# Patient Record
Sex: FEMALE | Race: WHITE | NOT HISPANIC OR LATINO | Employment: OTHER | URBAN - METROPOLITAN AREA
[De-identification: names, ages, dates, MRNs, and addresses within clinical notes are randomized per-mention and may not be internally consistent; named-entity substitution may affect disease eponyms.]

---

## 2020-06-08 ENCOUNTER — TELEPHONE (OUTPATIENT)
Dept: FAMILY MEDICINE CLINIC | Facility: CLINIC | Age: 50
End: 2020-06-08

## 2020-06-09 NOTE — TELEPHONE ENCOUNTER
Mother called on behalf of this patient, mom states that she has a appointment with melly cordero 06/09/2020 did not mention a a time. No information was given to mom at all. Mom states that her daughter vanessa was diagnosed 4 + years ago with bipolar issues, mom states that she draws a disability check but don't think that's the problem. She has also been diagnosed with spinal stenosis. 3 years ago she was suppose to have surgery for it to see if it would help then her mom had a stroke. Vanessa mom said that she didn't want to have the surgery because she needed to care for her mom and not her self so she canceled the surgery. She has seen a spine doctor in the past but not recently. She walks with a walker. Mom said 4 months ago vanessa has been sitting in the middle of her bed ross crossed and wont get up to do anything. She said she got up to go to the bathroom and she fell on the floor she said her leg came out from under neath her she couldn't get up they had to dial 911. She said that has happened twice since. Mom told vanessa that she cant be calling 911 every time she falls. Mom says every time she goes to the bathroom and calls for her daughter sonal to come in there (shes 17 yr old), she thinks that the daughter helps her wipe her butt because they go in there and shut the door. Mom thinks that she has something else going like depression. She knows that this is putting a toll on everyone in the family and she needs help. Mom says that she has diabetic, thyroid, and bipolar issues. Patient mom was informed to have patient call out office to verify her appointment. ( pt is not scheduled), would not tell any information to mom per hippa

## 2021-11-22 ENCOUNTER — TELEPHONE (OUTPATIENT)
Dept: FAMILY MEDICINE CLINIC | Facility: CLINIC | Age: 51
End: 2021-11-22

## 2021-11-29 ENCOUNTER — TELEMEDICINE (OUTPATIENT)
Dept: FAMILY MEDICINE CLINIC | Facility: CLINIC | Age: 51
End: 2021-11-29

## 2021-11-29 VITALS — BODY MASS INDEX: 41.48 KG/M2 | HEIGHT: 65 IN | WEIGHT: 249 LBS

## 2021-11-29 DIAGNOSIS — D50.8 IRON DEFICIENCY ANEMIA SECONDARY TO INADEQUATE DIETARY IRON INTAKE: ICD-10-CM

## 2021-11-29 DIAGNOSIS — E53.8 VITAMIN B12 DEFICIENCY: ICD-10-CM

## 2021-11-29 DIAGNOSIS — E54 VITAMIN C DEFICIENCY: ICD-10-CM

## 2021-11-29 DIAGNOSIS — E11.42 DIABETIC PERIPHERAL NEUROPATHY (HCC): ICD-10-CM

## 2021-11-29 DIAGNOSIS — E03.9 ACQUIRED HYPOTHYROIDISM: ICD-10-CM

## 2021-11-29 DIAGNOSIS — Z11.59 ENCOUNTER FOR HEPATITIS C SCREENING TEST FOR LOW RISK PATIENT: ICD-10-CM

## 2021-11-29 DIAGNOSIS — B37.31 VAGINAL CANDIDIASIS: ICD-10-CM

## 2021-11-29 DIAGNOSIS — Z79.4 TYPE 2 DIABETES MELLITUS WITH DIABETIC POLYNEUROPATHY, WITH LONG-TERM CURRENT USE OF INSULIN (HCC): Primary | ICD-10-CM

## 2021-11-29 DIAGNOSIS — E61.1 IRON DEFICIENCY: ICD-10-CM

## 2021-11-29 DIAGNOSIS — E78.5 HYPERLIPIDEMIA, UNSPECIFIED HYPERLIPIDEMIA TYPE: ICD-10-CM

## 2021-11-29 DIAGNOSIS — M62.81 MUSCLE WEAKNESS: ICD-10-CM

## 2021-11-29 DIAGNOSIS — E55.9 VITAMIN D DEFICIENCY: ICD-10-CM

## 2021-11-29 DIAGNOSIS — E11.42 TYPE 2 DIABETES MELLITUS WITH DIABETIC POLYNEUROPATHY, WITH LONG-TERM CURRENT USE OF INSULIN (HCC): Primary | ICD-10-CM

## 2021-11-29 PROBLEM — M47.817 LUMBOSACRAL SPONDYLOSIS WITHOUT MYELOPATHY: Status: ACTIVE | Noted: 2017-02-24

## 2021-11-29 PROBLEM — M21.372 LEFT FOOT DROP: Status: ACTIVE | Noted: 2017-01-10

## 2021-11-29 PROBLEM — M54.32 SCIATICA OF LEFT SIDE: Status: ACTIVE | Noted: 2019-02-27

## 2021-11-29 PROBLEM — M48.061 SPINAL STENOSIS OF LUMBAR REGION: Status: ACTIVE | Noted: 2017-01-10

## 2021-11-29 PROCEDURE — 99214 OFFICE O/P EST MOD 30 MIN: CPT | Performed by: INTERNAL MEDICINE

## 2021-11-29 RX ORDER — INSULIN ASPART 100 [IU]/ML
INJECTION, SOLUTION INTRAVENOUS; SUBCUTANEOUS
Qty: 6 PEN | Refills: 4 | Status: SHIPPED | OUTPATIENT
Start: 2021-11-29

## 2021-11-29 RX ORDER — FLUCONAZOLE 100 MG/1
100 TABLET ORAL DAILY
Qty: 2 TABLET | Refills: 0 | Status: SHIPPED | OUTPATIENT
Start: 2021-11-29 | End: 2022-02-21

## 2021-11-29 RX ORDER — ATORVASTATIN CALCIUM 80 MG/1
80 TABLET, FILM COATED ORAL DAILY
Qty: 30 TABLET | Refills: 4 | Status: SHIPPED | OUTPATIENT
Start: 2021-11-29 | End: 2022-03-29 | Stop reason: SDUPTHER

## 2021-11-29 RX ORDER — EZETIMIBE 10 MG/1
TABLET ORAL
COMMUNITY
End: 2021-11-29 | Stop reason: SDUPTHER

## 2021-11-29 RX ORDER — EZETIMIBE 10 MG/1
10 TABLET ORAL DAILY
Qty: 30 TABLET | Refills: 4 | Status: SHIPPED | OUTPATIENT
Start: 2021-11-29 | End: 2022-05-02

## 2021-11-29 RX ORDER — LEVOTHYROXINE SODIUM 0.05 MG/1
50 TABLET ORAL DAILY
COMMUNITY
End: 2021-11-29 | Stop reason: SDUPTHER

## 2021-11-29 RX ORDER — INSULIN DEGLUDEC 200 U/ML
60 INJECTION, SOLUTION SUBCUTANEOUS DAILY
Qty: 3 PEN | Refills: 4 | Status: SHIPPED | OUTPATIENT
Start: 2021-11-29

## 2021-11-29 RX ORDER — INSULIN DEGLUDEC 200 U/ML
INJECTION, SOLUTION SUBCUTANEOUS
COMMUNITY
Start: 2021-10-04 | End: 2021-11-29 | Stop reason: SDUPTHER

## 2021-11-29 RX ORDER — LEVOTHYROXINE SODIUM 0.2 MG/1
200 TABLET ORAL DAILY
Qty: 30 TABLET | Refills: 4 | Status: SHIPPED | OUTPATIENT
Start: 2021-11-29 | End: 2022-03-29 | Stop reason: SDUPTHER

## 2021-11-29 RX ORDER — LEVOTHYROXINE SODIUM 0.2 MG/1
200 TABLET ORAL DAILY
COMMUNITY
End: 2021-11-29 | Stop reason: SDUPTHER

## 2021-11-29 RX ORDER — LEVOTHYROXINE SODIUM 0.05 MG/1
50 TABLET ORAL DAILY
Qty: 30 TABLET | Refills: 4 | Status: SHIPPED | OUTPATIENT
Start: 2021-11-29 | End: 2021-12-20 | Stop reason: SDUPTHER

## 2021-11-29 RX ORDER — ATORVASTATIN CALCIUM 80 MG/1
TABLET, FILM COATED ORAL
COMMUNITY
End: 2021-11-29 | Stop reason: SDUPTHER

## 2021-11-29 NOTE — PROGRESS NOTES
Subjective   Vanessa Root is a 51 y.o. female.     Chief Complaint   Patient presents with   • Diabetes     switch to pens, medication refill    • Vaginitis       History of Present Illness   You have chosen to receive care through a telehealth visit.  Do you consent to use a video/audio connection for your medical care today? Yes  Video visit completed utilizing [employer] video conferencing.  Patient is located within her home in [facility] and physician is located within the office in [facility]    Patient reestablishing physician.  Has been having weakness in the legs for over 2 years that was progressive.  Was hospitalized in [facility] 2020 with low vit B12 and was in rehab with little improvement.  Since home has had 2 rounds of in home PT.  Has been out of her meds for the last 2 months other than the insulin.  Last labs done about 5 months ago with [facility].  Has been seen by physical med/rehab and awaiting [facility] rehab in patient.  Basically is bedridden with weakness in the legs and numbness in the legs with poor balance.    Here for follow-up of diabetes.  Patient states to have been compliant with medications.  Blood sugar monitoring - patient states has been running on average over 200's.  With a range of 83 - 300.  No episodes of hypoglycemia, nausea, vomiting, new rashes, syncope or other issues.  Denies any difficulties with the current medication regimen of tresiba 60 mg daily and novolog sliding scale of 10-15 Units with each meal.  Januvia 100 mg and metformin 1000 mg BID.    Follow-up for thyroid.  Denies fatigue, weakness, constipation/diarrhea, hair/skin changes, weight gain/loss, depression/anxiety, rashes, palpitations, swelling, chest pain, shortness of breath or other issues.  Has been compliant with taking the medication of levothyroxine 250 mcg with no recent changes.  Denies any difficulty with the current medication.  Is due for  labs.    Follow-up for cholesterol.  Currently, has been feeling well without any myalgias, muscle aches, weakness, numbness, chest pain, short of breath or other issues.  Currently, is adherent with medication regimen of zetia 10 mg and atorvastatin 80 mg and denies medication side effects. Is due for lab follow-up.    History of WILLIS and using CPAP at night.    Patient is     The following portions of the patient's history were reviewed and updated as appropriate: allergies, current medications, past family history, past medical history, past social history, past surgical history and problem list.    Depression Screen:  No flowsheet data found.    Past Medical History:   Diagnosis Date   • Arthritis    • Bipolar 1 disorder (HCC)    • Cancer (HCC)     uterine   • Depression    • Diabetes mellitus (HCC)    • Frequent UTI    • GERD (gastroesophageal reflux disease)    • Hyperlipidemia    • Migraine    • Murmur, heart    • Ovarian cyst    • Stroke (HCC)        Past Surgical History:   Procedure Laterality Date   •  SECTION  2003   • HYSTERECTOMY     • TONSILLECTOMY         Family History   Problem Relation Age of Onset   • Arthritis Mother    • Diabetes Mother    • Migraines Mother    • Stroke Mother    • Arthritis Father    • Cancer Maternal Grandmother    • Thyroid disease Maternal Grandmother    • Cancer Maternal Grandfather    • Cancer Paternal Grandmother    • Thyroid disease Paternal Grandmother    • Cancer Paternal Grandfather        Social History     Socioeconomic History   • Marital status: Single   Tobacco Use   • Smoking status: Former Smoker   • Smokeless tobacco: Never Used   Substance and Sexual Activity   • Alcohol use: Never   • Drug use: Never   • Sexual activity: Defer       Current Outpatient Medications   Medication Sig Dispense Refill   • atorvastatin (LIPITOR) 80 MG tablet Take 1 tablet by mouth Daily. 30 tablet 4   • ezetimibe (ZETIA) 10 MG tablet Take 1 tablet by mouth Daily. 30  tablet 4   • levothyroxine (SYNTHROID, LEVOTHROID) 200 MCG tablet Take 1 tablet by mouth Daily. 30 tablet 4   • levothyroxine (SYNTHROID, LEVOTHROID) 50 MCG tablet Take 1 tablet by mouth Daily. 30 tablet 4   • metFORMIN (GLUCOPHAGE) 1000 MG tablet Take 1 tablet by mouth 2 (Two) Times a Day With Meals. 60 tablet 4   • SITagliptin (Januvia) 100 MG tablet Take 1 tablet by mouth Daily. 30 tablet 5   • Tresiba FlexTouch 200 UNIT/ML solution pen-injector pen injection Inject 60 Units under the skin into the appropriate area as directed Daily. 3 pen 4   • fluconazole (Diflucan) 100 MG tablet Take 1 tablet by mouth Daily. 2 tablet 0   • insulin aspart (NovoLOG FlexPen) 100 UNIT/ML solution pen-injector sc pen Use as directed up to 16 U with each meal 6 pen 4   • Insulin Pen Needle 32G X 4 MM misc Use as directed with insulin with meals and at night 200 each 4     No current facility-administered medications for this visit.       Review of Systems   Constitutional: Negative for activity change, appetite change, fatigue, fever, unexpected weight gain and unexpected weight loss.   HENT: Negative for nosebleeds, rhinorrhea, trouble swallowing and voice change.    Eyes: Positive for visual disturbance.   Respiratory: Negative for cough, chest tightness, shortness of breath and wheezing.    Cardiovascular: Negative for chest pain, palpitations and leg swelling.   Gastrointestinal: Negative for abdominal pain, blood in stool, constipation, diarrhea, nausea, vomiting, GERD and indigestion.   Endocrine: Positive for polyuria.   Genitourinary: Negative for dysuria, frequency and hematuria.        Vaginal itching   Musculoskeletal: Positive for back pain and gait problem. Negative for arthralgias and myalgias.   Skin: Negative for rash and bruise.   Neurological: Positive for weakness and numbness. Negative for dizziness, tremors, seizures, syncope, light-headedness, headache and memory problem.   Hematological: Negative for  "adenopathy. Does not bruise/bleed easily.   Psychiatric/Behavioral: Negative for sleep disturbance and depressed mood. The patient is not nervous/anxious.        Objective   Ht 165.1 cm (65\")   Wt 113 kg (249 lb)   BMI 41.44 kg/m²     Physical Exam   Constitutional: She appears well-developed. No distress.   Obese habitus   HENT:   Head: Normocephalic and atraumatic.   Eyes: Pupils are equal, round, and reactive to light. Conjunctivae and EOM are normal.   Pulmonary/Chest: Effort normal.  No respiratory distress.  Neurological: She is alert.   Skin: She is not diaphoretic.   Psychiatric: She has a normal mood and affect. Her behavior is normal. Thought content is normal. She does not express abnormal judgement.       No results found for this or any previous visit (from the past 2016 hour(s)).  Assessment/Plan   Diagnoses and all orders for this visit:    1. Type 2 diabetes mellitus with diabetic polyneuropathy, with long-term current use of insulin (HCC) (Primary)  -     Hemoglobin A1c; Future  -     Comprehensive Metabolic Panel; Future  -     Lipid Panel; Future  -     Microalbumin / Creatinine Urine Ratio - Urine, Clean Catch; Future  -     SITagliptin (Januvia) 100 MG tablet; Take 1 tablet by mouth Daily.  Dispense: 30 tablet; Refill: 5  -     metFORMIN (GLUCOPHAGE) 1000 MG tablet; Take 1 tablet by mouth 2 (Two) Times a Day With Meals.  Dispense: 60 tablet; Refill: 4  -     Tresiba FlexTouch 200 UNIT/ML solution pen-injector pen injection; Inject 60 Units under the skin into the appropriate area as directed Daily.  Dispense: 3 pen; Refill: 4  -     insulin aspart (NovoLOG FlexPen) 100 UNIT/ML solution pen-injector sc pen; Use as directed up to 16 U with each meal  Dispense: 6 pen; Refill: 4  -     Insulin Pen Needle 32G X 4 MM misc; Use as directed with insulin with meals and at night  Dispense: 200 each; Refill: 4    2. Hyperlipidemia, unspecified hyperlipidemia type  -     Comprehensive Metabolic Panel; " Future  -     Lipid Panel; Future  -     ezetimibe (ZETIA) 10 MG tablet; Take 1 tablet by mouth Daily.  Dispense: 30 tablet; Refill: 4  -     atorvastatin (LIPITOR) 80 MG tablet; Take 1 tablet by mouth Daily.  Dispense: 30 tablet; Refill: 4    3. Acquired hypothyroidism  -     TSH; Future  -     T4, Free; Future  -     levothyroxine (SYNTHROID, LEVOTHROID) 50 MCG tablet; Take 1 tablet by mouth Daily.  Dispense: 30 tablet; Refill: 4  -     levothyroxine (SYNTHROID, LEVOTHROID) 200 MCG tablet; Take 1 tablet by mouth Daily.  Dispense: 30 tablet; Refill: 4    4. Vitamin B12 deficiency  -     Vitamin B12 & Folate; Future    5. Vitamin D deficiency  -     Vitamin D 25 Hydroxy; Future    6. Vitamin C deficiency  -     Vitamin C; Future    7. Iron deficiency anemia secondary to inadequate dietary iron intake  -     CBC & Differential  -     Iron and TIBC; Future  -     Ferritin; Future    8. Iron deficiency  -     CBC & Differential  -     Iron and TIBC; Future  -     Ferritin; Future    9. Diabetic peripheral neuropathy (HCC)    10. Encounter for hepatitis C screening test for low risk patient  -     Hepatitis C Antibody; Future    11. Vaginal candidiasis  -     fluconazole (Diflucan) 100 MG tablet; Take 1 tablet by mouth Daily.  Dispense: 2 tablet; Refill: 0    12. Muscle weakness    We will try to obtain past medical history and labs from  internal medicine office.  Also try and obtain the MRIs that apparently were done at [facility].  Patient has care tenders that is scheduled to be coming to see her.  We will see if we can have them perform labs as she is unable to leave the home have labs done.  She will need labs including A1c, B12, vitamin C, vitamin D, CMP, lipid panel, microalbumin, hepatitis C screen, iron studies and CBC.  I discussed with her the importance of controlling her blood sugars and taking her medications.  We will restart all medications and likely repeat the labs in 3 months.  Hopefully,  she will be able to get in with the [facility] rehab as is the plan with her physical medicine rehab physician.  I did recommend that she follow-up with the eye doctor and proper foot care to be performed at home.  Colon cancer screening will not be done until she is 52 as she has Medicare and the only covered 50 and older.      Video visit completed.     I spent 64 minutes caring for Vanessa on this date of service. This time includes time spent by me in the following activities:preparing for the visit, obtaining and/or reviewing a separately obtained history, performing a medically appropriate examination and/or evaluation , counseling and educating the patient/family/caregiver, ordering medications, tests, or procedures, referring and communicating with other health care professionals  and documenting information in the medical record

## 2021-12-10 ENCOUNTER — TELEPHONE (OUTPATIENT)
Dept: FAMILY MEDICINE CLINIC | Facility: CLINIC | Age: 51
End: 2021-12-10

## 2021-12-10 NOTE — TELEPHONE ENCOUNTER
[facility]ren low [facility] went for pt evaluation on 12/09/2021 and is needing verbal orders for skilled nursing visits 1 time per week for 5 weeks    [facility] can be contacted at   [phone]

## 2021-12-20 ENCOUNTER — TELEPHONE (OUTPATIENT)
Dept: FAMILY MEDICINE CLINIC | Facility: CLINIC | Age: 51
End: 2021-12-20

## 2021-12-20 DIAGNOSIS — E03.9 ACQUIRED HYPOTHYROIDISM: ICD-10-CM

## 2021-12-20 DIAGNOSIS — E55.9 VITAMIN D DEFICIENCY: Primary | ICD-10-CM

## 2021-12-20 RX ORDER — CHOLECALCIFEROL (VITAMIN D3) 1250 MCG
50000 CAPSULE ORAL
Qty: 12 CAPSULE | Refills: 3 | Status: SHIPPED | OUTPATIENT
Start: 2021-12-20

## 2021-12-20 RX ORDER — LEVOTHYROXINE SODIUM 0.07 MG/1
TABLET ORAL
Qty: 30 TABLET | Refills: 11 | Status: SHIPPED | OUTPATIENT
Start: 2021-12-20

## 2021-12-20 NOTE — TELEPHONE ENCOUNTER
Patient states she has been off of medication for 2 months.  She said she used to get it from her home health dr but she hadn't seen him in a while so she hasn't been able to take it.  She said when she was on it, she was on 250 so she is confused about the dosage.  Also, she says she hasn't taken her diabetic medication for 2 months and was wondering about those results, but I didn't see any results in her labs.  Her number is [phone]

## 2021-12-20 NOTE — TELEPHONE ENCOUNTER
[facility]    low vit D, take VIT D ONE PILL Q WEEK, rx was called in   Also thyroid is uncontrolled and I increased her thyroid medication from 50 mcg to 75 mg, new rx was sent.   Discuss with  when he is back.

## 2021-12-28 ENCOUNTER — TELEMEDICINE (OUTPATIENT)
Dept: FAMILY MEDICINE CLINIC | Facility: CLINIC | Age: 51
End: 2021-12-28

## 2021-12-28 VITALS — BODY MASS INDEX: 41.44 KG/M2 | HEIGHT: 65 IN | TEMPERATURE: 97 F

## 2021-12-28 DIAGNOSIS — E83.51 HYPOCALCEMIA: ICD-10-CM

## 2021-12-28 DIAGNOSIS — E78.5 HYPERLIPIDEMIA, UNSPECIFIED HYPERLIPIDEMIA TYPE: ICD-10-CM

## 2021-12-28 DIAGNOSIS — E03.9 ACQUIRED HYPOTHYROIDISM: ICD-10-CM

## 2021-12-28 DIAGNOSIS — E54 VITAMIN C DEFICIENCY: ICD-10-CM

## 2021-12-28 DIAGNOSIS — E11.42 TYPE 2 DIABETES MELLITUS WITH DIABETIC POLYNEUROPATHY, WITH LONG-TERM CURRENT USE OF INSULIN (HCC): Primary | ICD-10-CM

## 2021-12-28 DIAGNOSIS — Z79.4 TYPE 2 DIABETES MELLITUS WITH DIABETIC POLYNEUROPATHY, WITH LONG-TERM CURRENT USE OF INSULIN (HCC): Primary | ICD-10-CM

## 2021-12-28 DIAGNOSIS — E55.9 VITAMIN D DEFICIENCY: ICD-10-CM

## 2021-12-28 PROCEDURE — 99214 OFFICE O/P EST MOD 30 MIN: CPT | Performed by: INTERNAL MEDICINE

## 2021-12-28 RX ORDER — MULTIVIT WITH MINERALS/LUTEIN
250 TABLET ORAL DAILY
Qty: 30 TABLET | Refills: 6 | Status: SHIPPED | OUTPATIENT
Start: 2021-12-28

## 2021-12-28 RX ORDER — FLUTICASONE PROPIONATE 50 MCG
2 SPRAY, SUSPENSION (ML) NASAL DAILY
Qty: 16 G | Refills: 6 | Status: SHIPPED | OUTPATIENT
Start: 2021-12-28

## 2021-12-28 RX ORDER — IBUPROFEN 800 MG/1
800 TABLET ORAL EVERY 6 HOURS PRN
Qty: 90 TABLET | Refills: 3 | Status: SHIPPED | OUTPATIENT
Start: 2021-12-28

## 2021-12-28 NOTE — PROGRESS NOTES
Subjective   Vanessa Root is a 51 y.o. female.     Chief Complaint   Patient presents with   • Hypothyroidism   • Pain       History of Present Illness   You have chosen to receive care through a telehealth visit.  Do you consent to use a video/audio connection for your medical care today? Yes  Video visit completed utilizing [employer] video conferencing. Most of the visit patient could see me but I can only see her intermittently. Audio connection was clear throughout  .  Patient is located within the home in [facility] and physician is located within the office in [facility].    Patient had been off the medications for at least 2 months prior to labs. Is now back on the medications as noted.    Here for follow-up of diabetes.  Patient states to have been compliant with medications.  Blood sugar monitoring - patient states has been running on average around 150.  With a range of 137-251.  No episodes of hypoglycemia, nausea, vomiting, new rashes, syncope or other issues.  Denies any difficulties with the current medication regimen of tresiba 60 mg daily and novolog sliding scale of 10-15 Units with each meal.  Januvia 100 mg and metformin 1000 mg BID.  Labs done on 12/9/2021 demonstrated a blood sugar of 284 but unfortunately A1c was not done.     Follow-up for thyroid.  Denies fatigue, weakness, constipation/diarrhea, hair/skin changes, weight gain/loss, depression/anxiety, rashes, palpitations, swelling, chest pain, shortness of breath or other issues.  Has been compliant with taking the medication of levothyroxine 250 mcg with no recent changes.  Denies any difficulty with the current medication.  Labs done on 12/9/2021 demonstrated TSH of 35.71 and a free T4 of 4.4.     Follow-up for cholesterol.  Currently, has been feeling well without any myalgias, muscle aches, weakness, numbness, chest pain, short of breath or other issues.  Currently, is adherent with medication regimen of zetia 10  mg and atorvastatin 80 mg and denies medication side effects.  Labs done on 2021 demonstrated an LDL of 47.8 with a total cholesterol 116, triglycerides 201, HDL 28.     History of WILLIS and using CPAP at night.    Patient with low calcium and vitamin D with calcium level of 8.2 and vitamin D level of 11 based on results from 2021 and a low vitamin C of 0.3.    Patient has started the physical therapy with only 2 visits to date and more to come.    The following portions of the patient's history were reviewed and updated as appropriate: allergies, current medications, past family history, past medical history, past social history, past surgical history and problem list.    Depression Screen:  No flowsheet data found.    Past Medical History:   Diagnosis Date   • Arthritis    • Bipolar 1 disorder (HCC)    • Cancer (HCC)     uterine   • Depression    • Diabetes mellitus (HCC)    • Frequent UTI    • GERD (gastroesophageal reflux disease)    • Hyperlipidemia    • Migraine    • Murmur, heart    • Ovarian cyst    • Stroke (HCC)        Past Surgical History:   Procedure Laterality Date   •  SECTION  2003   • HYSTERECTOMY     • TONSILLECTOMY         Family History   Problem Relation Age of Onset   • Arthritis Mother    • Diabetes Mother    • Migraines Mother    • Stroke Mother    • Arthritis Father    • Cancer Maternal Grandmother    • Thyroid disease Maternal Grandmother    • Cancer Maternal Grandfather    • Cancer Paternal Grandmother    • Thyroid disease Paternal Grandmother    • Cancer Paternal Grandfather        Social History     Socioeconomic History   • Marital status: Single   Tobacco Use   • Smoking status: Former Smoker   • Smokeless tobacco: Never Used   Substance and Sexual Activity   • Alcohol use: Never   • Drug use: Never   • Sexual activity: Defer       Current Outpatient Medications   Medication Sig Dispense Refill   • atorvastatin (LIPITOR) 80 MG tablet Take 1 tablet by mouth Daily.  30 tablet 4   • Cholecalciferol (Vitamin D3) 1.25 MG (66367 UT) capsule Take 1 capsule by mouth Every 7 (Seven) Days. 12 capsule 3   • ezetimibe (ZETIA) 10 MG tablet Take 1 tablet by mouth Daily. 30 tablet 4   • insulin aspart (NovoLOG FlexPen) 100 UNIT/ML solution pen-injector sc pen Use as directed up to 16 U with each meal 6 pen 4   • Insulin Pen Needle 32G X 4 MM misc Use as directed with insulin with meals and at night 200 each 4   • levothyroxine (SYNTHROID, LEVOTHROID) 200 MCG tablet Take 1 tablet by mouth Daily. 30 tablet 4   • levothyroxine (SYNTHROID, LEVOTHROID) 75 MCG tablet Take one a day. 30 tablet 11   • metFORMIN (GLUCOPHAGE) 1000 MG tablet Take 1 tablet by mouth 2 (Two) Times a Day With Meals. 60 tablet 4   • SITagliptin (Januvia) 100 MG tablet Take 1 tablet by mouth Daily. 30 tablet 5   • Tresiba FlexTouch 200 UNIT/ML solution pen-injector pen injection Inject 60 Units under the skin into the appropriate area as directed Daily. 3 pen 4   • fluconazole (Diflucan) 100 MG tablet Take 1 tablet by mouth Daily. 2 tablet 0   • fluticasone (Flonase) 50 MCG/ACT nasal spray 2 sprays into the nostril(s) as directed by provider Daily. 16 g 6   • ibuprofen (ADVIL,MOTRIN) 800 MG tablet Take 1 tablet by mouth Every 6 (Six) Hours As Needed for Mild Pain . Take with small amount of food. 90 tablet 3   • vitamin C (ASCORBIC ACID) 250 MG tablet Take 1 tablet by mouth Daily. 30 tablet 6     No current facility-administered medications for this visit.       Review of Systems   Constitutional: Negative for activity change, appetite change, fatigue, fever, unexpected weight gain and unexpected weight loss.   HENT: Negative for nosebleeds, rhinorrhea, trouble swallowing and voice change.    Eyes: Negative for visual disturbance.   Respiratory: Negative for cough, chest tightness, shortness of breath and wheezing.    Cardiovascular: Negative for chest pain, palpitations and leg swelling.   Gastrointestinal: Positive for  "constipation and diarrhea. Negative for abdominal pain, blood in stool, nausea, vomiting, GERD and indigestion.   Genitourinary: Negative for dysuria, frequency and hematuria.   Musculoskeletal: Negative for arthralgias, back pain and myalgias.   Skin: Negative for rash and bruise.   Neurological: Positive for weakness. Negative for dizziness, tremors, light-headedness, numbness, headache and memory problem.   Hematological: Negative for adenopathy. Does not bruise/bleed easily.   Psychiatric/Behavioral: Negative for sleep disturbance and depressed mood. The patient is not nervous/anxious.        Objective   Temp 97 °F (36.1 °C)   Ht 165.1 cm (65\")   BMI 41.44 kg/m²     Physical Exam   Constitutional: She appears well-developed and well-nourished.   Pulmonary/Chest: Effort normal.  No respiratory distress.  Neurological: She is alert.   Psychiatric: She has a normal mood and affect. Her behavior is normal. Thought content is normal. She does not express abnormal judgement.       No results found for this or any previous visit (from the past 2016 hour(s)).  Assessment/Plan   Diagnoses and all orders for this visit:    1. Type 2 diabetes mellitus with diabetic polyneuropathy, with long-term current use of insulin (HCC) (Primary)    2. Hyperlipidemia, unspecified hyperlipidemia type    3. Acquired hypothyroidism    4. Vitamin D deficiency    5. Hypocalcemia    6. Vitamin C deficiency  -     vitamin C (ASCORBIC ACID) 250 MG tablet; Take 1 tablet by mouth Daily.  Dispense: 30 tablet; Refill: 6    Other orders  -     ibuprofen (ADVIL,MOTRIN) 800 MG tablet; Take 1 tablet by mouth Every 6 (Six) Hours As Needed for Mild Pain . Take with small amount of food.  Dispense: 90 tablet; Refill: 3  -     fluticasone (Flonase) 50 MCG/ACT nasal spray; 2 sprays into the nostril(s) as directed by provider Daily.  Dispense: 16 g; Refill: 6    We discussed at length her history including her labs. Type 2 diabetes uncontrolled but has " not been on her medications just like her thyroid vitamin D etc. We will see how she is when she is taking these on a more consistent basis. We discussed her movement and inactivity is likely cause underlying causes for many of her issues. I encouraged her and strongly to get up and move and hopefully the next time we can see her standing.    Noted low vitamin C and calcium levels. I recommend that she take vitamin C for which prescription sent to the pharmacy and a calcium supplement over-the-counter. Continue the vitamin D and thyroid hormone that she is now receiving.    Continue the current medications and encouraged diet and exercise as much as possible.  Continue the PT and home health.    Recheck the labs in 3 months for the A1c, CMP, lipid, vit D, Vit C, TSH, and Free T4.    Video visit completed.     I spent 37 minutes caring for Vanessa on this date of service. This time includes time spent by me in the following activities:preparing for the visit, reviewing tests, obtaining and/or reviewing a separately obtained history, counseling and educating the patient/family/caregiver, ordering medications, tests, or procedures and documenting information in the medical record

## 2021-12-29 ENCOUNTER — TELEPHONE (OUTPATIENT)
Dept: FAMILY MEDICINE CLINIC | Facility: CLINIC | Age: 51
End: 2021-12-29

## 2021-12-29 RX ORDER — CALCIUM CARB/VITAMIN D3/VIT K1 500-100-40
TABLET,CHEWABLE ORAL
COMMUNITY
Start: 2021-12-28

## 2022-02-17 DIAGNOSIS — B37.31 VAGINAL CANDIDIASIS: ICD-10-CM

## 2022-02-18 DIAGNOSIS — B37.31 VAGINAL CANDIDIASIS: ICD-10-CM

## 2022-02-18 RX ORDER — FLUCONAZOLE 100 MG/1
100 TABLET ORAL DAILY
Qty: 2 TABLET | Refills: 0 | OUTPATIENT
Start: 2022-02-18

## 2022-02-18 NOTE — TELEPHONE ENCOUNTER
Rx Refill Note  Requested Prescriptions     Refused Prescriptions Disp Refills   • fluconazole (DIFLUCAN) 100 MG tablet [Pharmacy Med Name: FLUCONAZOLE 100MG TABLETS] 2 tablet 0     Sig: TAKE 1 TABLET BY MOUTH DAILY     Refused By: ALPA BAH     Reason for Refusal: Other      Last office visit with prescribing clinician: Visit date not found      Next office visit with prescribing clinician: 2/18/2022            Alpa Bah MA  02/18/22, 13:39 EST

## 2022-02-19 DIAGNOSIS — B37.31 VAGINAL CANDIDIASIS: ICD-10-CM

## 2022-02-20 DIAGNOSIS — B37.31 VAGINAL CANDIDIASIS: ICD-10-CM

## 2022-02-21 DIAGNOSIS — B37.31 VAGINAL CANDIDIASIS: ICD-10-CM

## 2022-02-21 RX ORDER — FLUCONAZOLE 100 MG/1
100 TABLET ORAL DAILY
Qty: 2 TABLET | Refills: 0 | OUTPATIENT
Start: 2022-02-21

## 2022-02-21 RX ORDER — FLUCONAZOLE 100 MG/1
100 TABLET ORAL DAILY
Qty: 2 TABLET | Refills: 0 | Status: SHIPPED | OUTPATIENT
Start: 2022-02-21

## 2022-03-29 ENCOUNTER — TELEMEDICINE (OUTPATIENT)
Dept: FAMILY MEDICINE CLINIC | Facility: CLINIC | Age: 52
End: 2022-03-29

## 2022-03-29 DIAGNOSIS — K21.9 GASTROESOPHAGEAL REFLUX DISEASE, UNSPECIFIED WHETHER ESOPHAGITIS PRESENT: ICD-10-CM

## 2022-03-29 DIAGNOSIS — M62.81 MUSCLE WEAKNESS: ICD-10-CM

## 2022-03-29 DIAGNOSIS — E03.9 ACQUIRED HYPOTHYROIDISM: ICD-10-CM

## 2022-03-29 DIAGNOSIS — E11.42 TYPE 2 DIABETES MELLITUS WITH DIABETIC POLYNEUROPATHY, WITH LONG-TERM CURRENT USE OF INSULIN: ICD-10-CM

## 2022-03-29 DIAGNOSIS — I63.9 CEREBROVASCULAR ACCIDENT (CVA), UNSPECIFIED MECHANISM: ICD-10-CM

## 2022-03-29 DIAGNOSIS — Z79.4 TYPE 2 DIABETES MELLITUS WITH DIABETIC POLYNEUROPATHY, WITH LONG-TERM CURRENT USE OF INSULIN: ICD-10-CM

## 2022-03-29 DIAGNOSIS — E55.9 VITAMIN D DEFICIENCY: Primary | ICD-10-CM

## 2022-03-29 DIAGNOSIS — E54 VITAMIN C DEFICIENCY: ICD-10-CM

## 2022-03-29 DIAGNOSIS — M21.372 LEFT FOOT DROP: ICD-10-CM

## 2022-03-29 DIAGNOSIS — Z74.09 IMMOBILITY: ICD-10-CM

## 2022-03-29 DIAGNOSIS — E66.01 MORBID OBESITY: ICD-10-CM

## 2022-03-29 DIAGNOSIS — E78.5 HYPERLIPIDEMIA, UNSPECIFIED HYPERLIPIDEMIA TYPE: ICD-10-CM

## 2022-03-29 PROBLEM — C55 MALIGNANT NEOPLASM OF UTERUS: Status: ACTIVE | Noted: 2022-03-29

## 2022-03-29 PROCEDURE — 99214 OFFICE O/P EST MOD 30 MIN: CPT | Performed by: INTERNAL MEDICINE

## 2022-03-29 RX ORDER — LEVOTHYROXINE SODIUM 0.2 MG/1
200 TABLET ORAL DAILY
Qty: 30 TABLET | Refills: 4 | Status: SHIPPED | OUTPATIENT
Start: 2022-03-29

## 2022-03-29 RX ORDER — ATORVASTATIN CALCIUM 80 MG/1
80 TABLET, FILM COATED ORAL DAILY
Qty: 30 TABLET | Refills: 4 | Status: SHIPPED | OUTPATIENT
Start: 2022-03-29

## 2022-03-29 RX ORDER — PANTOPRAZOLE SODIUM 40 MG/1
40 TABLET, DELAYED RELEASE ORAL DAILY
Qty: 30 TABLET | Refills: 3 | Status: SHIPPED | OUTPATIENT
Start: 2022-03-29 | End: 2022-08-10

## 2022-03-29 NOTE — PROGRESS NOTES
Subjective   Vanessa Root is a 52 y.o. female.     Chief Complaint   Patient presents with   • Home Health Care       History of Present Illness   You have chosen to receive care through a telehealth visit.  Do you consent to use a video/audio connection for your medical care today? Yes  Video visit completed utilizing  doximity but had to be changed to audio only.  Patient is located within the home in [facility] and physician is located within the office in [facility].    Unable to complete visit using a video connection to the patient. A phone visit was used to complete this visits. Total time of discussion was 40 minutes.    Patient still continues with difficulties with strength and not able to get out of bed.  She is homebound secondary to immobility.  She is unable to stand and walk on her own.  She requires medical transport for any kind of leaving of the home.  She has weakness throughout ever since she had a CVA per patient history.  She is try to get in the [facility] rehab but she has not been able to get in.  She is asking for help and so is the family.    Here for follow-up of diabetes.  Patient states to have been compliant with medications.  Blood sugar monitoring - patient states has been running on average around 150.  With a range of .  No episodes of hypoglycemia, nausea, vomiting, new rashes, syncope or other issues.  Denies any difficulties with the current medication regimen of tresiba 45 U daily and novolog sliding scale of 10 Units with each meal.  Januvia 100 mg and metformin 1000 mg BID.  Labs done on 12/9/2021 demonstrated a blood sugar of 284 but unfortunately A1c was not done.     Follow-up for thyroid.  Denies fatigue, weakness, constipation/diarrhea, hair/skin changes, weight gain/loss, depression/anxiety, rashes, palpitations, swelling, chest pain, shortness of breath or other issues.  Has been compliant with taking the medication of levothyroxine 275  mcg with no recent changes since 2021.  Denies any difficulty with the current medication.  Labs done on 2021 demonstrated TSH of 35.71 and a free T4 of 4.4.     Follow-up for cholesterol.  Currently, has been feeling well without any myalgias, muscle aches, weakness, numbness, chest pain, short of breath or other issues.  Currently, is adherent with medication regimen of zetia 10 mg and atorvastatin 80 mg and denies medication side effects.  Labs done on 2021 demonstrated an LDL of 47.8 with a total cholesterol 116, triglycerides 201, HDL 28.     History of WILLIS and using CPAP at night.     Patient with low calcium and vitamin D with calcium level of 8.2 and vitamin D level of 11 based on results from 2021 and a low vitamin C of 0.3.  Is currently on the vitamin D 50,000 units weekly and the vitamin C 250 mg daily     History of persisting GERD and is not taking any medications at this time.  Is asking for medication.    The following portions of the patient's history were reviewed and updated as appropriate: allergies, current medications, past family history, past medical history, past social history, past surgical history and problem list.    Depression Screen:  No flowsheet data found.    Past Medical History:   Diagnosis Date   • Arthritis    • Bipolar 1 disorder (HCC)    • Cancer (HCC)     uterine   • Depression    • Diabetes mellitus (HCC)    • Frequent UTI    • GERD (gastroesophageal reflux disease)    • Hyperlipidemia    • Migraine    • Murmur, heart    • Ovarian cyst    • Stroke (HCC)        Past Surgical History:   Procedure Laterality Date   •  SECTION  2003   • HYSTERECTOMY     • TONSILLECTOMY         Family History   Problem Relation Age of Onset   • Arthritis Mother    • Diabetes Mother    • Migraines Mother    • Stroke Mother    • Arthritis Father    • Cancer Maternal Grandmother    • Thyroid disease Maternal Grandmother    • Cancer Maternal Grandfather    • Cancer  "Paternal Grandmother    • Thyroid disease Paternal Grandmother    • Cancer Paternal Grandfather        Social History     Socioeconomic History   • Marital status: Single   Tobacco Use   • Smoking status: Former Smoker   • Smokeless tobacco: Never Used   Substance and Sexual Activity   • Alcohol use: Never   • Drug use: Never   • Sexual activity: Defer       Current Outpatient Medications   Medication Sig Dispense Refill   • atorvastatin (LIPITOR) 80 MG tablet Take 1 tablet by mouth Daily. 30 tablet 4   • levothyroxine (SYNTHROID, LEVOTHROID) 200 MCG tablet Take 1 tablet by mouth Daily. 30 tablet 4   • metFORMIN (GLUCOPHAGE) 1000 MG tablet Take 1 tablet by mouth 2 (Two) Times a Day With Meals. 60 tablet 4   • SITagliptin (Januvia) 100 MG tablet Take 1 tablet by mouth Daily. 30 tablet 5   • Cholecalciferol (Vitamin D3) 1.25 MG (22731 UT) capsule Take 1 capsule by mouth Every 7 (Seven) Days. 12 capsule 3   • ezetimibe (ZETIA) 10 MG tablet Take 1 tablet by mouth Daily. 30 tablet 4   • fluconazole (DIFLUCAN) 100 MG tablet TAKE 1 TABLET BY MOUTH DAILY 2 tablet 0   • fluticasone (Flonase) 50 MCG/ACT nasal spray 2 sprays into the nostril(s) as directed by provider Daily. 16 g 6   • ibuprofen (ADVIL,MOTRIN) 800 MG tablet Take 1 tablet by mouth Every 6 (Six) Hours As Needed for Mild Pain . Take with small amount of food. 90 tablet 3   • insulin aspart (NovoLOG FlexPen) 100 UNIT/ML solution pen-injector sc pen Use as directed up to 16 U with each meal 6 pen 4   • Insulin Pen Needle 32G X 4 MM misc Use as directed with insulin with meals and at night 200 each 4   • Insulin Syringe 31G X 5/16\" 1 ML misc      • levothyroxine (SYNTHROID, LEVOTHROID) 75 MCG tablet Take one a day. 30 tablet 11   • pantoprazole (PROTONIX) 40 MG EC tablet Take 1 tablet by mouth Daily. Take prior to dinner/evening mealtime 30 tablet 3   • Tresiba FlexTouch 200 UNIT/ML solution pen-injector pen injection Inject 60 Units under the skin into the " appropriate area as directed Daily. 3 pen 4   • vitamin C (ASCORBIC ACID) 250 MG tablet Take 1 tablet by mouth Daily. 30 tablet 6     No current facility-administered medications for this visit.       Review of Systems   Constitutional: Positive for fatigue. Negative for activity change, appetite change, fever, unexpected weight gain and unexpected weight loss.   HENT: Negative for nosebleeds, rhinorrhea, trouble swallowing and voice change.    Eyes: Negative for visual disturbance.   Respiratory: Negative for cough, chest tightness, shortness of breath and wheezing.    Cardiovascular: Negative for chest pain, palpitations and leg swelling.   Gastrointestinal: Positive for GERD. Negative for abdominal pain, blood in stool, constipation, diarrhea, nausea, vomiting and indigestion.   Genitourinary: Negative for dysuria, frequency and hematuria.   Musculoskeletal: Negative for arthralgias, back pain and myalgias.   Skin: Negative for rash and wound.   Neurological: Positive for weakness. Negative for dizziness, tremors, light-headedness, numbness, headache and memory problem.   Hematological: Negative for adenopathy. Does not bruise/bleed easily.   Psychiatric/Behavioral: Negative for sleep disturbance and depressed mood. The patient is not nervous/anxious.        Objective   There were no vitals taken for this visit.    Physical Exam   Constitutional:   Severely limited secondary audio only visit   Pulmonary/Chest: Effort normal.  No respiratory distress.  Neurological: She is alert.   Psychiatric: She has a normal mood and affect. Her behavior is normal. Thought content is normal. She does not express abnormal judgement.       No results found for this or any previous visit (from the past 2016 hour(s)).  Assessment/Plan   Diagnoses and all orders for this visit:    1. Vitamin D deficiency (Primary)  -     Vitamin D 25 Hydroxy; Future    2. Hyperlipidemia, unspecified hyperlipidemia type  -     atorvastatin (LIPITOR)  80 MG tablet; Take 1 tablet by mouth Daily.  Dispense: 30 tablet; Refill: 4  -     Comprehensive Metabolic Panel; Future  -     Lipid Panel; Future    3. Acquired hypothyroidism  -     levothyroxine (SYNTHROID, LEVOTHROID) 200 MCG tablet; Take 1 tablet by mouth Daily.  Dispense: 30 tablet; Refill: 4  -     TSH; Future  -     T4, Free; Future    4. Type 2 diabetes mellitus with diabetic polyneuropathy, with long-term current use of insulin (HCC)  -     metFORMIN (GLUCOPHAGE) 1000 MG tablet; Take 1 tablet by mouth 2 (Two) Times a Day With Meals.  Dispense: 60 tablet; Refill: 4  -     SITagliptin (Januvia) 100 MG tablet; Take 1 tablet by mouth Daily.  Dispense: 30 tablet; Refill: 5  -     Comprehensive Metabolic Panel; Future  -     Lipid Panel; Future  -     Hemoglobin A1c; Future    5. Vitamin C deficiency  -     Vitamin C; Future    6. Gastroesophageal reflux disease, unspecified whether esophagitis present  -     pantoprazole (PROTONIX) 40 MG EC tablet; Take 1 tablet by mouth Daily. Take prior to dinner/evening mealtime  Dispense: 30 tablet; Refill: 3    7. Muscle weakness  -     Ambulatory Referral to Home Health  -     Ambulatory Referral to Neurology    8. Cerebrovascular accident (CVA), unspecified mechanism (HCC)  -     Ambulatory Referral to Home Health  -     Ambulatory Referral to Neurology    9. Left foot drop  -     Ambulatory Referral to Home Health  -     Ambulatory Referral to Neurology    10. Immobility  -     Ambulatory Referral to Home Health  -     Ambulatory Referral to Neurology    11. Morbid obesity (HCC)  -     Ambulatory Referral to Home Health    Long discussion with patient concerning her care.  She requires multiple labs which have been ordered.  We are still trying to control her blood sugars which are uncontrolled with her diabetes.  We will see if we need home health to draw the blood and for the nursing to evaluate and for diabetic education.   as well to be involved for  trying to place patient in a rehab facility secondary to patient's immobility that is severe.  We will start pantoprazole for GERD symptoms.  And await lab results as above.      Video visit completed.  Time of visit in discussion and care of patient and one-on-one care not including charting time of 40 minutes.     I spent 40 minutes caring for Vanessa on this date of service. This time includes time spent by me in the following activities:preparing for the visit, reviewing tests, obtaining and/or reviewing a separately obtained history, counseling and educating the patient/family/caregiver, ordering medications, tests, or procedures, referring and communicating with other health care professionals  and documenting information in the medical record

## 2022-05-02 DIAGNOSIS — E78.5 HYPERLIPIDEMIA, UNSPECIFIED HYPERLIPIDEMIA TYPE: ICD-10-CM

## 2022-05-02 RX ORDER — EZETIMIBE 10 MG/1
10 TABLET ORAL DAILY
Qty: 30 TABLET | Refills: 4 | Status: SHIPPED | OUTPATIENT
Start: 2022-05-02

## 2022-05-02 NOTE — TELEPHONE ENCOUNTER
Rx Refill Note  Requested Prescriptions     Pending Prescriptions Disp Refills   • ezetimibe (ZETIA) 10 MG tablet [Pharmacy Med Name: EZETIMIBE 10MG TABLETS] 30 tablet 4     Sig: TAKE 1 TABLET BY MOUTH DAILY      Last office visit with prescribing clinician: 3/29/22      Next office visit with prescribing clinician: Visit date not found          {TIP  Is Refill Pharmacy correct?:23}  Lauren Lindo MA  05/02/22, 16:13 EDT

## 2022-05-05 DIAGNOSIS — E03.9 ACQUIRED HYPOTHYROIDISM: ICD-10-CM

## 2022-05-05 RX ORDER — LEVOTHYROXINE SODIUM 0.05 MG/1
50 TABLET ORAL DAILY
Qty: 30 TABLET | Refills: 4 | OUTPATIENT
Start: 2022-05-05

## 2022-05-05 NOTE — TELEPHONE ENCOUNTER
Rx Refill Note  Requested Prescriptions     Pending Prescriptions Disp Refills   • levothyroxine (SYNTHROID, LEVOTHROID) 50 MCG tablet [Pharmacy Med Name: LEVOTHYROXINE 0.05MG (50MCG) TAB] 30 tablet 4     Sig: TAKE 1 TABLET BY MOUTH DAILY      Last office visit with prescribing clinician: 3/29/22     Next office visit with prescribing clinician: Visit date not found      Patient has been increased to 200 MCG

## 2022-05-17 ENCOUNTER — TELEPHONE (OUTPATIENT)
Dept: FAMILY MEDICINE CLINIC | Facility: CLINIC | Age: 52
End: 2022-05-17

## 2022-05-17 NOTE — TELEPHONE ENCOUNTER
I do not believe that we have ordered this.  And we cannot do a full body MRI unless we have a significant indication otherwise.

## 2022-05-17 NOTE — TELEPHONE ENCOUNTER
Caller: Vanessa Root    Relationship: Self    Best call back number: [phone]    What orders are you requesting (i.e. lab or imaging): A FULL MRI    In what timeframe would the patient need to come in: SHE ALREADY HAS AN APPOINTMENT ON June 7TH FOR A LUMBAR MRI, CAMI FROM [facility] TOLD PATIENT TO ASK IF PCP CAN ADD NEW MRI ORDER IN WITH THE LUMBAR MRI SO IT WILL BE ONE COST.     Where will you receive your lab/imaging services: [facility].     Additional notes: PLEASE ADVISE.

## 2022-05-18 NOTE — TELEPHONE ENCOUNTER
OK FOR HUB      [facility]    Patient needs to contact the provider who ordered the first scan of the lumbar spine. Spoke with [facility] imaging and they state the provider is Fina Sue she can be reached at [phone]

## 2022-05-18 NOTE — TELEPHONE ENCOUNTER
Patient would like Dr Batista to order the cervical & thoracic MRI to go along with the Lumbar MRI that Dr Sue has ordered, Dr Sue stated patient would benefit form the cervical & thoracic but she wanted PCP to do the add on order

## 2022-05-24 NOTE — TELEPHONE ENCOUNTER
History of Present Illness





- History of Present Illness


History of Present Illness: 


INFECTIOUS DISEASE CONSULT;


HPI.





63-year-old male with history of HTN, diabetes mellitus, arthritis,   right leg 

NONHEALING WOUND who presented to [facility] with abdominal pains, nausea 

and vomiting , diarrhea and sinus bradycardia with heart rate in 40s and 38.


Patient also has history off substance abuse and states he used heroin one week 

ago.


Infectious disease consult requested by PMD for a right leg wound which is drai

mansoor purulent material.


Patient complains of subjective fevers and chills.  Denies any headaches.


Patient was treated with appropriate cultures with Zosyn one dose and one dose 

of Flagyl 500 mg.


PATIENT DENIES COUGH OR SHORTNESS OF BREATH OR CHEST PAIN.


PATIENT STATES HE HAD AN APPOINTMENT WITH DR ISLAS, WHO GIVES HER oXYcONTIN FOR 

PAIN BUT BEFORE THE APPOINTMENT HE FELT SICK AND CAME TO THE ER WHERE HE WAS 

ADVISED ADMISSION.


PATIENT WAS ALSO FOUND ON CT OF THE ABDOMEN AND PELVIS WITH MILD THICKENING AND 

EDEMA RIGHT COLON SUGGESTIVE OF COLITIS.





PMH: Arthritis, Fractures (Right leg), HTN


Family History: States: No Known Family Hx





- Social History


Hx Alcohol Use: Yes


Hx Substance Use: Yes





- Immunization History


Hx Tetanus Toxoid Vaccination: No


Hx Influenza Vaccination: No


Hx Pneumococcal Vaccination: No








Review of Systems





- Constitutional


Constitutional: absent: Chills, Fever





- EENT


Nose/Mouth/Throat: absent: Mouth Lesions





- Cardiovascular


Cardiovascular: Slow Heart Rate.  absent: Chest Pain, Dyspnea





- Gastrointestinal


Gastrointestinal: Abdominal Pain, Diarrhea, Nausea, Vomiting





- Genitourinary


Genitourinary: absent: Dysuria





- Musculoskeletal


Musculoskeletal: As Per HPI, Abnormal Gait (WALKS WITH A CANE.)





- Neurological


Neurological: absent: Headaches





- Hematologic/Lymphatic


Hematologic: As Per HPI.  absent: Lymphadenopathy





Past Patient History





- Past Social History


Smoking Status: Heavy Smoker > 10 Cigarettes Daily





- CARDIAC


Hx Hypertension: Yes





- ENDOCRINE/METABOLIC


Hx Diabetes Mellitus Type 2: Yes





- MUSCULOSKELETAL/RHEUMATOLOGICAL


Hx Arthritis: Yes


Hx Fractures: Yes (Right leg)





- PSYCHIATRIC


Hx Substance Use: Yes





- SURGICAL HISTORY


Hx Orthopedic Surgery: Yes (Right leg)





- ANESTHESIA


Hx Anesthesia: Yes


Hx Anesthesia Reactions: No





Meds


Allergies/Adverse Reactions: 


                                    Allergies











Allergy/AdvReac Type Severity Reaction Status Date / Time


 


No Known Allergies Allergy   Verified 12/21/18 12:14














- Medications


Medications: 


                               Current Medications





Insulin Human Regular (Novolin R)  0 unit SC ACHS JARVIS; Protocol


Tramadol HCl (Ultram)  50 mg PO Q8 PRN


   PRN Reason: Pain, severe (8-10)











Physical Exam





- Constitutional


Appears: No Acute Distress


Additional comments: 





MORBIDLY OBESE





- Head Exam


Head Exam: NORMAL INSPECTION





- Eye Exam


Eye Exam: EOMI, PERRL





- ENT Exam


ENT Exam: Normal Oropharynx





- Neck Exam


Neck exam: Positive for: Normal Inspection





- Respiratory Exam


Respiratory Exam: Clear to Auscultation Bilateral, NORMAL BREATHING PATTERN





- Cardiovascular Exam


Cardiovascular Exam: Bradycardia, +S1, +S2 (HR 42-38/MIN)





- GI/Abdominal Exam


GI & Abdominal Exam: Normal Bowel Sounds, Soft, Tenderness (EPIGASTRIUM,MID 

ABDOMEN)





- Extremities Exam


Extremities exam: Positive for: pedal pulses present (WEAK. LINEAR ULCERATION 

7CM LONG ANTERIOR SHIN.SERO-SANGUINOUS DRAINAGE +VE RT LE).  Negative for: calf 

tenderness, pedal edema





- Neurological Exam


Neurological exam: Abnormal Gait, Alert, CN II-XII Intact, Oriented x3, Reflexes

Normal (AMBULATES WITH CANE.)





- Skin


Skin Exam: Normal Color, Warm





Results





- Vital Signs


Recent Vital Signs: 


                                Last Vital Signs











Temp  98.9 F   12/21/18 18:27


 


Pulse  41 L  12/21/18 18:27


 


Resp  12   12/21/18 18:27


 


BP  139/61   12/21/18 18:27


 


Pulse Ox  99   12/21/18 18:27














- Labs


Result Diagrams: 


                                 12/23/18 08:14





                                 12/23/18 08:14


Labs: 


                         Laboratory Results - last 24 hr











  12/21/18 12/21/18 12/21/18





  12:13 13:03 13:03


 


WBC   6.3 


 


RBC   4.31 L 


 


Hgb   13.1 


 


Hct   38.1 


 


MCV   88.5 


 


MCH   30.5 


 


MCHC   34.5 


 


RDW   15.9 H 


 


Plt Count   311 


 


MPV   8.4 


 


Neut % (Auto)   82.0 H 


 


Lymph % (Auto)   13.9 L 


 


Mono % (Auto)   3.3 


 


Eos % (Auto)   0.0 


 


Baso % (Auto)   0.8 


 


Neut # (Auto)   5.2 


 


Lymph # (Auto)   0.9 L 


 


Mono # (Auto)   0.2 


 


Eos # (Auto)   0.0 


 


Baso # (Auto)   0.1 


 


Sodium    137


 


Potassium    3.5 L


 


Chloride    105


 


Carbon Dioxide    18 L


 


Anion Gap    17


 


BUN    12


 


Creatinine    0.9


 


Est GFR ( Amer)    > 60


 


Est GFR (Non-Af Amer)    > 60


 


POC Glucose (mg/dL)  149 H  


 


Random Glucose    160 H D


 


Calcium    9.6


 


Total Bilirubin    0.9


 


AST    20


 


ALT    10 L D


 


Alkaline Phosphatase    109


 


Total Creatine Kinase    139


 


CK-MB (Mass)    1.30


 


Troponin I    < 0.0120


 


Total Protein    8.5 H


 


Albumin    4.6


 


Globulin    3.9


 


Albumin/Globulin Ratio    1.2


 


Lipase    65


 


Urine Color   


 


Urine Clarity   


 


Urine pH   


 


Ur Specific Gravity   


 


Urine Protein   


 


Urine Glucose (UA)   


 


Urine Ketones   


 


Urine Blood   


 


Urine Nitrate   


 


Urine Bilirubin   


 


Urine Urobilinogen   


 


Ur Leukocyte Esterase   


 


Urine WBC (Auto)   


 


Urine RBC (Auto)   


 


Ur Squamous Epith Cells   


 


Urine Bacteria   


 


Urine Opiates Screen   


 


Urine Methadone Screen   


 


Ur Barbiturates Screen   


 


Ur Phencyclidine Scrn   


 


Ur Amphetamines Screen   


 


U Benzodiazepines Scrn   


 


U Oth Cocaine Metabols   


 


U Cannabinoids Screen   














  12/21/18 12/21/18





  14:39 14:39


 


WBC  


 


RBC  


 


Hgb  


 


Hct  


 


MCV  


 


MCH  


 


MCHC  


 


RDW  


 


Plt Count  


 


MPV  


 


Neut % (Auto)  


 


Lymph % (Auto)  


 


Mono % (Auto)  


 


Eos % (Auto)  


 


Baso % (Auto)  


 


Neut # (Auto)  


 


Lymph # (Auto)  


 


Mono # (Auto)  


 


Eos # (Auto)  


 


Baso # (Auto)  


 


Sodium  


 


Potassium  


 


Chloride  


 


Carbon Dioxide  


 


Anion Gap  


 


BUN  


 


Creatinine  


 


Est GFR ( Amer)  


 


Est GFR (Non-Af Amer)  


 


POC Glucose (mg/dL)  


 


Random Glucose  


 


Calcium  


 


Total Bilirubin  


 


AST  


 


ALT  


 


Alkaline Phosphatase  


 


Total Creatine Kinase  


 


CK-MB (Mass)  


 


Troponin I  


 


Total Protein  


 


Albumin  


 


Globulin  


 


Albumin/Globulin Ratio  


 


Lipase  


 


Urine Color  Clau 


 


Urine Clarity  Clear 


 


Urine pH  5.0 


 


Ur Specific Gravity  1.033 H 


 


Urine Protein  2+ H 


 


Urine Glucose (UA)  Normal 


 


Urine Ketones  1+ H 


 


Urine Blood  Negative 


 


Urine Nitrate  Negative 


 


Urine Bilirubin  1+ H 


 


Urine Urobilinogen  2.0 


 


Ur Leukocyte Esterase  Neg 


 


Urine WBC (Auto)  3 


 


Urine RBC (Auto)  3 


 


Ur Squamous Epith Cells  3 


 


Urine Bacteria  Rare 


 


Urine Opiates Screen   Positive H


 


Urine Methadone Screen   Negative


 


Ur Barbiturates Screen   Negative


 


Ur Phencyclidine Scrn   Negative


 


Ur Amphetamines Screen   Negative


 


U Benzodiazepines Scrn   Positive


 


U Oth Cocaine Metabols   Negative


 


U Cannabinoids Screen   Negative














- Imaging and Cardiology


  ** CT scan - abdomenAND PELVIS WITH DOUBT BY MOUT OR iv CONTRAST


Status: Report reviewed by me (MILD THICKENING EDEMA RIGHT COLON ? COLITIS.)





Assessment & Plan


(1) Ulcer of right lower leg


Assessment and Plan: 


PANCULTURES


ESR


CRP.


wOUND CULTURES.


START iv ROCEPHIN 1 G EVERY 12 HOURLY.12/21/18.


F/U CULTURES TO ADJUST ANTIBIOTICS.


Status: Acute   





(2) Lower extremity pain, right


Status: Acute   





(3) Sinus bradycardia


Assessment and Plan: 


PATIENT PRESENTLY HAS SINUS BRADYCARDIA, BUT BLOOD PRESSURE IS MAINTAINED 

130/60.


PATIENT REQUESTING FOR OXYCONTIN.


PATIENT TO GET PSYCHIATRY EVALUATION FOR POSSIBLE OPIOID WITHDRAWAL.


Status: Acute   





(4) Colitis


Assessment and Plan: 


STOOLS FOR C. DIFFICILE TOXIN.


ct OF THE ABDOMEN AND PELVIS REVIEWED SHOWS RIGHT SIDED COLITIS.


wILL FOLLOW c. DIFFICILE TOXIN.





Status: Acute   





(5) Diabetes mellitus


Assessment and Plan: 


PATIENT VERY NONCOMPLIANT.  pRESENTLY STATES HAS NOT BEEN TAKING HIS  DIABETIC 

PILLS.


Status: Acute Patient called back regarding MRI. She said Fina Watson is ordering the lower lumbar MRI and states her pcp would need to order the full body MRI.  She said they are trying to figure out why she can't walk.  Her phone number [phone].

## 2022-05-24 NOTE — TELEPHONE ENCOUNTER
There is no mention of this in Ms. Fina ruiz office notes.  Please call their office and see what they are wanting us to do as normally were not the ones that order this when they are coming to the spine.  They usually are.

## 2022-05-25 ENCOUNTER — TELEPHONE (OUTPATIENT)
Dept: FAMILY MEDICINE CLINIC | Facility: CLINIC | Age: 52
End: 2022-05-25

## 2022-05-25 NOTE — TELEPHONE ENCOUNTER
[facility] returned Lauren's call and states they will only start with a lumbar and will only go up from there after she's been evaluated.

## 2022-05-26 ENCOUNTER — TELEPHONE (OUTPATIENT)
Dept: FAMILY MEDICINE CLINIC | Facility: CLINIC | Age: 52
End: 2022-05-26

## 2022-05-26 NOTE — TELEPHONE ENCOUNTER
[facility] can't accept the referral without a face to face appt documented, the appointment on 3/29/22 states video wasn't able to be done so the visit was telephone. Once a face to face has been done then they can take a verbal to get this referral going

## 2022-05-26 NOTE — TELEPHONE ENCOUNTER
OK for HUB to read and schedule:    LMTCB-  Your provider recommended a face to face appointment in order to have the home health referral completed. The appointment must be visual appointment.      Please call our office to schedule.   
[facility] called and needs a face to face before they can accept the patient  
normal (ped)...

## 2022-08-10 ENCOUNTER — TELEMEDICINE (OUTPATIENT)
Dept: FAMILY MEDICINE CLINIC | Facility: CLINIC | Age: 52
End: 2022-08-10

## 2022-08-10 DIAGNOSIS — Z12.11 SCREENING FOR COLON CANCER: ICD-10-CM

## 2022-08-10 DIAGNOSIS — E11.42 TYPE 2 DIABETES MELLITUS WITH DIABETIC POLYNEUROPATHY, WITH LONG-TERM CURRENT USE OF INSULIN: Primary | ICD-10-CM

## 2022-08-10 DIAGNOSIS — E53.8 VITAMIN B12 DEFICIENCY: ICD-10-CM

## 2022-08-10 DIAGNOSIS — E66.01 MORBID OBESITY: ICD-10-CM

## 2022-08-10 DIAGNOSIS — K21.9 GASTROESOPHAGEAL REFLUX DISEASE WITHOUT ESOPHAGITIS: ICD-10-CM

## 2022-08-10 DIAGNOSIS — D50.8 IRON DEFICIENCY ANEMIA SECONDARY TO INADEQUATE DIETARY IRON INTAKE: ICD-10-CM

## 2022-08-10 DIAGNOSIS — M21.372 LEFT FOOT DROP: ICD-10-CM

## 2022-08-10 DIAGNOSIS — M62.81 MUSCLE WEAKNESS: ICD-10-CM

## 2022-08-10 DIAGNOSIS — E03.9 ACQUIRED HYPOTHYROIDISM: ICD-10-CM

## 2022-08-10 DIAGNOSIS — M62.3 IMMOBILITY SYNDROME: ICD-10-CM

## 2022-08-10 DIAGNOSIS — Z79.4 TYPE 2 DIABETES MELLITUS WITH DIABETIC POLYNEUROPATHY, WITH LONG-TERM CURRENT USE OF INSULIN: Primary | ICD-10-CM

## 2022-08-10 DIAGNOSIS — E55.9 VITAMIN D DEFICIENCY: ICD-10-CM

## 2022-08-10 DIAGNOSIS — E54 VITAMIN C DEFICIENCY: ICD-10-CM

## 2022-08-10 DIAGNOSIS — E78.5 HYPERLIPIDEMIA, UNSPECIFIED HYPERLIPIDEMIA TYPE: ICD-10-CM

## 2022-08-10 DIAGNOSIS — I63.9 CEREBROVASCULAR ACCIDENT (CVA), UNSPECIFIED MECHANISM: ICD-10-CM

## 2022-08-10 PROCEDURE — 99214 OFFICE O/P EST MOD 30 MIN: CPT | Performed by: INTERNAL MEDICINE

## 2022-08-10 RX ORDER — OMEPRAZOLE 40 MG/1
40 CAPSULE, DELAYED RELEASE ORAL DAILY
Qty: 30 CAPSULE | Refills: 3 | Status: SHIPPED | OUTPATIENT
Start: 2022-08-10

## 2022-08-10 NOTE — PROGRESS NOTES
Subjective   Vanessa Root is a 52 y.o. female.     No chief complaint on file.      History of Present Illness   You have chosen to receive care through a telehealth visit.  Do you consent to use a video/audio connection for your medical care today? Yes  Video visit completed utilizing [employer].  Patient is located within the home in [facility].  Physician located within the office in [facility].    Face-to-face visit for patient and need for home health care.  She is needing labs to be done as she is unable to leave the home.  She needs assistance with increasing her ability to even stand or do things within the home.    Patient continues with difficulties not being able to get out of bed.  She is homebound secondary to her immobility.  She is unable to stand and walk on her own she requires medical transport of any kind to leave the home.  Her mother has been trying to take care of her but is having difficulties doing this.  She has weakness throughout.  She has had a CVA in the past history and is try to get in with [facility] rehab but continues not to be able to get in.  Is having some blurred vision.  Has appointment with neurology on 11/23/22.    History of of diabetes.  Patient states to have been compliant with medications.  Blood sugar monitoring - patient states has been running on average around 160.  With a range of .  No episodes of hypoglycemia, nausea, vomiting, new rashes, syncope or other issues.  Denies any difficulties with the current medication regimen of tresiba 45 U daily and novolog sliding scale of 10 Units with each meal.  Januvia 100 mg and metformin 1000 mg BID.  Labs done on 12/9/2021 demonstrated a blood sugar of 284 but unfortunately A1c was not done.  Labs were reordered on 3/31/2022 but have never been done secondary to patient unable to leave the home to have any labs done.     Follow-up for thyroid.  Denies fatigue, weakness, constipation/diarrhea,  hair/skin changes, weight gain/loss, depression/anxiety, rashes, palpitations, swelling, chest pain, shortness of breath or other issues.  Has been compliant with taking the medication of levothyroxine 275 mcg with no recent changes since 2021.  Denies any difficulty with the current medication.  Labs done on 2021 demonstrated TSH of 35.71 and a free T4 of 4.4.     Follow-up for cholesterol.  Currently, has been feeling well without any myalgias, muscle aches, weakness, numbness, chest pain, short of breath or other issues.  Currently, is adherent with medication regimen of zetia 10 mg and atorvastatin 80 mg and denies medication side effects.  Labs done on 2021 demonstrated an LDL of 47.8 with a total cholesterol 116, triglycerides 201, HDL 28.     History of WILLIS and using CPAP at night.     Patient with low calcium and vitamin D with calcium level of 8.2 and vitamin D level of 11 based on results from 2021 and a low vitamin C of 0.3.  Is currently on the vitamin D 50,000 units weekly and the vitamin C 250 mg daily     History of persisting GERD and is currently on pantoprazole 40 mg daily.  Once started the blood sugar increased.    The following portions of the patient's history were reviewed and updated as appropriate: allergies, current medications, past family history, past medical history, past social history, past surgical history and problem list.    Depression Screen:  No flowsheet data found.    Past Medical History:   Diagnosis Date   • Arthritis    • Bipolar 1 disorder (HCC)    • Cancer (HCC)     uterine   • Depression    • Diabetes mellitus (HCC)    • Frequent UTI    • GERD (gastroesophageal reflux disease)    • Hyperlipidemia    • Migraine    • Murmur, heart    • Ovarian cyst    • Stroke (HCC)        Past Surgical History:   Procedure Laterality Date   •  SECTION  2003   • HYSTERECTOMY     • TONSILLECTOMY         Family History   Problem Relation Age of Onset   •  "Arthritis Mother    • Diabetes Mother    • Migraines Mother    • Stroke Mother    • Arthritis Father    • Cancer Maternal Grandmother    • Thyroid disease Maternal Grandmother    • Cancer Maternal Grandfather    • Cancer Paternal Grandmother    • Thyroid disease Paternal Grandmother    • Cancer Paternal Grandfather        Social History     Socioeconomic History   • Marital status: Single   Tobacco Use   • Smoking status: Former Smoker   • Smokeless tobacco: Never Used   Substance and Sexual Activity   • Alcohol use: Never   • Drug use: Never   • Sexual activity: Defer       Current Outpatient Medications   Medication Sig Dispense Refill   • atorvastatin (LIPITOR) 80 MG tablet Take 1 tablet by mouth Daily. 30 tablet 4   • Cholecalciferol (Vitamin D3) 1.25 MG (55144 UT) capsule Take 1 capsule by mouth Every 7 (Seven) Days. 12 capsule 3   • ezetimibe (ZETIA) 10 MG tablet TAKE 1 TABLET BY MOUTH DAILY 30 tablet 4   • fluconazole (DIFLUCAN) 100 MG tablet TAKE 1 TABLET BY MOUTH DAILY 2 tablet 0   • fluticasone (Flonase) 50 MCG/ACT nasal spray 2 sprays into the nostril(s) as directed by provider Daily. 16 g 6   • ibuprofen (ADVIL,MOTRIN) 800 MG tablet Take 1 tablet by mouth Every 6 (Six) Hours As Needed for Mild Pain . Take with small amount of food. 90 tablet 3   • insulin aspart (NovoLOG FlexPen) 100 UNIT/ML solution pen-injector sc pen Use as directed up to 16 U with each meal 6 pen 4   • Insulin Pen Needle 32G X 4 MM misc Use as directed with insulin with meals and at night 200 each 4   • Insulin Syringe 31G X 5/16\" 1 ML misc      • levothyroxine (SYNTHROID, LEVOTHROID) 200 MCG tablet Take 1 tablet by mouth Daily. 30 tablet 4   • levothyroxine (SYNTHROID, LEVOTHROID) 75 MCG tablet Take one a day. 30 tablet 11   • metFORMIN (GLUCOPHAGE) 1000 MG tablet Take 1 tablet by mouth 2 (Two) Times a Day With Meals. 60 tablet 4   • omeprazole (priLOSEC) 40 MG capsule Take 1 capsule by mouth Daily. 30 capsule 3   • SITagliptin " (Januvia) 100 MG tablet Take 1 tablet by mouth Daily. 30 tablet 5   • Tresiba FlexTouch 200 UNIT/ML solution pen-injector pen injection Inject 60 Units under the skin into the appropriate area as directed Daily. 3 pen 4   • vitamin C (ASCORBIC ACID) 250 MG tablet Take 1 tablet by mouth Daily. 30 tablet 6     No current facility-administered medications for this visit.       Review of Systems   Constitutional: Positive for fatigue. Negative for activity change, appetite change, fever, unexpected weight gain and unexpected weight loss.   HENT: Negative for nosebleeds, rhinorrhea, trouble swallowing and voice change.    Eyes: Positive for visual disturbance.   Respiratory: Negative for cough, chest tightness, shortness of breath and wheezing.    Cardiovascular: Negative for chest pain, palpitations and leg swelling.   Gastrointestinal: Negative for abdominal pain, blood in stool, constipation, diarrhea, nausea, vomiting, GERD and indigestion.   Genitourinary: Negative for dysuria, frequency and hematuria.   Musculoskeletal: Positive for back pain and gait problem. Negative for arthralgias and myalgias.        Diffuse weakness   Skin: Negative for rash and wound.   Neurological: Positive for weakness. Negative for dizziness, tremors, light-headedness, numbness, headache and memory problem.   Hematological: Negative for adenopathy. Does not bruise/bleed easily.   Psychiatric/Behavioral: Negative for sleep disturbance and depressed mood. The patient is not nervous/anxious.        Objective   There were no vitals taken for this visit.    Physical Exam   Constitutional: No distress.   Video visit completed while patient is laying in her bed in the home.  She cannot stand up on her own.  Cannot transfer on her own.   HENT:   Head: Normocephalic and atraumatic.   Eyes: Pupils are equal, round, and reactive to light. EOM are normal.   Pulmonary/Chest: Effort normal.  No respiratory distress.  Neurological: She is alert.    Skin: She is not diaphoretic.   Psychiatric: She has a normal mood and affect.   Currently talking clearly without any evidence of any issues and denies any depression or other issues.       No results found for this or any previous visit (from the past 2016 hour(s)).  Assessment & Plan   Diagnoses and all orders for this visit:    1. Type 2 diabetes mellitus with diabetic polyneuropathy, with long-term current use of insulin ([facility]) (Primary)  -     Ambulatory Referral to Home Health    2. Muscle weakness  -     Ambulatory Referral to Home Health    3. Cerebrovascular accident (CVA), unspecified mechanism ([facility])  -     Ambulatory Referral to Home Health    4. Left foot drop  -     Ambulatory Referral to Home Health    5. Immobility syndrome  -     Ambulatory Referral to Home Health    6. Morbid obesity ([facility])  -     Ambulatory Referral to Home Health    7. Gastroesophageal reflux disease without esophagitis  -     omeprazole (priLOSEC) 40 MG capsule; Take 1 capsule by mouth Daily.  Dispense: 30 capsule; Refill: 3    8. Hyperlipidemia, unspecified hyperlipidemia type    9. Acquired hypothyroidism    10. Vitamin B12 deficiency    11. Vitamin D deficiency    12. Vitamin C deficiency    13. Iron deficiency anemia secondary to inadequate dietary iron intake    14. Screening for colon cancer  -     Cologuard - Stool, Per Rectum; Future      Face-to-face visit completed via video conferencing through [employer].  We will have home health evaluate with skilled nursing, , occupational therapy.  Will need to have labs drawn likely in the home as patient is unable leave the house.  Will need assistance in scheduling and setting up transportation and in payment for .  We will have the dietitian also see hopefully to help with her diabetes and her weight.  She does need to see the neurologist but that is not until November.  If she has any acute changes in her vision weakness numbness talking out of her  head is to go to the emergency room by ambulance.  She will need all labs done that have been ordered as per in chart Which would include free T4 TSH, vitamin C, hemoglobin A1c, lipid panel, CMP.    Patient GERD symptoms not controlled with the pantoprazole.  We will have to try omeprazole 40 mg daily as per insurance coverage.  Prescription has been sent to the pharmacy.  She needs to be following her diet she needs to be elevating and she needs to be exercising the best that she can get that weight down which will help.    Video visit completed.     I spent 36 minutes caring for Vanessa on this date of service. This time includes time spent by me in the following activities:preparing for the visit, obtaining and/or reviewing a separately obtained history, performing a medically appropriate examination and/or evaluation , counseling and educating the patient/family/caregiver, ordering medications, tests, or procedures, referring and communicating with other health care professionals  and documenting information in the medical record

## 2022-08-12 ENCOUNTER — TELEPHONE (OUTPATIENT)
Dept: FAMILY MEDICINE CLINIC | Facility: CLINIC | Age: 52
End: 2022-08-12

## 2022-08-12 NOTE — TELEPHONE ENCOUNTER
fyi    -incoming call from [facility] asking for new verbal orders for skilled nursing for start of care for next week.      Verbal order previously given for start of care (skilled nursing) for today Friday 08/12)    [facility] was going to start care today 08/12/2022 pt declined pt danyel prefer start of care next week and needs new verbal order    Verbal order given with ma guidance emerson

## 2022-08-16 ENCOUNTER — TELEPHONE (OUTPATIENT)
Dept: FAMILY MEDICINE CLINIC | Facility: CLINIC | Age: 52
End: 2022-08-16

## 2022-08-29 ENCOUNTER — TELEPHONE (OUTPATIENT)
Dept: FAMILY MEDICINE CLINIC | Facility: CLINIC | Age: 52
End: 2022-08-29

## 2022-08-29 DIAGNOSIS — R11.0 NAUSEA: Primary | ICD-10-CM

## 2022-08-29 RX ORDER — ONDANSETRON 4 MG/1
4 TABLET, FILM COATED ORAL EVERY 8 HOURS PRN
Qty: 20 TABLET | Refills: 0 | Status: SHIPPED | OUTPATIENT
Start: 2022-08-29

## 2022-08-29 NOTE — TELEPHONE ENCOUNTER
Caller: Vanessa Root    Relationship: Self    Best call back number: [phone]      What medication are you requesting: SOMETHING FOR NAUSEA    What are your current symptoms: STOMACH VERY UPSET FROM COVID. STILL HAS A LOT OF CONGESTION BUILT UP AND IS MAKING HER STOMACH VERY UPSET.    How long have you been experiencing symptoms: ABOUT A WEEK AGO.    Have you had these symptoms before:    [x] Yes  [] No    Have you been treated for these symptoms before:   [x] Yes  [] No    If a prescription is needed, what is your preferred pharmacy and phone number: [id number] [place], [place] - [address]  AT [facility] [address]  - [phone]  - [phone] FX     Additional notes:  PATIENT TESTED POSITIVE FOR COVID ABOUT 2 WEEK AGO AND SHE IS STILL HAVING SYMPTOMS, SHE IS VERY NAUSEATED AND IS ASKING TO GET SOMETHING TO HELP WITH THAT. SHE FEELS LIKE ALL THE MUCUS AND CONGESTION IS MAKING HE STOMACH UPSET.

## 2023-01-08 DIAGNOSIS — Z79.4 TYPE 2 DIABETES MELLITUS WITH DIABETIC POLYNEUROPATHY, WITH LONG-TERM CURRENT USE OF INSULIN: ICD-10-CM

## 2023-01-08 DIAGNOSIS — E11.42 TYPE 2 DIABETES MELLITUS WITH DIABETIC POLYNEUROPATHY, WITH LONG-TERM CURRENT USE OF INSULIN: ICD-10-CM

## 2023-01-09 RX ORDER — INSULIN ASPART 100 [IU]/ML
INJECTION, SOLUTION INTRAVENOUS; SUBCUTANEOUS
Qty: 18 ML | OUTPATIENT
Start: 2023-01-09

## 2023-01-09 RX ORDER — FLUTICASONE PROPIONATE 50 MCG
SPRAY, SUSPENSION (ML) NASAL
Qty: 16 G | Refills: 6 | OUTPATIENT
Start: 2023-01-09

## 2023-01-09 RX ORDER — INSULIN DEGLUDEC 200 U/ML
INJECTION, SOLUTION SUBCUTANEOUS
Qty: 9 ML | OUTPATIENT
Start: 2023-01-09

## 2023-01-09 RX ORDER — IBUPROFEN 800 MG/1
TABLET ORAL
Qty: 90 TABLET | Refills: 3 | OUTPATIENT
Start: 2023-01-09

## 2023-01-09 NOTE — TELEPHONE ENCOUNTER
Rx Refill Note  Requested Prescriptions     Pending Prescriptions Disp Refills   • ibuprofen (ADVIL,MOTRIN) 800 MG tablet [Pharmacy Med Name: IBUPROFEN 800MG TABLETS] 90 tablet 3     Sig: TAKE 1 TABLET BY MOUTH EVERY 6 HOURS AS NEEDED FOR PAIN. TAKE WITH A SMALL AMOUNT OF FOOD      Last office visit with prescribing clinician: 03/29/2022   Last telemedicine visit with prescribing clinician: Visit date not found   Next office visit with prescribing clinician: Visit date not found  Last refill 12/28/2021

## 2023-01-10 NOTE — TELEPHONE ENCOUNTER
This patient needs to be seen and has to have labs before anybody can prescribe any insulin or other medications.  If she cannot leave the home then she will need to have the MD to you are home physicians coming to the home to see her and doing her labs.

## 2023-03-31 ENCOUNTER — OFFICE VISIT (OUTPATIENT)
Dept: NEUROLOGY | Facility: CLINIC | Age: 53
End: 2023-03-31
Payer: MEDICARE

## 2023-03-31 VITALS — BODY MASS INDEX: 41.44 KG/M2 | HEIGHT: 65 IN

## 2023-03-31 DIAGNOSIS — R29.898 WEAKNESS OF BOTH LOWER EXTREMITIES: Primary | ICD-10-CM

## 2023-03-31 NOTE — LETTER
"March 31, 2023       No Recipients    Patient: Vanessa Root   YOB: 1970   Date of Visit: 3/31/2023       Dear Dr. Henriquez Recipients:    Thank you for referring Vanessa Root to me for evaluation. Below are the relevant portions of my assessment and plan of care.    If you have questions, please do not hesitate to call me. I look forward to following Vanessa along with you.         Sincerely,        Ayo Carrasquillo II, MD        CC:   No Recipients    Ayo Carrasquillo II, MD  04/18/23 0942  Sign when Signing Visit  Chief Complaint   Patient presents with   • Numbness       Patient ID: Vanessa Root is a 53 y.o. female.    HPI: I had the pleasure of seeing your patient today.  As you may know she is a 53-year-old female here for evaluation of the inability to walk.  The patient says that her symptoms began in February 2020.  She says that in February she had some sort of respiratory infection and it was question whether she had COVID.  These symptoms continued with increasing difficulty walking and ambulating.  Her legs began to feel very tired and \"heavy\".  The symptoms were bilateral.  She is morbidly obese.  However she previously was able to ambulate.  Of note she has been diagnosed with congenital spinal stenosis.  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  She says in April she fell twice needing assistance from EMS to get back up and back into bed.  She contacted her PCP who prescribed physical therapy for her.  She apparently was hospitalized back in July of last year.  She says that some blood work was performed and no particular diagnosis was found however she was discharged to rehabilitation.  She had home health physical therapy upon returning back to home.  Although while she continued to have difficulties with walking.  She was discharged from home health PT due to the failure of improvement.  She apparently was supposed to have some type of " lumbosacral spine surgical intervention however the history is somewhat unclear.  She has no significant movement that she can describe that her legs.  No significant pain in her legs however she does have chronic back pain.  She does describe some urinary frequency and urgency however no incontinence.    The following portions of the patient's history were reviewed and updated as appropriate: allergies, current medications, past family history, past medical history, past social history, past surgical history and problem list.    Review of Systems   Constitutional: Positive for fatigue and fever.   HENT: Positive for congestion, dental problem, hearing loss, postnasal drip and sneezing.    Eyes: Positive for photophobia and visual disturbance.   Respiratory: Positive for apnea and cough.    Gastrointestinal: Positive for constipation, diarrhea and nausea.   Endocrine: Positive for cold intolerance, heat intolerance, polydipsia and polyuria.   Genitourinary: Positive for frequency and urgency.   Musculoskeletal: Positive for arthralgias, back pain, gait problem, myalgias and neck stiffness.   Allergic/Immunologic: Positive for environmental allergies and food allergies.   Neurological: Positive for dizziness, tremors, weakness, light-headedness, numbness and headaches.   Psychiatric/Behavioral: Positive for agitation, confusion, decreased concentration and sleep disturbance. The patient is nervous/anxious.       I have reviewed the review of systems above performed by my medical assistant.      There were no vitals filed for this visit.    Neurologic Exam     Mental Status   Oriented to person, place, and time.   Registration: recalls 3 of 3 objects. Follows 3 step commands.   Attention: normal. Concentration: normal.   Speech: speech is normal   Level of consciousness: alert  Knowledge: consistent with education (No deficits found.).   Normal comprehension.     Cranial Nerves     CN II   Visual fields full to  confrontation.     CN III, IV, VI   Pupils are equal, round, and reactive to light.  Extraocular motions are normal.   CN III: no CN III palsy  CN VI: no CN VI palsy  Nystagmus: none   Diplopia: none    CN V   Facial sensation intact.     CN VII   Facial expression full, symmetric.     CN VIII   CN VIII normal.     CN IX, X   CN IX normal.   CN X normal.     CN XI   CN XI normal.     CN XII   CN XII normal.     Motor Exam   Muscle bulk: normal  Right arm tone: normal  Left arm tone: normal  Right leg tone: normal  Left leg tone: normal    Strength   Right neck flexion: 5/5  Left neck flexion: 5/5  Right neck extension: 5/5  Left neck extension: 5/5  Right deltoid: 5/5  Left deltoid: 5/5  Right biceps: 5/5  Left biceps: 5/5  Right triceps: 5/5  Left triceps: 5/5  Right wrist flexion: 5/5  Left wrist flexion: 5/5  Right wrist extension: 5/5  Left wrist extension: 5/5  Right interossei: 5/5  Left interossei: 5/5  Right abdominals: 5/5  Left abdominals: 5/5  Right iliopsoas: 4/5  Left iliopsoas: 4/5  Right quadriceps: 3/5  Left quadriceps: 3/5  Right hamstring: 3/5  Left hamstring: 3/5  Right glutei: 3/5  Left glutei: 3/5  Right anterior tibial: 3/5  Left anterior tibial: 4/5  Right posterior tibial: 3/5  Left posterior tibial: 3/5  Right peroneal: 3/5  Left peroneal: 3/5  Right gastroc: 3/5  Left gastroc: 3/5    Sensory Exam   Right leg light touch: decreased from toes  Left leg light touch: decreased from toes  Right leg vibration: decreased from ankle  Left leg vibration: decreased from ankle  Right leg proprioception: decreased from toes  Left leg proprioception: decreased from toes  Right leg pinprick: decreased from ankle  Left leg pinprick: decreased from ankle    Gait, Coordination, and Reflexes     Gait  Gait: (Patient unable to ambulate.  Due to leg weakness.)    Coordination   Positive Romberg's test: unable to assess.    Tremor   Resting tremor: absent  Intention tremor: absent    Reflexes   Right  brachioradialis: 0  Left brachioradialis: 0  Right biceps: 0  Left biceps: 0  Right triceps: 0  Left triceps: 0  Right patellar: 0  Left patellar: 0  Right achilles: 0  Left achilles: 0  Right : 0  Left : 0Station is normal.       Physical Exam  Vitals reviewed.   Constitutional:       General: She is not in acute distress.     Appearance: She is well-developed.   HENT:      Head: Normocephalic and atraumatic.   Eyes:      Extraocular Movements: EOM normal.      Pupils: Pupils are equal, round, and reactive to light.   Cardiovascular:      Rate and Rhythm: Normal rate and regular rhythm.      Heart sounds: Normal heart sounds.   Pulmonary:      Effort: Pulmonary effort is normal. No respiratory distress.      Breath sounds: Normal breath sounds.   Abdominal:      General: Bowel sounds are normal. There is no distension.      Palpations: Abdomen is soft.      Tenderness: There is no abdominal tenderness.   Musculoskeletal:         General: No deformity. Normal range of motion.      Cervical back: Normal range of motion.   Skin:     General: Skin is warm.      Findings: No rash.   Neurological:      Mental Status: She is oriented to person, place, and time.      Coordination: Positive Romberg's test: unable to assess.      Deep Tendon Reflexes:      Reflex Scores:       Tricep reflexes are 0 on the right side and 0 on the left side.       Bicep reflexes are 0 on the right side and 0 on the left side.       Brachioradialis reflexes are 0 on the right side and 0 on the left side.       Patellar reflexes are 0 on the right side and 0 on the left side.       Achilles reflexes are 0 on the right side and 0 on the left side.  Psychiatric:         Speech: Speech normal.         Judgment: Judgment normal.         Procedures    Assessment/Plan: Our plan is to schedule an MRI of both lower extremities.  We will also schedule physical therapy/home health PT.  We are going to also schedule an EMG/nerve conduction study of  both lower extremities.  We will see her back after testing been completed.    Diagnoses and all orders for this visit:    1. Weakness of both lower extremities (Primary)  -     MRI lumbar spine w wo contrast; Future  -     EMG & Nerve Conduction Test; Future          Ayo Carrasquillo II, MD

## 2023-03-31 NOTE — PROGRESS NOTES
"Chief Complaint   Patient presents with   • Numbness       Patient ID: Vanessa Root is a 53 y.o. female.    HPI: I had the pleasure of seeing your patient today.  As you may know she is a 53-year-old female here for evaluation of the inability to walk.  The patient says that her symptoms began in February 2020.  She says that in February she had some sort of respiratory infection and it was question whether she had COVID.  These symptoms continued with increasing difficulty walking and ambulating.  Her legs began to feel very tired and \"heavy\".  The symptoms were bilateral.  She is morbidly obese.  However she previously was able to ambulate.  Of note she has been diagnosed with congenital spinal stenosis.  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  She says in April she fell twice needing assistance from EMS to get back up and back into bed.  She contacted her PCP who prescribed physical therapy for her.  She apparently was hospitalized back in July of last year.  She says that some blood work was performed and no particular diagnosis was found however she was discharged to rehabilitation.  She had home health physical therapy upon returning back to home.  Although while she continued to have difficulties with walking.  She was discharged from home health PT due to the failure of improvement.  She apparently was supposed to have some type of lumbosacral spine surgical intervention however the history is somewhat unclear.  She has no significant movement that she can describe that her legs.  No significant pain in her legs however she does have chronic back pain.  She does describe some urinary frequency and urgency however no incontinence.    The following portions of the patient's history were reviewed and updated as appropriate: allergies, current medications, past family history, past medical history, past social history, past surgical history and problem list.    Review " of Systems   Constitutional: Positive for fatigue and fever.   HENT: Positive for congestion, dental problem, hearing loss, postnasal drip and sneezing.    Eyes: Positive for photophobia and visual disturbance.   Respiratory: Positive for apnea and cough.    Gastrointestinal: Positive for constipation, diarrhea and nausea.   Endocrine: Positive for cold intolerance, heat intolerance, polydipsia and polyuria.   Genitourinary: Positive for frequency and urgency.   Musculoskeletal: Positive for arthralgias, back pain, gait problem, myalgias and neck stiffness.   Allergic/Immunologic: Positive for environmental allergies and food allergies.   Neurological: Positive for dizziness, tremors, weakness, light-headedness, numbness and headaches.   Psychiatric/Behavioral: Positive for agitation, confusion, decreased concentration and sleep disturbance. The patient is nervous/anxious.       I have reviewed the review of systems above performed by my medical assistant.      There were no vitals filed for this visit.    Neurologic Exam     Mental Status   Oriented to person, place, and time.   Registration: recalls 3 of 3 objects. Follows 3 step commands.   Attention: normal. Concentration: normal.   Speech: speech is normal   Level of consciousness: alert  Knowledge: consistent with education (No deficits found.).   Normal comprehension.     Cranial Nerves     CN II   Visual fields full to confrontation.     CN III, IV, VI   Pupils are equal, round, and reactive to light.  Extraocular motions are normal.   CN III: no CN III palsy  CN VI: no CN VI palsy  Nystagmus: none   Diplopia: none    CN V   Facial sensation intact.     CN VII   Facial expression full, symmetric.     CN VIII   CN VIII normal.     CN IX, X   CN IX normal.   CN X normal.     CN XI   CN XI normal.     CN XII   CN XII normal.     Motor Exam   Muscle bulk: normal  Right arm tone: normal  Left arm tone: normal  Right leg tone: normal  Left leg tone:  normal    Strength   Right neck flexion: 5/5  Left neck flexion: 5/5  Right neck extension: 5/5  Left neck extension: 5/5  Right deltoid: 5/5  Left deltoid: 5/5  Right biceps: 5/5  Left biceps: 5/5  Right triceps: 5/5  Left triceps: 5/5  Right wrist flexion: 5/5  Left wrist flexion: 5/5  Right wrist extension: 5/5  Left wrist extension: 5/5  Right interossei: 5/5  Left interossei: 5/5  Right abdominals: 5/5  Left abdominals: 5/5  Right iliopsoas: 4/5  Left iliopsoas: 4/5  Right quadriceps: 3/5  Left quadriceps: 3/5  Right hamstring: 3/5  Left hamstring: 3/5  Right glutei: 3/5  Left glutei: 3/5  Right anterior tibial: 3/5  Left anterior tibial: 4/5  Right posterior tibial: 3/5  Left posterior tibial: 3/5  Right peroneal: 3/5  Left peroneal: 3/5  Right gastroc: 3/5  Left gastroc: 3/5    Sensory Exam   Right leg light touch: decreased from toes  Left leg light touch: decreased from toes  Right leg vibration: decreased from ankle  Left leg vibration: decreased from ankle  Right leg proprioception: decreased from toes  Left leg proprioception: decreased from toes  Right leg pinprick: decreased from ankle  Left leg pinprick: decreased from ankle    Gait, Coordination, and Reflexes     Gait  Gait: (Patient unable to ambulate.  Due to leg weakness.)    Coordination   Positive Romberg's test: unable to assess.    Tremor   Resting tremor: absent  Intention tremor: absent    Reflexes   Right brachioradialis: 0  Left brachioradialis: 0  Right biceps: 0  Left biceps: 0  Right triceps: 0  Left triceps: 0  Right patellar: 0  Left patellar: 0  Right achilles: 0  Left achilles: 0  Right : 0  Left : 0Station is normal.       Physical Exam  Vitals reviewed.   Constitutional:       General: She is not in acute distress.     Appearance: She is well-developed.   HENT:      Head: Normocephalic and atraumatic.   Eyes:      Extraocular Movements: EOM normal.      Pupils: Pupils are equal, round, and reactive to light.    Cardiovascular:      Rate and Rhythm: Normal rate and regular rhythm.      Heart sounds: Normal heart sounds.   Pulmonary:      Effort: Pulmonary effort is normal. No respiratory distress.      Breath sounds: Normal breath sounds.   Abdominal:      General: Bowel sounds are normal. There is no distension.      Palpations: Abdomen is soft.      Tenderness: There is no abdominal tenderness.   Musculoskeletal:         General: No deformity. Normal range of motion.      Cervical back: Normal range of motion.   Skin:     General: Skin is warm.      Findings: No rash.   Neurological:      Mental Status: She is oriented to person, place, and time.      Coordination: Positive Romberg's test: unable to assess.      Deep Tendon Reflexes:      Reflex Scores:       Tricep reflexes are 0 on the right side and 0 on the left side.       Bicep reflexes are 0 on the right side and 0 on the left side.       Brachioradialis reflexes are 0 on the right side and 0 on the left side.       Patellar reflexes are 0 on the right side and 0 on the left side.       Achilles reflexes are 0 on the right side and 0 on the left side.  Psychiatric:         Speech: Speech normal.         Judgment: Judgment normal.         Procedures    Assessment/Plan: Our plan is to schedule an MRI of both lower extremities.  We will also schedule physical therapy/home health PT.  We are going to also schedule an EMG/nerve conduction study of both lower extremities.  We will see her back after testing been completed.     Diagnoses and all orders for this visit:    1. Weakness of both lower extremities (Primary)  -     MRI lumbar spine w wo contrast; Future  -     EMG & Nerve Conduction Test; Future           Ayo Carrasquillo II, MD

## 2023-04-04 ENCOUNTER — PATIENT ROUNDING (BHMG ONLY) (OUTPATIENT)
Dept: NEUROLOGY | Facility: CLINIC | Age: 53
End: 2023-04-04
Payer: MEDICARE

## 2023-04-10 ENCOUNTER — TELEPHONE (OUTPATIENT)
Dept: NEUROLOGY | Facility: CLINIC | Age: 53
End: 2023-04-10
Payer: MEDICARE

## 2023-04-10 NOTE — TELEPHONE ENCOUNTER
PT CALLING TO CHECK ON MRI REFERRAL TO [facility] - PT NEEDS OPEN MRI.    NO REFERRAL IN CHART.    PLEASE ADVISE    THANK YOU

## 2023-04-17 ENCOUNTER — TELEPHONE (OUTPATIENT)
Dept: NEUROLOGY | Facility: CLINIC | Age: 53
End: 2023-04-17

## 2023-04-17 NOTE — TELEPHONE ENCOUNTER
Provider: GIGI  Caller: BONNY  Phone Number: [phone]  Reason for Call: PT CALLED BACK TO SEE IF IF PROVIDER HAS SENT A REFERRAL OVER TO [facility] FOR A MRI TO BE COMPLETED. PT STATES THAT SHE NEEDS A KNOW A HEAD OF TIME SO SHE CAN SCHEDULE STRETCHER  TRANSPORTATION.  PT WAS SEEN ON  03/31/23    PLEASE REVIEW AND ADVISE.  THANK YOU

## 2023-05-12 ENCOUNTER — TELEPHONE (OUTPATIENT)
Dept: NEUROLOGY | Facility: CLINIC | Age: 53
End: 2023-05-12

## 2023-05-12 NOTE — TELEPHONE ENCOUNTER
Caller: ANGELES VILLANUEVA    Relationship: Mother    Best call back number: [phone]    What is the best time to reach you: ANY    Who are you requesting to speak with (clinical staff, provider,  specific staff member): GIGI    What was the call regarding: MOM IS NEEDING HELP W/ RECOMMENDATIONS RE: ASSISTED LIVING/ NURSING HOME FOR PATIENT. MOM IS HAVING TEST DONE. GETTING THINGS ALIGNED SO PATIENT RECIEVES THE APPROPRIATE CARE AS MOM IS AGING & HAVING LOTS OF BLOOD WORK DONE. SHE IS WANTING PATIENT TO BE SELF SUFFICIENT IN CASE & READY TO TRANSFER TO A FACILITY IF ANYTHING HAPPENS TO HER.    PLEASE CALL & ADVISE    THANK YOU

## 2023-05-15 NOTE — TELEPHONE ENCOUNTER
Spoke with patient's mother about testing and care.  We will discuss further after tests have been completed.

## 2023-05-24 ENCOUNTER — TELEPHONE (OUTPATIENT)
Dept: FAMILY MEDICINE CLINIC | Facility: CLINIC | Age: 53
End: 2023-05-24

## 2023-05-24 ENCOUNTER — HOSPITAL ENCOUNTER (INPATIENT)
Facility: HOSPITAL | Age: 53
LOS: 13 days | Discharge: HOME | End: 2023-06-06
Attending: EMERGENCY MEDICINE | Admitting: INTERNAL MEDICINE
Payer: MEDICARE

## 2023-05-24 ENCOUNTER — APPOINTMENT (OUTPATIENT)
Dept: CT IMAGING | Facility: HOSPITAL | Age: 53
End: 2023-05-24
Payer: MEDICARE

## 2023-05-24 DIAGNOSIS — E11.42 TYPE 2 DIABETES MELLITUS WITH DIABETIC POLYNEUROPATHY, WITH LONG-TERM CURRENT USE OF INSULIN: ICD-10-CM

## 2023-05-24 DIAGNOSIS — L89.310 PRESSURE INJURY OF RIGHT BUTTOCK, UNSTAGEABLE: ICD-10-CM

## 2023-05-24 DIAGNOSIS — A41.9 SEPSIS, DUE TO UNSPECIFIED ORGANISM, UNSPECIFIED WHETHER ACUTE ORGAN DYSFUNCTION PRESENT: Primary | ICD-10-CM

## 2023-05-24 DIAGNOSIS — L89.40: ICD-10-CM

## 2023-05-24 DIAGNOSIS — M79.89 NECROTIZING SOFT TISSUE INFECTION: ICD-10-CM

## 2023-05-24 DIAGNOSIS — Z79.4 TYPE 2 DIABETES MELLITUS WITH DIABETIC POLYNEUROPATHY, WITH LONG-TERM CURRENT USE OF INSULIN: ICD-10-CM

## 2023-05-24 DIAGNOSIS — Z86.73 HISTORY OF CVA (CEREBROVASCULAR ACCIDENT): ICD-10-CM

## 2023-05-24 DIAGNOSIS — E83.42 HYPOMAGNESEMIA: ICD-10-CM

## 2023-05-24 DIAGNOSIS — Z86.39 HISTORY OF DIABETES MELLITUS: ICD-10-CM

## 2023-05-24 LAB
ALBUMIN SERPL-MCNC: 3 G/DL (ref 3.5–5.2)
ALBUMIN/GLOB SERPL: 0.8 G/DL
ALP SERPL-CCNC: 190 U/L (ref 39–117)
ALT SERPL W P-5'-P-CCNC: 10 U/L (ref 1–33)
ANION GAP SERPL CALCULATED.3IONS-SCNC: 14.4 MMOL/L (ref 5–15)
AST SERPL-CCNC: 13 U/L (ref 1–32)
BACTERIA UR QL AUTO: ABNORMAL /HPF
BILIRUB SERPL-MCNC: 0.8 MG/DL (ref 0–1.2)
BILIRUB UR QL STRIP: NEGATIVE
BUN SERPL-MCNC: 18 MG/DL (ref 6–20)
BUN/CREAT SERPL: 15.7 (ref 7–25)
CALCIUM SPEC-SCNC: 9.8 MG/DL (ref 8.6–10.5)
CHLORIDE SERPL-SCNC: 93 MMOL/L (ref 98–107)
CLARITY UR: ABNORMAL
CO2 SERPL-SCNC: 27.6 MMOL/L (ref 22–29)
COLOR UR: ABNORMAL
CREAT SERPL-MCNC: 1.15 MG/DL (ref 0.57–1)
D-LACTATE SERPL-SCNC: 1.8 MMOL/L (ref 0.5–2)
D-LACTATE SERPL-SCNC: 2.3 MMOL/L (ref 0.5–2)
DEPRECATED RDW RBC AUTO: 42.3 FL (ref 37–54)
EGFRCR SERPLBLD CKD-EPI 2021: 57.8 ML/MIN/1.73
ERYTHROCYTE [DISTWIDTH] IN BLOOD BY AUTOMATED COUNT: 12.7 % (ref 12.3–15.4)
GLOBULIN UR ELPH-MCNC: 3.7 GM/DL
GLUCOSE BLDC GLUCOMTR-MCNC: 302 MG/DL (ref 70–130)
GLUCOSE SERPL-MCNC: 325 MG/DL (ref 65–99)
GLUCOSE UR STRIP-MCNC: ABNORMAL MG/DL
HCT VFR BLD AUTO: 31.7 % (ref 34–46.6)
HGB BLD-MCNC: 10.6 G/DL (ref 12–15.9)
HGB UR QL STRIP.AUTO: ABNORMAL
HYALINE CASTS UR QL AUTO: ABNORMAL /LPF
KETONES UR QL STRIP: ABNORMAL
LEUKOCYTE ESTERASE UR QL STRIP.AUTO: ABNORMAL
LYMPHOCYTES # BLD MANUAL: 0.61 10*3/MM3 (ref 0.7–3.1)
LYMPHOCYTES NFR BLD MANUAL: 1 % (ref 5–12)
MAGNESIUM SERPL-MCNC: 1.5 MG/DL (ref 1.6–2.6)
MCH RBC QN AUTO: 29.9 PG (ref 26.6–33)
MCHC RBC AUTO-ENTMCNC: 33.4 G/DL (ref 31.5–35.7)
MCV RBC AUTO: 89.5 FL (ref 79–97)
MONOCYTES # BLD: 0.2 10*3/MM3 (ref 0.1–0.9)
NEUTROPHILS # BLD AUTO: 18.76 10*3/MM3 (ref 1.7–7)
NEUTROPHILS NFR BLD MANUAL: 95.9 % (ref 42.7–76)
NITRITE UR QL STRIP: NEGATIVE
NRBC BLD AUTO-RTO: 0.1 /100 WBC (ref 0–0.2)
NRBC SPEC MANUAL: 1 /100 WBC (ref 0–0.2)
PH UR STRIP.AUTO: <=5 [PH] (ref 5–8)
PLAT MORPH BLD: NORMAL
PLATELET # BLD AUTO: 396 10*3/MM3 (ref 140–450)
PMV BLD AUTO: 10.7 FL (ref 6–12)
POTASSIUM SERPL-SCNC: 3.9 MMOL/L (ref 3.5–5.2)
PROCALCITONIN SERPL-MCNC: 1.21 NG/ML (ref 0–0.25)
PROT SERPL-MCNC: 6.7 G/DL (ref 6–8.5)
PROT UR QL STRIP: ABNORMAL
RBC # BLD AUTO: 3.54 10*6/MM3 (ref 3.77–5.28)
RBC # UR STRIP: ABNORMAL /HPF
RBC MORPH BLD: NORMAL
REF LAB TEST METHOD: ABNORMAL
SODIUM SERPL-SCNC: 135 MMOL/L (ref 136–145)
SP GR UR STRIP: >=1.03 (ref 1–1.03)
SQUAMOUS #/AREA URNS HPF: ABNORMAL /HPF
UROBILINOGEN UR QL STRIP: ABNORMAL
VARIANT LYMPHS NFR BLD MANUAL: 3.1 % (ref 19.6–45.3)
WBC # UR STRIP: ABNORMAL /HPF
WBC MORPH BLD: NORMAL
WBC NRBC COR # BLD: 19.56 10*3/MM3 (ref 3.4–10.8)
YEAST URNS QL MICRO: ABNORMAL /HPF

## 2023-05-24 PROCEDURE — 82948 REAGENT STRIP/BLOOD GLUCOSE: CPT

## 2023-05-24 PROCEDURE — 87147 CULTURE TYPE IMMUNOLOGIC: CPT | Performed by: EMERGENCY MEDICINE

## 2023-05-24 PROCEDURE — 84145 PROCALCITONIN (PCT): CPT | Performed by: EMERGENCY MEDICINE

## 2023-05-24 PROCEDURE — G0378 HOSPITAL OBSERVATION PER HR: HCPCS

## 2023-05-24 PROCEDURE — 25010000002 VANCOMYCIN 10 G RECONSTITUTED SOLUTION: Performed by: EMERGENCY MEDICINE

## 2023-05-24 PROCEDURE — 83605 ASSAY OF LACTIC ACID: CPT | Performed by: EMERGENCY MEDICINE

## 2023-05-24 PROCEDURE — 25010000002 ONDANSETRON PER 1 MG: Performed by: INTERNAL MEDICINE

## 2023-05-24 PROCEDURE — 72193 CT PELVIS W/DYE: CPT

## 2023-05-24 PROCEDURE — 80053 COMPREHEN METABOLIC PANEL: CPT | Performed by: EMERGENCY MEDICINE

## 2023-05-24 PROCEDURE — 85007 BL SMEAR W/DIFF WBC COUNT: CPT | Performed by: EMERGENCY MEDICINE

## 2023-05-24 PROCEDURE — 25010000002 MORPHINE PER 10 MG: Performed by: EMERGENCY MEDICINE

## 2023-05-24 PROCEDURE — P9612 CATHETERIZE FOR URINE SPEC: HCPCS

## 2023-05-24 PROCEDURE — 25010000002 PIPERACILLIN SOD-TAZOBACTAM PER 1 G: Performed by: EMERGENCY MEDICINE

## 2023-05-24 PROCEDURE — 99285 EMERGENCY DEPT VISIT HI MDM: CPT

## 2023-05-24 PROCEDURE — 83735 ASSAY OF MAGNESIUM: CPT | Performed by: EMERGENCY MEDICINE

## 2023-05-24 PROCEDURE — 85025 COMPLETE CBC W/AUTO DIFF WBC: CPT | Performed by: EMERGENCY MEDICINE

## 2023-05-24 PROCEDURE — 25510000001 IOPAMIDOL 61 % SOLUTION: Performed by: INTERNAL MEDICINE

## 2023-05-24 PROCEDURE — 81001 URINALYSIS AUTO W/SCOPE: CPT | Performed by: EMERGENCY MEDICINE

## 2023-05-24 PROCEDURE — 87040 BLOOD CULTURE FOR BACTERIA: CPT | Performed by: EMERGENCY MEDICINE

## 2023-05-24 PROCEDURE — 87150 DNA/RNA AMPLIFIED PROBE: CPT | Performed by: EMERGENCY MEDICINE

## 2023-05-24 PROCEDURE — 83036 HEMOGLOBIN GLYCOSYLATED A1C: CPT | Performed by: HOSPITALIST

## 2023-05-24 RX ORDER — INSULIN LISPRO 100 [IU]/ML
1-200 INJECTION, SOLUTION INTRAVENOUS; SUBCUTANEOUS AS NEEDED
Status: DISCONTINUED | OUTPATIENT
Start: 2023-05-24 | End: 2023-06-06 | Stop reason: HOSPADM

## 2023-05-24 RX ORDER — NICOTINE POLACRILEX 4 MG
15 LOZENGE BUCCAL
Status: DISCONTINUED | OUTPATIENT
Start: 2023-05-24 | End: 2023-06-06 | Stop reason: HOSPADM

## 2023-05-24 RX ORDER — HYDROCODONE BITARTRATE AND ACETAMINOPHEN 5; 325 MG/1; MG/1
1 TABLET ORAL EVERY 6 HOURS PRN
Status: DISCONTINUED | OUTPATIENT
Start: 2023-05-24 | End: 2023-05-25

## 2023-05-24 RX ORDER — ONDANSETRON 2 MG/ML
4 INJECTION INTRAMUSCULAR; INTRAVENOUS EVERY 6 HOURS PRN
Status: DISCONTINUED | OUTPATIENT
Start: 2023-05-24 | End: 2023-06-06 | Stop reason: HOSPADM

## 2023-05-24 RX ORDER — VANCOMYCIN 2 GRAM/500 ML IN 0.9 % SODIUM CHLORIDE INTRAVENOUS
2000 ONCE
Status: COMPLETED | OUTPATIENT
Start: 2023-05-24 | End: 2023-05-25

## 2023-05-24 RX ORDER — IBUPROFEN 600 MG/1
1 TABLET ORAL
Status: DISCONTINUED | OUTPATIENT
Start: 2023-05-24 | End: 2023-06-06 | Stop reason: HOSPADM

## 2023-05-24 RX ORDER — BISACODYL 5 MG/1
5 TABLET, DELAYED RELEASE ORAL DAILY PRN
Status: DISCONTINUED | OUTPATIENT
Start: 2023-05-24 | End: 2023-05-26

## 2023-05-24 RX ORDER — MORPHINE SULFATE 2 MG/ML
4 INJECTION, SOLUTION INTRAMUSCULAR; INTRAVENOUS ONCE
Status: COMPLETED | OUTPATIENT
Start: 2023-05-24 | End: 2023-05-24

## 2023-05-24 RX ORDER — DEXTROSE MONOHYDRATE 25 G/50ML
10-50 INJECTION, SOLUTION INTRAVENOUS
Status: DISCONTINUED | OUTPATIENT
Start: 2023-05-24 | End: 2023-06-06 | Stop reason: HOSPADM

## 2023-05-24 RX ORDER — ONDANSETRON 4 MG/1
4 TABLET, FILM COATED ORAL EVERY 6 HOURS PRN
Status: DISCONTINUED | OUTPATIENT
Start: 2023-05-24 | End: 2023-06-06 | Stop reason: HOSPADM

## 2023-05-24 RX ORDER — ACETAMINOPHEN 325 MG/1
650 TABLET ORAL EVERY 4 HOURS PRN
Status: DISCONTINUED | OUTPATIENT
Start: 2023-05-24 | End: 2023-06-06 | Stop reason: HOSPADM

## 2023-05-24 RX ORDER — AMOXICILLIN 250 MG
2 CAPSULE ORAL 2 TIMES DAILY
Status: DISCONTINUED | OUTPATIENT
Start: 2023-05-24 | End: 2023-05-26

## 2023-05-24 RX ORDER — INSULIN LISPRO 100 [IU]/ML
1-200 INJECTION, SOLUTION INTRAVENOUS; SUBCUTANEOUS
Status: DISCONTINUED | OUTPATIENT
Start: 2023-05-24 | End: 2023-06-06 | Stop reason: HOSPADM

## 2023-05-24 RX ORDER — POLYETHYLENE GLYCOL 3350 17 G/17G
17 POWDER, FOR SOLUTION ORAL DAILY PRN
Status: DISCONTINUED | OUTPATIENT
Start: 2023-05-24 | End: 2023-05-26

## 2023-05-24 RX ORDER — UREA 10 %
3 LOTION (ML) TOPICAL NIGHTLY PRN
Status: DISCONTINUED | OUTPATIENT
Start: 2023-05-24 | End: 2023-06-06 | Stop reason: HOSPADM

## 2023-05-24 RX ORDER — BISACODYL 10 MG
10 SUPPOSITORY, RECTAL RECTAL DAILY PRN
Status: DISCONTINUED | OUTPATIENT
Start: 2023-05-24 | End: 2023-05-26

## 2023-05-24 RX ADMIN — TAZOBACTAM SODIUM AND PIPERACILLIN SODIUM 3.38 G: 375; 3 INJECTION, SOLUTION INTRAVENOUS at 19:25

## 2023-05-24 RX ADMIN — HYDROCODONE BITARTRATE AND ACETAMINOPHEN 1 TABLET: 5; 325 TABLET ORAL at 23:36

## 2023-05-24 RX ADMIN — SODIUM CHLORIDE 1000 ML: 9 INJECTION, SOLUTION INTRAVENOUS at 17:52

## 2023-05-24 RX ADMIN — IOPAMIDOL 85 ML: 612 INJECTION, SOLUTION INTRAVENOUS at 20:06

## 2023-05-24 RX ADMIN — SODIUM CHLORIDE 1000 ML: 9 INJECTION, SOLUTION INTRAVENOUS at 19:29

## 2023-05-24 RX ADMIN — VANCOMYCIN HYDROCHLORIDE 2000 MG: 10 INJECTION, POWDER, LYOPHILIZED, FOR SOLUTION INTRAVENOUS at 23:30

## 2023-05-24 RX ADMIN — MORPHINE SULFATE 4 MG: 2 INJECTION, SOLUTION INTRAMUSCULAR; INTRAVENOUS at 20:21

## 2023-05-24 RX ADMIN — ONDANSETRON 4 MG: 2 INJECTION INTRAMUSCULAR; INTRAVENOUS at 19:29

## 2023-05-24 NOTE — ED NOTES
Nursing report ED to floor  Vanessa Root  53 y.o.  female    HPI :   Chief Complaint   Patient presents with    Wound Check    Headache    Nausea       Admitting doctor:   Rosa Alvarado MD    Admitting diagnosis:   The primary encounter diagnosis was Sepsis, due to unspecified organism, unspecified whether acute organ dysfunction present. Diagnoses of Pressure injury of skin of contiguous region involving right buttock and hip, unspecified injury stage, Hypomagnesemia, History of diabetes mellitus, and History of CVA (cerebrovascular accident) were also pertinent to this visit.    Code status:   Current Code Status       Date Active Code Status Order ID Comments User Context       5/24/2023 1841 CPR (Attempt to Resuscitate) [id number]  Rosa Alvarado MD ED        Question Answer    Code Status (Patient has no pulse and is not breathing) CPR (Attempt to Resuscitate)    Medical Interventions (Patient has pulse or is breathing) Full                    Allergies:   Clemastine fumarate, Ziprasidone, Aripiprazole, Sulfa antibiotics, Latex, and Lisinopril    Isolation:   No active isolations    Intake and Output  No intake or output data in the 24 hours ending 05/24/23 1937    Weight:   There were no vitals filed for this visit.    Most recent vitals:   Vitals:    05/24/23 1657 05/24/23 1658 05/24/23 1741 05/24/23 1931   BP: 91/60  109/69 121/82   BP Location:    Left arm   Patient Position:    Lying   Pulse:  93 86 82   Resp:    20   Temp:       TempSrc:       SpO2:  96% 93% 94%       Active LDAs/IV Access:   Lines, Drains & Airways       Active LDAs       Name Placement date Placement time Site Days    Peripheral IV 05/24/23 1709 Right Antecubital 05/24/23  1709  Antecubital  less than 1                    Labs (abnormal labs have a star):   Labs Reviewed   COMPREHENSIVE METABOLIC PANEL - Abnormal; Notable for the following components:       Result Value    Glucose 325 (*)     Creatinine 1.15 (*)      "Sodium 135 (*)     Chloride 93 (*)     Albumin 3.0 (*)     Alkaline Phosphatase 190 (*)     eGFR 57.8 (*)     All other components within normal limits    Narrative:     GFR Normal >60  Chronic Kidney Disease <60  Kidney Failure <15     LACTIC ACID, PLASMA - Abnormal; Notable for the following components:    Lactate 2.3 (*)     All other components within normal limits   PROCALCITONIN - Abnormal; Notable for the following components:    Procalcitonin 1.21 (*)     All other components within normal limits    Narrative:     As a Marker for Sepsis (Non-Neonates):    1. <0.5 ng/mL represents a low risk of severe sepsis and/or septic shock.  2. >2 ng/mL represents a high risk of severe sepsis and/or septic shock.    As a Marker for Lower Respiratory Tract Infections that require antibiotic therapy:    PCT on Admission    Antibiotic Therapy       6-12 Hrs later    >0.5                Strongly Recommended  >0.25 - <0.5        Recommended   0.1 - 0.25          Discouraged              Remeasure/reassess PCT  <0.1                Strongly Discouraged     Remeasure/reassess PCT    As 28 day mortality risk marker: \"Change in Procalcitonin Result\" (>80% or <=80%) if Day 0 (or Day 1) and Day 4 values are available. Refer to http://www.[employer]-pct-calculator.com    Change in PCT <=80%  A decrease of PCT levels below or equal to 80% defines a positive change in PCT test result representing a higher risk for 28-day all-cause mortality of patients diagnosed with severe sepsis for septic shock.    Change in PCT >80%  A decrease of PCT levels of more than 80% defines a negative change in PCT result representing a lower risk for 28-day all-cause mortality of patients diagnosed with severe sepsis or septic shock.      MAGNESIUM - Abnormal; Notable for the following components:    Magnesium 1.5 (*)     All other components within normal limits   URINALYSIS W/ MICROSCOPIC IF INDICATED (NO CULTURE) - Abnormal; Notable for the following " components:    Color, UA Dark Yellow (*)     Appearance, UA Turbid (*)     Glucose, UA >=1000 mg/dL (3+) (*)     Ketones, UA 15 mg/dL (1+) (*)     Blood, UA Trace (*)     Protein,  mg/dL (2+) (*)     Leuk Esterase, UA Moderate (2+) (*)     All other components within normal limits   CBC WITH AUTO DIFFERENTIAL - Abnormal; Notable for the following components:    WBC 19.56 (*)     RBC 3.54 (*)     Hemoglobin 10.6 (*)     Hematocrit 31.7 (*)     All other components within normal limits   MANUAL DIFFERENTIAL - Abnormal; Notable for the following components:    Neutrophil % 95.9 (*)     Lymphocyte % 3.1 (*)     Monocyte % 1.0 (*)     Neutrophils Absolute 18.76 (*)     Lymphocytes Absolute 0.61 (*)     nRBC 1.0 (*)     All other components within normal limits   URINALYSIS, MICROSCOPIC ONLY - Abnormal; Notable for the following components:    WBC, UA Too Numerous to Count (*)     Bacteria, UA 4+ (*)     All other components within normal limits   BLOOD CULTURE   BLOOD CULTURE   LACTIC ACID, REFLEX   CBC AND DIFFERENTIAL    Narrative:     The following orders were created for panel order CBC & Differential.  Procedure                               Abnormality         Status                     ---------                               -----------         ------                     CBC Auto Differential[[id number]]        Abnormal            Final result                 Please view results for these tests on the individual orders.       EKG:   No orders to display       Meds given in ED:   Medications   vancomycin (VANCOCIN) 20 mg/kg in sodium chloride 0.9 % 250 mL IVPB (has no administration in time range)   piperacillin-tazobactam (ZOSYN) 3.375 g in iso-osmotic dextrose 50 ml (premix) (3.375 g Intravenous New Bag 5/24/23 1925)   sodium chloride 0.9 % bolus 1,000 mL (1,000 mL Intravenous New Bag 5/24/23 1929)   acetaminophen (TYLENOL) tablet 650 mg (has no administration in time range)   sennosides-docusate  (PERICOLACE) 8.6-50 MG per tablet 2 tablet (has no administration in time range)     And   polyethylene glycol (MIRALAX) packet 17 g (has no administration in time range)     And   bisacodyl (DULCOLAX) EC tablet 5 mg (has no administration in time range)     And   bisacodyl (DULCOLAX) suppository 10 mg (has no administration in time range)   ondansetron (ZOFRAN) tablet 4 mg ( Oral Not Given:  See Alt 5/24/23 1929)     Or   ondansetron (ZOFRAN) injection 4 mg (4 mg Intravenous Given 5/24/23 1929)   melatonin tablet 3 mg (has no administration in time range)   morphine injection 4 mg (has no administration in time range)   sodium chloride 0.9 % bolus 1,000 mL (0 mL Intravenous Stopped 5/24/23 1924)       Imaging results:  No radiology results for the last day    Ambulatory status:   - bed rest    Social issues:   Social History     Socioeconomic History    Marital status: Single   Tobacco Use    Smoking status: Former    Smokeless tobacco: Never   Substance and Sexual Activity    Alcohol use: Never    Drug use: Never    Sexual activity: Defer       NIH Stroke Scale:         Lily Mccoy RN  05/24/23 19:37 EDT

## 2023-05-24 NOTE — TELEPHONE ENCOUNTER
Called and spoke to mother and they are requesting a cream to keep the spot on her hip from getting infected. Patient has scratched it and it's deep. She has dry skin and wanting to know if we can prescribe anything. Patient is bedridden.    Please advise.

## 2023-05-24 NOTE — TELEPHONE ENCOUNTER
Caller: ANGELES VILLANUEVA    Relationship: Mother    Best call back number: [phone]    What medication are you requesting: CREAM TO HELP PREVENT BED SORES     What are your current symptoms: SPOT ON HIP THAT HAS SCABBED OVER    If a prescription is needed, what is your preferred pharmacy and phone number: [id number] - [place], [place] - [address]  AT [facility] [address]  - [phone] [facility] [phone] FX     Additional notes: PATIENTS MOM STATES SHE IS WANTING TO KNOW IF DR. SCOTT COULD CALL THE PATIENT IN A CREAM THAT COULD HELP PREVENT BED SORES IF THERE IS ANYTHING THAT DR. SCOTT RECOMMENDS THE PATIENT TRYING.

## 2023-05-24 NOTE — ED PROVIDER NOTES
EMERGENCY DEPARTMENT ENCOUNTER    Room Number:  22/22  Date of encounter:  5/24/2023  PCP: Reese Batista MD  Historian: Patient      HPI:  Chief Complaint: Leg pain, nausea  A complete HPI/ROS/PMH/PSH/SH/FH are unobtainable due to: None    Context: Vanessa Root is a 53 y.o. female who presents to the ED via EMS from home with progressively worsening right gluteal/hip pain from a chronic wound that has worsened over the last week or 2.  Has had nausea without vomiting.  No diarrhea.  Denies any abdominal pain.  Patient is bedbound chronically.  Does have a history of diabetes.  Has not been on any recent antibiotics.      MEDICAL RECORD REVIEW    External (non-ED) record review: No anticoagulants noted on chart review in epic    PAST MEDICAL HISTORY  Active Ambulatory Problems     Diagnosis Date Noted   • Hyperlipidemia    • Diabetes mellitus    • GERD (gastroesophageal reflux disease)    • Bipolar 1 disorder    • Depression    • Arthritis    • Spinal stenosis of lumbar region 01/10/2017   • Sciatica of left side 02/27/2019   • Restless legs 09/02/2015   • Obstructive sleep apnea syndrome 09/09/2014   • Lumbosacral spondylosis without myelopathy 02/24/2017   • Hypothyroidism 04/23/2014   • Facet arthritis of lumbar region 02/28/2014   • Degeneration of intervertebral disc of lumbar region 02/28/2014   • Diabetic peripheral neuropathy 12/09/2014   • Cancer of endometrium 07/02/2013   • Cerebrovascular accident 06/09/2015   • Left foot drop 01/10/2017   • Vitamin B12 deficiency    • Vitamin D deficiency    • Vitamin C deficiency    • H/O hypokalemia    • Iron deficiency anemia secondary to inadequate dietary iron intake    • Iron deficiency    • Muscle weakness 07/06/2020   • Hypocalcemia 12/28/2021   • Malignant neoplasm of uterus 03/29/2022     Resolved Ambulatory Problems     Diagnosis Date Noted   • No Resolved Ambulatory Problems     Past Medical History:   Diagnosis Date   • Cancer    • COVID-19    •  Frequent UTI    • Migraine    • Murmur, heart    • Ovarian cyst    • Stroke          PAST SURGICAL HISTORY  Past Surgical History:   Procedure Laterality Date   •  SECTION  2003   • HYSTERECTOMY     • TONSILLECTOMY           FAMILY HISTORY  Family History   Problem Relation Age of Onset   • Arthritis Mother    • Diabetes Mother    • Migraines Mother    • Stroke Mother    • Arthritis Father    • Cancer Maternal Grandmother    • Thyroid disease Maternal Grandmother    • Cancer Maternal Grandfather    • Cancer Paternal Grandmother    • Thyroid disease Paternal Grandmother    • Cancer Paternal Grandfather          SOCIAL HISTORY  Social History     Socioeconomic History   • Marital status: Single   Tobacco Use   • Smoking status: Former   • Smokeless tobacco: Never   Substance and Sexual Activity   • Alcohol use: Never   • Drug use: Never   • Sexual activity: Defer         ALLERGIES  Clemastine fumarate, Ziprasidone, Aripiprazole, Sulfa antibiotics, Latex, and Lisinopril        REVIEW OF SYSTEMS  Review of Systems     All systems reviewed and negative except for those discussed in HPI.       PHYSICAL EXAM    I have reviewed the triage vital signs and nursing notes.    ED Triage Vitals [23 1643]   Temp Heart Rate Resp BP SpO2   100 °F (37.8 °C) 90 23 109/76 93 %      Temp src Heart Rate Source Patient Position BP Location FiO2 (%)   Oral -- Lying Left arm --       Physical Exam  General: Chronically ill-appearing, nondiaphoretic  HEENT: Mucous membranes dry, atraumatic, EOMI  Neck: Full ROM  Pulm: Symmetric chest rise, nonlabored, lungs CTAB  Cardiovascular: Regular rate and rhythm, intact distal pulses  GI: Soft, nontender, nondistended, no rebound, no guarding, bowel sounds present  MSK: Full ROM, no deformity  Skin: Warm, dry, large right gluteal/hip area of necrosis and ulceration with surrounding induration  Neuro: Awake, alert, oriented x 4, GCS 15, moving all extremities, no focal  deficits  Psych: Calm, cooperative        LAB RESULTS  Recent Results (from the past 24 hour(s))   Comprehensive Metabolic Panel    Collection Time: 05/24/23  5:31 PM    Specimen: Blood   Result Value Ref Range    Glucose 325 (H) 65 - 99 mg/dL    BUN 18 6 - 20 mg/dL    Creatinine 1.15 (H) 0.57 - 1.00 mg/dL    Sodium 135 (L) 136 - 145 mmol/L    Potassium 3.9 3.5 - 5.2 mmol/L    Chloride 93 (L) 98 - 107 mmol/L    CO2 27.6 22.0 - 29.0 mmol/L    Calcium 9.8 8.6 - 10.5 mg/dL    Total Protein 6.7 6.0 - 8.5 g/dL    Albumin 3.0 (L) 3.5 - 5.2 g/dL    ALT (SGPT) 10 1 - 33 U/L    AST (SGOT) 13 1 - 32 U/L    Alkaline Phosphatase 190 (H) 39 - 117 U/L    Total Bilirubin 0.8 0.0 - 1.2 mg/dL    Globulin 3.7 gm/dL    A/G Ratio 0.8 g/dL    BUN/Creatinine Ratio 15.7 7.0 - 25.0    Anion Gap 14.4 5.0 - 15.0 mmol/L    eGFR 57.8 (L) >60.0 mL/min/1.73   Lactic Acid, Plasma    Collection Time: 05/24/23  5:31 PM    Specimen: Blood   Result Value Ref Range    Lactate 2.3 (C) 0.5 - 2.0 mmol/L   Procalcitonin    Collection Time: 05/24/23  5:31 PM    Specimen: Blood   Result Value Ref Range    Procalcitonin 1.21 (H) 0.00 - 0.25 ng/mL   Magnesium    Collection Time: 05/24/23  5:31 PM    Specimen: Blood   Result Value Ref Range    Magnesium 1.5 (L) 1.6 - 2.6 mg/dL   CBC Auto Differential    Collection Time: 05/24/23  5:31 PM    Specimen: Blood   Result Value Ref Range    WBC 19.56 (H) 3.40 - 10.80 10*3/mm3    RBC 3.54 (L) 3.77 - 5.28 10*6/mm3    Hemoglobin 10.6 (L) 12.0 - 15.9 g/dL    Hematocrit 31.7 (L) 34.0 - 46.6 %    MCV 89.5 79.0 - 97.0 fL    MCH 29.9 26.6 - 33.0 pg    MCHC 33.4 31.5 - 35.7 g/dL    RDW 12.7 12.3 - 15.4 %    RDW-SD 42.3 37.0 - 54.0 fl    MPV 10.7 6.0 - 12.0 fL    Platelets 396 140 - 450 10*3/mm3    nRBC 0.1 0.0 - 0.2 /100 WBC   Manual Differential    Collection Time: 05/24/23  5:31 PM    Specimen: Blood   Result Value Ref Range    Neutrophil % 95.9 (H) 42.7 - 76.0 %    Lymphocyte % 3.1 (L) 19.6 - 45.3 %    Monocyte % 1.0 (L)  5.0 - 12.0 %    Neutrophils Absolute 18.76 (H) 1.70 - 7.00 10*3/mm3    Lymphocytes Absolute 0.61 (L) 0.70 - 3.10 10*3/mm3    Monocytes Absolute 0.20 0.10 - 0.90 10*3/mm3    nRBC 1.0 (H) 0.0 - 0.2 /100 WBC    RBC Morphology Normal Normal    WBC Morphology Normal Normal    Platelet Morphology Normal Normal   Urinalysis With Microscopic If Indicated (No Culture) - Urine, Catheter    Collection Time: 05/24/23  5:54 PM    Specimen: Urine, Catheter   Result Value Ref Range    Color, UA Dark Yellow (A) Yellow, Straw    Appearance, UA Turbid (A) Clear    pH, UA <=5.0 5.0 - 8.0    Specific Gravity, UA >=1.030 1.005 - 1.030    Glucose, UA >=1000 mg/dL (3+) (A) Negative    Ketones, UA 15 mg/dL (1+) (A) Negative    Bilirubin, UA Negative Negative    Blood, UA Trace (A) Negative    Protein,  mg/dL (2+) (A) Negative    Leuk Esterase, UA Moderate (2+) (A) Negative    Nitrite, UA Negative Negative    Urobilinogen, UA 1.0 E.U./dL 0.2 - 1.0 E.U./dL   Urinalysis, Microscopic Only - Urine, Catheter    Collection Time: 05/24/23  5:54 PM    Specimen: Urine, Catheter   Result Value Ref Range    RBC, UA 0-2 None Seen, 0-2 /HPF    WBC, UA Too Numerous to Count (A) None Seen, 0-2 /HPF    Bacteria, UA 4+ (A) None Seen /HPF    Squamous Epithelial Cells, UA 0-2 None Seen, 0-2 /HPF    Yeast, UA Large/3+ Budding Yeast None Seen /HPF    Hyaline Casts, UA None Seen None Seen /LPF    Methodology Manual Light Microscopy        Ordered the above labs and independently interpreted results. My findings will be discussed in the medical decision making section below        RADIOLOGY  No Radiology Exams Resulted Within Past 24 Hours    Ordered the above noted radiological studies.  Independently interpreted by me and my independent review of findings can be found in the ED Course.  See dictation for official radiology interpretation.      PROCEDURES    Critical Care  Performed by: Leobardo Ceron MD  Authorized by: Leobardo Ceron MD     Critical  care provider statement:     Critical care time (minutes):  38    Critical care time was exclusive of:  Separately billable procedures and treating other patients    Critical care was necessary to treat or prevent imminent or life-threatening deterioration of the following conditions:  Sepsis    Critical care was time spent personally by me on the following activities:  Development of treatment plan with patient or surrogate, discussions with consultants, evaluation of patient's response to treatment, examination of patient, obtaining history from patient or surrogate, ordering and performing treatments and interventions, ordering and review of laboratory studies, ordering and review of radiographic studies, pulse oximetry, review of old charts and re-evaluation of patient's condition    Care discussed with: admitting provider            MEDICATIONS GIVEN IN ER    Medications   vancomycin (VANCOCIN) 20 mg/kg in sodium chloride 0.9 % 250 mL IVPB (has no administration in time range)   piperacillin-tazobactam (ZOSYN) 3.375 g in iso-osmotic dextrose 50 ml (premix) (has no administration in time range)   sodium chloride 0.9 % bolus 1,000 mL (has no administration in time range)   acetaminophen (TYLENOL) tablet 650 mg (has no administration in time range)   sennosides-docusate (PERICOLACE) 8.6-50 MG per tablet 2 tablet (has no administration in time range)     And   polyethylene glycol (MIRALAX) packet 17 g (has no administration in time range)     And   bisacodyl (DULCOLAX) EC tablet 5 mg (has no administration in time range)     And   bisacodyl (DULCOLAX) suppository 10 mg (has no administration in time range)   ondansetron (ZOFRAN) tablet 4 mg (has no administration in time range)     Or   ondansetron (ZOFRAN) injection 4 mg (has no administration in time range)   melatonin tablet 3 mg (has no administration in time range)   sodium chloride 0.9 % bolus 1,000 mL (1,000 mL Intravenous New Bag 5/24/23 8110)          PROGRESS, DATA ANALYSIS, CONSULTS, AND MEDICAL DECISION MAKING    Please note that this section constitutes my independent interpretation of clinical data including lab results, radiology, EKG's.  This constitutes my independent professional opinion regarding differential diagnosis and management of this patient.  It may include any factors such as history from outside sources, review of external records, social determinants of health, management of medications, response to those treatments, and discussions with other providers.    Differential Diagnosis and Plan: Initial concern for infected necrotic decubitus wound in the right hip/gluteal region, sepsis, renal failure, electrolyte abnormalities, UTI, among others.  Plan for labs, symptomatic control, CT scan, and plans for hospitalization with antibiotics.    Additional sources:  - Discussed/ obtained information from independent historians:       - Chronic or social conditions impacting care:      - Shared decision making:  Patient fully updated on and in agreement with the course and plan moving forward    ED Course as of 05/24/23 1917   Wed May 24, 2023   1819 WBC(!): 19.56 [DC]   1819 Hemoglobin(!): 10.6 [DC]   1819 Lactate(!!): 2.3 [DC]   1819 Procalcitonin(!): 1.21 [DC]   1819 Glucose(!): 325 [DC]   1819 BUN: 18 [DC]   1819 Creatinine(!): 1.15 [DC]   1819 Sodium(!): 135 [DC]   1819 Potassium: 3.9 [DC]   1819 ALT (SGPT): 10 [DC]   1819 AST (SGOT): 13 [DC]   1819 Alkaline Phosphatase(!): 190 [DC]   1819 Total Bilirubin: 0.8 [DC]   1819 Nitrite, UA: Negative [DC]   1819 Leukocytes, UA(!): Moderate (2+) [DC]   1819 Blood, UA(!): Trace [DC]   1819 Ketones, UA(!): 15 mg/dL (1+) [DC]   1838 Discussed with Dr. Alvarado, [facility], discussed patient's clinical course and findings today, treatment modalities, and need for hospitalization. [DC]      ED Course User Index  [DC] Leobardo Ceron MD     Concerning appearing wound as a source of infection on her right  hip/gluteal region, broad-spectrum antibiotics have been ordered, IV fluids in progress.  CT scan pending prior to going upstairs patient fully updated the course and plan Avinza day need for hospital stay.    Hospitalization Considered?: yes    Orders Placed During This Visit:  Orders Placed This Encounter   Procedures   • Blood Culture - Blood,   • Blood Culture - Blood,   • CT Pelvis With Contrast   • Comprehensive Metabolic Panel   • Lactic Acid, Plasma   • Procalcitonin   • Magnesium   • Urinalysis With Microscopic If Indicated (No Culture) - Urine, Catheter   • CBC Auto Differential   • Manual Differential   • Urinalysis, Microscopic Only - Urine, Clean Catch   • STAT Lactic Acid, Reflex   • Basic Metabolic Panel   • CBC (No Diff)   • Diet: Cardiac Diets; Healthy Heart (2-3 Na+); Texture: Regular Texture (IDDSI 7); Fluid Consistency: Thin (IDDSI 0)   • Vital Signs   • Up with assistance   • Daily Weights   • Strict Intake & Output   • Oral Care   • Place Sequential Compression Device   • Maintain Sequential Compression Device   • Code Status and Medical Interventions:   • LHA (on-call MD unless specified) Details   • Inpatient Admission   • Initiate Observation Status   • CBC & Differential       Additional orders considered but not placed:      Independent interpretation of labs, radiology studies, and discussions with consultants: See ED Course        AS OF 19:17 EDT VITALS:    BP - 109/69  HR - 86  TEMP - 100 °F (37.8 °C) (Oral)  02 SATS - 93%        DIAGNOSIS  Final diagnoses:   Sepsis, due to unspecified organism, unspecified whether acute organ dysfunction present   Pressure injury of skin of contiguous region involving right buttock and hip, unspecified injury stage   Hypomagnesemia   History of diabetes mellitus   History of CVA (cerebrovascular accident)         DISPOSITION  HOSPITALIZATION    Discussed treatment plan and reason for hospitalization with pt/family and hospitalizing physician.   Pt/family voiced understanding of the plan for hospitalization for further testing/treatment as needed.                   --    Please note that portions of this were completed with a voice recognition program.       Note Disclaimer: At [facility], we believe that sharing information builds trust and better relationships. You are receiving this note because you are receiving care at [facility] or recently visited. It is possible you will see health information before a provider has talked with you about it. This kind of information can be easy to misunderstand. To help you fully understand what it means for your health, we urge you to discuss this note with your provider.         Leobardo Ceron MD  05/24/23 [id number]

## 2023-05-24 NOTE — H&P
HISTORY AND PHYSICAL   [facility]        Date of Admission: 2023  Patient Identification:  Name: Vanessa Root  Age: 53 y.o.  Sex: female  :  1970  MRN: [id number]                     Primary Care Physician: Reese Batista MD    Chief Complaint:  53 year old female who presented to the emergency room with pain of her right hip and buttock as well as nausea; she has a history of spinal stenosis and has been bed bound for about 3 years; she tries to remember to turn frequently but has developed a wound on her right hip/gluteus; she denies fever or chills    History of Present Illness:   As above    Past Medical History:  Past Medical History:   Diagnosis Date   • Arthritis    • Bipolar 1 disorder    • Cancer     uterine   • COVID-19    • Depression    • Diabetes mellitus    • Frequent UTI    • GERD (gastroesophageal reflux disease)    • Hyperlipidemia    • Migraine    • Murmur, heart    • Ovarian cyst    • Stroke      Past Surgical History:  Past Surgical History:   Procedure Laterality Date   •  SECTION  2003   • HYSTERECTOMY     • TONSILLECTOMY        Home Meds:  (Not in a hospital admission)      Allergies:  Allergies   Allergen Reactions   • Clemastine Fumarate Other (See Comments)     SEVERE ORBITAL SWELLING   • Ziprasidone Other (See Comments)     EXTREME SLEEPINESS  EXTREME SLEEPINESS     • Aripiprazole Other (See Comments)     MIGRAINES  MIGRAINES     • Sulfa Antibiotics Nausea Only   • Latex Rash   • Lisinopril Cough     Immunizations:  Immunization History   Administered Date(s) Administered   • COVID-19 (MODERNA) 1st,2nd,3rd Dose Monovalent 2021, 10/20/2021   • Flu Vaccine Intradermal Quad 18-64YR 10/20/2021   • Flu Vaccine Quad PF >36MO 10/18/2014, 10/07/2015   • Fluzone High Dose =>65 Years ([employer] ONLY) 2016   • Hepatitis A 2018, 2018   • Influenza Quad Vaccine (Inpatient) 2013   • Pneumococcal Polysaccharide (PPSV23)  2015, 2015   • Td, Unspecified 2009   • Tdap 2013     Social History:   Social History     Social History Narrative   • Not on file     Social History     Socioeconomic History   • Marital status: Single   Tobacco Use   • Smoking status: Former   • Smokeless tobacco: Never   Substance and Sexual Activity   • Alcohol use: Never   • Drug use: Never   • Sexual activity: Defer       Family History:  Family History   Problem Relation Age of Onset   • Arthritis Mother    • Diabetes Mother    • Migraines Mother    • Stroke Mother    • Arthritis Father    • Cancer Maternal Grandmother    • Thyroid disease Maternal Grandmother    • Cancer Maternal Grandfather    • Cancer Paternal Grandmother    • Thyroid disease Paternal Grandmother    • Cancer Paternal Grandfather         Review of Systems  See history of present illness and past medical history.  Patient denies headache, dizziness, syncope, falls, trauma, change in vision, change in hearing, change in taste, changes in weight, changes in appetite, focal weakness, numbness, or paresthesia.  Patient denies chest pain, palpitations, dyspnea, orthopnea, PND, cough, sinus pressure, rhinorrhea, epistaxis, hemoptysis, nausea, vomiting,hematemesis, diarrhea, constipation or hematochezia.  Denies cold or heat intolerance, polydipsia, polyuria, polyphagia. Denies hematuria, pyuria, dysuria, hesitancy, frequency or urgency. Denies consumption of raw and under cooked meats foods or change in water source.  Denies fever, chills, sweats, night sweats.  Denies missing any routine medications. Remainder of ROS is negative.    Objective:  T Max 24 hrs: Temp (24hrs), Av °F (37.8 °C), Min:100 °F (37.8 °C), Max:100 °F (37.8 °C)    Vitals Ranges:   Temp:  [100 °F (37.8 °C)] 100 °F (37.8 °C)  Heart Rate:  [82-93] 82  Resp:  [20-23] 20  BP: ()/(60-82) 121/82      Exam:  /82 (BP Location: Left arm, Patient Position: Lying)   Pulse 82   Temp 100 °F (37.8  °C) (Oral)   Resp 20   SpO2 94%     General Appearance:    Alert, cooperative, no distress, appears stated age   Head:    Normocephalic, without obvious abnormality, atraumatic   Eyes:    PERRL, conjunctivae/corneas clear, EOM's intact, both eyes   Ears:    Normal external ear canals, both ears   Nose:   Nares normal, septum midline, mucosa normal, no drainage    or sinus tenderness   Throat:   Lips, mucosa, and tongue normal   Neck:   Supple, symmetrical, trachea midline, no adenopathy;     thyroid:  no enlargement/tenderness/nodules; no carotid    bruit or JVD   Back:     Symmetric, no curvature, ROM normal, no CVA tenderness   Lungs:     Decreased breath sounds bilaterally, respirations unlabored   Chest Wall:    No tenderness or deformity    Heart:    Regular rate and rhythm, S1 and S2 normal, no murmur, rub   or gallop   Abdomen:     Soft, nontender, bowel sounds active all four quadrants,     no masses, no hepatomegaly, no splenomegaly   Extremities:   Extremities normal, atraumatic, no cyanosis or edema   Pulses:   2+ and symmetric all extremities   Skin:   Skin color, texture, turgor normal, wound right buttock/hip with necrosis               .    Data Review:  Labs in chart were reviewed.  WBC   Date Value Ref Range Status   05/24/2023 19.56 (H) 3.40 - 10.80 10*3/mm3 Final     Hemoglobin   Date Value Ref Range Status   05/24/2023 10.6 (L) 12.0 - 15.9 g/dL Final     Hematocrit   Date Value Ref Range Status   05/24/2023 31.7 (L) 34.0 - 46.6 % Final     Platelets   Date Value Ref Range Status   05/24/2023 396 140 - 450 10*3/mm3 Final     Sodium   Date Value Ref Range Status   05/24/2023 135 (L) 136 - 145 mmol/L Final     Potassium   Date Value Ref Range Status   05/24/2023 3.9 3.5 - 5.2 mmol/L Final     Chloride   Date Value Ref Range Status   05/24/2023 93 (L) 98 - 107 mmol/L Final     CO2   Date Value Ref Range Status   05/24/2023 27.6 22.0 - 29.0 mmol/L Final     BUN   Date Value Ref Range Status    05/24/2023 18 6 - 20 mg/dL Final     Creatinine   Date Value Ref Range Status   05/24/2023 1.15 (H) 0.57 - 1.00 mg/dL Final     Glucose   Date Value Ref Range Status   05/24/2023 325 (H) 65 - 99 mg/dL Final     Calcium   Date Value Ref Range Status   05/24/2023 9.8 8.6 - 10.5 mg/dL Final     Magnesium   Date Value Ref Range Status   05/24/2023 1.5 (L) 1.6 - 2.6 mg/dL Final                Imaging Results (All)     None            Assessment:  Active Hospital Problems    Diagnosis  POA   • **Sepsis, due to unspecified organism, unspecified whether acute organ dysfunction present [A41.9]  Yes      Resolved Hospital Problems   No resolved problems to display.   decubitus ulcer right hip unstageable poa  Obesity  Spinal stenosis  Immobility  Sleep apnea  Diabetes  anemia    Plan:  Ask id to see her  Ask the wound nurse to see her  Replace magnesium  accu checks, insulin  Monitor on telemetry  Cpap for sleep apnea  Trend labs  D.w patient and ed provider as well as family    Rosa Alvarado MD  5/24/2023  19:52 EDT

## 2023-05-24 NOTE — ED NOTES
Chief Complaint   Patient presents with    Wound Check    Headache    Nausea     Blood pressure 121/82, pulse 82, temperature 100 °F (37.8 °C), temperature source Oral, resp. rate 20, SpO2 94 %.      Bedside report with off going RN.. Family at the bedside.. the patient is alert and oriented x4.

## 2023-05-24 NOTE — Clinical Note
Level of Care: Telemetry [5]   Diagnosis: Sepsis, due to unspecified organism, unspecified whether acute organ dysfunction present [[id number]]   Admitting Physician: JOHNATHAN BEAR [[id number]]   Attending Physician: JOHNATHAN BEAR [[id number]]   Certification: I Certify That Inpatient Hospital Services Are Medically Necessary For Greater Than 2 Midnights

## 2023-05-25 ENCOUNTER — ANESTHESIA (OUTPATIENT)
Dept: PERIOP | Facility: HOSPITAL | Age: 53
End: 2023-05-25
Payer: MEDICARE

## 2023-05-25 ENCOUNTER — ANESTHESIA EVENT (OUTPATIENT)
Dept: PERIOP | Facility: HOSPITAL | Age: 53
End: 2023-05-25
Payer: MEDICARE

## 2023-05-25 PROBLEM — M79.89 NECROTIZING SOFT TISSUE INFECTION: Status: ACTIVE | Noted: 2023-05-25

## 2023-05-25 PROBLEM — L89.310 PRESSURE INJURY OF RIGHT BUTTOCK, UNSTAGEABLE: Status: ACTIVE | Noted: 2023-05-25

## 2023-05-25 PROBLEM — M62.3 IMMOBILITY SYNDROME: Status: ACTIVE | Noted: 2023-05-25

## 2023-05-25 LAB
ANION GAP SERPL CALCULATED.3IONS-SCNC: 10 MMOL/L (ref 5–15)
BACTERIA BLD CULT: ABNORMAL
BUN SERPL-MCNC: 18 MG/DL (ref 6–20)
BUN/CREAT SERPL: 18.8 (ref 7–25)
CALCIUM SPEC-SCNC: 8.6 MG/DL (ref 8.6–10.5)
CHLORIDE SERPL-SCNC: 97 MMOL/L (ref 98–107)
CO2 SERPL-SCNC: 26 MMOL/L (ref 22–29)
CREAT SERPL-MCNC: 0.96 MG/DL (ref 0.57–1)
DEPRECATED RDW RBC AUTO: 41.6 FL (ref 37–54)
EGFRCR SERPLBLD CKD-EPI 2021: 71.8 ML/MIN/1.73
ERYTHROCYTE [DISTWIDTH] IN BLOOD BY AUTOMATED COUNT: 12.9 % (ref 12.3–15.4)
GLUCOSE BLDC GLUCOMTR-MCNC: 248 MG/DL (ref 70–130)
GLUCOSE BLDC GLUCOMTR-MCNC: 251 MG/DL (ref 70–130)
GLUCOSE BLDC GLUCOMTR-MCNC: 252 MG/DL (ref 70–130)
GLUCOSE BLDC GLUCOMTR-MCNC: 254 MG/DL (ref 70–130)
GLUCOSE BLDC GLUCOMTR-MCNC: 254 MG/DL (ref 70–130)
GLUCOSE BLDC GLUCOMTR-MCNC: 256 MG/DL (ref 70–130)
GLUCOSE SERPL-MCNC: 268 MG/DL (ref 65–99)
HBA1C MFR BLD: 11.3 % (ref 4.8–5.6)
HCT VFR BLD AUTO: 30.5 % (ref 34–46.6)
HGB BLD-MCNC: 10 G/DL (ref 12–15.9)
MAGNESIUM SERPL-MCNC: 1.4 MG/DL (ref 1.6–2.6)
MCH RBC QN AUTO: 29 PG (ref 26.6–33)
MCHC RBC AUTO-ENTMCNC: 32.8 G/DL (ref 31.5–35.7)
MCV RBC AUTO: 88.4 FL (ref 79–97)
PLATELET # BLD AUTO: 377 10*3/MM3 (ref 140–450)
PMV BLD AUTO: 10.4 FL (ref 6–12)
POTASSIUM SERPL-SCNC: 3.4 MMOL/L (ref 3.5–5.2)
RBC # BLD AUTO: 3.45 10*6/MM3 (ref 3.77–5.28)
SODIUM SERPL-SCNC: 133 MMOL/L (ref 136–145)
WBC NRBC COR # BLD: 20.25 10*3/MM3 (ref 3.4–10.8)

## 2023-05-25 PROCEDURE — 25010000002 PIPERACILLIN SOD-TAZOBACTAM PER 1 G: Performed by: INTERNAL MEDICINE

## 2023-05-25 PROCEDURE — 87075 CULTR BACTERIA EXCEPT BLOOD: CPT | Performed by: SURGERY

## 2023-05-25 PROCEDURE — 63710000001 INSULIN LISPRO (HUMAN) PER 5 UNITS: Performed by: INTERNAL MEDICINE

## 2023-05-25 PROCEDURE — 99222 1ST HOSP IP/OBS MODERATE 55: CPT | Performed by: SURGERY

## 2023-05-25 PROCEDURE — 0JB90ZZ EXCISION OF BUTTOCK SUBCUTANEOUS TISSUE AND FASCIA, OPEN APPROACH: ICD-10-PCS | Performed by: SURGERY

## 2023-05-25 PROCEDURE — 83735 ASSAY OF MAGNESIUM: CPT | Performed by: HOSPITALIST

## 2023-05-25 PROCEDURE — 80048 BASIC METABOLIC PNL TOTAL CA: CPT | Performed by: INTERNAL MEDICINE

## 2023-05-25 PROCEDURE — 25010000002 VANCOMYCIN 750 MG RECONSTITUTED SOLUTION 1 EACH VIAL: Performed by: INTERNAL MEDICINE

## 2023-05-25 PROCEDURE — 87070 CULTURE OTHR SPECIMN AEROBIC: CPT | Performed by: SURGERY

## 2023-05-25 PROCEDURE — 11046 DBRDMT MUSC&/FSCA EA ADDL: CPT | Performed by: SURGERY

## 2023-05-25 PROCEDURE — 25010000002 PHENYLEPHRINE 10 MG/ML SOLUTION 5 ML VIAL: Performed by: ANESTHESIOLOGY

## 2023-05-25 PROCEDURE — 25010000002 HYDROMORPHONE PER 4 MG: Performed by: ANESTHESIOLOGY

## 2023-05-25 PROCEDURE — 36415 COLL VENOUS BLD VENIPUNCTURE: CPT | Performed by: INTERNAL MEDICINE

## 2023-05-25 PROCEDURE — 25010000002 PROPOFOL 10 MG/ML EMULSION: Performed by: ANESTHESIOLOGY

## 2023-05-25 PROCEDURE — 63710000001 INSULIN LISPRO (HUMAN) PER 5 UNITS: Performed by: SURGERY

## 2023-05-25 PROCEDURE — 25010000002 VANCOMYCIN 750 MG RECONSTITUTED SOLUTION 1 EACH VIAL: Performed by: SURGERY

## 2023-05-25 PROCEDURE — 11043 DBRDMT MUSC&/FSCA 1ST 20/<: CPT | Performed by: SURGERY

## 2023-05-25 PROCEDURE — 82948 REAGENT STRIP/BLOOD GLUCOSE: CPT

## 2023-05-25 PROCEDURE — 85027 COMPLETE CBC AUTOMATED: CPT | Performed by: INTERNAL MEDICINE

## 2023-05-25 PROCEDURE — 25010000002 FENTANYL CITRATE (PF) 50 MCG/ML SOLUTION: Performed by: ANESTHESIOLOGY

## 2023-05-25 PROCEDURE — 87205 SMEAR GRAM STAIN: CPT | Performed by: SURGERY

## 2023-05-25 PROCEDURE — 99223 1ST HOSP IP/OBS HIGH 75: CPT | Performed by: INTERNAL MEDICINE

## 2023-05-25 PROCEDURE — 25010000002 SUGAMMADEX 200 MG/2ML SOLUTION: Performed by: ANESTHESIOLOGY

## 2023-05-25 RX ORDER — ONDANSETRON 2 MG/ML
4 INJECTION INTRAMUSCULAR; INTRAVENOUS ONCE AS NEEDED
Status: DISCONTINUED | OUTPATIENT
Start: 2023-05-25 | End: 2023-05-25

## 2023-05-25 RX ORDER — PROMETHAZINE HYDROCHLORIDE 25 MG/1
25 TABLET ORAL ONCE AS NEEDED
Status: DISCONTINUED | OUTPATIENT
Start: 2023-05-25 | End: 2023-05-25

## 2023-05-25 RX ORDER — ASCORBIC ACID 500 MG
250 TABLET ORAL DAILY
Status: DISCONTINUED | OUTPATIENT
Start: 2023-05-25 | End: 2023-06-06 | Stop reason: HOSPADM

## 2023-05-25 RX ORDER — MORPHINE SULFATE 2 MG/ML
4 INJECTION, SOLUTION INTRAMUSCULAR; INTRAVENOUS EVERY 4 HOURS PRN
Status: DISCONTINUED | OUTPATIENT
Start: 2023-05-25 | End: 2023-06-06 | Stop reason: HOSPADM

## 2023-05-25 RX ORDER — METOCLOPRAMIDE HYDROCHLORIDE 5 MG/ML
10 INJECTION INTRAMUSCULAR; INTRAVENOUS ONCE
Status: DISCONTINUED | OUTPATIENT
Start: 2023-05-25 | End: 2023-05-25 | Stop reason: HOSPADM

## 2023-05-25 RX ORDER — LIDOCAINE HYDROCHLORIDE 10 MG/ML
0.5 INJECTION, SOLUTION EPIDURAL; INFILTRATION; INTRACAUDAL; PERINEURAL ONCE AS NEEDED
Status: DISCONTINUED | OUTPATIENT
Start: 2023-05-25 | End: 2023-05-25 | Stop reason: HOSPADM

## 2023-05-25 RX ORDER — FENTANYL CITRATE 50 UG/ML
50 INJECTION, SOLUTION INTRAMUSCULAR; INTRAVENOUS
Status: DISCONTINUED | OUTPATIENT
Start: 2023-05-25 | End: 2023-05-25

## 2023-05-25 RX ORDER — MAGNESIUM SULFATE 1 G/100ML
2 INJECTION INTRAVENOUS
Status: CANCELLED | OUTPATIENT
Start: 2023-05-25 | End: 2023-05-26

## 2023-05-25 RX ORDER — HYDROMORPHONE HYDROCHLORIDE 1 MG/ML
0.5 INJECTION, SOLUTION INTRAMUSCULAR; INTRAVENOUS; SUBCUTANEOUS
Status: DISCONTINUED | OUTPATIENT
Start: 2023-05-25 | End: 2023-05-25

## 2023-05-25 RX ORDER — FENTANYL CITRATE 50 UG/ML
50 INJECTION, SOLUTION INTRAMUSCULAR; INTRAVENOUS ONCE AS NEEDED
Status: DISCONTINUED | OUTPATIENT
Start: 2023-05-25 | End: 2023-05-25 | Stop reason: HOSPADM

## 2023-05-25 RX ORDER — SODIUM CHLORIDE 0.9 % (FLUSH) 0.9 %
3 SYRINGE (ML) INJECTION EVERY 12 HOURS SCHEDULED
Status: DISCONTINUED | OUTPATIENT
Start: 2023-05-25 | End: 2023-05-25 | Stop reason: HOSPADM

## 2023-05-25 RX ORDER — MIDAZOLAM HYDROCHLORIDE 1 MG/ML
1 INJECTION INTRAMUSCULAR; INTRAVENOUS
Status: DISCONTINUED | OUTPATIENT
Start: 2023-05-25 | End: 2023-05-25 | Stop reason: HOSPADM

## 2023-05-25 RX ORDER — PROMETHAZINE HYDROCHLORIDE 25 MG/1
25 SUPPOSITORY RECTAL ONCE AS NEEDED
Status: DISCONTINUED | OUTPATIENT
Start: 2023-05-25 | End: 2023-05-25

## 2023-05-25 RX ORDER — SODIUM CHLORIDE, SODIUM LACTATE, POTASSIUM CHLORIDE, CALCIUM CHLORIDE 600; 310; 30; 20 MG/100ML; MG/100ML; MG/100ML; MG/100ML
9 INJECTION, SOLUTION INTRAVENOUS CONTINUOUS
Status: DISCONTINUED | OUTPATIENT
Start: 2023-05-25 | End: 2023-05-25

## 2023-05-25 RX ORDER — NALOXONE HCL 0.4 MG/ML
0.2 VIAL (ML) INJECTION AS NEEDED
Status: DISCONTINUED | OUTPATIENT
Start: 2023-05-25 | End: 2023-05-25

## 2023-05-25 RX ORDER — PROPOFOL 10 MG/ML
VIAL (ML) INTRAVENOUS AS NEEDED
Status: DISCONTINUED | OUTPATIENT
Start: 2023-05-25 | End: 2023-05-25 | Stop reason: SURG

## 2023-05-25 RX ORDER — DIPHENHYDRAMINE HYDROCHLORIDE 50 MG/ML
12.5 INJECTION INTRAMUSCULAR; INTRAVENOUS
Status: DISCONTINUED | OUTPATIENT
Start: 2023-05-25 | End: 2023-05-25

## 2023-05-25 RX ORDER — DROPERIDOL 2.5 MG/ML
0.62 INJECTION, SOLUTION INTRAMUSCULAR; INTRAVENOUS
Status: DISCONTINUED | OUTPATIENT
Start: 2023-05-25 | End: 2023-05-25

## 2023-05-25 RX ORDER — SODIUM CHLORIDE 9 MG/ML
100 INJECTION, SOLUTION INTRAVENOUS CONTINUOUS
Status: DISCONTINUED | OUTPATIENT
Start: 2023-05-25 | End: 2023-05-27

## 2023-05-25 RX ORDER — HYDRALAZINE HYDROCHLORIDE 20 MG/ML
5 INJECTION INTRAMUSCULAR; INTRAVENOUS
Status: DISCONTINUED | OUTPATIENT
Start: 2023-05-25 | End: 2023-05-25

## 2023-05-25 RX ORDER — FAMOTIDINE 10 MG/ML
20 INJECTION, SOLUTION INTRAVENOUS ONCE
Status: COMPLETED | OUTPATIENT
Start: 2023-05-25 | End: 2023-05-25

## 2023-05-25 RX ORDER — LABETALOL HYDROCHLORIDE 5 MG/ML
5 INJECTION, SOLUTION INTRAVENOUS
Status: DISCONTINUED | OUTPATIENT
Start: 2023-05-25 | End: 2023-05-25

## 2023-05-25 RX ORDER — IPRATROPIUM BROMIDE AND ALBUTEROL SULFATE 2.5; .5 MG/3ML; MG/3ML
3 SOLUTION RESPIRATORY (INHALATION) ONCE AS NEEDED
Status: DISCONTINUED | OUTPATIENT
Start: 2023-05-25 | End: 2023-05-25

## 2023-05-25 RX ORDER — PHENYLEPHRINE HCL IN 0.9% NACL 1 MG/10 ML
SYRINGE (ML) INTRAVENOUS AS NEEDED
Status: DISCONTINUED | OUTPATIENT
Start: 2023-05-25 | End: 2023-05-25 | Stop reason: SURG

## 2023-05-25 RX ORDER — MAGNESIUM SULFATE 1 G/100ML
2 INJECTION INTRAVENOUS
Status: CANCELLED | OUTPATIENT
Start: 2023-05-25 | End: 2023-05-25

## 2023-05-25 RX ORDER — OXYCODONE AND ACETAMINOPHEN 7.5; 325 MG/1; MG/1
1 TABLET ORAL EVERY 4 HOURS PRN
Status: DISCONTINUED | OUTPATIENT
Start: 2023-05-25 | End: 2023-05-25

## 2023-05-25 RX ORDER — EPHEDRINE SULFATE 50 MG/ML
5 INJECTION, SOLUTION INTRAVENOUS ONCE AS NEEDED
Status: DISCONTINUED | OUTPATIENT
Start: 2023-05-25 | End: 2023-05-25

## 2023-05-25 RX ORDER — PANTOPRAZOLE SODIUM 40 MG/1
40 TABLET, DELAYED RELEASE ORAL
Status: DISCONTINUED | OUTPATIENT
Start: 2023-05-25 | End: 2023-06-06 | Stop reason: HOSPADM

## 2023-05-25 RX ORDER — LIDOCAINE HYDROCHLORIDE 20 MG/ML
INJECTION, SOLUTION INFILTRATION; PERINEURAL AS NEEDED
Status: DISCONTINUED | OUTPATIENT
Start: 2023-05-25 | End: 2023-05-25 | Stop reason: SURG

## 2023-05-25 RX ORDER — FLUMAZENIL 0.1 MG/ML
0.2 INJECTION INTRAVENOUS AS NEEDED
Status: DISCONTINUED | OUTPATIENT
Start: 2023-05-25 | End: 2023-05-25

## 2023-05-25 RX ORDER — LEVOTHYROXINE SODIUM 0.1 MG/1
200 TABLET ORAL
Status: DISCONTINUED | OUTPATIENT
Start: 2023-05-25 | End: 2023-05-27

## 2023-05-25 RX ORDER — SODIUM HYPOCHLORITE 1.25 MG/ML
SOLUTION TOPICAL 2 TIMES DAILY
Status: DISCONTINUED | OUTPATIENT
Start: 2023-05-26 | End: 2023-05-29

## 2023-05-25 RX ORDER — SODIUM CHLORIDE 0.9 % (FLUSH) 0.9 %
3-10 SYRINGE (ML) INJECTION AS NEEDED
Status: DISCONTINUED | OUTPATIENT
Start: 2023-05-25 | End: 2023-05-25 | Stop reason: HOSPADM

## 2023-05-25 RX ORDER — EPHEDRINE SULFATE 50 MG/ML
INJECTION, SOLUTION INTRAVENOUS AS NEEDED
Status: DISCONTINUED | OUTPATIENT
Start: 2023-05-25 | End: 2023-05-25 | Stop reason: SURG

## 2023-05-25 RX ORDER — FAMOTIDINE 10 MG/ML
20 INJECTION, SOLUTION INTRAVENOUS ONCE
Status: DISCONTINUED | OUTPATIENT
Start: 2023-05-25 | End: 2023-05-25 | Stop reason: HOSPADM

## 2023-05-25 RX ORDER — CALCIUM CHLORIDE 100 MG/ML
INJECTION INTRAVENOUS; INTRAVENTRICULAR AS NEEDED
Status: DISCONTINUED | OUTPATIENT
Start: 2023-05-25 | End: 2023-05-25 | Stop reason: SURG

## 2023-05-25 RX ORDER — ROCURONIUM BROMIDE 10 MG/ML
INJECTION, SOLUTION INTRAVENOUS AS NEEDED
Status: DISCONTINUED | OUTPATIENT
Start: 2023-05-25 | End: 2023-05-25 | Stop reason: SURG

## 2023-05-25 RX ORDER — ATORVASTATIN CALCIUM 80 MG/1
80 TABLET, FILM COATED ORAL DAILY
Status: DISCONTINUED | OUTPATIENT
Start: 2023-05-25 | End: 2023-06-06 | Stop reason: HOSPADM

## 2023-05-25 RX ORDER — HYDROCODONE BITARTRATE AND ACETAMINOPHEN 7.5; 325 MG/1; MG/1
1 TABLET ORAL ONCE AS NEEDED
Status: DISCONTINUED | OUTPATIENT
Start: 2023-05-25 | End: 2023-05-25

## 2023-05-25 RX ORDER — MAGNESIUM HYDROXIDE 1200 MG/15ML
LIQUID ORAL AS NEEDED
Status: DISCONTINUED | OUTPATIENT
Start: 2023-05-25 | End: 2023-05-25 | Stop reason: HOSPADM

## 2023-05-25 RX ORDER — VASOPRESSIN 20 U/ML
INJECTION PARENTERAL AS NEEDED
Status: DISCONTINUED | OUTPATIENT
Start: 2023-05-25 | End: 2023-05-25 | Stop reason: SURG

## 2023-05-25 RX ORDER — HYDROCODONE BITARTRATE AND ACETAMINOPHEN 7.5; 325 MG/1; MG/1
1 TABLET ORAL EVERY 4 HOURS PRN
Status: DISCONTINUED | OUTPATIENT
Start: 2023-05-25 | End: 2023-06-06 | Stop reason: HOSPADM

## 2023-05-25 RX ADMIN — VASOPRESSIN 4 UNITS: 20 INJECTION, SOLUTION INTRAMUSCULAR; SUBCUTANEOUS at 19:19

## 2023-05-25 RX ADMIN — LEVOTHYROXINE SODIUM 200 MCG: 0.1 TABLET ORAL at 07:08

## 2023-05-25 RX ADMIN — HYDROCODONE BITARTRATE AND ACETAMINOPHEN 1 TABLET: 5; 325 TABLET ORAL at 07:07

## 2023-05-25 RX ADMIN — TAZOBACTAM SODIUM AND PIPERACILLIN SODIUM 3.38 G: 375; 3 INJECTION, SOLUTION INTRAVENOUS at 13:52

## 2023-05-25 RX ADMIN — OXYCODONE HYDROCHLORIDE AND ACETAMINOPHEN 250 MG: 500 TABLET ORAL at 10:05

## 2023-05-25 RX ADMIN — SODIUM CHLORIDE, POTASSIUM CHLORIDE, SODIUM LACTATE AND CALCIUM CHLORIDE 9 ML/HR: 600; 310; 30; 20 INJECTION, SOLUTION INTRAVENOUS at 18:01

## 2023-05-25 RX ADMIN — LIDOCAINE HYDROCHLORIDE 100 MG: 20 INJECTION, SOLUTION INFILTRATION; PERINEURAL at 18:20

## 2023-05-25 RX ADMIN — Medication 200 MCG: at 18:20

## 2023-05-25 RX ADMIN — FENTANYL CITRATE 50 MCG: 50 INJECTION INTRAMUSCULAR; INTRAVENOUS at 19:54

## 2023-05-25 RX ADMIN — PROPOFOL 100 MG: 10 INJECTION, EMULSION INTRAVENOUS at 18:20

## 2023-05-25 RX ADMIN — INSULIN LISPRO 2 UNITS: 100 INJECTION, SOLUTION INTRAVENOUS; SUBCUTANEOUS at 10:04

## 2023-05-25 RX ADMIN — VASOPRESSIN 4 UNITS: 20 INJECTION, SOLUTION INTRAMUSCULAR; SUBCUTANEOUS at 18:56

## 2023-05-25 RX ADMIN — FAMOTIDINE 20 MG: 10 INJECTION INTRAVENOUS at 18:01

## 2023-05-25 RX ADMIN — VANCOMYCIN HYDROCHLORIDE 750 MG: 750 INJECTION, POWDER, LYOPHILIZED, FOR SOLUTION INTRAVENOUS at 23:14

## 2023-05-25 RX ADMIN — CALCIUM CHLORIDE 1 G: 100 INJECTION INTRAVENOUS; INTRAVENTRICULAR at 18:43

## 2023-05-25 RX ADMIN — DOCUSATE SODIUM 50MG AND SENNOSIDES 8.6MG 2 TABLET: 8.6; 5 TABLET, FILM COATED ORAL at 10:05

## 2023-05-25 RX ADMIN — INSULIN GLARGINE-YFGN 20 UNITS: 100 INJECTION, SOLUTION SUBCUTANEOUS at 23:01

## 2023-05-25 RX ADMIN — VANCOMYCIN HYDROCHLORIDE 750 MG: 750 INJECTION, POWDER, LYOPHILIZED, FOR SOLUTION INTRAVENOUS at 10:04

## 2023-05-25 RX ADMIN — Medication 300 MCG: at 18:50

## 2023-05-25 RX ADMIN — INSULIN LISPRO 3 UNITS: 100 INJECTION, SOLUTION INTRAVENOUS; SUBCUTANEOUS at 00:32

## 2023-05-25 RX ADMIN — EPHEDRINE SULFATE 10 MG: 50 INJECTION INTRAVENOUS at 18:40

## 2023-05-25 RX ADMIN — SUGAMMADEX 400 MG: 100 INJECTION, SOLUTION INTRAVENOUS at 19:37

## 2023-05-25 RX ADMIN — Medication 200 MCG: at 18:26

## 2023-05-25 RX ADMIN — ROCURONIUM BROMIDE 50 MG: 10 INJECTION, SOLUTION INTRAVENOUS at 18:22

## 2023-05-25 RX ADMIN — PANTOPRAZOLE SODIUM 40 MG: 40 TABLET, DELAYED RELEASE ORAL at 07:07

## 2023-05-25 RX ADMIN — HYDROMORPHONE HYDROCHLORIDE 0.5 MG: 1 INJECTION, SOLUTION INTRAMUSCULAR; INTRAVENOUS; SUBCUTANEOUS at 19:55

## 2023-05-25 RX ADMIN — PROPOFOL 50 MG: 10 INJECTION, EMULSION INTRAVENOUS at 18:26

## 2023-05-25 RX ADMIN — EPHEDRINE SULFATE 10 MG: 50 INJECTION INTRAVENOUS at 18:20

## 2023-05-25 RX ADMIN — VASOPRESSIN 2 UNITS: 20 INJECTION, SOLUTION INTRAMUSCULAR; SUBCUTANEOUS at 18:52

## 2023-05-25 RX ADMIN — INSULIN GLARGINE-YFGN 17 UNITS: 100 INJECTION, SOLUTION SUBCUTANEOUS at 00:32

## 2023-05-25 RX ADMIN — INSULIN LISPRO 2 UNITS: 100 INJECTION, SOLUTION INTRAVENOUS; SUBCUTANEOUS at 13:52

## 2023-05-25 RX ADMIN — Medication 100 MCG: at 18:23

## 2023-05-25 RX ADMIN — ATORVASTATIN CALCIUM 80 MG: 80 TABLET, FILM COATED ORAL at 10:05

## 2023-05-25 RX ADMIN — Medication 300 MCG: at 19:24

## 2023-05-25 RX ADMIN — PHENYLEPHRINE HYDROCHLORIDE 1 MCG/KG/MIN: 10 INJECTION INTRAVENOUS at 18:44

## 2023-05-25 RX ADMIN — ROCURONIUM BROMIDE 20 MG: 10 INJECTION, SOLUTION INTRAVENOUS at 19:13

## 2023-05-25 RX ADMIN — SODIUM CHLORIDE 100 ML/HR: 9 INJECTION, SOLUTION INTRAVENOUS at 23:01

## 2023-05-25 RX ADMIN — INSULIN LISPRO 2 UNITS: 100 INJECTION, SOLUTION INTRAVENOUS; SUBCUTANEOUS at 23:02

## 2023-05-25 RX ADMIN — TAZOBACTAM SODIUM AND PIPERACILLIN SODIUM 3.38 G: 375; 3 INJECTION, SOLUTION INTRAVENOUS at 07:07

## 2023-05-25 RX ADMIN — Medication 200 MCG: at 18:40

## 2023-05-25 NOTE — PROGRESS NOTES
Name: Vanessa Root ADMIT: 2023   : 1970  PCP: Reese Batista MD    MRN: [id number] LOS: 0 days   AGE/SEX: 53 y.o. female  ROOM: [facility]     Subjective   Subjective   No new complaints. Denies any chest pain or shortness of breath.     Objective   Objective   Vital Signs  Temp:  [97.5 °F (36.4 °C)-100 °F (37.8 °C)] 97.5 °F (36.4 °C)  Heart Rate:  [76-93] 76  Resp:  [18-23] 20  BP: ()/(60-84) 93/62  SpO2:  [92 %-98 %] 94 %  on  Flow (L/min):  [3] 3;   Device (Oxygen Therapy): CPAP  Body mass index is 48.76 kg/m².    Physical Exam  Constitutional:       Appearance: She is ill-appearing (chronic).   Eyes:      Extraocular Movements: Extraocular movements intact.   Cardiovascular:      Rate and Rhythm: Normal rate and regular rhythm.   Pulmonary:      Effort: No respiratory distress.      Breath sounds: Normal breath sounds. No wheezing, rhonchi or rales.   Abdominal:      Palpations: Abdomen is soft.      Tenderness: There is no abdominal tenderness.   Neurological:      General: No focal deficit present.      Mental Status: She is oriented to person, place, and time.   Psychiatric:         Mood and Affect: Mood normal.         Behavior: Behavior normal.         Thought Content: Thought content normal.     Results Review  I reviewed the patient's new clinical results.  Results from last 7 days   Lab Units 23  0604 23  1731   WBC 10*3/mm3 20.25* 19.56*   HEMOGLOBIN g/dL 10.0* 10.6*   PLATELETS 10*3/mm3 377 396     Results from last 7 days   Lab Units 23  0604 23  1731   SODIUM mmol/L 133* 135*   POTASSIUM mmol/L 3.4* 3.9   CHLORIDE mmol/L 97* 93*   CO2 mmol/L 26.0 27.6   BUN mg/dL 18 18   CREATININE mg/dL 0.96 1.15*   GLUCOSE mg/dL 268* 325*     Lab Results   Component Value Date    ANIONGAP 10.0 2023     Estimated Creatinine Clearance: 95.7 mL/min (by C-G formula based on SCr of 0.96 mg/dL).    Results from last 7 days   Lab Units 23  1731   ALBUMIN g/dL  3.0*   BILIRUBIN mg/dL 0.8   ALK PHOS U/L 190*   AST (SGOT) U/L 13   ALT (SGPT) U/L 10     Results from last 7 days   Lab Units 05/25/23  0604 05/24/23  1731   CALCIUM mg/dL 8.6 9.8   ALBUMIN g/dL  --  3.0*   MAGNESIUM mg/dL  --  1.5*     Results from last 7 days   Lab Units 05/24/23 2039 05/24/23  1731   PROCALCITONIN ng/mL  --  1.21*   LACTATE mmol/L 1.8 2.3*     Hemoglobin A1C   Date/Time Value Ref Range Status   05/24/2023 1731 11.30 (H) 4.80 - 5.60 % Final     Glucose   Date/Time Value Ref Range Status   05/25/2023 1107 254 (H) 70 - 130 mg/dL Final     Comment:     Meter: [id number] : [id number] Kirby SHARMA   05/25/2023 0850 252 (H) 70 - 130 mg/dL Final     Comment:     Meter: [id number] : [id number] Anat RENDON RN   05/24/2023 2356 302 (H) 70 - 130 mg/dL Final     Comment:     Meter: [id number] : [id number] Aidan Hewitt RN       No radiology results for the last day    Scheduled Meds  vitamin C, 250 mg, Oral, Daily  atorvastatin, 80 mg, Oral, Daily  insulin glargine, 1-200 Units, Subcutaneous, Nightly - Glucommander  insulin lispro, 1-200 Units, Subcutaneous, 4x Daily With Meals & Nightly  levothyroxine, 200 mcg, Oral, Q AM  pantoprazole, 40 mg, Oral, Q AM  piperacillin-tazobactam, 3.375 g, Intravenous, Q8H  senna-docusate sodium, 2 tablet, Oral, BID  vancomycin, 750 mg, Intravenous, Q12H    Continuous Infusions  Pharmacy to dose vancomycin,     PRN Meds  •  acetaminophen  •  senna-docusate sodium **AND** polyethylene glycol **AND** bisacodyl **AND** bisacodyl  •  dextrose  •  dextrose  •  glucagon (human recombinant)  •  HYDROcodone-acetaminophen  •  insulin lispro  •  melatonin  •  ondansetron **OR** ondansetron  •  Pharmacy to dose vancomycin    Pharmacy to dose vancomycin,     Diet  NPO Diet NPO Type: Sips with Meds    I have personally reviewed:  [x]  Medications  [x]  Laboratory   [x]  Microbiology   [x]  Radiology   [x]  EKG/Telemetry sinus on tele  []  Cardiology/Vascular    []  Pathology   []  Records     Assessment/Plan     Active Hospital Problems    Diagnosis  POA   • **Sepsis, due to unspecified organism, unspecified whether acute organ dysfunction present [A41.9]  Yes   • Pressure injury of right buttock, unstageable [L89.310]  Yes   • Immobility syndrome [M62.3]  Unknown   • Diabetes mellitus [E11.9]  Yes   • Spinal stenosis of lumbar region [M48.061]  Yes   • Diabetic peripheral neuropathy [E11.42]  Yes   • Obstructive sleep apnea syndrome [G47.33]  Yes   • Hypothyroidism [E03.9]  Yes      Resolved Hospital Problems   No resolved problems to display.     53 y.o. female with a history of spinal stenosis and immobility, bedbound x3 years presented with right hip pain/wound    Sepsis  Right necrotic gluteal decubitus wound  Immobility syndrome d/t spinal stenosis  -Continue antibiotics per ID  -OR today for debridement and likely wound VAC    DM2 with neuropathy, uncontrolled  -A1c 11.30%  -Glucommander    Hypokalemia  -Start protocol  -Check magnesium    Hypothyroidism  -Levothyroxine    WILLIS  Body mass index is 48.76 kg/m².     SCDs for DVT prophylaxis    Discussed with patient, nursing staff and care team on multidisciplinary rounds    Discharge: BETTIE Burks MD  [facility]  05/25/23

## 2023-05-25 NOTE — ANESTHESIA PROCEDURE NOTES
Airway  Urgency: elective    Date/Time: 5/25/2023 6:27 PM  End Time:5/25/2023 6:28 PM  Airway not difficult    General Information and Staff    Patient location during procedure: OR  Anesthesiologist: Manuel Ott DO    Indications and Patient Condition  Indications for airway management: airway protection    Preoxygenated: yes  MILS maintained throughout  Mask difficulty assessment: 2 - vent by mask + OA or adjuvant +/- NMBA    Final Airway Details  Final airway type: endotracheal airway      Successful airway: ETT and Microcuff Subglottic Suctioning ETT  Cuffed: yes   Successful intubation technique: direct laryngoscopy  Endotracheal tube insertion site: oral  Blade: Yesenia  Blade size: 3  ETT size (mm): 7.0  Cormack-Lehane Classification: grade IIa - partial view of glottis  Placement verified by: capnometry   Cuff volume (mL): 8  Measured from: lips  ETT/EBT  to lips (cm): 24  Number of attempts at approach: 1  Assessment: lips, teeth, and gum same as pre-op and atraumatic intubation

## 2023-05-25 NOTE — DISCHARGE PLACEMENT REQUEST
"Vanessa Villanueva (53 y.o. Female)     Date of Birth   1970    Social Security Number       Address   [address] [id number]    Home Phone   [phone]    MRN   [id number]       Christian   [facility]    Marital Status   Single                            Admission Date   5/24/23    Admission Type   Emergency    Admitting Provider   Rosa Alvarado MD    Attending Provider   Gerardo Burks MD    Department, Room/Bed   [address], 35/1       Discharge Date       Discharge Disposition       Discharge Destination                               Attending Provider: Gerardo Burks MD    Allergies: Clemastine Fumarate, Ziprasidone, Aripiprazole, Sulfa Antibiotics, Latex, Lisinopril    Isolation: None   Infection: None   Code Status: CPR    Ht: 165.1 cm (65\")   Wt: 133 kg (292 lb 15.9 oz)    Admission Cmt: None   Principal Problem: Sepsis, due to unspecified organism, unspecified whether acute organ dysfunction present [A41.9]                 Active Insurance as of 5/24/2023     Primary Coverage     Payor Plan Insurance Group Employer/Plan Group    MEDICARE MEDICARE A & B      Payor Plan Address Payor Plan Phone Number Payor Plan Fax Number Effective Dates    [address] [phone]  4/1/2003 - None Entered    [facility] [id number]       Subscriber Name Subscriber Birth Date Member ID       VANESSA VILLANUEVA 1970 [id number]           Secondary Coverage     Payor Plan Insurance Group Employer/Plan Group    [place]      Payor Plan Address Payor Plan Phone Number Payor Plan Fax Number Effective Dates    [address] [phone]  11/16/2021 - None Entered    [place] [address]       Subscriber Name Subscriber Birth Date Member ID       VANESSA VILLANUEVA 1970 [id number]                 Emergency Contacts      (Rel.) Home Phone Work Phone Mobile Phone    WHITNEY VILLANUEVA (Daughter) -- -- [phone]          "

## 2023-05-25 NOTE — CASE MANAGEMENT/SOCIAL WORK
Discharge Planning Assessment  [facility]     Patient Name: Vanessa Root  MRN: [id number]  Today's Date: 5/25/2023    Admit Date: 5/24/2023    Plan: Anticipates home with daughter/ follow for SNF needs   Discharge Needs Assessment     Row Name 05/25/23 1147       Living Environment    People in Home child(nicole), adult    Current Living Arrangements home    Primary Care Provided by self    Provides Primary Care For no one    Family Caregiver if Needed child(nicole), adult    Quality of Family Relationships helpful;involved    Able to Return to Prior Arrangements yes       Resource/Environmental Concerns    Resource/Environmental Concerns none       Transition Planning    Patient/Family Anticipates Transition to home with family    Patient/Family Anticipated Services at Transition home health care;skilled nursing    Transportation Anticipated health plan transportation       Discharge Needs Assessment    Readmission Within the Last 30 Days no previous admission in last 30 days    Equipment Currently Used at Home hospital bed;cpap    Concerns to be Addressed basic needs;discharge planning               Discharge Plan     Row Name 05/25/23 6560       Plan    Plan Anticipates home with daughter/ follow for SNF needs    Patient/Family in Agreement with Plan yes    Plan Comments Spoke with pt bedside. Confirmed facesheet correct. Explained role of CCP. Pt reports she lives with her daughter. Pt reports she uses hospital bed at home. She reports she is bed bound and has been for 3 years. She has lift at home. Pt reports she has to use stretcher vans to transport out of the house for apts which she states can be expensive. Pt reports she only has [place] Medicaid and doesn't qualify for Medicaid tranpsortation. Pt's PCP is Dr. Batista. Discussed [facility] house calls and pt is agreeable, referral placed. She is agreeable to meds to beds. Pt has used [facility], [facility] and [facility] in past and she doesn't want to use any of  them again. She request referral to [facility]. Pt has been to [facility] in past. Pt request Patrick consult for POA paperwork. Surgery consulted and CCP to follow for d/c needs.              Continued Care and Services - Admitted Since 5/24/2023    Coordination has not been started for this encounter.          Demographic Summary    No documentation.                Functional Status    No documentation.                Psychosocial    No documentation.                Abuse/Neglect    No documentation.                Legal    No documentation.                Substance Abuse    No documentation.                Patient Forms    No documentation.                   JINA Klein

## 2023-05-25 NOTE — OP NOTE
Operative Note :  Elizabeth Adame MD      Vanessa Root  1970    Procedure Date: 05/25/23    Pre-op Diagnosis:  Pressure injury of right buttock, unstageable [L89.310]    Post-Operative Diagnosis:  Necrotizing fasciitis right buttock    Procedure:   Excision necrotic skin, subcutaneous fat, and muscular fascia of right buttock (300 cm²)    Surgeon: Elizabeth Adame MD    Assistant: Melissa Lindo CSA (Melissa was responsible for suctioning, retracting, suturing of all surgical incisions, and application of sterile dressings at the completion of the case)    Anesthesia:  General (general endotracheal tube)    Estimated Blood Loss: 100ml    Specimens: Wound culture right buttock    Complications: None    Indications:  The patient is a 53-year-old lady with spinal stenosis who has developed a pressure wound of the right buttock that has rapidly worsened over the last 3 days.  She came to the hospital where she has been admitted to the hospitalist service with evidence of a necrotic wound along the right lateral buttock.  I have recommended we proceed with surgical debridement the operating room today under general anesthesia.  She understands the risks of the procedure and has consented to proceed.    Findings: 20 cm x 15 cm necrotic skin, subcutaneous fat, and muscular fascia of the right buttock consistent with necrotizing fasciitis    Description of procedure:  The patient was brought to the operating room and intubated on her hospital bed.  She was turned in the left lateral decubitus position and the right buttock prepped and draped in a sterile fashion.  A surgical timeout was completed.  Sharp debridement was used to excise the square demarcated necrotic skin, which unroofed a large pocket of purulent very malodorous fluid.  Wound culture was obtained and sent to microbiology.  Using the curved Garcia scissors, I performed a tedious debridement of the necrotic subcutaneous fat, which tunneled for about 15  cm medially.  The underlying open wound tunneled so much so that I felt I needed to debride more of the overlying healthy appearing skin.  The wound was extended for a length of about 15 cm at the skin surface, but tunneled for about 20 cm total into the deep subcutaneous fat and down into the anterior muscular fascia.  Once all of the necrotic skin and subcutaneous fat had been debrided, hemostasis was achieved using electrocautery.  The perimeter of the wound was inspected and appeared mostly healthy with some desiccated fat.  There was no evidence for necrotic skin or subcutaneous fat remaining at the completion of the dissection.  The open wound was then packed with 3 saline moistened Kerlix rolls and covered with ABD pads and tape.  She was then transferred to the recovery room after extubation.  All counts were correct per nursing.    Recommendations:  Begin twice daily Dakin's wet-to-dry dressing changes to open wound of right buttock.  Follow-up results of wound culture and continue empiric antibiotics.    Elizabeth Adame MD  General, Robotic, and Endoscopic Surgery  [facility]    [address], [address]  [place] [address]  P: [phone]  F: [phone]

## 2023-05-25 NOTE — PROGRESS NOTES
[facility] Clinical Pharmacy Services: Vancomycin Monitoring Note    Vanessa Root is a 53 y.o. female who is on day 1/5 of pharmacy to dose vancomycin for Skin and Soft Tissue. Deep ulceration of r buttocks    Previous Vancomycin Dose:   750 mg IV every  12  hours  Updated Cultures and Sensitivities:   5/24 bcx: in process    Vitals/Labs  Ht:  ; Wt: 133 kg (292 lb 15.9 oz)   Temp Readings from Last 1 Encounters:   05/25/23 97.5 °F (36.4 °C) (Oral)     Estimated Creatinine Clearance: 95.7 mL/min (by C-G formula based on SCr of 0.96 mg/dL).       Results from last 7 days   Lab Units 05/25/23  0604 05/24/23  1731   CREATININE mg/dL 0.96 1.15*   WBC 10*3/mm3 20.25* 19.56*     Assessment/Plan    Current Vancomycin Dose: 750 mg IV every  12  hours; provides a predicted  mg/L.hr   Next Level Date and Time: Vanc Trough on 5/26 at 1000. Moved to tomorrow to evaluate closer to steady state to get a more precise level   We will continue to monitor patient changes and renal function     Thank you for involving pharmacy in this patient's care. Please contact pharmacy with any questions or concerns.       Charlie Galvan [facility]  Clinical Pharmacist

## 2023-05-25 NOTE — SIGNIFICANT NOTE
05/25/23 1031   Therapy Assessment/Plan (PT)   Criteria for Skilled Interventions Met (PT) other (see comments)  (Discussed with Pt. Pt reports she is bed bound, transfers with a lift and hasn't walked in 3 years. Not appropriate for skilled PT. Pt will sign off.)

## 2023-05-25 NOTE — CONSULTS
Referring Provider: Jese Hollingsworth MD      Subjective   History of present illness: A nice 53-year-old with a history of immobility which she tells me is related to advanced spinal stenosis who is bedbound and lives at home.  She says about 2 to 3 days ago she developed a right-sided gluteal bedsore which progressed and was not associated with any fevers or chills or night sweats.  She had some pain there and some nausea but no constitutional symptoms.  Given progression of her symptoms she presented to the [facility] emergency department yesterday and was admitted.  She was started on vancomycin and Zosyn.    Past Medical History:   Diagnosis Date   • Arthritis    • Bipolar 1 disorder    • Cancer     uterine   • COVID-19    • Depression    • Diabetes mellitus    • Frequent UTI    • GERD (gastroesophageal reflux disease)    • Hyperlipidemia    • Migraine    • Murmur, heart    • Ovarian cyst    • Stroke        Past Surgical History:   Procedure Laterality Date   •  SECTION  2003   • HYSTERECTOMY     • TONSILLECTOMY             Allergies   Allergen Reactions   • Clemastine Fumarate Other (See Comments)     SEVERE ORBITAL SWELLING   • Ziprasidone Other (See Comments)     EXTREME SLEEPINESS  EXTREME SLEEPINESS     • Aripiprazole Other (See Comments)     MIGRAINES  MIGRAINES     • Sulfa Antibiotics Nausea Only   • Latex Rash   • Lisinopril Cough       Medication:  Antibiotics:  Anti-Infectives (From admission, onward)    Ordered     Dose/Rate Route Frequency Start Stop    23 0431  vancomycin 750 mg in sodium chloride 0.9 % 250 mL IVPB-VTB        Ordering Provider: Rosa Alvarado MD    750 mg Intravenous Every 12 Hours 23 1100 23 1059    23 195  piperacillin-tazobactam (ZOSYN) 3.375 g in iso-osmotic dextrose 50 ml (premix)        Ordering Provider: Rosa Alvarado MD    3.375 g  over 4 Hours Intravenous Every 8 Hours 23 0200 23 0159     05/24/23 1821  vancomycin IVPB 2000 mg in 0.9% Sodium Chloride (premix) 500 mL        Ordering Provider: Leobardo Cerno MD    2,000 mg Intravenous Once 05/24/23 2000 05/25/23 0500 05/24/23 1957  Pharmacy to dose vancomycin        Ordering Provider: Rosa Alvarado MD     Does not apply Continuous PRN 05/24/23 1957 05/29/23 1956 05/24/23 1821  piperacillin-tazobactam (ZOSYN) 3.375 g in iso-osmotic dextrose 50 ml (premix)        Ordering Provider: Leobardo Ceron MD    3.375 g  over 30 Minutes Intravenous Once 05/24/23 1837 05/24/23 1955              Physical Exam:   Vital Signs   Temp:  [97.5 °F (36.4 °C)-100 °F (37.8 °C)] 97.5 °F (36.4 °C)  Heart Rate:  [76-93] 76  Resp:  [18-23] 20  BP: ()/(60-84) 93/62    GENERAL: Awake and alert, in no acute distress.   HEENT: Oropharynx is clear. Hearing is grossly normal.   EYES: . No conjunctival injection. No lid lag.   LUNGS:normal respiratory effort.   SKIN: no cutaneous eruptions in exposed areas  PSYCHIATRIC: Appropriate mood, affect, insight, and judgment.     Results Review:  White count 20.25, hemoglobin 10, platelet 377  Creatinine 0.96  Liver function test normal except for alk phos of 190  Glucose 263 325  Hemoglobin A1c 11.3  Microbiology:  Blood cultures pending    Radiology:  CT of the pelvis, independently reviewed shows a deep ulceration of the right buttocks with gas formation    A/p  1.  Sepsis secondary #2  2.  Right buttocks cellulitis  3.  Diabetes mellitus type 2, complicating above    Patient could not let me examine the area, but based on the photographs that are within the chart, there appears to be some necrosis and I think this is likely going to need debridement.  We will ask general surgery to evaluate.    We will continue the vancomycin for an AUC of 400-600.  Check vancomycin trough ahead of tonight's dose.  Continue Zosyn.    We will need stricter glycemic control to prevent infectious complications and promote wound healing.   Her A1c is 11.3           Thank you for this consult.  We will continue to follow along and tailor antibiotics as the patient's clinical course evolves.

## 2023-05-25 NOTE — NURSING NOTE
Cwon consult received. Patient with right gluteal pressure injury that was poa. Wound photo reviewed and wound is unstageable and is associated with necrotic tissue and cellulitis. Wound will require surgical debridement.  GS has been consulted, therefore no needs from our dept at this time.  Please re-consult if needed.

## 2023-05-25 NOTE — ANESTHESIA PREPROCEDURE EVALUATION
Anesthesia Evaluation     Patient summary reviewed and Nursing notes reviewed   history of anesthetic complications: difficult airway  NPO Solid Status: > 8 hours  NPO Liquid Status: > 8 hours           Airway   Mallampati: II  Neck ROM: full  Difficult intubation highly probable  Dental    (+) poor dentition    Comment: Extremely poor dentition, missing teeth    Pulmonary     breath sounds clear to auscultation  (+) a smoker Former, sleep apnea,   Cardiovascular     Rhythm: regular    (+) valvular problems/murmurs murmur, hyperlipidemia,       Neuro/Psych  (+) CVA, headaches, weakness, numbness, psychiatric history Depression and Bipolar,    GI/Hepatic/Renal/Endo    (+) obesity, morbid obesity, GERD,  diabetes mellitus, thyroid problem hypothyroidism    Musculoskeletal     Abdominal   (+) obese,    Substance History      OB/GYN          Other   arthritis,    history of cancer                  Anesthesia Plan    [facility]     [facility]  [facility]     Anesthetic plan, risks, benefits, and alternatives have been provided, discussed and informed consent has been obtained with: patient.        CODE STATUS:    Code Status (Patient has no pulse and is not breathing): CPR (Attempt to Resuscitate)  Medical Interventions (Patient has pulse or is breathing): Full

## 2023-05-25 NOTE — CASE MANAGEMENT/SOCIAL WORK
Continued Stay Note  [facility]     Patient Name: Vanessa Root  MRN: [id number]  Today's Date: 5/25/2023    Admit Date: 5/24/2023    Plan: Anticipates home with daughter/ follow for SNF needs   Discharge Plan     Row Name 05/25/23 6517       Plan    Plan Comments Referral to [facility] house calls per pt request. Referral placed and CCP will need to f/u when pt is close to d/c so they can follow up at home.               Discharge Codes    No documentation.                     JINA Klein

## 2023-05-25 NOTE — PROGRESS NOTES
"[facility] Clinical Pharmacy Services: Vancomycin Pharmacokinetic Initial Consult Note    Vanessa Root is a 53 y.o. female who is on day 1 of pharmacy to dose vancomycin.    Indication: Skin and Soft Tissue Infection  Consulting Provider: Dr. Alvarado  Planned Duration of Therapy: 5 days  Loading Dose Ordered or Given: 2000 mg on 5/24 at 2330  Culture/Source: blood cultures pending  Target: -600 mg/L.hr   Other Antimicrobials: Zosyn 3.375gm IV q8h    Vitals/Labs  Ht:  65\"; Wt: 133 kg (292 lb 15.9 oz)  Temp Readings from Last 1 Encounters:   05/25/23 98.9 °F (37.2 °C) (Oral)    Estimated Creatinine Clearance: 79.9 mL/min (A) (by C-G formula based on SCr of 1.15 mg/dL (H)).       Results from last 7 days   Lab Units 05/24/23  1731   CREATININE mg/dL 1.15*   WBC 10*3/mm3 19.56*     Assessment/Plan:    1. Vancomycin Dose:   750 mg IV every  12  hours  2. Predictive AUC level for the dose ordered is 493 mg/L.hr, which is within the target of 400-600 mg/L.hr  3. Vanc Trough has been ordered for 5/25 at 2230     Pharmacy will follow patient's kidney function and will adjust doses and obtain levels as necessary. Thank you for involving pharmacy in this patient's care. Please contact pharmacy with any questions or concerns.                           Miguelina Murrieta, [facility]  Clinical Pharmacist    "

## 2023-05-25 NOTE — CONSULTS
"General Surgery Consultation    Consulting Physician: Elizabeth Adame MD  Referring Physician: Cody Sheridan MD    Reason for consultation: Necrotic decubitus wound    CC: Right hip wound    HPI:   The patient is a very pleasant 53 y.o. female that presented to the hospital with a right hip acute wound that started 3 days ago with a small rash.  She has severe spinal stenosis with bilateral lower extremity weakness and frequently has to turn and reposition herself.  She has not been as good about repositioning herself in the last week and noticed a small rash on the right hip as a result of her sedentary status.  The wound has rapidly progressed to a necrotic malodorous wound.  She called EMS who brought her here to the emergency room where a CT of the pelvis showed gas within the subcutaneous fat concerning for soft tissue necrosis.  I have been consulted for possible surgical debridement.    Past Medical History:  Bipolar disorder  Hyperlipidemia  Migraine headaches  History of stroke  History of uterine cancer  Type 2 diabetes  GERD  Obstructive sleep apnea  Spinal stenosis    Past Surgical History:    Hysterectomy  Tonsillectomy    Medications:  Medications Prior to Admission   Medication Sig Dispense Refill Last Dose   • fluticasone (Flonase) 50 MCG/ACT nasal spray 2 sprays into the nostril(s) as directed by provider Daily. 16 g 6    • ibuprofen (ADVIL,MOTRIN) 800 MG tablet Take 1 tablet by mouth Every 6 (Six) Hours As Needed for Mild Pain . Take with small amount of food. 90 tablet 3    • insulin aspart (NovoLOG FlexPen) 100 UNIT/ML solution pen-injector sc pen Use as directed up to 16 U with each meal 6 pen 4    • Insulin Pen Needle 32G X 4 MM misc Use as directed with insulin with meals and at night 200 each 4    • Insulin Syringe 31G X 5/16\" 1 ML misc       • levothyroxine (SYNTHROID, LEVOTHROID) 200 MCG tablet Take 1 tablet by mouth Daily. 30 tablet 4    • levothyroxine (SYNTHROID, " LEVOTHROID) 75 MCG tablet Take one a day. 30 tablet 11    • metFORMIN (GLUCOPHAGE) 1000 MG tablet Take 1 tablet by mouth 2 (Two) Times a Day With Meals. 60 tablet 4    • SITagliptin (Januvia) 100 MG tablet Take 1 tablet by mouth Daily. 30 tablet 5    • atorvastatin (LIPITOR) 80 MG tablet Take 1 tablet by mouth Daily. 30 tablet 4    • Cholecalciferol (Vitamin D3) 1.25 MG (64212 UT) capsule Take 1 capsule by mouth Every 7 (Seven) Days. 12 capsule 3    • ezetimibe (ZETIA) 10 MG tablet TAKE 1 TABLET BY MOUTH DAILY 30 tablet 4    • fluconazole (DIFLUCAN) 100 MG tablet TAKE 1 TABLET BY MOUTH DAILY 2 tablet 0    • omeprazole (priLOSEC) 40 MG capsule Take 1 capsule by mouth Daily. 30 capsule 3    • ondansetron (Zofran) 4 MG tablet Take 1 tablet by mouth Every 8 (Eight) Hours As Needed for Nausea or Vomiting. 20 tablet 0    • Tresiba FlexTouch 200 UNIT/ML solution pen-injector pen injection Inject 60 Units under the skin into the appropriate area as directed Daily. 3 pen 4    • vitamin C (ASCORBIC ACID) 250 MG tablet Take 1 tablet by mouth Daily. 30 tablet 6        Allergies: Multiple allergies including lisinopril (cough), latex (rash), sulfa antibiotics, aripiprazole, ziprasidone, and clemastine    Social History: Single, former smoker, no regular alcohol use    Family History: Mother with history of stroke, father with history of arthritis    Review of Systems:  Constitutional: denies any weight changes, fatigue, or weakness  Eyes: denies blurred/double vision or scleral icterus  Cardiovascular: denies chest pain, palpitations, or edemas  Respiratory: denies cough, sputum, or dyspnea  Gastrointestinal: denies nausea, vomiting, melena, hematochezia, or abdominal pain  Genitourinary: denies dysuria or hematuria  Endocrine: denies cold intolerance, lethargy, or flushing  Hematologic: denies excessive bruising or bleeding  Musculoskeletal: Positive for chronic weakness and lower extremity paresthesias due to spinal  stenosis  Neurologic: denies seizures, CVA, paresthesia, or peripheral neuropathy  Skin: Positive for malodorous wound of the right buttock; denies change in nevi, rashes, masses, or jaundice     All other systems reviewed and were negative.    Physical Exam:   Vitals:    05/25/23 0507   BP: 93/62   Pulse: 76   Resp: 20   Temp: 97.5 °F (36.4 °C)   SpO2: 94%     Height: 165 cm  Weight: 133 kg  BMI: 48.76  GENERAL: awake and alert, no acute distress, oriented to person, place, and time  HEENT: normocephalic, atraumatic, no scleral icterus, moist mucous membranes  NECK: Supple, there is no thyromegaly or lymphadenopathy  RESPIRATORY: clear to auscultation, no wheezes, rales or rhonchi, symmetric air entry  CARDIOVASCULAR: regular rate and rhythm    GASTROINTESTINAL: Soft, morbidly obese, nontender, nondistended  MUSCULOSKELETAL: no cyanosis, clubbing, or edema   NEUROLOGIC: alert and oriented, normal speech, very irritable  SKIN: Necrotic malodorous 5 x 5 cm wound of the right lateral buttock that is fluctuant and tender to palpation with well demarcated borders      Diagnostic workup:     Pertinent labs:   Results from last 7 days   Lab Units 05/25/23  0604 05/24/23  1731   WBC 10*3/mm3 20.25* 19.56*   HEMOGLOBIN g/dL 10.0* 10.6*   HEMATOCRIT % 30.5* 31.7*   PLATELETS 10*3/mm3 377 396     Results from last 7 days   Lab Units 05/25/23  0604 05/24/23  1731   SODIUM mmol/L 133* 135*   POTASSIUM mmol/L 3.4* 3.9   CHLORIDE mmol/L 97* 93*   CO2 mmol/L 26.0 27.6   BUN mg/dL 18 18   CREATININE mg/dL 0.96 1.15*   CALCIUM mg/dL 8.6 9.8   BILIRUBIN mg/dL  --  0.8   ALK PHOS U/L  --  190*   ALT (SGPT) U/L  --  10   AST (SGOT) U/L  --  13   GLUCOSE mg/dL 268* 325*       IMAGING:  CT PELVIS:  CONCLUSION: Decubitus ulceration of the right buttocks with gas extending into the subcutaneous fat deep to this location. In addition there is considerable subcutaneous fat edema, consistent with cellulitis however no focal fluid collection  to suggest abscess formation.    Assessment and plan:     The patient is a 53 y.o. female with right gluteal necrotic decubitus wound    I would recommend proceeding with surgery today to debride the necrotic skin and subcutaneous fat.  I reviewed her CT scan which does not show any clear extension of necrosis to the bone or underlying musculature.  Postoperatively we will begin wet-to-dry dressing changes and hopefully transition to a wound VAC as an outpatient.  She understands the risks of the procedure and has consented to proceed.     Elizabeth Adame MD  General, Robotic, and Endoscopic Surgery  [facility]    [address], [address]  [place] [address]  P: [phone]  F: [phone]

## 2023-05-26 ENCOUNTER — HOME HEALTH ADMISSION (OUTPATIENT)
Dept: HOME HEALTH SERVICES | Facility: HOME HEALTHCARE | Age: 53
End: 2023-05-26
Payer: MEDICARE

## 2023-05-26 ENCOUNTER — APPOINTMENT (OUTPATIENT)
Dept: GENERAL RADIOLOGY | Facility: HOSPITAL | Age: 53
End: 2023-05-26
Payer: MEDICARE

## 2023-05-26 PROBLEM — E83.42 HYPOMAGNESEMIA: Status: ACTIVE | Noted: 2023-05-26

## 2023-05-26 PROBLEM — E87.6 HYPOKALEMIA: Status: ACTIVE | Noted: 2023-05-26

## 2023-05-26 PROBLEM — M72.9 FASCIITIS: Status: ACTIVE | Noted: 2023-05-26

## 2023-05-26 LAB
ALBUMIN SERPL-MCNC: 2.6 G/DL (ref 3.5–5.2)
ALBUMIN/GLOB SERPL: 0.8 G/DL
ALP SERPL-CCNC: 198 U/L (ref 39–117)
ALT SERPL W P-5'-P-CCNC: 6 U/L (ref 1–33)
ANION GAP SERPL CALCULATED.3IONS-SCNC: 11.7 MMOL/L (ref 5–15)
AST SERPL-CCNC: 11 U/L (ref 1–32)
BASOPHILS # BLD AUTO: 0.08 10*3/MM3 (ref 0–0.2)
BASOPHILS NFR BLD AUTO: 0.4 % (ref 0–1.5)
BILIRUB SERPL-MCNC: 0.5 MG/DL (ref 0–1.2)
BUN SERPL-MCNC: 16 MG/DL (ref 6–20)
BUN/CREAT SERPL: 17.6 (ref 7–25)
CALCIUM SPEC-SCNC: 8.7 MG/DL (ref 8.6–10.5)
CHLORIDE SERPL-SCNC: 99 MMOL/L (ref 98–107)
CO2 SERPL-SCNC: 23.3 MMOL/L (ref 22–29)
CREAT SERPL-MCNC: 0.91 MG/DL (ref 0.57–1)
DEPRECATED RDW RBC AUTO: 41.6 FL (ref 37–54)
EGFRCR SERPLBLD CKD-EPI 2021: 76.5 ML/MIN/1.73
EOSINOPHIL # BLD AUTO: 0.22 10*3/MM3 (ref 0–0.4)
EOSINOPHIL NFR BLD AUTO: 1 % (ref 0.3–6.2)
ERYTHROCYTE [DISTWIDTH] IN BLOOD BY AUTOMATED COUNT: 12.8 % (ref 12.3–15.4)
FERRITIN SERPL-MCNC: 743 NG/ML (ref 13–150)
FOLATE SERPL-MCNC: 8.02 NG/ML (ref 4.78–24.2)
GLOBULIN UR ELPH-MCNC: 3.4 GM/DL
GLUCOSE BLDC GLUCOMTR-MCNC: 210 MG/DL (ref 70–130)
GLUCOSE BLDC GLUCOMTR-MCNC: 255 MG/DL (ref 70–130)
GLUCOSE BLDC GLUCOMTR-MCNC: 256 MG/DL (ref 70–130)
GLUCOSE BLDC GLUCOMTR-MCNC: 289 MG/DL (ref 70–130)
GLUCOSE SERPL-MCNC: 223 MG/DL (ref 65–99)
HAPTOGLOB SERPL-MCNC: 372 MG/DL (ref 30–200)
HCT VFR BLD AUTO: 28.3 % (ref 34–46.6)
HEMOCCULT STL QL: NEGATIVE
HGB BLD-MCNC: 9.2 G/DL (ref 12–15.9)
IMM GRANULOCYTES # BLD AUTO: 0.57 10*3/MM3 (ref 0–0.05)
IMM GRANULOCYTES NFR BLD AUTO: 2.6 % (ref 0–0.5)
IRON 24H UR-MRATE: 51 MCG/DL (ref 37–145)
IRON SATN MFR SERPL: 29 % (ref 20–50)
LDH SERPL-CCNC: 189 U/L (ref 135–214)
LYMPHOCYTES # BLD AUTO: 3.02 10*3/MM3 (ref 0.7–3.1)
LYMPHOCYTES NFR BLD AUTO: 13.6 % (ref 19.6–45.3)
MCH RBC QN AUTO: 29.1 PG (ref 26.6–33)
MCHC RBC AUTO-ENTMCNC: 32.5 G/DL (ref 31.5–35.7)
MCV RBC AUTO: 89.6 FL (ref 79–97)
MONOCYTES # BLD AUTO: 0.79 10*3/MM3 (ref 0.1–0.9)
MONOCYTES NFR BLD AUTO: 3.5 % (ref 5–12)
NEUTROPHILS NFR BLD AUTO: 17.59 10*3/MM3 (ref 1.7–7)
NEUTROPHILS NFR BLD AUTO: 78.9 % (ref 42.7–76)
NRBC BLD AUTO-RTO: 0 /100 WBC (ref 0–0.2)
PLATELET # BLD AUTO: 386 10*3/MM3 (ref 140–450)
PMV BLD AUTO: 10.8 FL (ref 6–12)
POTASSIUM SERPL-SCNC: 3.1 MMOL/L (ref 3.5–5.2)
PROT SERPL-MCNC: 6 G/DL (ref 6–8.5)
RBC # BLD AUTO: 3.16 10*6/MM3 (ref 3.77–5.28)
RETICS # AUTO: 0.02 10*6/MM3 (ref 0.02–0.13)
RETICS/RBC NFR AUTO: 0.53 % (ref 0.7–1.9)
SODIUM SERPL-SCNC: 134 MMOL/L (ref 136–145)
TIBC SERPL-MCNC: 179 MCG/DL (ref 298–536)
TRANSFERRIN SERPL-MCNC: 120 MG/DL (ref 200–360)
TSH SERPL DL<=0.05 MIU/L-ACNC: 22.5 UIU/ML (ref 0.27–4.2)
VANCOMYCIN TROUGH SERPL-MCNC: 13.2 MCG/ML (ref 5–20)
VIT B12 BLD-MCNC: 789 PG/ML (ref 211–946)
WBC NRBC COR # BLD: 22.27 10*3/MM3 (ref 3.4–10.8)

## 2023-05-26 PROCEDURE — 25010000002 MORPHINE PER 10 MG: Performed by: SURGERY

## 2023-05-26 PROCEDURE — 82728 ASSAY OF FERRITIN: CPT | Performed by: INTERNAL MEDICINE

## 2023-05-26 PROCEDURE — 80053 COMPREHEN METABOLIC PANEL: CPT | Performed by: SURGERY

## 2023-05-26 PROCEDURE — 84443 ASSAY THYROID STIM HORMONE: CPT | Performed by: INTERNAL MEDICINE

## 2023-05-26 PROCEDURE — 82607 VITAMIN B-12: CPT | Performed by: INTERNAL MEDICINE

## 2023-05-26 PROCEDURE — 85045 AUTOMATED RETICULOCYTE COUNT: CPT | Performed by: INTERNAL MEDICINE

## 2023-05-26 PROCEDURE — 25010000002 ONDANSETRON PER 1 MG: Performed by: SURGERY

## 2023-05-26 PROCEDURE — 99232 SBSQ HOSP IP/OBS MODERATE 35: CPT | Performed by: SURGERY

## 2023-05-26 PROCEDURE — 83615 LACTATE (LD) (LDH) ENZYME: CPT | Performed by: INTERNAL MEDICINE

## 2023-05-26 PROCEDURE — 25010000002 VANCOMYCIN PER 500 MG: Performed by: SURGERY

## 2023-05-26 PROCEDURE — 25010000002 HYDROMORPHONE PER 4 MG: Performed by: SURGERY

## 2023-05-26 PROCEDURE — 82746 ASSAY OF FOLIC ACID SERUM: CPT | Performed by: INTERNAL MEDICINE

## 2023-05-26 PROCEDURE — 83540 ASSAY OF IRON: CPT | Performed by: INTERNAL MEDICINE

## 2023-05-26 PROCEDURE — 84466 ASSAY OF TRANSFERRIN: CPT | Performed by: INTERNAL MEDICINE

## 2023-05-26 PROCEDURE — 83010 ASSAY OF HAPTOGLOBIN QUANT: CPT | Performed by: INTERNAL MEDICINE

## 2023-05-26 PROCEDURE — 82272 OCCULT BLD FECES 1-3 TESTS: CPT | Performed by: INTERNAL MEDICINE

## 2023-05-26 PROCEDURE — 80202 ASSAY OF VANCOMYCIN: CPT | Performed by: SURGERY

## 2023-05-26 PROCEDURE — 99233 SBSQ HOSP IP/OBS HIGH 50: CPT | Performed by: STUDENT IN AN ORGANIZED HEALTH CARE EDUCATION/TRAINING PROGRAM

## 2023-05-26 PROCEDURE — 63710000001 INSULIN LISPRO (HUMAN) PER 5 UNITS: Performed by: SURGERY

## 2023-05-26 PROCEDURE — 85025 COMPLETE CBC W/AUTO DIFF WBC: CPT | Performed by: SURGERY

## 2023-05-26 PROCEDURE — 71045 X-RAY EXAM CHEST 1 VIEW: CPT

## 2023-05-26 PROCEDURE — 25010000002 PIPERACILLIN SOD-TAZOBACTAM PER 1 G: Performed by: SURGERY

## 2023-05-26 PROCEDURE — 82948 REAGENT STRIP/BLOOD GLUCOSE: CPT

## 2023-05-26 RX ORDER — HYDROMORPHONE HYDROCHLORIDE 1 MG/ML
0.5 INJECTION, SOLUTION INTRAMUSCULAR; INTRAVENOUS; SUBCUTANEOUS ONCE
Status: COMPLETED | OUTPATIENT
Start: 2023-05-26 | End: 2023-05-26

## 2023-05-26 RX ORDER — VANCOMYCIN HYDROCHLORIDE 1 G/200ML
1000 INJECTION, SOLUTION INTRAVENOUS EVERY 12 HOURS
Status: DISCONTINUED | OUTPATIENT
Start: 2023-05-26 | End: 2023-05-28

## 2023-05-26 RX ADMIN — SODIUM CHLORIDE 100 ML/HR: 9 INJECTION, SOLUTION INTRAVENOUS at 09:59

## 2023-05-26 RX ADMIN — HYDROCODONE BITARTRATE AND ACETAMINOPHEN 1 TABLET: 7.5; 325 TABLET ORAL at 10:00

## 2023-05-26 RX ADMIN — INSULIN LISPRO 1 UNITS: 100 INJECTION, SOLUTION INTRAVENOUS; SUBCUTANEOUS at 21:12

## 2023-05-26 RX ADMIN — SODIUM HYPOCHLORITE: 1.25 SOLUTION TOPICAL at 10:02

## 2023-05-26 RX ADMIN — TAZOBACTAM SODIUM AND PIPERACILLIN SODIUM 3.38 G: 375; 3 INJECTION, SOLUTION INTRAVENOUS at 02:12

## 2023-05-26 RX ADMIN — TAZOBACTAM SODIUM AND PIPERACILLIN SODIUM 3.38 G: 375; 3 INJECTION, SOLUTION INTRAVENOUS at 10:00

## 2023-05-26 RX ADMIN — TAZOBACTAM SODIUM AND PIPERACILLIN SODIUM 3.38 G: 375; 3 INJECTION, SOLUTION INTRAVENOUS at 18:36

## 2023-05-26 RX ADMIN — MORPHINE SULFATE 4 MG: 2 INJECTION, SOLUTION INTRAMUSCULAR; INTRAVENOUS at 03:24

## 2023-05-26 RX ADMIN — SODIUM CHLORIDE 100 ML/HR: 9 INJECTION, SOLUTION INTRAVENOUS at 19:51

## 2023-05-26 RX ADMIN — ATORVASTATIN CALCIUM 80 MG: 80 TABLET, FILM COATED ORAL at 10:00

## 2023-05-26 RX ADMIN — LEVOTHYROXINE SODIUM 200 MCG: 0.1 TABLET ORAL at 10:00

## 2023-05-26 RX ADMIN — OXYCODONE HYDROCHLORIDE AND ACETAMINOPHEN 250 MG: 500 TABLET ORAL at 10:00

## 2023-05-26 RX ADMIN — INSULIN LISPRO 5 UNITS: 100 INJECTION, SOLUTION INTRAVENOUS; SUBCUTANEOUS at 13:50

## 2023-05-26 RX ADMIN — INSULIN GLARGINE-YFGN 22 UNITS: 100 INJECTION, SOLUTION SUBCUTANEOUS at 20:35

## 2023-05-26 RX ADMIN — VANCOMYCIN HYDROCHLORIDE 1000 MG: 1 INJECTION, SOLUTION INTRAVENOUS at 13:51

## 2023-05-26 RX ADMIN — PANTOPRAZOLE SODIUM 40 MG: 40 TABLET, DELAYED RELEASE ORAL at 10:00

## 2023-05-26 RX ADMIN — MORPHINE SULFATE 4 MG: 2 INJECTION, SOLUTION INTRAMUSCULAR; INTRAVENOUS at 22:57

## 2023-05-26 RX ADMIN — INSULIN LISPRO 5 UNITS: 100 INJECTION, SOLUTION INTRAVENOUS; SUBCUTANEOUS at 18:36

## 2023-05-26 RX ADMIN — INSULIN LISPRO 7 UNITS: 100 INJECTION, SOLUTION INTRAVENOUS; SUBCUTANEOUS at 10:01

## 2023-05-26 RX ADMIN — ONDANSETRON 4 MG: 2 INJECTION INTRAMUSCULAR; INTRAVENOUS at 00:49

## 2023-05-26 RX ADMIN — HYDROMORPHONE HYDROCHLORIDE 0.5 MG: 1 INJECTION, SOLUTION INTRAMUSCULAR; INTRAVENOUS; SUBCUTANEOUS at 13:51

## 2023-05-26 NOTE — PROGRESS NOTES
LOS: 2 days     Chief Complaint: Decubitus wound    Interval History: Patient reports she is doing better today.  States she is sore from the surgery.  Also reports she is having some difficulty with vertigo.  Denies any fevers or chills.  Tolerating antibiotics.    Vital Signs  Temp:  [97.7 °F (36.5 °C)-99 °F (37.2 °C)] 97.7 °F (36.5 °C)  Heart Rate:  [80-92] 83  Resp:  [16-18] 16  BP: ()/(30-78) 95/65    Physical Exam:  General: In no acute distress.  Obese.  HEENT: Oropharynx clear, moist mucous membranes  Respiratory: Normal work of breathing  GI: Soft, NT/ND  Skin: Surgical site with dressing intact.  Extremities: No cyanosis  Access: Peripheral IV.    Antibiotics:  Anti-Infectives (From admission, onward)    Ordered     Dose/Rate Route Frequency Start Stop    05/26/23 1029  vancomycin (VANCOCIN) 1000 mg/200 mL dextrose 5% IVPB        Ordering Provider: Elizabeth Adame MD    1,000 mg  over 60 Minutes Intravenous Every 12 Hours 05/26/23 1130 05/30/23 1129    05/24/23 1957  piperacillin-tazobactam (ZOSYN) 3.375 g in iso-osmotic dextrose 50 ml (premix)        Ordering Provider: Elizabeth Adame MD    3.375 g  over 4 Hours Intravenous Every 8 Hours 05/25/23 0200 05/30/23 0159    05/24/23 1821  vancomycin IVPB 2000 mg in 0.9% Sodium Chloride (premix) 500 mL        Ordering Provider: Leobardo Ceron MD    2,000 mg Intravenous Once 05/24/23 2000 05/25/23 0500    05/24/23 1957  Pharmacy to dose vancomycin        Ordering Provider: Elizabeth Adame MD     Does not apply Continuous PRN 05/24/23 1957 05/29/23 1956 05/24/23 1821  piperacillin-tazobactam (ZOSYN) 3.375 g in iso-osmotic dextrose 50 ml (premix)        Ordering Provider: Leobardo Ceron MD    3.375 g  over 30 Minutes Intravenous Once 05/24/23 1837 05/24/23 1955           Results Review:     I reviewed the patient's new clinical results.  I reviewed the patient's new imaging results and agree with the interpretation.    Lab Results    Component Value Date    WBC 22.27 (H) 2023    HGB 9.2 (L) 2023    HCT 28.3 (L) 2023    MCV 89.6 2023     2023     Lab Results   Component Value Date    GLUCOSE 223 (H) 2023    BUN 16 2023    CREATININE 0.91 2023    BCR 17.6 2023    CO2 23.3 2023    CALCIUM 8.7 2023    ALBUMIN 2.6 (L) 2023    AST 11 2023    ALT 6 2023       Microbiology:   blood cultures 1 out of 2 coag negative staph   wound culture from the OR with growth too young to evaluate    Imagin/24 CT of the abdomen pelvis with report reviewed.  Decubitus ulceration of the right buttocks with gas extending into the subcutaneous deep tissue.    Assessment    #Sepsis  #Infected right buttock decubitus wound with cellulitis  #Type 2 diabetes complicating the above  #Positive blood culture with coag negative staph, suspect contamination  (All new to me today)    Status post debridement by general surgery yesterday and appreciate their management.  Plan to continue IV vancomycin goal -600.  Continue to monitor vancomycin levels for therapeutic dosing.  Continue Zosyn 3.375 every 8 hours.  Plan to de-escalate antibiotics based on operative wound cultures and treat for skin and soft tissue infection.  Plan to follow-up blood cultures and if remain 1 out of 2 positive then would consider coag negative staph contamination.    ID will follow.

## 2023-05-26 NOTE — ANESTHESIA POSTPROCEDURE EVALUATION
Patient: Vanessa Root    Procedure Summary     Date: 05/25/23 Room / Location: [facility] OR [address] [place] [address] MAIN OR    Anesthesia Start: 1810 Anesthesia Stop: 1953    Procedure: Debridement necrotizing tissue  right buttock wound Diagnosis:       Pressure injury of right buttock, unstageable      (Pressure injury of right buttock, unstageable [L89.310])    Surgeons: Elizabeth Adame MD Provider: Crescencio Campa MD    Anesthesia Type: general ASA Status: 4          Anesthesia Type: general    Vitals  Vitals Value Taken Time   /78 05/25/23 2045   Temp 36.7 °C (98.1 °F) 05/25/23 1947   Pulse 90 05/25/23 2045   Resp 16 05/25/23 2045   SpO2 92 % 05/25/23 2045           Post Anesthesia Care and Evaluation    Patient location during evaluation: bedside  Patient participation: complete - patient participated  Level of consciousness: sleepy but conscious  Pain score: 0  Pain management: adequate    Airway patency: patent  Anesthetic complications: No anesthetic complications    Cardiovascular status: acceptable  Respiratory status: acceptable  Hydration status: acceptable    Comments: /78 (BP Location: Right arm, Patient Position: Lying)   Pulse 90   Temp 36.7 °C (98.1 °F) (Oral)   Resp 16   Wt 133 kg (292 lb 15.9 oz)   SpO2 92%   BMI 48.76 kg/m²

## 2023-05-26 NOTE — PROGRESS NOTES
Name: Vanessa Root ADMIT: 2023   : 1970  PCP: Reese Batista MD    MRN: [id number] LOS: 2 days   AGE/SEX: 53 y.o. female  ROOM: [facility] [place]     Subjective   Subjective   Positive right buttock pain.  No fever or chills.  No night sweats.  No chest pain.  Positive shortness of breath.  No cough.  No wheeze.  No hemoptysis.  No palpitation.  No abdominal pain.  No nausea or vomiting.  He reports having a loose bowel movement but cannot tell me about the color as she is legally blind.  No dysuria or hematuria.      GI I THOR.  No abdominal pain or nausea or vomiting.  She reports that she probably have had a bowel movement today that was loose but cannot comment on the color as she is legally blind.    No abdominal objective   Objective   Vital Signs  Temp:  [97.7 °F (36.5 °C)-99 °F (37.2 °C)] 97.7 °F (36.5 °C)  Heart Rate:  [80-92] 83  Resp:  [16-18] 16  BP: ()/(30-78) 95/65  SpO2:  [88 %-99 %] 92 %  on  Flow (L/min):  [2-10] 4;   Device (Oxygen Therapy): nasal cannula    Intake/Output Summary (Last 24 hours) at 2023 1426  Last data filed at 2023 0800  Gross per 24 hour   Intake 400 ml   Output 550 ml   Net -150 ml     Body mass index is 48.76 kg/m².      23  0338   Weight: 133 kg (292 lb 15.9 oz)     Physical Exam  General.  Middle-aged female.  Morbidly obese.  Alert and oriented x3.  No apparent pain/distress/diaphoresis.  Normal mood and affect.  Eyes.  Pupils equal round and reactive.  Intact extraocular musculature.  No pallor or jaundice  Oral cavity.  Moist mucous membrane.  Neck.  Supple.  No JVD.  No lymphadenopathy or thyromegaly.  Cardiovascular.  Regular rate and rhythm.  Distant S1 and S2.  I did not appreciate any murmurs.  Chest.  Poor but clear to auscultation bilaterally with no added sounds.  Abdomen.  Obese.  Soft lax.  No tenderness.  No organomegaly.  No guarding or rebound.  Gluteal region.  Patient did not want me to examine the gluteal region  today  Extremities.  Trace bilateral lower extremity edema.  No clubbing or cyanosis  CNS.  No acute focal neurological deficits    Results Review:      Results from last 7 days   Lab Units 05/26/23  0656 05/25/23  0604 05/24/23  1731   SODIUM mmol/L 134* 133* 135*   POTASSIUM mmol/L 3.1* 3.4* 3.9   CHLORIDE mmol/L 99 97* 93*   CO2 mmol/L 23.3 26.0 27.6   BUN mg/dL 16 18 18   CREATININE mg/dL 0.91 0.96 1.15*   GLUCOSE mg/dL 223* 268* 325*   CALCIUM mg/dL 8.7 8.6 9.8   AST (SGOT) U/L 11  --  13   ALT (SGPT) U/L 6  --  10     Estimated Creatinine Clearance: 100.9 mL/min (by C-G formula based on SCr of 0.91 mg/dL).  Results from last 7 days   Lab Units 05/24/23  1731   HEMOGLOBIN A1C % 11.30*     Results from last 7 days   Lab Units 05/26/23  1331 05/26/23  0933 05/25/23  2241 05/25/23  1952 05/25/23  1651 05/25/23  1341 05/25/23  1107 05/25/23  0850   GLUCOSE mg/dL 255* 210* 248* 256* 251* 254* 254* 252*                 Results from last 7 days   Lab Units 05/25/23  1428 05/24/23  1731   MAGNESIUM mg/dL 1.4* 1.5*           Invalid input(s): LDLCALC  Results from last 7 days   Lab Units 05/26/23  0656 05/25/23  0604 05/24/23  1731   WBC 10*3/mm3 22.27* 20.25* 19.56*   HEMOGLOBIN g/dL 9.2* 10.0* 10.6*   HEMATOCRIT % 28.3* 30.5* 31.7*   PLATELETS 10*3/mm3 386 377 396   MCV fL 89.6 88.4 89.5   MCH pg 29.1 29.0 29.9   MCHC g/dL 32.5 32.8 33.4   RDW % 12.8 12.9 12.7   RDW-SD fl 41.6 41.6 42.3   MPV fL 10.8 10.4 10.7   NEUTROPHIL % % 78.9*  --   --    LYMPHOCYTE % % 13.6*  --   --    MONOCYTES % % 3.5*  --   --    EOSINOPHIL % % 1.0  --   --    BASOPHIL % % 0.4  --   --    IMM GRAN % % 2.6*  --   --    NEUTROS ABS 10*3/mm3 17.59*  --  18.76*   LYMPHS ABS 10*3/mm3 3.02  --   --    MONOS ABS 10*3/mm3 0.79  --   --    EOS ABS 10*3/mm3 0.22  --   --    BASOS ABS 10*3/mm3 0.08  --   --    IMMATURE GRANS (ABS) 10*3/mm3 0.57*  --   --    NRBC /100 WBC 0.0  --  1.0*  0.1             Results from last 7 days   Lab Units  05/24/23 2039 05/24/23  1731   PROCALCITONIN ng/mL  --  1.21*   LACTATE mmol/L 1.8 2.3*             Results from last 7 days   Lab Units 05/25/23  1843 05/24/23  1838 05/24/23  1731   BLOODCX   --  No growth at 24 hours Growth present, too young to evaluate   WOUNDCX  Growth present, too young to evaluate  --   --    BCIDPCR   --   --  Staph spp, not aureus or lugdunensis. Identification by BCID2 PCR.*         Results from last 7 days   Lab Units 05/24/23  1754   NITRITE UA  Negative   WBC UA /HPF Too Numerous to Count*   BACTERIA UA /HPF 4+*   SQUAM EPITHEL UA /HPF 0-2           Imaging:  Imaging Results (Last 24 Hours)     Procedure Component Value Units Date/Time    XR Chest 1 View [[id number]] Resulted: 05/26/23 1411     Updated: 05/26/23 1411             I reviewed the patient's new clinical results / labs / tests / procedures      Assessment/Plan     Active Hospital Problems    Diagnosis  POA   • **Necrotizing soft tissue infection [M79.89]  Yes   • Fasciitis [M72.9]  Yes   • Hypokalemia [E87.6]  Yes   • Hypomagnesemia [E83.42]  Yes   • Immobility syndrome [M62.3]  Yes   • Sepsis, due to unspecified organism, unspecified whether acute organ dysfunction present [A41.9]  Yes   • Diabetes mellitus [E11.9]  Yes   • Spinal stenosis of lumbar region [M48.061]  Yes   • Diabetic peripheral neuropathy [E11.42]  Yes   • Obstructive sleep apnea syndrome [G47.33]  Yes   • Hypothyroidism [E03.9]  Yes      Resolved Hospital Problems   No resolved problems to display.         1.  Sepsis secondary to right necrotic gluteal decubitus with necrotizing fasciitis.  Patient is status post excision and debridement on 5/25/2023 by surgery and their help is highly appreciated.  Currently the patient surgical and wound cultures are pending.  Blood cultures is 1 out of 2 with coagulase-negative which ID thinks its contamination.  Currently on IV vancomycin and Zosyn.  Will await final cultures.  CT scan of the pelvis revealed decubitus  with gas extending to the deep fascia.  2.  Immobility secondary to spinal stenosis and peripheral neuropathy.  3.  Type 2 diabetes.  A1c is 11.3.  On Glucomander.  4.  Hypokalemia/hypomagnesemia.  Will substitute per protocol.  5.  Hypothyroidism.  Continue current replacement and check TSH  6.  Hypoxemia.  Will check chest x-ray.  7.  UTI.  Will check urine cultures.  Continue Zosyn  8.  Anemia.  Will work-up.  9.  Elevated alkaline phosphatase.  Asymptomatic with benign GI examination.  Will monitor.    Discussed my findings and plan of treatment with the patient/nurses at multidisciplinary rounds  Disposition.  To be determined based on clinical course.      Cassie Cesar MD  [facility]  05/26/23  14:26 EDT

## 2023-05-26 NOTE — PROGRESS NOTES
General Surgery  Progress Note    CC: Follow-up necrotizing fasciitis right buttock    POD#1 debridement skin and soft tissue infection of right buttock secondary to necrotizing fasciitis    S: She reports the right buttock and hip pain is dramatically better today compared to yesterday.  She remains afebrile.    O:BP 95/59 (BP Location: Right arm, Patient Position: Lying)   Pulse 83   Temp 97.8 °F (36.6 °C) (Oral)   Resp 16   Wt 133 kg (292 lb 15.9 oz)   SpO2 91%   BMI 48.76 kg/m²     Intake & Output:  UOP: 300 mL/24 hrs  BM x1    GENERAL: alert, well appearing, and in no distress  HEENT: normocephalic, atraumatic, moist mucous membranes, clear sclerae   CHEST: clear to auscultation, no wheezes, rales or rhonchi, symmetric air entry  CARDIAC: regular rate and rhythm    ABDOMEN: Soft, morbidly obese, nontender, nondistended  EXTREMITIES: Right buttock with 17 cm x 15 cm open wound at the skin surface with healthy appearing subcutaneous fat and no further evidence of skin or soft tissue necrosis, there is no surrounding cellulitis and no evidence of bleeding within the wound  SKIN: Warm and moist, no rashes    LABS  Results from last 7 days   Lab Units 05/26/23  0656 05/25/23  0604 05/24/23  1731   WBC 10*3/mm3 22.27* 20.25* 19.56*   HEMOGLOBIN g/dL 9.2* 10.0* 10.6*   HEMATOCRIT % 28.3* 30.5* 31.7*   PLATELETS 10*3/mm3 386 377 396   MONOCYTES % %  --   --  1.0*     Results from last 7 days   Lab Units 05/26/23  0656 05/25/23  0604 05/24/23  1731   SODIUM mmol/L 134* 133* 135*   POTASSIUM mmol/L 3.1* 3.4* 3.9   CHLORIDE mmol/L 99 97* 93*   CO2 mmol/L 23.3 26.0 27.6   BUN mg/dL 16 18 18   CREATININE mg/dL 0.91 0.96 1.15*   CALCIUM mg/dL 8.7 8.6 9.8   BILIRUBIN mg/dL 0.5  --  0.8   ALK PHOS U/L 198*  --  190*   ALT (SGPT) U/L 6  --  10   AST (SGOT) U/L 11  --  13   GLUCOSE mg/dL 223* 268* 325*             A/P: 53 y.o. female POD#1 debridement skin and soft tissue infection of right buttock secondary to necrotizing  fasciitis    Continue twice daily wet-to-dry dressing changes with Dakin's moistened Kerlix gauze.  I changed her dressings today and was able to fit 2 Kerlix rolls into the wound.  She tolerated the dressing change surprisingly well.  We can follow-up on the results of the wound culture obtained yesterday and adjust antibiotics accordingly.  I appreciate infectious disease following along.  Eventually, once the wound looks  we can plan for a wound VAC with discharge to home via home health.    Elizabeth Adame MD  General, Robotic, and Endoscopic Surgery  [facility]    [address], [address]  [place] [address]  P: [phone]  F: [phone]

## 2023-05-26 NOTE — PROGRESS NOTES
[facility] Clinical Pharmacy Services: Vancomycin Monitoring Note    Vanessa Root is a 53 y.o. female who is on day 2/5 of pharmacy to dose vancomycin for Skin and Soft Tissue.    Previous Vancomycin Dose:   750 mg IV every  12  hours  Updated Cultures and Sensitivities:   5/24 BCX: CoNS  5/24 wcx: surgery culture: growth present, too young to evaluate    Results from last 7 days   Lab Units 05/26/23  0948   VANCOMYCIN TR mcg/mL 13.20     Vitals/Labs  Ht:  ; Wt: 133 kg (292 lb 15.9 oz)   Temp Readings from Last 1 Encounters:   05/26/23 97.7 °F (36.5 °C) (Oral)     Estimated Creatinine Clearance: 100.9 mL/min (by C-G formula based on SCr of 0.91 mg/dL).       Results from last 7 days   Lab Units 05/26/23  0656 05/25/23  0604 05/24/23  1731   CREATININE mg/dL 0.91 0.96 1.15*   WBC 10*3/mm3 22.27* 20.25* 19.56*     Assessment/Plan  Predictive model is showing a sub-therapeutic AUC with the level this am. So will plan to increase to 1000mg q12h for a therapeutic AUC of 503  Increase to Vancomycin Dose: 1000 mg IV every  12  hours; provides a predicted  mg/L.hr   Next Level Date and Time: Vanc Trough on 5/28 at 1100  We will continue to monitor patient changes and renal function     Thank you for involving pharmacy in this patient's care. Please contact pharmacy with any questions or concerns.       Charlie Galvan [facility]  Clinical Pharmacist

## 2023-05-27 LAB
ALBUMIN SERPL-MCNC: 2.5 G/DL (ref 3.5–5.2)
ALBUMIN/GLOB SERPL: 0.7 G/DL
ALP SERPL-CCNC: 181 U/L (ref 39–117)
ALT SERPL W P-5'-P-CCNC: 12 U/L (ref 1–33)
ANION GAP SERPL CALCULATED.3IONS-SCNC: 11.3 MMOL/L (ref 5–15)
ANISOCYTOSIS BLD QL: ABNORMAL
AST SERPL-CCNC: 19 U/L (ref 1–32)
BACTERIA SPEC AEROBE CULT: ABNORMAL
BILIRUB SERPL-MCNC: 0.4 MG/DL (ref 0–1.2)
BUN SERPL-MCNC: 15 MG/DL (ref 6–20)
BUN/CREAT SERPL: 17.4 (ref 7–25)
CALCIUM SPEC-SCNC: 8.2 MG/DL (ref 8.6–10.5)
CHLORIDE SERPL-SCNC: 99 MMOL/L (ref 98–107)
CO2 SERPL-SCNC: 23.7 MMOL/L (ref 22–29)
CREAT SERPL-MCNC: 0.86 MG/DL (ref 0.57–1)
DEPRECATED RDW RBC AUTO: 43.4 FL (ref 37–54)
EGFRCR SERPLBLD CKD-EPI 2021: 81.9 ML/MIN/1.73
EOSINOPHIL # BLD MANUAL: 0.47 10*3/MM3 (ref 0–0.4)
EOSINOPHIL NFR BLD MANUAL: 3.1 % (ref 0.3–6.2)
ERYTHROCYTE [DISTWIDTH] IN BLOOD BY AUTOMATED COUNT: 13 % (ref 12.3–15.4)
GLOBULIN UR ELPH-MCNC: 3.5 GM/DL
GLUCOSE BLDC GLUCOMTR-MCNC: 193 MG/DL (ref 70–130)
GLUCOSE BLDC GLUCOMTR-MCNC: 225 MG/DL (ref 70–130)
GLUCOSE BLDC GLUCOMTR-MCNC: 230 MG/DL (ref 70–130)
GLUCOSE BLDC GLUCOMTR-MCNC: 230 MG/DL (ref 70–130)
GLUCOSE SERPL-MCNC: 246 MG/DL (ref 65–99)
GRAM STN SPEC: ABNORMAL
HCT VFR BLD AUTO: 27.8 % (ref 34–46.6)
HGB BLD-MCNC: 9 G/DL (ref 12–15.9)
ISOLATED FROM: ABNORMAL
LYMPHOCYTES # BLD MANUAL: 0.63 10*3/MM3 (ref 0.7–3.1)
LYMPHOCYTES NFR BLD MANUAL: 2.1 % (ref 5–12)
MCH RBC QN AUTO: 29.7 PG (ref 26.6–33)
MCHC RBC AUTO-ENTMCNC: 32.4 G/DL (ref 31.5–35.7)
MCV RBC AUTO: 91.7 FL (ref 79–97)
METAMYELOCYTES NFR BLD MANUAL: 1 % (ref 0–0)
MONOCYTES # BLD: 0.32 10*3/MM3 (ref 0.1–0.9)
MYELOCYTES NFR BLD MANUAL: 2.1 % (ref 0–0)
NEUTROPHILS # BLD AUTO: 13.39 10*3/MM3 (ref 1.7–7)
NEUTROPHILS NFR BLD MANUAL: 87.6 % (ref 42.7–76)
PLAT MORPH BLD: NORMAL
PLATELET # BLD AUTO: 387 10*3/MM3 (ref 140–450)
PMV BLD AUTO: 10.2 FL (ref 6–12)
POTASSIUM SERPL-SCNC: 2.8 MMOL/L (ref 3.5–5.2)
PROT SERPL-MCNC: 6 G/DL (ref 6–8.5)
RBC # BLD AUTO: 3.03 10*6/MM3 (ref 3.77–5.28)
SODIUM SERPL-SCNC: 134 MMOL/L (ref 136–145)
VARIANT LYMPHS NFR BLD MANUAL: 4.1 % (ref 19.6–45.3)
WBC MORPH BLD: NORMAL
WBC NRBC COR # BLD: 15.29 10*3/MM3 (ref 3.4–10.8)

## 2023-05-27 PROCEDURE — 25010000002 MORPHINE PER 10 MG: Performed by: SURGERY

## 2023-05-27 PROCEDURE — 82948 REAGENT STRIP/BLOOD GLUCOSE: CPT

## 2023-05-27 PROCEDURE — 25010000002 PIPERACILLIN SOD-TAZOBACTAM PER 1 G: Performed by: SURGERY

## 2023-05-27 PROCEDURE — 85007 BL SMEAR W/DIFF WBC COUNT: CPT | Performed by: INTERNAL MEDICINE

## 2023-05-27 PROCEDURE — 63710000001 INSULIN LISPRO (HUMAN) PER 5 UNITS: Performed by: SURGERY

## 2023-05-27 PROCEDURE — 25010000002 ONDANSETRON PER 1 MG: Performed by: SURGERY

## 2023-05-27 PROCEDURE — 85025 COMPLETE CBC W/AUTO DIFF WBC: CPT | Performed by: INTERNAL MEDICINE

## 2023-05-27 PROCEDURE — 99232 SBSQ HOSP IP/OBS MODERATE 35: CPT | Performed by: SURGERY

## 2023-05-27 PROCEDURE — 80053 COMPREHEN METABOLIC PANEL: CPT | Performed by: INTERNAL MEDICINE

## 2023-05-27 PROCEDURE — 25010000002 VANCOMYCIN PER 500 MG: Performed by: SURGERY

## 2023-05-27 RX ORDER — POTASSIUM CHLORIDE 750 MG/1
20 TABLET, FILM COATED, EXTENDED RELEASE ORAL 2 TIMES DAILY WITH MEALS
Status: DISCONTINUED | OUTPATIENT
Start: 2023-05-27 | End: 2023-06-06 | Stop reason: HOSPADM

## 2023-05-27 RX ORDER — POTASSIUM CHLORIDE 750 MG/1
40 TABLET, FILM COATED, EXTENDED RELEASE ORAL EVERY 4 HOURS
Status: COMPLETED | OUTPATIENT
Start: 2023-05-27 | End: 2023-05-27

## 2023-05-27 RX ORDER — LEVOTHYROXINE SODIUM 0.12 MG/1
250 TABLET ORAL
Status: DISCONTINUED | OUTPATIENT
Start: 2023-05-28 | End: 2023-06-06 | Stop reason: HOSPADM

## 2023-05-27 RX ADMIN — PANTOPRAZOLE SODIUM 40 MG: 40 TABLET, DELAYED RELEASE ORAL at 06:22

## 2023-05-27 RX ADMIN — OXYCODONE HYDROCHLORIDE AND ACETAMINOPHEN 250 MG: 500 TABLET ORAL at 09:36

## 2023-05-27 RX ADMIN — HYDROCODONE BITARTRATE AND ACETAMINOPHEN 1 TABLET: 7.5; 325 TABLET ORAL at 10:53

## 2023-05-27 RX ADMIN — INSULIN LISPRO 2 UNITS: 100 INJECTION, SOLUTION INTRAVENOUS; SUBCUTANEOUS at 18:08

## 2023-05-27 RX ADMIN — ATORVASTATIN CALCIUM 80 MG: 80 TABLET, FILM COATED ORAL at 09:36

## 2023-05-27 RX ADMIN — MORPHINE SULFATE 4 MG: 2 INJECTION, SOLUTION INTRAMUSCULAR; INTRAVENOUS at 23:14

## 2023-05-27 RX ADMIN — POTASSIUM CHLORIDE 20 MEQ: 750 TABLET, EXTENDED RELEASE ORAL at 20:18

## 2023-05-27 RX ADMIN — SODIUM HYPOCHLORITE: 1.25 SOLUTION TOPICAL at 23:45

## 2023-05-27 RX ADMIN — MAGNESIUM OXIDE 400 MG (241.3 MG MAGNESIUM) TABLET 400 MG: TABLET at 15:37

## 2023-05-27 RX ADMIN — VANCOMYCIN HYDROCHLORIDE 1000 MG: 1 INJECTION, SOLUTION INTRAVENOUS at 15:37

## 2023-05-27 RX ADMIN — LEVOTHYROXINE SODIUM 200 MCG: 0.1 TABLET ORAL at 06:22

## 2023-05-27 RX ADMIN — MAGNESIUM OXIDE 400 MG (241.3 MG MAGNESIUM) TABLET 400 MG: TABLET at 20:18

## 2023-05-27 RX ADMIN — ONDANSETRON 4 MG: 2 INJECTION INTRAMUSCULAR; INTRAVENOUS at 15:46

## 2023-05-27 RX ADMIN — SODIUM HYPOCHLORITE: 1.25 SOLUTION TOPICAL at 04:21

## 2023-05-27 RX ADMIN — INSULIN LISPRO 9 UNITS: 100 INJECTION, SOLUTION INTRAVENOUS; SUBCUTANEOUS at 09:35

## 2023-05-27 RX ADMIN — INSULIN LISPRO 2 UNITS: 100 INJECTION, SOLUTION INTRAVENOUS; SUBCUTANEOUS at 20:30

## 2023-05-27 RX ADMIN — POTASSIUM CHLORIDE 40 MEQ: 750 TABLET, EXTENDED RELEASE ORAL at 15:36

## 2023-05-27 RX ADMIN — INSULIN LISPRO 9 UNITS: 100 INJECTION, SOLUTION INTRAVENOUS; SUBCUTANEOUS at 12:49

## 2023-05-27 RX ADMIN — ONDANSETRON 4 MG: 2 INJECTION INTRAMUSCULAR; INTRAVENOUS at 23:13

## 2023-05-27 RX ADMIN — TAZOBACTAM SODIUM AND PIPERACILLIN SODIUM 3.38 G: 375; 3 INJECTION, SOLUTION INTRAVENOUS at 18:05

## 2023-05-27 RX ADMIN — HYDROCODONE BITARTRATE AND ACETAMINOPHEN 1 TABLET: 7.5; 325 TABLET ORAL at 18:23

## 2023-05-27 RX ADMIN — VANCOMYCIN HYDROCHLORIDE 1000 MG: 1 INJECTION, SOLUTION INTRAVENOUS at 02:13

## 2023-05-27 RX ADMIN — INSULIN GLARGINE-YFGN 26 UNITS: 100 INJECTION, SOLUTION SUBCUTANEOUS at 20:18

## 2023-05-27 RX ADMIN — TAZOBACTAM SODIUM AND PIPERACILLIN SODIUM 3.38 G: 375; 3 INJECTION, SOLUTION INTRAVENOUS at 09:35

## 2023-05-27 RX ADMIN — POTASSIUM CHLORIDE 20 MEQ: 750 TABLET, EXTENDED RELEASE ORAL at 18:08

## 2023-05-27 RX ADMIN — SODIUM HYPOCHLORITE: 1.25 SOLUTION TOPICAL at 09:38

## 2023-05-27 RX ADMIN — HYDROCODONE BITARTRATE AND ACETAMINOPHEN 1 TABLET: 7.5; 325 TABLET ORAL at 04:12

## 2023-05-27 RX ADMIN — SODIUM CHLORIDE 100 ML/HR: 9 INJECTION, SOLUTION INTRAVENOUS at 09:44

## 2023-05-27 RX ADMIN — POTASSIUM CHLORIDE 40 MEQ: 750 TABLET, EXTENDED RELEASE ORAL at 10:53

## 2023-05-27 RX ADMIN — TAZOBACTAM SODIUM AND PIPERACILLIN SODIUM 3.38 G: 375; 3 INJECTION, SOLUTION INTRAVENOUS at 04:12

## 2023-05-27 NOTE — PROGRESS NOTES
General Surgery  Progress Note    CC: Follow-up necrotizing fasciitis right buttock    POD#2 debridement skin and soft tissue infection of right buttock secondary to necrotizing fasciitis    S: She has no complaints today.  She has been tolerating dressing changes.    O:/77 (BP Location: Left arm, Patient Position: Lying)   Pulse 75   Temp 97.6 °F (36.4 °C) (Oral)   Resp 18   Wt 132 kg (291 lb 0.1 oz)   SpO2 90%   BMI 48.43 kg/m²     Intake & Output:  UOP: 550 mL/24 hrs  BM x3    GENERAL: alert, well appearing, and in no distress  HEENT: normocephalic, atraumatic, moist mucous membranes, clear sclerae   CHEST: clear to auscultation, no wheezes, rales or rhonchi, symmetric air entry  CARDIAC: regular rate and rhythm    ABDOMEN: Soft, morbidly obese, nontender, nondistended  EXTREMITIES: Right buttock wound is dressed with ABD pads with serous fluid on the overlying dressings, I did not examine the wound today  SKIN: Warm and moist, no rashes    LABS  Results from last 7 days   Lab Units 05/27/23  0643 05/26/23  0656 05/25/23  0604 05/24/23  1731   WBC 10*3/mm3 15.29* 22.27* 20.25* 19.56*   HEMOGLOBIN g/dL 9.0* 9.2* 10.0* 10.6*   HEMATOCRIT % 27.8* 28.3* 30.5* 31.7*   PLATELETS 10*3/mm3 387 386 377 396   MONOCYTES % % 2.1*  --   --  1.0*     Results from last 7 days   Lab Units 05/27/23  0643 05/26/23  0656 05/25/23  0604 05/24/23  1731   SODIUM mmol/L 134* 134* 133* 135*   POTASSIUM mmol/L 2.8* 3.1* 3.4* 3.9   CHLORIDE mmol/L 99 99 97* 93*   CO2 mmol/L 23.7 23.3 26.0 27.6   BUN mg/dL 15 16 18 18   CREATININE mg/dL 0.86 0.91 0.96 1.15*   CALCIUM mg/dL 8.2* 8.7 8.6 9.8   BILIRUBIN mg/dL 0.4 0.5  --  0.8   ALK PHOS U/L 181* 198*  --  190*   ALT (SGPT) U/L 12 6  --  10   AST (SGOT) U/L 19 11  --  13   GLUCOSE mg/dL 246* 223* 268* 325*             A/P: 53 y.o. female POD#2 debridement skin and soft tissue infection of right buttock secondary to necrotizing fasciitis    I did not examine the wound today, and  will take a look at it tomorrow.  Once it is looking a bit  I will ask the wound ostomy nurses to place a wound VAC.  They will be back on Monday at the earliest.  Follow-up results of wound culture and initial blood cultures.  Infectious disease is managing her antibiotics.    Elizabeth Adame MD  General, Robotic, and Endoscopic Surgery  [facility]    [address], [address]  [place] [address]  P: [phone]  F: [phone]

## 2023-05-27 NOTE — PROGRESS NOTES
Name: Vanessa Root ADMIT: 2023   : 1970  PCP: Reese Batista MD    MRN: [id number] LOS: 3 days   AGE/SEX: 53 y.o. female  ROOM: [facility]     Subjective   Subjective   Continues with right buttock pain worse with changing the dressing..  No fever or chills.  No night sweats.  No chest pain.  No shortness of breath.  No cough.  No wheeze.  No hemoptysis.  No palpitation.  No abdominal pain.  No nausea or vomiting.  Positive diarrhea without fresh bright blood per rectum or melena..  No dysuria or hematuria.      GI I THOR.  No abdominal pain or nausea or vomiting.  She reports that she probably have had a bowel movement today that was loose but cannot comment on the color as she is legally blind.    No abdominal objective   Objective   Vital Signs  Temp:  [97.6 °F (36.4 °C)-98.4 °F (36.9 °C)] 97.6 °F (36.4 °C)  Heart Rate:  [75] 75  Resp:  [16-18] 18  BP: ()/(59-77) 108/77  SpO2:  [90 %-92 %] 90 %  on  Flow (L/min):  [3-7] 3;   Device (Oxygen Therapy): humidified;nasal cannula    Intake/Output Summary (Last 24 hours) at 2023 1241  Last data filed at 2023 0900  Gross per 24 hour   Intake 290 ml   Output 300 ml   Net -10 ml     Body mass index is 48.43 kg/m².      23  0338 23  0530   Weight: 133 kg (292 lb 15.9 oz) 132 kg (291 lb 0.1 oz)     Physical Exam    General.  Middle-aged female.  Morbidly obese.  Alert and oriented x3.  No apparent pain/distress/diaphoresis.  Normal mood and affect.  Eyes.  Pupils equal round and reactive.  Intact extraocular musculature.  No pallor or jaundice  Oral cavity.  Moist mucous membrane.  Neck.  Supple.  No JVD.  No lymphadenopathy or thyromegaly.  Cardiovascular.  Regular rate and rhythm.  Distant S1 and S2.  I did not appreciate any murmurs.  Chest.  Poor but clear to auscultation bilaterally with scattered bilateral rhonchi.  Abdomen.  Obese.  Soft lax.  No tenderness.  No organomegaly.  No guarding or rebound.  Gluteal region.  I  did not examine the wound.    Extremities.  Trace bilateral lower extremity edema.  No clubbing or cyanosis  CNS.  No acute focal neurological deficits    Results Review:      Results from last 7 days   Lab Units 05/27/23  0643 05/26/23  0656 05/25/23  0604 05/24/23  1731   SODIUM mmol/L 134* 134* 133* 135*   POTASSIUM mmol/L 2.8* 3.1* 3.4* 3.9   CHLORIDE mmol/L 99 99 97* 93*   CO2 mmol/L 23.7 23.3 26.0 27.6   BUN mg/dL 15 16 18 18   CREATININE mg/dL 0.86 0.91 0.96 1.15*   GLUCOSE mg/dL 246* 223* 268* 325*   CALCIUM mg/dL 8.2* 8.7 8.6 9.8   AST (SGOT) U/L 19 11  --  13   ALT (SGPT) U/L 12 6  --  10     Estimated Creatinine Clearance: 106.3 mL/min (by C-G formula based on SCr of 0.86 mg/dL).  Results from last 7 days   Lab Units 05/24/23  1731   HEMOGLOBIN A1C % 11.30*     Results from last 7 days   Lab Units 05/27/23  0821 05/26/23  2109 05/26/23  1826 05/26/23  1331 05/26/23  0933 05/25/23  2241 05/25/23  1952 05/25/23  1651   GLUCOSE mg/dL 225* 289* 256* 255* 210* 248* 256* 251*             Results from last 7 days   Lab Units 05/26/23  1511   TSH uIU/mL 22.500*     Results from last 7 days   Lab Units 05/25/23  1428 05/24/23  1731   MAGNESIUM mg/dL 1.4* 1.5*           Invalid input(s): LDLCALC  Results from last 7 days   Lab Units 05/27/23  0643 05/26/23  0656 05/25/23  0604 05/24/23  1731   WBC 10*3/mm3 15.29* 22.27* 20.25* 19.56*   HEMOGLOBIN g/dL 9.0* 9.2* 10.0* 10.6*   HEMATOCRIT % 27.8* 28.3* 30.5* 31.7*   PLATELETS 10*3/mm3 387 386 377 396   MCV fL 91.7 89.6 88.4 89.5   MCH pg 29.7 29.1 29.0 29.9   MCHC g/dL 32.4 32.5 32.8 33.4   RDW % 13.0 12.8 12.9 12.7   RDW-SD fl 43.4 41.6 41.6 42.3   MPV fL 10.2 10.8 10.4 10.7   NEUTROPHIL % %  --  78.9*  --   --    LYMPHOCYTE % %  --  13.6*  --   --    MONOCYTES % %  --  3.5*  --   --    EOSINOPHIL % %  --  1.0  --   --    BASOPHIL % %  --  0.4  --   --    IMM GRAN % %  --  2.6*  --   --    NEUTROS ABS 10*3/mm3 13.39* 17.59*  --  18.76*   LYMPHS ABS 10*3/mm3  --  3.02   --   --    MONOS ABS 10*3/mm3  --  0.79  --   --    EOS ABS 10*3/mm3 0.47* 0.22  --   --    BASOS ABS 10*3/mm3  --  0.08  --   --    IMMATURE GRANS (ABS) 10*3/mm3  --  0.57*  --   --    NRBC /100 WBC  --  0.0  --  1.0*  0.1             Results from last 7 days   Lab Units 05/24/23 2039 05/24/23  1731   PROCALCITONIN ng/mL  --  1.21*   LACTATE mmol/L 1.8 2.3*             Results from last 7 days   Lab Units 05/25/23  1843 05/24/23  1838 05/24/23  1731   BLOODCX   --  No growth at 2 days Staphylococcus, coagulase negative*   WOUNDCX  Growth present, too young to evaluate  --   --    BCIDPCR   --   --  Staph spp, not aureus or lugdunensis. Identification by BCID2 PCR.*         Results from last 7 days   Lab Units 05/24/23  1754   NITRITE UA  Negative   WBC UA /HPF Too Numerous to Count*   BACTERIA UA /HPF 4+*   SQUAM EPITHEL UA /HPF 0-2           Imaging:  Imaging Results (Last 24 Hours)     Procedure Component Value Units Date/Time    XR Chest 1 View [[id number]] Collected: 05/26/23 2002     Updated: 05/26/23 2002    Narrative:      XR CHEST 1 VW-     HISTORY: 53-year-old female with hypoxemia.     FINDINGS: There is prominence of the central pulmonary vascularity, but  there is no definite evidence for interstitial edema. The right  infrahilar opacity is possibly a confluence of markings, but may also  represent atelectasis or pneumonia. Please correlate clinically.                I reviewed the patient's new clinical results / labs / tests / procedures      Assessment/Plan     Active Hospital Problems    Diagnosis  POA   • **Necrotizing soft tissue infection [M79.89]  Yes   • Fasciitis [M72.9]  Yes   • Hypokalemia [E87.6]  Yes   • Hypomagnesemia [E83.42]  Yes   • Immobility syndrome [M62.3]  Yes   • Sepsis, due to unspecified organism, unspecified whether acute organ dysfunction present [A41.9]  Yes   • Diabetes mellitus [E11.9]  Yes   • Spinal stenosis of lumbar region [M48.061]  Yes   • Diabetic peripheral  neuropathy [E11.42]  Yes   • Obstructive sleep apnea syndrome [G47.33]  Yes   • Hypothyroidism [E03.9]  Yes      Resolved Hospital Problems   No resolved problems to display.         1.  Sepsis secondary to right necrotic gluteal decubitus with necrotizing fasciitis.  Patient is status post excision and debridement on 5/25/2023 by surgery and their help is highly appreciated.  Wound cultures are pending..  Blood cultures is 1 out of 2 with coagulase-negative which ID thinks its contamination.  Currently on IV vancomycin and Zosyn.  Will await final cultures.  CT scan of the pelvis revealed decubitus with gas extending to the deep fascia.  Surgery planning wound VAC once wound cleans up.  Appreciate ID and surgery help.  2.  Immobility secondary to spinal stenosis and peripheral neuropathy.  3.  Type 2 diabetes.  A1c is 11.3.  On Glucomander.  4.  Hypokalemia/hypomagnesemia.  Will substitute per protocol.  5.  Hypothyroidism.  TSH is elevated.  Will increase Synthroid.  6.  Hypoxemia.  Chest x-ray with possible atelectasis and vascular congestion.  Will initiate incentive spirometry and DC IV fluid.  7.  UTI.  Currently on Zosyn.  8.  Chronic disease anemia.  Positive for low hemoglobin.  Continue to monitor.  9.  Elevated alkaline phosphatase.  Asymptomatic with benign GI examination.  Improving.  Will monitor.    Discussed my findings and plan of treatment with the patient/nurse  Disposition.  To be determined based on clinical course.  Initially home with family and home health      Cassie Cesar MD  [facility]  05/27/23  12:41 EDT

## 2023-05-27 NOTE — PLAN OF CARE
Goal Outcome Evaluation:  Plan of Care Reviewed With: patient        Progress: no change  Outcome Evaluation: Pt A&OX4- very emotional this shift - improved as day went on, 3l NC, purewick in use, iv fluids and antibiotics per orders, pain med prn, [place] and visually impaired, cc diet- setup help, Q2 turns with assist x2, wound dressing changed per orders, areas of tunneling addressed, barrier cream applied to cristina and coccyx area, powder to abd folds, KCL replaced per protocal,  glucomander driven accu checks and insulin

## 2023-05-28 LAB
ALBUMIN SERPL-MCNC: 2.6 G/DL (ref 3.5–5.2)
ALBUMIN/GLOB SERPL: 0.7 G/DL
ALP SERPL-CCNC: 198 U/L (ref 39–117)
ALT SERPL W P-5'-P-CCNC: 13 U/L (ref 1–33)
ANION GAP SERPL CALCULATED.3IONS-SCNC: 11 MMOL/L (ref 5–15)
AST SERPL-CCNC: 24 U/L (ref 1–32)
BACTERIA SPEC AEROBE CULT: NORMAL
BACTERIA SPEC ANAEROBE CULT: ABNORMAL
BILIRUB SERPL-MCNC: 0.4 MG/DL (ref 0–1.2)
BUN SERPL-MCNC: 11 MG/DL (ref 6–20)
BUN/CREAT SERPL: 13.6 (ref 7–25)
CALCIUM SPEC-SCNC: 7.8 MG/DL (ref 8.6–10.5)
CHLORIDE SERPL-SCNC: 103 MMOL/L (ref 98–107)
CO2 SERPL-SCNC: 26 MMOL/L (ref 22–29)
CREAT SERPL-MCNC: 0.81 MG/DL (ref 0.57–1)
DEPRECATED RDW RBC AUTO: 45.5 FL (ref 37–54)
EGFRCR SERPLBLD CKD-EPI 2021: 88 ML/MIN/1.73
EOSINOPHIL # BLD MANUAL: 0.82 10*3/MM3 (ref 0–0.4)
EOSINOPHIL NFR BLD MANUAL: 6 % (ref 0.3–6.2)
ERYTHROCYTE [DISTWIDTH] IN BLOOD BY AUTOMATED COUNT: 13.6 % (ref 12.3–15.4)
GLOBULIN UR ELPH-MCNC: 3.5 GM/DL
GLUCOSE BLDC GLUCOMTR-MCNC: 168 MG/DL (ref 70–130)
GLUCOSE BLDC GLUCOMTR-MCNC: 173 MG/DL (ref 70–130)
GLUCOSE BLDC GLUCOMTR-MCNC: 176 MG/DL (ref 70–130)
GLUCOSE BLDC GLUCOMTR-MCNC: 191 MG/DL (ref 70–130)
GLUCOSE SERPL-MCNC: 169 MG/DL (ref 65–99)
GRAM STN SPEC: NORMAL
HCT VFR BLD AUTO: 29.1 % (ref 34–46.6)
HGB BLD-MCNC: 9.1 G/DL (ref 12–15.9)
LYMPHOCYTES # BLD MANUAL: 1.65 10*3/MM3 (ref 0.7–3.1)
LYMPHOCYTES NFR BLD MANUAL: 3 % (ref 5–12)
MAGNESIUM SERPL-MCNC: 1.5 MG/DL (ref 1.6–2.6)
MCH RBC QN AUTO: 29.1 PG (ref 26.6–33)
MCHC RBC AUTO-ENTMCNC: 31.3 G/DL (ref 31.5–35.7)
MCV RBC AUTO: 93 FL (ref 79–97)
METAMYELOCYTES NFR BLD MANUAL: 3 % (ref 0–0)
MONOCYTES # BLD: 0.41 10*3/MM3 (ref 0.1–0.9)
MYELOCYTES NFR BLD MANUAL: 8 % (ref 0–0)
NEUTROPHILS # BLD AUTO: 9.34 10*3/MM3 (ref 1.7–7)
NEUTROPHILS NFR BLD MANUAL: 68 % (ref 42.7–76)
PLAT MORPH BLD: NORMAL
PLATELET # BLD AUTO: 443 10*3/MM3 (ref 140–450)
PMV BLD AUTO: 10.4 FL (ref 6–12)
POTASSIUM SERPL-SCNC: 3.7 MMOL/L (ref 3.5–5.2)
POTASSIUM SERPL-SCNC: 3.8 MMOL/L (ref 3.5–5.2)
PROT SERPL-MCNC: 6.1 G/DL (ref 6–8.5)
RBC # BLD AUTO: 3.13 10*6/MM3 (ref 3.77–5.28)
RBC MORPH BLD: NORMAL
SODIUM SERPL-SCNC: 140 MMOL/L (ref 136–145)
VANCOMYCIN TROUGH SERPL-MCNC: 21.7 MCG/ML (ref 5–20)
VARIANT LYMPHS NFR BLD MANUAL: 12 % (ref 19.6–45.3)
WBC MORPH BLD: NORMAL
WBC NRBC COR # BLD: 13.73 10*3/MM3 (ref 3.4–10.8)

## 2023-05-28 PROCEDURE — 84132 ASSAY OF SERUM POTASSIUM: CPT | Performed by: INTERNAL MEDICINE

## 2023-05-28 PROCEDURE — 80202 ASSAY OF VANCOMYCIN: CPT | Performed by: INTERNAL MEDICINE

## 2023-05-28 PROCEDURE — 25010000002 MAGNESIUM SULFATE 2 GM/50ML SOLUTION: Performed by: INTERNAL MEDICINE

## 2023-05-28 PROCEDURE — 63710000001 INSULIN LISPRO (HUMAN) PER 5 UNITS: Performed by: SURGERY

## 2023-05-28 PROCEDURE — 83735 ASSAY OF MAGNESIUM: CPT | Performed by: INTERNAL MEDICINE

## 2023-05-28 PROCEDURE — 25010000002 ONDANSETRON PER 1 MG: Performed by: SURGERY

## 2023-05-28 PROCEDURE — 25010000002 VANCOMYCIN PER 500 MG: Performed by: SURGERY

## 2023-05-28 PROCEDURE — 99232 SBSQ HOSP IP/OBS MODERATE 35: CPT | Performed by: SURGERY

## 2023-05-28 PROCEDURE — 25010000002 MORPHINE PER 10 MG: Performed by: SURGERY

## 2023-05-28 PROCEDURE — 85025 COMPLETE CBC W/AUTO DIFF WBC: CPT | Performed by: INTERNAL MEDICINE

## 2023-05-28 PROCEDURE — 85007 BL SMEAR W/DIFF WBC COUNT: CPT | Performed by: INTERNAL MEDICINE

## 2023-05-28 PROCEDURE — 82948 REAGENT STRIP/BLOOD GLUCOSE: CPT

## 2023-05-28 PROCEDURE — 80053 COMPREHEN METABOLIC PANEL: CPT | Performed by: INTERNAL MEDICINE

## 2023-05-28 PROCEDURE — 25010000002 PIPERACILLIN SOD-TAZOBACTAM PER 1 G: Performed by: SURGERY

## 2023-05-28 RX ORDER — ECHINACEA PURPUREA EXTRACT 125 MG
2 TABLET ORAL AS NEEDED
Status: DISCONTINUED | OUTPATIENT
Start: 2023-05-28 | End: 2023-06-06 | Stop reason: HOSPADM

## 2023-05-28 RX ORDER — MAGNESIUM SULFATE HEPTAHYDRATE 40 MG/ML
2 INJECTION, SOLUTION INTRAVENOUS
Status: COMPLETED | OUTPATIENT
Start: 2023-05-28 | End: 2023-05-28

## 2023-05-28 RX ADMIN — INSULIN LISPRO 8 UNITS: 100 INJECTION, SOLUTION INTRAVENOUS; SUBCUTANEOUS at 12:45

## 2023-05-28 RX ADMIN — MAGNESIUM OXIDE 400 MG (241.3 MG MAGNESIUM) TABLET 400 MG: TABLET at 08:43

## 2023-05-28 RX ADMIN — POTASSIUM CHLORIDE 20 MEQ: 750 TABLET, EXTENDED RELEASE ORAL at 18:18

## 2023-05-28 RX ADMIN — TAZOBACTAM SODIUM AND PIPERACILLIN SODIUM 3.38 G: 375; 3 INJECTION, SOLUTION INTRAVENOUS at 11:37

## 2023-05-28 RX ADMIN — INSULIN GLARGINE-YFGN 26 UNITS: 100 INJECTION, SOLUTION SUBCUTANEOUS at 20:23

## 2023-05-28 RX ADMIN — SODIUM HYPOCHLORITE 1 ML: 1.25 SOLUTION TOPICAL at 08:44

## 2023-05-28 RX ADMIN — POTASSIUM CHLORIDE 20 MEQ: 750 TABLET, EXTENDED RELEASE ORAL at 08:43

## 2023-05-28 RX ADMIN — HYDROCODONE BITARTRATE AND ACETAMINOPHEN 1 TABLET: 7.5; 325 TABLET ORAL at 08:48

## 2023-05-28 RX ADMIN — ATORVASTATIN CALCIUM 80 MG: 80 TABLET, FILM COATED ORAL at 08:43

## 2023-05-28 RX ADMIN — HYDROCODONE BITARTRATE AND ACETAMINOPHEN 1 TABLET: 7.5; 325 TABLET ORAL at 21:54

## 2023-05-28 RX ADMIN — PANTOPRAZOLE SODIUM 40 MG: 40 TABLET, DELAYED RELEASE ORAL at 06:20

## 2023-05-28 RX ADMIN — MAGNESIUM SULFATE HEPTAHYDRATE 2 G: 2 INJECTION, SOLUTION INTRAVENOUS at 15:12

## 2023-05-28 RX ADMIN — TAZOBACTAM SODIUM AND PIPERACILLIN SODIUM 3.38 G: 375; 3 INJECTION, SOLUTION INTRAVENOUS at 20:08

## 2023-05-28 RX ADMIN — VANCOMYCIN HYDROCHLORIDE 1000 MG: 1 INJECTION, SOLUTION INTRAVENOUS at 02:00

## 2023-05-28 RX ADMIN — MAGNESIUM SULFATE HEPTAHYDRATE 2 G: 2 INJECTION, SOLUTION INTRAVENOUS at 17:29

## 2023-05-28 RX ADMIN — TAZOBACTAM SODIUM AND PIPERACILLIN SODIUM 3.38 G: 375; 3 INJECTION, SOLUTION INTRAVENOUS at 03:33

## 2023-05-28 RX ADMIN — MAGNESIUM OXIDE 400 MG (241.3 MG MAGNESIUM) TABLET 400 MG: TABLET at 20:08

## 2023-05-28 RX ADMIN — MORPHINE SULFATE 4 MG: 2 INJECTION, SOLUTION INTRAMUSCULAR; INTRAVENOUS at 20:16

## 2023-05-28 RX ADMIN — VANCOMYCIN HYDROCHLORIDE 1000 MG: 1 INJECTION, SOLUTION INTRAVENOUS at 16:13

## 2023-05-28 RX ADMIN — LEVOTHYROXINE SODIUM 250 MCG: 0.12 TABLET ORAL at 06:20

## 2023-05-28 RX ADMIN — OXYCODONE HYDROCHLORIDE AND ACETAMINOPHEN 250 MG: 500 TABLET ORAL at 08:43

## 2023-05-28 RX ADMIN — ONDANSETRON 4 MG: 2 INJECTION INTRAMUSCULAR; INTRAVENOUS at 11:37

## 2023-05-28 RX ADMIN — INSULIN LISPRO 8 UNITS: 100 INJECTION, SOLUTION INTRAVENOUS; SUBCUTANEOUS at 09:05

## 2023-05-28 RX ADMIN — INSULIN LISPRO 9 UNITS: 100 INJECTION, SOLUTION INTRAVENOUS; SUBCUTANEOUS at 18:17

## 2023-05-28 RX ADMIN — MAGNESIUM SULFATE HEPTAHYDRATE 2 G: 2 INJECTION, SOLUTION INTRAVENOUS at 11:37

## 2023-05-28 RX ADMIN — SODIUM HYPOCHLORITE: 1.25 SOLUTION TOPICAL at 21:01

## 2023-05-28 NOTE — PROGRESS NOTES
General Surgery  Progress Note    CC: Follow-up necrotizing fasciitis right buttock    POD#3 debridement skin and soft tissue infection of right buttock secondary to necrotizing fasciitis    S: She is tolerating dressing changes, and the wound looks great this morning with the beginnings of granulation tissue forming.    O:/82 (BP Location: Left arm, Patient Position: Lying)   Pulse 79   Temp 97.9 °F (36.6 °C) (Oral)   Resp 18   Wt (!) 139 kg (305 lb 12.5 oz)   SpO2 95%   BMI 50.88 kg/m²     Intake & Output:  UOP: 700 mL/24 hrs  BM x3    GENERAL: alert, well appearing, and in no distress  HEENT: normocephalic, atraumatic, moist mucous membranes, clear sclerae   CHEST: clear to auscultation, no wheezes, rales or rhonchi, symmetric air entry  CARDIAC: regular rate and rhythm    ABDOMEN: Soft, morbidly obese, nontender, nondistended  EXTREMITIES: Right buttock wound is dressed, I removed the ABD pad and Kerlix which exposed healthy appearing subcutaneous fat and no evidence for ongoing skin or soft tissue necrosis, there is no cellulitis of the surrounding skin  SKIN: Warm and moist, no rashes    LABS  Results from last 7 days   Lab Units 05/28/23  0651 05/27/23  0643 05/26/23  0656 05/25/23  0604 05/24/23  1731   WBC 10*3/mm3 13.73* 15.29* 22.27*   < > 19.56*   HEMOGLOBIN g/dL 9.1* 9.0* 9.2*   < > 10.6*   HEMATOCRIT % 29.1* 27.8* 28.3*   < > 31.7*   PLATELETS 10*3/mm3 443 387 386   < > 396   MONOCYTES % % 3.0* 2.1*  --   --  1.0*    < > = values in this interval not displayed.     Results from last 7 days   Lab Units 05/28/23  0651 05/28/23  0007 05/27/23  0643 05/26/23  0656   SODIUM mmol/L 140  --  134* 134*   POTASSIUM mmol/L 3.8 3.7 2.8* 3.1*   CHLORIDE mmol/L 103  --  99 99   CO2 mmol/L 26.0  --  23.7 23.3   BUN mg/dL 11  --  15 16   CREATININE mg/dL 0.81  --  0.86 0.91   CALCIUM mg/dL 7.8*  --  8.2* 8.7   BILIRUBIN mg/dL 0.4  --  0.4 0.5   ALK PHOS U/L 198*  --  181* 198*   ALT (SGPT) U/L 13  --  12  6   AST (SGOT) U/L 24  --  19 11   GLUCOSE mg/dL 169*  --  246* 223*             A/P: 53 y.o. female POD#3 debridement skin and soft tissue infection of right buttock secondary to necrotizing fasciitis    The wound looks great.  I will ask our wound ostomy nurses to place a wound VAC tomorrow and will place an order for home health for potential discharge later this week.  Follow-up results of intraoperative cultures and adjust antibiotics accordingly.    Elizabeth Adame MD  General, Robotic, and Endoscopic Surgery  [facility]    [address], [address]  [place] [address]  P: [phone]  F: [phone]

## 2023-05-28 NOTE — PROGRESS NOTES
Name: Vanessa Root ADMIT: 2023   : 1970  PCP: Reese Batista MD    MRN: [id number] LOS: 4 days   AGE/SEX: 53 y.o. female  ROOM: [facility]     Subjective   Subjective   Positive low back pain..  No fever or chills.  No night sweats.  No chest pain.  No shortness of breath.  No cough.  No wheeze.  Positive cough.  No hemoptysis.  No palpitation.  No abdominal pain.  Positive nausea but no vomiting.  Positive loose bowel movement yesterday without fresh bright blood per rectum or melena..  No dysuria or hematuria.      GI I THOR.  No abdominal pain or nausea or vomiting.  She reports that she probably have had a bowel movement today that was loose but cannot comment on the color as she is legally blind.    No abdominal objective   Objective   Vital Signs  Temp:  [97.7 °F (36.5 °C)-97.9 °F (36.6 °C)] 97.9 °F (36.6 °C)  Heart Rate:  [75-88] 79  Resp:  [18-19] 18  BP: (122-138)/(76-90) 125/82  SpO2:  [91 %-96 %] 95 %  on  Flow (L/min):  [2-3] 2;   Device (Oxygen Therapy): humidified;nasal cannula    Intake/Output Summary (Last 24 hours) at 2023 1131  Last data filed at 2023 0948  Gross per 24 hour   Intake 480 ml   Output 700 ml   Net -220 ml     Body mass index is 50.88 kg/m².      23  0338 23  0530 23  0542   Weight: 133 kg (292 lb 15.9 oz) 132 kg (291 lb 0.1 oz) (!) 139 kg (305 lb 12.5 oz)     Physical Exam    General.  Middle-aged female.  Morbidly obese.  Alert and oriented x3.  No apparent pain/distress/diaphoresis.  Normal mood and affect.  Eyes.  Pupils equal round and reactive.  Intact extraocular musculature.  No pallor or jaundice  Oral cavity.  Moist mucous membrane.  Neck.  Supple.  No JVD.  No lymphadenopathy or thyromegaly.  Cardiovascular.  Regular rate and rhythm.  Distant S1 and S2.  I did not appreciate any murmurs.  Chest.  Poor but clear to auscultation bilaterally with no added sounds.  Abdomen.  Obese.  Soft lax.  No tenderness.  No organomegaly.  No  guarding or rebound.  Gluteal region.  I did not examine the wound.    Extremities.  +1 bilateral lower extremity edema.  No clubbing or cyanosis  CNS.  No acute focal neurological deficits    Results Review:      Results from last 7 days   Lab Units 05/28/23  0651 05/28/23  0007 05/27/23  0643 05/26/23  0656 05/25/23  0604 05/24/23  1731   SODIUM mmol/L 140  --  134* 134* 133* 135*   POTASSIUM mmol/L 3.8 3.7 2.8* 3.1* 3.4* 3.9   CHLORIDE mmol/L 103  --  99 99 97* 93*   CO2 mmol/L 26.0  --  23.7 23.3 26.0 27.6   BUN mg/dL 11  --  15 16 18 18   CREATININE mg/dL 0.81  --  0.86 0.91 0.96 1.15*   GLUCOSE mg/dL 169*  --  246* 223* 268* 325*   CALCIUM mg/dL 7.8*  --  8.2* 8.7 8.6 9.8   AST (SGOT) U/L 24  --  19 11  --  13   ALT (SGPT) U/L 13  --  12 6  --  10     Estimated Creatinine Clearance: 116.5 mL/min (by C-G formula based on SCr of 0.81 mg/dL).  Results from last 7 days   Lab Units 05/24/23  1731   HEMOGLOBIN A1C % 11.30*     Results from last 7 days   Lab Units 05/28/23  0853 05/27/23  2024 05/27/23  1803 05/27/23  1240 05/27/23  0821 05/26/23  2109 05/26/23  1826 05/26/23  1331   GLUCOSE mg/dL 173* 193* 230* 230* 225* 289* 256* 255*             Results from last 7 days   Lab Units 05/26/23  1511   TSH uIU/mL 22.500*     Results from last 7 days   Lab Units 05/28/23  0651 05/25/23  1428 05/24/23  1731   MAGNESIUM mg/dL 1.5* 1.4* 1.5*           Invalid input(s): LDLCALC  Results from last 7 days   Lab Units 05/28/23  0651 05/27/23  0643 05/26/23  0656 05/25/23  0604 05/24/23  1731 05/24/23  1731   WBC 10*3/mm3 13.73* 15.29* 22.27* 20.25*  --  19.56*   HEMOGLOBIN g/dL 9.1* 9.0* 9.2* 10.0*  --  10.6*   HEMATOCRIT % 29.1* 27.8* 28.3* 30.5*  --  31.7*   PLATELETS 10*3/mm3 443 387 386 377  --  396   MCV fL 93.0 91.7 89.6 88.4  --  89.5   MCH pg 29.1 29.7 29.1 29.0  --  29.9   MCHC g/dL 31.3* 32.4 32.5 32.8  --  33.4   RDW % 13.6 13.0 12.8 12.9  --  12.7   RDW-SD fl 45.5 43.4 41.6 41.6  --  42.3   MPV fL 10.4 10.2 10.8  10.4  --  10.7   NEUTROPHIL % %  --   --  78.9*  --   --   --    LYMPHOCYTE % %  --   --  13.6*  --   --   --    MONOCYTES % %  --   --  3.5*  --   --   --    EOSINOPHIL % %  --   --  1.0  --   --   --    BASOPHIL % %  --   --  0.4  --   --   --    IMM GRAN % %  --   --  2.6*  --   --   --    NEUTROS ABS 10*3/mm3 9.34* 13.39* 17.59*  --   --  18.76*   LYMPHS ABS 10*3/mm3  --   --  3.02  --   --   --    MONOS ABS 10*3/mm3  --   --  0.79  --   --   --    EOS ABS 10*3/mm3 0.82* 0.47* 0.22  --    < >  --    BASOS ABS 10*3/mm3  --   --  0.08  --   --   --    IMMATURE GRANS (ABS) 10*3/mm3  --   --  0.57*  --   --   --    NRBC /100 WBC  --   --  0.0  --   --  1.0*  0.1    < > = values in this interval not displayed.             Results from last 7 days   Lab Units 05/24/23 2039 05/24/23  1731   PROCALCITONIN ng/mL  --  1.21*   LACTATE mmol/L 1.8 2.3*             Results from last 7 days   Lab Units 05/25/23  1843 05/24/23  1838 05/24/23  1731   BLOODCX   --  No growth at 3 days Staphylococcus, coagulase negative*   WOUNDCX  Culture in progress  --   --    BCIDPCR   --   --  Staph spp, not aureus or lugdunensis. Identification by BCID2 PCR.*         Results from last 7 days   Lab Units 05/24/23  1754   NITRITE UA  Negative   WBC UA /HPF Too Numerous to Count*   BACTERIA UA /HPF 4+*   SQUAM EPITHEL UA /HPF 0-2           Imaging:  Imaging Results (Last 24 Hours)     Procedure Component Value Units Date/Time    XR Chest 1 View [[id number]] Collected: 05/26/23 2002     Updated: 05/27/23 1529    Narrative:      XR CHEST 1 VW-     HISTORY: 53-year-old female with hypoxemia.     FINDINGS: There is prominence of the central pulmonary vascularity, but  there is no definite evidence for interstitial edema. The right  infrahilar opacity is possibly a confluence of markings, but may also  represent atelectasis or pneumonia. Please correlate clinically.     This report was finalized on 5/27/2023 3:26 PM by Dr. Zeinab Stevenson M.D.                 I reviewed the patient's new clinical results / labs / tests / procedures      Assessment/Plan     Active Hospital Problems    Diagnosis  POA   • **Necrotizing soft tissue infection [M79.89]  Yes   • Fasciitis [M72.9]  Yes   • Hypokalemia [E87.6]  Yes   • Hypomagnesemia [E83.42]  Yes   • Immobility syndrome [M62.3]  Yes   • Sepsis, due to unspecified organism, unspecified whether acute organ dysfunction present [A41.9]  Yes   • Diabetes mellitus [E11.9]  Yes   • Spinal stenosis of lumbar region [M48.061]  Yes   • Diabetic peripheral neuropathy [E11.42]  Yes   • Obstructive sleep apnea syndrome [G47.33]  Yes   • Hypothyroidism [E03.9]  Yes      Resolved Hospital Problems   No resolved problems to display.         1.  Sepsis secondary to right necrotic gluteal decubitus with necrotizing fasciitis.  Patient is status post excision and debridement on 5/25/2023 by surgery and their help is highly appreciated.  Wound cultures are pending..  Blood cultures is 1 out of 2 with coagulase-negative which ID thinks its contamination.  Currently on IV vancomycin and Zosyn.  Will await final cultures.  CT scan of the pelvis revealed decubitus with gas extending to the deep fascia.  Surgery planning wound VAC once wound cleans up.  Appreciate ID and surgery help.  White count continues to improve.  2.  Immobility secondary to spinal stenosis and peripheral neuropathy.  3.  Type 2 diabetes.  A1c is 11.3.  On Glucomander.  Acceptable Accu-Cheks.  4.  Hypokalemia/hypomagnesemia.  Slowly improving.  Continue replacement.   5.  Hypothyroidism.  TSH is elevated.  Synthroid increased yesterday.  6.  Hypoxemia.  Chest x-ray with possible atelectasis and vascular congestion.  Continue incentive spirometry.  IV fluid DC'd yesterday.    7.  UTI.  Currently on Zosyn.  8.  Chronic disease anemia.  Positive for low hemoglobin.  Continue to monitor.  Stable hemoglobin.    9.  Elevated alkaline phosphatase.  Asymptomatic with benign GI  examination.  Stable..  Will monitor.    Discussed my findings and plan of treatment with the patient/nurse  Disposition.  To be determined based on clinical course.  Eventually home with family and home health      Cassie Cesar MD  [facility]  05/28/23  11:31 EDT

## 2023-05-28 NOTE — PLAN OF CARE
Goal Outcome Evaluation:  Plan of Care Reviewed With: patient        Progress: no change  Outcome Evaluation: Pt is A&OX4, 2L NC to room air as needed- wore CPAP off and on for napping, purewick in use with care as needed, iv antibiotics per order, mag replaced per protocal, CC diet- with setup and help cutting meats etc, Q2 turns, moved to low air loss mattress, glucomander driven accu checks and insulin, second iv site obtained to manage iv medications, dressing change as ordered with dakins- areas of tunneling addressed, Dr Adame at bedside this shift- spoke with pt about wound vac, barrier cream to cristina and coccyx area, powder to abd folds, edema BLE continues, less emotional today

## 2023-05-28 NOTE — CONSULTS
Subjective:       Patient ID: Roselia Sauer is a 51 y.o. female.    Vitals:  temperature is 98.7 °F (37.1 °C). Her blood pressure is 117/74 and her pulse is 95. Her respiration is 20 and oxygen saturation is 97%.     Chief Complaint: COVID-19 Concerns    A PATIENT IS A 51 Y O FEMALE WITH A  CHIEF COMPLAINT OF POSSIBLE BRONCHOITIS AND/OR CONCERNS OF COVID. PATIENT STATES THE LAST TWO NIGHTS SHE HAS HAD A FEVER(102 DEG), SHORTNESS OF BREATH, CHEST TIGHTNESS, HEADACHES, CHILLS, BODY ACHES AND COUGHING AT NIGHT. PATIENT HAS NOT TAKEN ANYTHING TO RELIEVE PAIN.      Constitution: Positive for chills, sweating, fatigue and fever.   HENT: Positive for congestion and postnasal drip. Negative for ear pain, sinus pain, sore throat and voice change.    Neck: Negative for painful lymph nodes.   Eyes: Negative for eye redness.   Respiratory: Positive for chest tightness, cough and shortness of breath. Negative for sputum production, bloody sputum, COPD, stridor, wheezing and asthma.    Gastrointestinal: Negative for nausea and vomiting.   Musculoskeletal: Positive for muscle ache.   Skin: Negative for rash.   Allergic/Immunologic: Negative for seasonal allergies and asthma.   Neurological: Positive for headaches.   Hematologic/Lymphatic: Negative for swollen lymph nodes.       Objective:      Physical Exam   Constitutional: She is oriented to person, place, and time.  Non-toxic appearance. She does not appear ill. No distress.   HENT:   Head: Normocephalic and atraumatic.   Ears:   Right Ear: External ear normal.   Left Ear: External ear normal.   Nose: Rhinorrhea present. No congestion.   Mouth/Throat: Uvula is midline and mucous membranes are normal. Mucous membranes are moist. Posterior oropharyngeal erythema present. No oropharyngeal exudate. Tonsils are 1+ on the right. Tonsils are 1+ on the left. No tonsillar exudate. Oropharynx is clear.       Eyes: Pupils are equal, round, and reactive to light. Conjunctivae are normal.  Visited and prayed with patient.     extraocular movement intact  Cardiovascular: Normal rate, regular rhythm, S1 normal, S2 normal, normal heart sounds and normal pulses. Exam reveals no decreased pulses.   Pulmonary/Chest: Effort normal. No accessory muscle usage. No respiratory distress. She has no decreased breath sounds. She has no wheezes. She has rhonchi in the left middle field and the left lower field. She has no rales.   Abdominal: Normal appearance.   Lymphadenopathy:     She has no cervical adenopathy.   Neurological: She is alert and oriented to person, place, and time.   Skin: Skin is warm, dry and not diaphoretic.   Nursing note and vitals reviewed.        Results for orders placed or performed in visit on 10/19/20   POCT COVID-19 Rapid Screening   Result Value Ref Range    POC Rapid COVID Negative Negative     Acceptable Yes    POCT INFLUENZA A/B   Result Value Ref Range    Rapid Influenza A Ag Negative Negative    Rapid Influenza B Ag Negative Negative     Acceptable Yes       X-ray Chest Pa And Lateral    Result Date: 10/19/2020  EXAMINATION: XR CHEST PA AND LATERAL CLINICAL HISTORY: Cough TECHNIQUE: PA and lateral views of the chest were performed. COMPARISON: None FINDINGS: The cardiomediastinal silhouette is not enlarged noting magnification by technique.  There is no pleural effusion.  The trachea is midline.  The lungs are symmetrically expanded bilaterally with patchy increased interstitial and parenchymal attenuation projected over the left upper and midlung zones concerning for infectious process..  There is no pneumothorax.  The osseous structures are remarkable for degenerative changes noting dextroscoliotic curvature of the spine..     1. Patchy consolidative opacity projected over the left upper and midlung zones, possibly extending to the left lower lung zone, concerning for infectious process, correlation is advised.  Follow-up is recommended to ensure resolution. Electronically signed  by: Corey Norman MD Date:    10/19/2020 Time:    13:02  Assessment:       1. Sore throat    2. Cough    3. Pneumonia of left lower lobe due to infectious organism        Plan:         Sore throat  -     POCT COVID-19 Rapid Screening    Cough  -     X-Ray Chest PA And Lateral; Future; Expected date: 10/19/2020  -     POCT INFLUENZA A/B  -     promethazine-dextromethorphan (PROMETHAZINE-DM) 6.25-15 mg/5 mL Syrp; Take 5 mLs by mouth nightly as needed.  Dispense: 118 mL; Refill: 0  -     benzonatate (TESSALON PERLES) 100 MG capsule; Take 1 capsule (100 mg total) by mouth 3 (three) times daily as needed for Cough.  Dispense: 30 capsule; Refill: 1    Pneumonia of left lower lobe due to infectious organism  -     amoxicillin-clavulanate 875-125mg (AUGMENTIN) 875-125 mg per tablet; Take 1 tablet by mouth 2 (two) times daily. for 10 days  Dispense: 20 tablet; Refill: 0  -     azithromycin (Z-GAETANO) 250 MG tablet; Take 1 tablet (250 mg total) by mouth once daily. Take 2 pills the first day then 1 pill a day for 4 days for 6 doses  Dispense: 6 tablet; Refill: 0  -     albuterol (PROVENTIL/VENTOLIN HFA) 90 mcg/actuation inhaler; Inhale 2 puffs into the lungs every 6 (six) hours as needed. Rescue  Dispense: 18 g; Refill: 0      Patient Instructions     Your test was NEGATIVE for COVID-19 (coronavirus).      You may leave home and/or return to work when the following conditions are met:   24 hours fever free without fever-reducing medications AND   Improved symptoms      If your symptoms worsen or if you have any other concerns, please contact [facility] at [phone].     Sincerely,    Sagar Boyce NP    Pneumonia  If your condition worsens or fails to improve we recommend that you receive another evaluation at the ER immediately or contact your PCP to discuss your concerns or return here. You must understand that you've received an urgent care treatment only and that you may be released before all your medical  "problems are known or treated. You the patient will arrange for follouwp care as instructed.   Rest and fluids are important  You were prescribed antibiotics, please take them to completion.  Take inhaler as prescribed and needed for wheezing  Please follow up with your primary care doctor or specialist in the next 48-72hrs as needed and if no improvement  Repeat Chest Xray in the next 10 days  If you smoke, please stop smoking.    Cough   If your condition worsens or fails to improve we recommend that you receive another evaluation at the ER immediately or contact your PCP to discuss your concerns or return here. You must understand that you've received an urgent care treatment only and that you may be released before all your medical problems are known or treated. You the patient will arrange for follouwp care as instructed. .  Rest and fluids are important  Can use honey with adrián to soothe your throat  Take prescription cough meds (pills) as prescribed; take prescription cough syrup at night as needed for cough.  Do not take both the prescribed cough pills and syrup at the same time.   -  Flonase (fluticasone) is a nasal spray which is available over the counter and may help with your symptoms.   -  If you have hypertension avoid using the "D" which is the decongestant.  Instead you can use Coricidin HBP for cold and cough symptoms.    -  If you just have drainage you can take plain Zyrtec, Claritin or Allegra   -  Tylenol or ibuprofen can also be used as directed for pain unless you have an allergy to them or medical condition such as stomach ulcers, kidney or liver disease or blood thinners etc for which you should not be taking these type of medications.   Please follow up with your primary care doctor or specialist in the next 48-72hrs as needed and if no improvement  If you  smoke, please stop smoking.                                                          Pharyngitis   If your condition worsens or " fails to improve we recommend that you receive another evaluation at the ER immediately or contact your PCP to discuss your concerns or return here. You must understand that you've received an urgent care treatment only and that you may be released before all your medical problems are known or treated. You the patient will arrange for followup care as instructed.   The majority of all sore throats or tonsillitis are viral and antibiotics will not treat this.     If the strep test performed in office was Positive:  - Complete the full course of antibiotics given  - Drink plenty of cool liquids while avoid any beverage or food that can irritate your throat (acidic, spicy or salty foods).  - Throw away your toothbrush now and when you complete your antibiotics.    If the strep culture was done and returns negative in 3-5 days and you are still having a sore throat, you may need to get a mono spot test done or repeated.   Tylenol or ibuprofen for pain may help as long as you are not allergic to these meds or have a medical condition such as stomach ulcers, liver or kidney disease or taking blood thinners etc that would   prevent you from using these medications.   Rest and fluids will help as well.   If you were prescribed antibiotics and are female and on BCP use additional methods to prevent pregnancy while on the antibiotics and for one cycle after.     Self-Care for Sore Throats    Sore throats happen for many reasons, such as colds, allergies, and infections caused by viruses or bacteria. In any case, your throat becomes red and sore. Your goal for self-care is to reduce your discomfort while giving your throat a chance to heal.  Moisten and soothe your throat  Tips include the following:  · Try a sip of water first thing after waking up.  · Keep your throat moist by drinking 6 or more glasses of clear liquids every day.  · Run a cool-air humidifier in your room overnight.  · Avoid cigarette smoke.   · Suck on  throat lozenges, cough drops, hard candy, ice chips, or frozen fruit-juice bars. Use the sugar-free versions if your diet or medical condition requires them.  Gargle to ease irritation  Gargling every hour or 2 can ease irritation. Try gargling with 1 of these solutions:  · 1/4 teaspoon of salt in 1/2 cup of warm water  · An over-the-counter anesthetic gargle  Use medicine for more relief  Over-the-counter medicine can reduce sore throat symptoms. Ask your pharmacist if you have questions about which medicine to use:  · Ease pain with anesthetic sprays. Aspirin or an aspirin substitute also helps. Remember, never give aspirin to anyone 18 or younger, or if you are already taking blood thinners.   · For sore throats caused by allergies, try antihistamines to block the allergic reaction.  · Remember: unless a sore throat is caused by a bacterial infection, antibiotics wont help you.  Prevent future sore throats  Prevention tips include the following:  · Stop smoking or reduce contact with secondhand smoke. Smoke irritates the tender throat lining.  · Limit contact with pets and with allergy-causing substances, such as pollen and mold.  · When youre around someone with a sore throat or cold, wash your hands often to keep viruses or bacteria from spreading.  · Dont strain your vocal cords.  Call your healthcare provider  Contact your healthcare provider if you have:  · A temperature over 101°F (38.3°C)  · White spots on the throat  · Great difficulty swallowing  · Trouble breathing  · A skin rash  · Recent exposure to someone else with strep bacteria  · Severe hoarseness and swollen glands in the neck or jaw   Date Last Reviewed: 8/1/2016  © 0826-1833 [employer]. [address] [place] [address], [place] [address]. All rights reserved. This information is not intended as a substitute for professional medical care. Always follow your healthcare professional's instructions.

## 2023-05-28 NOTE — PROGRESS NOTES
[facility] Clinical Pharmacy Services: Vancomycin Monitoring Note    Vanessa Root is a 53 y.o. female who is on day 4/5 of pharmacy to dose vancomycin for Skin and Soft Tissue.    Previous Vancomycin Dose:   1000 mg IV every  12  hours  Updated Cultures and Sensitivities:   Results from last 7 days   Lab Units 05/28/23  1317 05/26/23  0948   VANCOMYCIN TR mcg/mL 21.70* 13.20     Vitals/Labs  Ht:  ; Wt:   (!) 139 kg (305 lb 12.5 oz)   Temp Readings from Last 1 Encounters:   05/28/23 98 °F (36.7 °C) (Oral)     Estimated Creatinine Clearance: 116.5 mL/min (by C-G formula based on SCr of 0.81 mg/dL).       Results from last 7 days   Lab Units 05/28/23  0651 05/27/23  0643 05/26/23  0656   CREATININE mg/dL 0.81 0.86 0.91   WBC 10*3/mm3 13.73* 15.29* 22.27*     Assessment/Plan    New Vancomycin Dose: 750 mg IV every  12  hours; provides a predicted  mg/L.hr   Next Level Date and Time: will be ordered if duration extended  We will continue to monitor patient changes and renal function     Thank you for involving pharmacy in this patient's care. Please contact pharmacy with any questions or concerns.       Jose Bey PharmD  Clinical Pharmacist

## 2023-05-29 LAB
ALBUMIN SERPL-MCNC: 2.7 G/DL (ref 3.5–5.2)
ALBUMIN/GLOB SERPL: 0.8 G/DL
ALP SERPL-CCNC: 266 U/L (ref 39–117)
ALT SERPL W P-5'-P-CCNC: 14 U/L (ref 1–33)
ANION GAP SERPL CALCULATED.3IONS-SCNC: 7 MMOL/L (ref 5–15)
ANISOCYTOSIS BLD QL: ABNORMAL
AST SERPL-CCNC: 34 U/L (ref 1–32)
BACTERIA SPEC AEROBE CULT: NORMAL
BILIRUB SERPL-MCNC: 0.5 MG/DL (ref 0–1.2)
BUN SERPL-MCNC: 8 MG/DL (ref 6–20)
BUN/CREAT SERPL: 9.1 (ref 7–25)
CALCIUM SPEC-SCNC: 8.4 MG/DL (ref 8.6–10.5)
CHLORIDE SERPL-SCNC: 104 MMOL/L (ref 98–107)
CO2 SERPL-SCNC: 26 MMOL/L (ref 22–29)
CREAT SERPL-MCNC: 0.88 MG/DL (ref 0.57–1)
DEPRECATED RDW RBC AUTO: 44 FL (ref 37–54)
EGFRCR SERPLBLD CKD-EPI 2021: 79.7 ML/MIN/1.73
EOSINOPHIL # BLD MANUAL: 0.46 10*3/MM3 (ref 0–0.4)
EOSINOPHIL NFR BLD MANUAL: 3 % (ref 0.3–6.2)
ERYTHROCYTE [DISTWIDTH] IN BLOOD BY AUTOMATED COUNT: 13.3 % (ref 12.3–15.4)
GLOBULIN UR ELPH-MCNC: 3.4 GM/DL
GLUCOSE BLDC GLUCOMTR-MCNC: 162 MG/DL (ref 70–130)
GLUCOSE BLDC GLUCOMTR-MCNC: 163 MG/DL (ref 70–130)
GLUCOSE BLDC GLUCOMTR-MCNC: 178 MG/DL (ref 70–130)
GLUCOSE BLDC GLUCOMTR-MCNC: 220 MG/DL (ref 70–130)
GLUCOSE SERPL-MCNC: 174 MG/DL (ref 65–99)
HCT VFR BLD AUTO: 29.3 % (ref 34–46.6)
HGB BLD-MCNC: 9.4 G/DL (ref 12–15.9)
LYMPHOCYTES # BLD MANUAL: 2.47 10*3/MM3 (ref 0.7–3.1)
LYMPHOCYTES NFR BLD MANUAL: 1 % (ref 5–12)
MAGNESIUM SERPL-MCNC: 2.6 MG/DL (ref 1.6–2.6)
MCH RBC QN AUTO: 29.6 PG (ref 26.6–33)
MCHC RBC AUTO-ENTMCNC: 32.1 G/DL (ref 31.5–35.7)
MCV RBC AUTO: 92.1 FL (ref 79–97)
METAMYELOCYTES NFR BLD MANUAL: 4 % (ref 0–0)
MONOCYTES # BLD: 0.15 10*3/MM3 (ref 0.1–0.9)
MYELOCYTES NFR BLD MANUAL: 5 % (ref 0–0)
NEUTROPHILS # BLD AUTO: 10.79 10*3/MM3 (ref 1.7–7)
NEUTROPHILS NFR BLD MANUAL: 70 % (ref 42.7–76)
PLAT MORPH BLD: NORMAL
PLATELET # BLD AUTO: 461 10*3/MM3 (ref 140–450)
PMV BLD AUTO: 9.9 FL (ref 6–12)
POTASSIUM SERPL-SCNC: 3.9 MMOL/L (ref 3.5–5.2)
PROMYELOCYTES NFR BLD MANUAL: 1 % (ref 0–0)
PROT SERPL-MCNC: 6.1 G/DL (ref 6–8.5)
RBC # BLD AUTO: 3.18 10*6/MM3 (ref 3.77–5.28)
SODIUM SERPL-SCNC: 137 MMOL/L (ref 136–145)
VARIANT LYMPHS NFR BLD MANUAL: 16 % (ref 19.6–45.3)
WBC MORPH BLD: NORMAL
WBC NRBC COR # BLD: 15.41 10*3/MM3 (ref 3.4–10.8)

## 2023-05-29 PROCEDURE — 80053 COMPREHEN METABOLIC PANEL: CPT | Performed by: INTERNAL MEDICINE

## 2023-05-29 PROCEDURE — 85025 COMPLETE CBC W/AUTO DIFF WBC: CPT | Performed by: INTERNAL MEDICINE

## 2023-05-29 PROCEDURE — 99232 SBSQ HOSP IP/OBS MODERATE 35: CPT | Performed by: SURGERY

## 2023-05-29 PROCEDURE — 25010000002 PIPERACILLIN SOD-TAZOBACTAM PER 1 G: Performed by: SURGERY

## 2023-05-29 PROCEDURE — 83735 ASSAY OF MAGNESIUM: CPT | Performed by: INTERNAL MEDICINE

## 2023-05-29 PROCEDURE — 25010000002 ONDANSETRON PER 1 MG: Performed by: SURGERY

## 2023-05-29 PROCEDURE — 63710000001 INSULIN LISPRO (HUMAN) PER 5 UNITS: Performed by: SURGERY

## 2023-05-29 PROCEDURE — 82948 REAGENT STRIP/BLOOD GLUCOSE: CPT

## 2023-05-29 PROCEDURE — 25010000002 VANCOMYCIN 750 MG RECONSTITUTED SOLUTION 1 EACH VIAL: Performed by: INTERNAL MEDICINE

## 2023-05-29 PROCEDURE — 99232 SBSQ HOSP IP/OBS MODERATE 35: CPT | Performed by: INTERNAL MEDICINE

## 2023-05-29 PROCEDURE — 25010000002 MORPHINE PER 10 MG: Performed by: SURGERY

## 2023-05-29 PROCEDURE — 85007 BL SMEAR W/DIFF WBC COUNT: CPT | Performed by: INTERNAL MEDICINE

## 2023-05-29 RX ORDER — ALPRAZOLAM 1 MG/1
1 TABLET ORAL 3 TIMES DAILY PRN
Status: DISCONTINUED | OUTPATIENT
Start: 2023-05-29 | End: 2023-06-04

## 2023-05-29 RX ORDER — AMOXICILLIN AND CLAVULANATE POTASSIUM 875; 125 MG/1; MG/1
1 TABLET, FILM COATED ORAL EVERY 12 HOURS SCHEDULED
Status: COMPLETED | OUTPATIENT
Start: 2023-05-29 | End: 2023-06-04

## 2023-05-29 RX ADMIN — MORPHINE SULFATE 4 MG: 2 INJECTION, SOLUTION INTRAMUSCULAR; INTRAVENOUS at 12:33

## 2023-05-29 RX ADMIN — INSULIN LISPRO 9 UNITS: 100 INJECTION, SOLUTION INTRAVENOUS; SUBCUTANEOUS at 08:56

## 2023-05-29 RX ADMIN — OXYCODONE HYDROCHLORIDE AND ACETAMINOPHEN 250 MG: 500 TABLET ORAL at 08:57

## 2023-05-29 RX ADMIN — TAZOBACTAM SODIUM AND PIPERACILLIN SODIUM 3.38 G: 375; 3 INJECTION, SOLUTION INTRAVENOUS at 02:03

## 2023-05-29 RX ADMIN — MAGNESIUM OXIDE 400 MG (241.3 MG MAGNESIUM) TABLET 400 MG: TABLET at 21:17

## 2023-05-29 RX ADMIN — HYDROCODONE BITARTRATE AND ACETAMINOPHEN 1 TABLET: 7.5; 325 TABLET ORAL at 04:22

## 2023-05-29 RX ADMIN — INSULIN LISPRO 2 UNITS: 100 INJECTION, SOLUTION INTRAVENOUS; SUBCUTANEOUS at 21:16

## 2023-05-29 RX ADMIN — ALPRAZOLAM 1 MG: 1 TABLET ORAL at 21:20

## 2023-05-29 RX ADMIN — LEVOTHYROXINE SODIUM 250 MCG: 0.12 TABLET ORAL at 06:01

## 2023-05-29 RX ADMIN — HYDROCODONE BITARTRATE AND ACETAMINOPHEN 1 TABLET: 7.5; 325 TABLET ORAL at 10:55

## 2023-05-29 RX ADMIN — SODIUM HYPOCHLORITE: 1.25 SOLUTION TOPICAL at 10:57

## 2023-05-29 RX ADMIN — AMOXICILLIN AND CLAVULANATE POTASSIUM 1 TABLET: 875; 125 TABLET, FILM COATED ORAL at 21:17

## 2023-05-29 RX ADMIN — AMOXICILLIN AND CLAVULANATE POTASSIUM 1 TABLET: 875; 125 TABLET, FILM COATED ORAL at 10:55

## 2023-05-29 RX ADMIN — INSULIN LISPRO 7 UNITS: 100 INJECTION, SOLUTION INTRAVENOUS; SUBCUTANEOUS at 17:00

## 2023-05-29 RX ADMIN — PANTOPRAZOLE SODIUM 40 MG: 40 TABLET, DELAYED RELEASE ORAL at 06:01

## 2023-05-29 RX ADMIN — INSULIN GLARGINE-YFGN 26 UNITS: 100 INJECTION, SOLUTION SUBCUTANEOUS at 21:17

## 2023-05-29 RX ADMIN — ATORVASTATIN CALCIUM 80 MG: 80 TABLET, FILM COATED ORAL at 08:57

## 2023-05-29 RX ADMIN — MAGNESIUM OXIDE 400 MG (241.3 MG MAGNESIUM) TABLET 400 MG: TABLET at 08:57

## 2023-05-29 RX ADMIN — POTASSIUM CHLORIDE 20 MEQ: 750 TABLET, EXTENDED RELEASE ORAL at 18:27

## 2023-05-29 RX ADMIN — ONDANSETRON 4 MG: 2 INJECTION INTRAMUSCULAR; INTRAVENOUS at 10:55

## 2023-05-29 RX ADMIN — ALPRAZOLAM 1 MG: 1 TABLET ORAL at 14:47

## 2023-05-29 RX ADMIN — INSULIN LISPRO 8 UNITS: 100 INJECTION, SOLUTION INTRAVENOUS; SUBCUTANEOUS at 12:32

## 2023-05-29 RX ADMIN — POTASSIUM CHLORIDE 20 MEQ: 750 TABLET, EXTENDED RELEASE ORAL at 08:58

## 2023-05-29 RX ADMIN — VANCOMYCIN HYDROCHLORIDE 750 MG: 750 INJECTION, POWDER, LYOPHILIZED, FOR SOLUTION INTRAVENOUS at 00:19

## 2023-05-29 RX ADMIN — SALINE NASAL SPRAY 2 SPRAY: 1.5 SOLUTION NASAL at 08:58

## 2023-05-29 NOTE — PROGRESS NOTES
"General Surgery  Progress Note    CC: Follow-up necrotizing fasciitis right buttock    POD#4 debridement skin and soft tissue infection of right buttock secondary to necrotizing fasciitis    S: She complains of mild soreness of the wound but is tolerating dressing changes.  Her final cultures grew normal skin bunny with some mixed anaerobes.    O:/82 (BP Location: Left arm, Patient Position: Lying)   Pulse 82   Temp 97.4 °F (36.3 °C) (Oral)   Resp 18   Ht 165.1 cm (65\")   Wt (!) 139 kg (306 lb 6.4 oz)   SpO2 93%   BMI 50.99 kg/m²     Intake & Output:  UOP: 1300 mL/24 hrs    GENERAL: alert, well appearing, and in no distress  HEENT: normocephalic, atraumatic, moist mucous membranes, clear sclerae   CHEST: clear to auscultation, no wheezes, rales or rhonchi, symmetric air entry  CARDIAC: regular rate and rhythm    ABDOMEN: Soft, morbidly obese, nontender, nondistended  EXTREMITIES: Right buttock wound is dressed, I removed the ABD pad and Kerlix which exposed healthy appearing subcutaneous fat and no evidence for ongoing skin or soft tissue necrosis, there is no cellulitis of the surrounding skin  SKIN: Warm and moist, no rashes    LABS  Results from last 7 days   Lab Units 05/29/23  0539 05/28/23  0651 05/27/23  0643   WBC 10*3/mm3 15.41* 13.73* 15.29*   HEMOGLOBIN g/dL 9.4* 9.1* 9.0*   HEMATOCRIT % 29.3* 29.1* 27.8*   PLATELETS 10*3/mm3 461* 443 387   MONOCYTES % % 1.0* 3.0* 2.1*     Results from last 7 days   Lab Units 05/29/23  0539 05/28/23  0651 05/28/23  0007 05/27/23  0643   SODIUM mmol/L 137 140  --  134*   POTASSIUM mmol/L 3.9 3.8 3.7 2.8*   CHLORIDE mmol/L 104 103  --  99   CO2 mmol/L 26.0 26.0  --  23.7   BUN mg/dL 8 11  --  15   CREATININE mg/dL 0.88 0.81  --  0.86   CALCIUM mg/dL 8.4* 7.8*  --  8.2*   BILIRUBIN mg/dL 0.5 0.4  --  0.4   ALK PHOS U/L 266* 198*  --  181*   ALT (SGPT) U/L 14 13  --  12   AST (SGOT) U/L 34* 24  --  19   GLUCOSE mg/dL 174* 169*  --  246*             A/P: 53 y.o. " female POD#4 debridement skin and soft tissue infection of right buttock secondary to necrotizing fasciitis    Plan for wound VAC today.  I discussed this with Ambreen of the wound ostomy team.  Antibiotic management per infectious disease, with final culture showing normal skin bunny and mixed anaerobes.  I have no reservations with her being discharged home in the next few days once arrangements have been made for home health and a home wound VAC.  I will place a referral to home health preemptively.    Elizabeth Adame MD  General, Robotic, and Endoscopic Surgery  [facility]    [address], [address]  [place] [address]  P: [phone]  F: [phone]

## 2023-05-29 NOTE — PLAN OF CARE
Goal Outcome Evaluation:  Plan of Care Reviewed With: patient        Progress: improving  Outcome Evaluation: Dressing changed to right hip.  Fit 2 kerlex rolls into wound.  Turn Q2.  On specialty mattress.  Purewick in place.  Nystatin powder to MASD in abd folds and cristina area.  CPAP on for HS.  Possible wound vac soon when wound therapy available.

## 2023-05-29 NOTE — NURSING NOTE
05/29/23 1544   Wound 05/24/23 2101 Right lateral hip Soft Tissue Necrosis   Placement Date/Time: 05/24/23 2101   Present on Hospital Admission: Yes  Side: Right  Orientation: lateral  Location: hip  Primary Wound Type: Soft Tissue Necrosis  Number of Sutures Placed: 0   Dressing Appearance dry;intact   Closure None   Base clean;moist;red   Red (%), Wound Tissue Color 100   Periwound intact;dry   Periwound Temperature warm   Periwound Skin Turgor soft   Edges open   Wound Length (cm) 8 cm   Wound Width (cm) 13 cm   Wound Depth (cm) 2 cm   Wound Surface Area (cm^2) 104 cm^2   Wound Volume (cm^3) 208 cm^3   Undermining [Depth (cm)/Location] 5 cm/ 12-6 o'clock   Drainage Characteristics/Odor serosanguineous   Drainage Amount scant   Care, Wound cleansed with;sterile normal saline   Dressing Care dressing changed;foam;transparent film;other (see comments)  (wound vac dsg applied)   Periwound Care barrier film applied;other (see comments);dry periwound area maintained  (sure prep barrier spray applied)   NPWT (Negative Pressure Wound Therapy) 05/29/23 1531 right lateral hip   Placement Date/Time: 05/29/23 1531   Location: right lateral hip   Therapy Setting continuous therapy   Dressing foam, black;transparent dressing   Pressure Setting 125 mmHg   Sponges Inserted 2     Cwon consult received for wound vac placement to right lateral hip. Patient is s/p debridement of necrotizing fascitis infection to right hip pressure injury. Wound today is clean and moist and appropriate for npwt. Dressing placed without difficulty and pt tolerated well.  We will plan to change dressing MWF while inpatient.  Update provided to RN.  Thank you for consult and please call with any questions.

## 2023-05-29 NOTE — PROGRESS NOTES
ID note for Necrotizing fasciitis of right buttocks    Subjective: She is tolerating the antibiotics reasonably well.  Having some nausea but no emesis and so she is eating more than she has in quite some time.    Objective:   Temp:  [97.4 °F (36.3 °C)-98 °F (36.7 °C)] 97.4 °F (36.3 °C)  Heart Rate:  [76-82] 82  Resp:  [18] 18  BP: (117-145)/(82-86) 123/82  GENERAL: Awake and alert, in no acute distress.   HEENT: Hearing is grossly normal.   LUNGS:normal respiratory effort.   SKIN: no cutaneous eruptions in exposed areas    Wbc 15  Cr 0.88  glc 168-191  Bcx 1/2 ConS  5/25 OR cx: Nl skin bunny and mixed anaerobes    A/p  1.  Sepsis secondary #2  2.  Infected right buttocks decubitus wound with cellulitis and necrotizing soft tissue infection  3.  Diabetes mellitus type 2, uncontrolled with hyperglycemia, complicating above  4.  Coag negative staph bacteremia, likely contaminant    He is doing much better.  We will DC IV antibiotics and start Augmentin 875/125 mg for 1 week of therapy.  Plan for wound VAC noted by general surgery.  Thanks to them for aggressive source control.  Discussed with her that she will need offloading in the future.  She does have some reservations about going home which I think are reasonable given that she is going to need pretty involved care.

## 2023-05-29 NOTE — PROGRESS NOTES
Name: Vanessa Root ADMIT: 2023   : 1970  PCP: Reese Batista MD    MRN: [id number] LOS: 5 days   AGE/SEX: 53 y.o. female  ROOM: [facility]     Subjective   Subjective   Complaining of anxiety and panic attack today.    No fever or chills.  No night sweats.  No chest pain.  No shortness of breath.  No cough.  No wheeze.  No hemoptysis.  No palpitation.  No abdominal pain.  No nausea or vomiting. Normal  bowel movement yesterday without fresh bright blood per rectum or melena..  No dysuria or hematuria.      GI I THOR.  No abdominal pain or nausea or vomiting.  She reports that she probably have had a bowel movement today that was loose but cannot comment on the color as she is legally blind.    No abdominal objective   Objective   Vital Signs  Temp:  [97.4 °F (36.3 °C)-97.8 °F (36.6 °C)] 97.4 °F (36.3 °C)  Heart Rate:  [76-82] 80  Resp:  [18] 18  BP: (117-150)/(82-97) 150/97  SpO2:  [90 %-97 %] 94 %  on  Flow (L/min):  [2] 2;   Device (Oxygen Therapy): nasal cannula    Intake/Output Summary (Last 24 hours) at 2023 1429  Last data filed at 2023 1320  Gross per 24 hour   Intake 840 ml   Output 250 ml   Net 590 ml     Body mass index is 50.99 kg/m².      23  0542 23  2144 23  0510   Weight: (!) 139 kg (305 lb 12.5 oz) (!) 138 kg (305 lb) (!) 139 kg (306 lb 6.4 oz)     Physical Exam    General.  Middle-aged female.  Morbidly obese.  Alert and oriented x3.  No apparent pain/distress/diaphoresis.  Normal mood and affect.  Eyes.  Pupils equal round and reactive.  Intact extraocular musculature.  No pallor or jaundice  Oral cavity.  Moist mucous membrane.  Neck.  Supple.  No JVD.  No lymphadenopathy or thyromegaly.  Cardiovascular.  Regular rate and rhythm.  Distant S1 and S2.  I did not appreciate any murmurs.  Chest.  Poor but clear to auscultation bilaterally with no added sounds.  Abdomen.  Obese.  Soft lax.  No tenderness.  No organomegaly.  No guarding or  rebound.  Gluteal region.  I did not examine the wound.    Extremities.  +1 bilateral lower extremity edema.  No clubbing or cyanosis  CNS.  No acute focal neurological deficits    Results Review:      Results from last 7 days   Lab Units 05/29/23  0539 05/28/23  0651 05/28/23  0007 05/27/23  0643 05/26/23  0656 05/25/23  0604 05/24/23  1731   SODIUM mmol/L 137 140  --  134* 134* 133* 135*   POTASSIUM mmol/L 3.9 3.8 3.7 2.8* 3.1* 3.4* 3.9   CHLORIDE mmol/L 104 103  --  99 99 97* 93*   CO2 mmol/L 26.0 26.0  --  23.7 23.3 26.0 27.6   BUN mg/dL 8 11  --  15 16 18 18   CREATININE mg/dL 0.88 0.81  --  0.86 0.91 0.96 1.15*   GLUCOSE mg/dL 174* 169*  --  246* 223* 268* 325*   CALCIUM mg/dL 8.4* 7.8*  --  8.2* 8.7 8.6 9.8   AST (SGOT) U/L 34* 24  --  19 11  --  13   ALT (SGPT) U/L 14 13  --  12 6  --  10     Estimated Creatinine Clearance: 107.2 mL/min (by C-G formula based on SCr of 0.88 mg/dL).  Results from last 7 days   Lab Units 05/24/23  1731   HEMOGLOBIN A1C % 11.30*     Results from last 7 days   Lab Units 05/29/23  1219 05/29/23  0833 05/28/23  2017 05/28/23  1631 05/28/23  1249 05/28/23  0853 05/27/23  2024 05/27/23  1803   GLUCOSE mg/dL 163* 178* 191* 176* 168* 173* 193* 230*             Results from last 7 days   Lab Units 05/26/23  1511   TSH uIU/mL 22.500*     Results from last 7 days   Lab Units 05/29/23  0539 05/28/23  0651 05/25/23  1428 05/24/23  1731   MAGNESIUM mg/dL 2.6 1.5* 1.4* 1.5*           Invalid input(s): LDLCALC  Results from last 7 days   Lab Units 05/29/23  0539 05/28/23  0651 05/27/23  0643 05/26/23  0656 05/25/23  0604 05/24/23  1731 05/24/23  1731   WBC 10*3/mm3 15.41* 13.73* 15.29* 22.27* 20.25*  --  19.56*   HEMOGLOBIN g/dL 9.4* 9.1* 9.0* 9.2* 10.0*  --  10.6*   HEMATOCRIT % 29.3* 29.1* 27.8* 28.3* 30.5*  --  31.7*   PLATELETS 10*3/mm3 461* 443 387 386 377  --  396   MCV fL 92.1 93.0 91.7 89.6 88.4  --  89.5   MCH pg 29.6 29.1 29.7 29.1 29.0  --  29.9   MCHC g/dL 32.1 31.3* 32.4 32.5 32.8   --  33.4   RDW % 13.3 13.6 13.0 12.8 12.9  --  12.7   RDW-SD fl 44.0 45.5 43.4 41.6 41.6  --  42.3   MPV fL 9.9 10.4 10.2 10.8 10.4  --  10.7   NEUTROPHIL % %  --   --   --  78.9*  --   --   --    LYMPHOCYTE % %  --   --   --  13.6*  --   --   --    MONOCYTES % %  --   --   --  3.5*  --   --   --    EOSINOPHIL % %  --   --   --  1.0  --   --   --    BASOPHIL % %  --   --   --  0.4  --   --   --    IMM GRAN % %  --   --   --  2.6*  --   --   --    NEUTROS ABS 10*3/mm3 10.79* 9.34* 13.39* 17.59*  --   --  18.76*   LYMPHS ABS 10*3/mm3  --   --   --  3.02  --   --   --    MONOS ABS 10*3/mm3  --   --   --  0.79  --   --   --    EOS ABS 10*3/mm3 0.46* 0.82* 0.47* 0.22  --    < >  --    BASOS ABS 10*3/mm3  --   --   --  0.08  --   --   --    IMMATURE GRANS (ABS) 10*3/mm3  --   --   --  0.57*  --   --   --    NRBC /100 WBC  --   --   --  0.0  --   --  1.0*  0.1    < > = values in this interval not displayed.             Results from last 7 days   Lab Units 05/24/23 2039 05/24/23 1731   PROCALCITONIN ng/mL  --  1.21*   LACTATE mmol/L 1.8 2.3*             Results from last 7 days   Lab Units 05/25/23 1843 05/24/23  1838 05/24/23  1731   BLOODCX   --  No growth at 4 days Staphylococcus, coagulase negative*   WOUNDCX  Moderate growth (3+) Normal Skin Carmen  --   --    BCIDPCR   --   --  Staph spp, not aureus or lugdunensis. Identification by BCID2 PCR.*         Results from last 7 days   Lab Units 05/24/23  1754   NITRITE UA  Negative   WBC UA /HPF Too Numerous to Count*   BACTERIA UA /HPF 4+*   SQUAM EPITHEL UA /HPF 0-2           Imaging:  Imaging Results (Last 24 Hours)     ** No results found for the last 24 hours. **             I reviewed the patient's new clinical results / labs / tests / procedures      Assessment/Plan     Active Hospital Problems    Diagnosis  POA   • **Necrotizing soft tissue infection [M79.89]  Yes   • Fasciitis [M72.9]  Yes   • Hypokalemia [E87.6]  Yes   • Hypomagnesemia [E83.42]  Yes   •  Immobility syndrome [M62.3]  Yes   • Sepsis, due to unspecified organism, unspecified whether acute organ dysfunction present [A41.9]  Yes   • Diabetes mellitus [E11.9]  Yes   • Spinal stenosis of lumbar region [M48.061]  Yes   • Diabetic peripheral neuropathy [E11.42]  Yes   • Obstructive sleep apnea syndrome [G47.33]  Yes   • Hypothyroidism [E03.9]  Yes      Resolved Hospital Problems   No resolved problems to display.         1.  Sepsis secondary to right necrotic gluteal decubitus with necrotizing fasciitis.  Patient is status post excision and debridement on 5/25/2023 by surgery and their help is highly appreciated.  Wound cultures showed mixed anaerobes and normal bunny...  Blood cultures is 1 out of 2 with coagulase-negative which ID thinks its contamination.  .  CT scan of the pelvis revealed decubitus with gas extending to the deep fascia.  Infectious disease stopped IV Vanco and Zosyn and started the patient on p.o. Augmentin for the next 1 week.  Surgery placed wound VAC.  No fever.  White count improved but still elevated.  2.  Immobility secondary to spinal stenosis and peripheral neuropathy.  3.  Type 2 diabetes.  A1c is 11.3.  On Glucomander.  Acceptable Accu-Cheks.  Continue current regimen.  4.  Hypokalemia/hypomagnesemia.  Resolved with substitution.    5.  Hypothyroidism.  TSH is elevated.  Synthroid increased 2 days ago..  6.  Hypoxemia.  Chest x-ray with possible atelectasis and vascular congestion.  Continue incentive spirometry.  IV fluid DC'd yesterday.    7.  UTI.  On antibiotics  8.  Chronic disease anemia.  Hemoglobin stabilizing.  Continue to monitor.  Stable hemoglobin.    9.  Elevated alkaline phosphatase.  Asymptomatic with benign GI examination.  Stable..  Will monitor.    Discussed my findings and plan of treatment with the patient/nurse  Disposition.  To be determined based on clinical course.  Eventually home with family and home health.  We will involve CCP      Cassie Cesar  MD [facility]  05/29/23  14:29 EDT

## 2023-05-29 NOTE — PROGRESS NOTES
"[facility] Clinical Pharmacy Services: Vancomycin Monitoring Note    Vanessa Root is a 53 y.o. female who is on day 5/5 of pharmacy to dose vancomycin for Skin and Soft Tissue.    Previous Vancomycin Dose:   750 mg IV every  12  hours  Updated Cultures and Sensitivities:   Results from last 7 days   Lab Units 05/28/23  1317 05/26/23  0948   VANCOMYCIN TR mcg/mL 21.70* 13.20     Vitals/Labs  Ht: 165.1 cm (65\"); Wt:   (!) 139 kg (306 lb 6.4 oz)   Temp Readings from Last 1 Encounters:   05/29/23 97.4 °F (36.3 °C) (Oral)     Estimated Creatinine Clearance: 107.2 mL/min (by C-G formula based on SCr of 0.88 mg/dL).       Results from last 7 days   Lab Units 05/29/23  0539 05/28/23  0651 05/27/23  0643   CREATININE mg/dL 0.88 0.81 0.86   WBC 10*3/mm3 15.41* 13.73* 15.29*     Assessment/Plan    Vancomycin Dose: Will change to 500 mg IV every 12  hours since previous dose provided a trough of 614. New dose provides a predicted  mg/L.hr which is within the 400-600 mg/L.hr range.   Next Level Date and Time: Will order level if duration extended since today is the last day of therapy.   We will continue to monitor patient changes and renal function     Thank you for involving pharmacy in this patient's care. Please contact pharmacy with any questions or concerns.       Linda Chong, PharmD  Clinical Pharmacist            "

## 2023-05-30 ENCOUNTER — APPOINTMENT (OUTPATIENT)
Dept: CARDIOLOGY | Facility: HOSPITAL | Age: 53
End: 2023-05-30
Payer: MEDICARE

## 2023-05-30 LAB
ALBUMIN SERPL-MCNC: 2.7 G/DL (ref 3.5–5.2)
ALBUMIN/GLOB SERPL: 0.8 G/DL
ALP SERPL-CCNC: 276 U/L (ref 39–117)
ALT SERPL W P-5'-P-CCNC: 14 U/L (ref 1–33)
ANION GAP SERPL CALCULATED.3IONS-SCNC: 7 MMOL/L (ref 5–15)
AST SERPL-CCNC: 34 U/L (ref 1–32)
BH CV LOWER VASCULAR LEFT COMMON FEMORAL AUGMENT: NORMAL
BH CV LOWER VASCULAR LEFT COMMON FEMORAL COMPETENT: NORMAL
BH CV LOWER VASCULAR LEFT COMMON FEMORAL COMPRESS: NORMAL
BH CV LOWER VASCULAR LEFT COMMON FEMORAL PHASIC: NORMAL
BH CV LOWER VASCULAR LEFT COMMON FEMORAL SPONT: NORMAL
BH CV LOWER VASCULAR LEFT DISTAL FEMORAL COMPRESS: NORMAL
BH CV LOWER VASCULAR LEFT GASTRONEMIUS COMPRESS: NORMAL
BH CV LOWER VASCULAR LEFT GREATER SAPH AK COMPRESS: NORMAL
BH CV LOWER VASCULAR LEFT GREATER SAPH BK COMPRESS: NORMAL
BH CV LOWER VASCULAR LEFT LESSER SAPH COMPRESS: NORMAL
BH CV LOWER VASCULAR LEFT MID FEMORAL AUGMENT: NORMAL
BH CV LOWER VASCULAR LEFT MID FEMORAL COMPETENT: NORMAL
BH CV LOWER VASCULAR LEFT MID FEMORAL COMPRESS: NORMAL
BH CV LOWER VASCULAR LEFT MID FEMORAL PHASIC: NORMAL
BH CV LOWER VASCULAR LEFT MID FEMORAL SPONT: NORMAL
BH CV LOWER VASCULAR LEFT PERONEAL COMPRESS: NORMAL
BH CV LOWER VASCULAR LEFT POPLITEAL AUGMENT: NORMAL
BH CV LOWER VASCULAR LEFT POPLITEAL COMPETENT: NORMAL
BH CV LOWER VASCULAR LEFT POPLITEAL COMPRESS: NORMAL
BH CV LOWER VASCULAR LEFT POPLITEAL PHASIC: NORMAL
BH CV LOWER VASCULAR LEFT POPLITEAL SPONT: NORMAL
BH CV LOWER VASCULAR LEFT POSTERIOR TIBIAL COMPRESS: NORMAL
BH CV LOWER VASCULAR LEFT PROFUNDA FEMORAL COMPRESS: NORMAL
BH CV LOWER VASCULAR LEFT PROXIMAL FEMORAL COMPRESS: NORMAL
BH CV LOWER VASCULAR LEFT SAPHENOFEMORAL JUNCTION COMPRESS: NORMAL
BH CV LOWER VASCULAR RIGHT COMMON FEMORAL AUGMENT: NORMAL
BH CV LOWER VASCULAR RIGHT COMMON FEMORAL COMPETENT: NORMAL
BH CV LOWER VASCULAR RIGHT COMMON FEMORAL COMPRESS: NORMAL
BH CV LOWER VASCULAR RIGHT COMMON FEMORAL PHASIC: NORMAL
BH CV LOWER VASCULAR RIGHT COMMON FEMORAL SPONT: NORMAL
BH CV LOWER VASCULAR RIGHT GASTRONEMIUS COMPRESS: NORMAL
BH CV LOWER VASCULAR RIGHT GREATER SAPH AK COMPRESS: NORMAL
BH CV LOWER VASCULAR RIGHT GREATER SAPH BK COMPRESS: NORMAL
BH CV LOWER VASCULAR RIGHT LESSER SAPH COMPRESS: NORMAL
BH CV LOWER VASCULAR RIGHT MID FEMORAL AUGMENT: NORMAL
BH CV LOWER VASCULAR RIGHT MID FEMORAL COMPETENT: NORMAL
BH CV LOWER VASCULAR RIGHT MID FEMORAL COMPRESS: NORMAL
BH CV LOWER VASCULAR RIGHT MID FEMORAL PHASIC: NORMAL
BH CV LOWER VASCULAR RIGHT MID FEMORAL SPONT: NORMAL
BH CV LOWER VASCULAR RIGHT PERONEAL COMPRESS: NORMAL
BH CV LOWER VASCULAR RIGHT POPLITEAL AUGMENT: NORMAL
BH CV LOWER VASCULAR RIGHT POPLITEAL COMPETENT: NORMAL
BH CV LOWER VASCULAR RIGHT POPLITEAL COMPRESS: NORMAL
BH CV LOWER VASCULAR RIGHT POPLITEAL PHASIC: NORMAL
BH CV LOWER VASCULAR RIGHT POPLITEAL SPONT: NORMAL
BH CV LOWER VASCULAR RIGHT POSTERIOR TIBIAL COMPRESS: NORMAL
BH CV LOWER VASCULAR RIGHT PROFUNDA FEMORAL COMPRESS: NORMAL
BH CV LOWER VASCULAR RIGHT PROXIMAL FEMORAL COMPRESS: NORMAL
BH CV LOWER VASCULAR RIGHT SAPHENOFEMORAL JUNCTION COMPRESS: NORMAL
BH CV POP FLUID COLLECT LEFT: 1
BILIRUB SERPL-MCNC: 0.3 MG/DL (ref 0–1.2)
BUN SERPL-MCNC: 7 MG/DL (ref 6–20)
BUN/CREAT SERPL: 7.8 (ref 7–25)
CALCIUM SPEC-SCNC: 8 MG/DL (ref 8.6–10.5)
CHLORIDE SERPL-SCNC: 108 MMOL/L (ref 98–107)
CO2 SERPL-SCNC: 26 MMOL/L (ref 22–29)
CREAT SERPL-MCNC: 0.9 MG/DL (ref 0.57–1)
DEPRECATED RDW RBC AUTO: 46.7 FL (ref 37–54)
EGFRCR SERPLBLD CKD-EPI 2021: 77.6 ML/MIN/1.73
EOSINOPHIL # BLD MANUAL: 0.74 10*3/MM3 (ref 0–0.4)
EOSINOPHIL NFR BLD MANUAL: 4 % (ref 0.3–6.2)
ERYTHROCYTE [DISTWIDTH] IN BLOOD BY AUTOMATED COUNT: 13.7 % (ref 12.3–15.4)
GLOBULIN UR ELPH-MCNC: 3.5 GM/DL
GLUCOSE BLDC GLUCOMTR-MCNC: 166 MG/DL (ref 70–130)
GLUCOSE BLDC GLUCOMTR-MCNC: 178 MG/DL (ref 70–130)
GLUCOSE BLDC GLUCOMTR-MCNC: 208 MG/DL (ref 70–130)
GLUCOSE BLDC GLUCOMTR-MCNC: 227 MG/DL (ref 70–130)
GLUCOSE SERPL-MCNC: 184 MG/DL (ref 65–99)
HCT VFR BLD AUTO: 30.6 % (ref 34–46.6)
HGB BLD-MCNC: 9.6 G/DL (ref 12–15.9)
LYMPHOCYTES # BLD MANUAL: 1.48 10*3/MM3 (ref 0.7–3.1)
LYMPHOCYTES NFR BLD MANUAL: 6 % (ref 5–12)
MAGNESIUM SERPL-MCNC: 2.4 MG/DL (ref 1.6–2.6)
MAXIMAL PREDICTED HEART RATE: 169 BPM
MCH RBC QN AUTO: 29.4 PG (ref 26.6–33)
MCHC RBC AUTO-ENTMCNC: 31.4 G/DL (ref 31.5–35.7)
MCV RBC AUTO: 93.6 FL (ref 79–97)
METAMYELOCYTES NFR BLD MANUAL: 1 % (ref 0–0)
MONOCYTES # BLD: 1.11 10*3/MM3 (ref 0.1–0.9)
MYELOCYTES NFR BLD MANUAL: 2 % (ref 0–0)
NEUTROPHILS # BLD AUTO: 14.57 10*3/MM3 (ref 1.7–7)
NEUTROPHILS NFR BLD MANUAL: 79 % (ref 42.7–76)
NRBC BLD AUTO-RTO: 0.2 /100 WBC (ref 0–0.2)
PLAT MORPH BLD: NORMAL
PLATELET # BLD AUTO: 495 10*3/MM3 (ref 140–450)
PMV BLD AUTO: 10.1 FL (ref 6–12)
POTASSIUM SERPL-SCNC: 4.3 MMOL/L (ref 3.5–5.2)
PROT SERPL-MCNC: 6.2 G/DL (ref 6–8.5)
RBC # BLD AUTO: 3.27 10*6/MM3 (ref 3.77–5.28)
RBC MORPH BLD: NORMAL
SODIUM SERPL-SCNC: 141 MMOL/L (ref 136–145)
STRESS TARGET HR: 144 BPM
VARIANT LYMPHS NFR BLD MANUAL: 8 % (ref 19.6–45.3)
WBC MORPH BLD: NORMAL
WBC NRBC COR # BLD: 18.44 10*3/MM3 (ref 3.4–10.8)

## 2023-05-30 PROCEDURE — 85025 COMPLETE CBC W/AUTO DIFF WBC: CPT | Performed by: INTERNAL MEDICINE

## 2023-05-30 PROCEDURE — 25010000002 MORPHINE PER 10 MG: Performed by: SURGERY

## 2023-05-30 PROCEDURE — 63710000001 INSULIN LISPRO (HUMAN) PER 5 UNITS: Performed by: SURGERY

## 2023-05-30 PROCEDURE — 85007 BL SMEAR W/DIFF WBC COUNT: CPT | Performed by: INTERNAL MEDICINE

## 2023-05-30 PROCEDURE — 63710000001 ONDANSETRON PER 8 MG: Performed by: SURGERY

## 2023-05-30 PROCEDURE — 83735 ASSAY OF MAGNESIUM: CPT | Performed by: INTERNAL MEDICINE

## 2023-05-30 PROCEDURE — 80053 COMPREHEN METABOLIC PANEL: CPT | Performed by: INTERNAL MEDICINE

## 2023-05-30 PROCEDURE — 99232 SBSQ HOSP IP/OBS MODERATE 35: CPT | Performed by: SURGERY

## 2023-05-30 PROCEDURE — 82948 REAGENT STRIP/BLOOD GLUCOSE: CPT

## 2023-05-30 PROCEDURE — 99233 SBSQ HOSP IP/OBS HIGH 50: CPT | Performed by: INTERNAL MEDICINE

## 2023-05-30 PROCEDURE — 93970 EXTREMITY STUDY: CPT

## 2023-05-30 RX ADMIN — MORPHINE SULFATE 4 MG: 2 INJECTION, SOLUTION INTRAMUSCULAR; INTRAVENOUS at 11:22

## 2023-05-30 RX ADMIN — INSULIN GLARGINE-YFGN 29 UNITS: 100 INJECTION, SOLUTION SUBCUTANEOUS at 20:17

## 2023-05-30 RX ADMIN — INSULIN LISPRO 8 UNITS: 100 INJECTION, SOLUTION INTRAVENOUS; SUBCUTANEOUS at 13:17

## 2023-05-30 RX ADMIN — AMOXICILLIN AND CLAVULANATE POTASSIUM 1 TABLET: 875; 125 TABLET, FILM COATED ORAL at 08:40

## 2023-05-30 RX ADMIN — POTASSIUM CHLORIDE 20 MEQ: 750 TABLET, EXTENDED RELEASE ORAL at 08:44

## 2023-05-30 RX ADMIN — POTASSIUM CHLORIDE 20 MEQ: 750 TABLET, EXTENDED RELEASE ORAL at 18:00

## 2023-05-30 RX ADMIN — INSULIN LISPRO 7 UNITS: 100 INJECTION, SOLUTION INTRAVENOUS; SUBCUTANEOUS at 18:00

## 2023-05-30 RX ADMIN — MAGNESIUM OXIDE 400 MG (241.3 MG MAGNESIUM) TABLET 400 MG: TABLET at 20:17

## 2023-05-30 RX ADMIN — MAGNESIUM OXIDE 400 MG (241.3 MG MAGNESIUM) TABLET 400 MG: TABLET at 08:40

## 2023-05-30 RX ADMIN — HYDROCODONE BITARTRATE AND ACETAMINOPHEN 1 TABLET: 7.5; 325 TABLET ORAL at 13:17

## 2023-05-30 RX ADMIN — HYDROCODONE BITARTRATE AND ACETAMINOPHEN 1 TABLET: 7.5; 325 TABLET ORAL at 22:05

## 2023-05-30 RX ADMIN — LEVOTHYROXINE SODIUM 250 MCG: 0.12 TABLET ORAL at 06:26

## 2023-05-30 RX ADMIN — OXYCODONE HYDROCHLORIDE AND ACETAMINOPHEN 250 MG: 500 TABLET ORAL at 08:40

## 2023-05-30 RX ADMIN — ATORVASTATIN CALCIUM 80 MG: 80 TABLET, FILM COATED ORAL at 08:35

## 2023-05-30 RX ADMIN — INSULIN LISPRO 8 UNITS: 100 INJECTION, SOLUTION INTRAVENOUS; SUBCUTANEOUS at 08:34

## 2023-05-30 RX ADMIN — AMOXICILLIN AND CLAVULANATE POTASSIUM 1 TABLET: 875; 125 TABLET, FILM COATED ORAL at 20:17

## 2023-05-30 RX ADMIN — ONDANSETRON 4 MG: 4 TABLET, FILM COATED ORAL at 11:23

## 2023-05-30 RX ADMIN — PANTOPRAZOLE SODIUM 40 MG: 40 TABLET, DELAYED RELEASE ORAL at 06:26

## 2023-05-30 NOTE — DISCHARGE PLACEMENT REQUEST
"Vanessa Villanueva (53 y.o. Female)     Date of Birth   1970    Social Security Number       Address   [address] [place], [place] [address]    Home Phone   [phone]    MRN   [id number]       [facility]    Marital Status   Single                            Admission Date   5/24/23    Admission Type   Emergency    Admitting Provider   Rosa Alvarado MD    Attending Provider   Cassie Cesar MD    Department, Room/Bed   [address], [address]       Discharge Date       Discharge Disposition       Discharge Destination                               Attending Provider: Cassie Cesar MD    Allergies: Clemastine Fumarate, Ziprasidone, Aripiprazole, Sulfa Antibiotics, Latex, Lisinopril    Isolation: None   Infection: None   Code Status: CPR    Ht: 165.1 cm (65\")   Wt: 128 kg (281 lb 9.6 oz)    Admission Cmt: None   Principal Problem: Necrotizing soft tissue infection [M79.89]                 Active Insurance as of 5/24/2023     Primary Coverage     Payor Plan Insurance Group Employer/Plan Group    MEDICARE MEDICARE A & B      Payor Plan Address Payor Plan Phone Number Payor Plan Fax Number Effective Dates    [address] [phone]  4/1/2003 - None Entered    [facility] [id number]       Subscriber Name Subscriber Birth Date Member ID       VANESSA VILLANUEVA 1970 [id number]           Secondary Coverage     Payor Plan Insurance Group Employer/Plan Group    [place]      Payor Plan Address Payor Plan Phone Number Payor Plan Fax Number Effective Dates    [address] [phone]  11/16/2021 - None Entered    [facility] [id number]       Subscriber Name Subscriber Birth Date Member ID       VANESSA VILLANUEVA 1970 [id number]                 Emergency Contacts      (Rel.) Home Phone Work Phone Mobile Phone    WHITNEY VILLANUEVA (Daughter) -- -- [phone]    Pauline Villanueva (Mother) -- -- [phone]            "

## 2023-05-30 NOTE — PLAN OF CARE
Goal Outcome Evaluation:  Plan of Care Reviewed With: patient        Progress: improving  Outcome Evaluation: Possible DC soon.  On PO abx and has wound Vac now.  Still refusing to turn at times.  On specialty mattress.  CPAP on for HS.

## 2023-05-30 NOTE — PROGRESS NOTES
ID note for Necrotizing fasciitis of right buttocks    Subjective: sleepy but awakens.  No fevers  No pain  +BM    Objective:   Temp:  [97.4 °F (36.3 °C)-97.8 °F (36.6 °C)] 97.7 °F (36.5 °C)  Heart Rate:  [70-80] 70  Resp:  [18] 18  BP: (100-150)/(66-97) 113/70  GENERAL: Awake and alert, in no acute distress.   HEENT: Hearing is grossly normal.   LUNGS:normal respiratory effort.   SKIN: no cutaneous eruptions in exposed areas    Wbc 18  Cr 0.9  glc 162-220  Bcx 1/2 ConS  5/25 OR cx: Nl skin bunny and mixed anaerobes    A/p  1.  Sepsis secondary #2  2.  Infected right buttocks decubitus wound with cellulitis and necrotizing soft tissue infection  3.  Diabetes mellitus type 2, uncontrolled with hyperglycemia, complicating above  4.  Coag negative staph bacteremia, likely contaminant    WBC continues to increase, doubt progressive sepsis but will be mindful of that possibility and plan to check repeat bcx if febrile  Cont augmentin for now  Will check LE doppler to look for dvt  D/w Dr Cesar re impressions and plans

## 2023-05-30 NOTE — PROGRESS NOTES
Name: Vanessa Root ADMIT: 2023   : 1970  PCP: Reese Batista MD    MRN: [id number] LOS: 6 days   AGE/SEX: 53 y.o. female  ROOM: [facility]     Subjective   Subjective   No more anxiety and panic attacks.    No fever or chills.  No night sweats.  No chest pain.  No shortness of breath.  No cough.  No wheeze.  No hemoptysis.  No palpitation.  No abdominal pain.  No nausea or vomiting.  Positive multiple loose bowel movementwithout fresh bright blood per rectum or melena..  No dysuria or hematuria.      GI I THOR.  No abdominal pain or nausea or vomiting.  She reports that she probably have had a bowel movement today that was loose but cannot comment on the color as she is legally blind.    No abdominal objective   Objective   Vital Signs  Temp:  [97.5 °F (36.4 °C)-97.8 °F (36.6 °C)] 97.7 °F (36.5 °C)  Heart Rate:  [70-79] 70  Resp:  [18] 18  BP: (100-134)/(66-79) 113/70  SpO2:  [91 %-94 %] 91 %  on  Flow (L/min):  [2] 2;   Device (Oxygen Therapy): CPAP    Intake/Output Summary (Last 24 hours) at 2023 1359  Last data filed at 2023 0941  Gross per 24 hour   Intake 480 ml   Output 350 ml   Net 130 ml     Body mass index is 46.86 kg/m².      23  2144 23  0510 23  0500   Weight: (!) 138 kg (305 lb) (!) 139 kg (306 lb 6.4 oz) 128 kg (281 lb 9.6 oz)     Physical Exam    General.  Middle-aged female.  Morbidly obese.  Alert and oriented x3.  No apparent pain/distress/diaphoresis.  Normal mood and affect.  Eyes.  Pupils equal round and reactive.  Intact extraocular musculature.  No pallor or jaundice  Oral cavity.  Moist mucous membrane.  Neck.  Supple.  No JVD.  No lymphadenopathy or thyromegaly.  Cardiovascular.  Regular rate and rhythm.  Distant S1 and S2.  I did not appreciate any murmurs.  Chest.  Poor but clear to auscultation bilaterally with no added sounds.  Abdomen.  Obese.  Soft lax.  No tenderness.  No organomegaly.  No guarding or rebound.  Gluteal region.  I did  not examine the wound.    Extremities.  +1 bilateral lower extremity edema.  No clubbing or cyanosis  CNS.  No acute focal neurological deficits    Results Review:      Results from last 7 days   Lab Units 05/30/23  0558 05/29/23  0539 05/28/23  0651 05/28/23  0007 05/27/23  0643 05/26/23  0656 05/25/23  0604 05/24/23  1731   SODIUM mmol/L 141 137 140  --  134* 134* 133* 135*   POTASSIUM mmol/L 4.3 3.9 3.8 3.7 2.8* 3.1* 3.4* 3.9   CHLORIDE mmol/L 108* 104 103  --  99 99 97* 93*   CO2 mmol/L 26.0 26.0 26.0  --  23.7 23.3 26.0 27.6   BUN mg/dL 7 8 11  --  15 16 18 18   CREATININE mg/dL 0.90 0.88 0.81  --  0.86 0.91 0.96 1.15*   GLUCOSE mg/dL 184* 174* 169*  --  246* 223* 268* 325*   CALCIUM mg/dL 8.0* 8.4* 7.8*  --  8.2* 8.7 8.6 9.8   AST (SGOT) U/L 34* 34* 24  --  19 11  --  13   ALT (SGPT) U/L 14 14 13  --  12 6  --  10     Estimated Creatinine Clearance: 99.7 mL/min (by C-G formula based on SCr of 0.9 mg/dL).  Results from last 7 days   Lab Units 05/24/23  1731   HEMOGLOBIN A1C % 11.30*     Results from last 7 days   Lab Units 05/30/23  1308 05/30/23  0805 05/29/23  2111 05/29/23  1718 05/29/23  1219 05/29/23  0833 05/28/23  2017 05/28/23  1631   GLUCOSE mg/dL 166* 208* 220* 162* 163* 178* 191* 176*             Results from last 7 days   Lab Units 05/26/23  1511   TSH uIU/mL 22.500*     Results from last 7 days   Lab Units 05/30/23  0558 05/29/23  0539 05/28/23  0651 05/25/23  1428 05/24/23  1731   MAGNESIUM mg/dL 2.4 2.6 1.5* 1.4* 1.5*           Invalid input(s): LDLCALC  Results from last 7 days   Lab Units 05/30/23  0558 05/29/23  0539 05/28/23  0651 05/27/23  0643 05/26/23  0656 05/25/23  0604 05/24/23  1731 05/24/23  1731   WBC 10*3/mm3 18.44* 15.41* 13.73* 15.29* 22.27* 20.25*  --  19.56*   HEMOGLOBIN g/dL 9.6* 9.4* 9.1* 9.0* 9.2* 10.0*  --  10.6*   HEMATOCRIT % 30.6* 29.3* 29.1* 27.8* 28.3* 30.5*  --  31.7*   PLATELETS 10*3/mm3 495* 461* 443 387 386 377  --  396   MCV fL 93.6 92.1 93.0 91.7 89.6 88.4  --   89.5   MCH pg 29.4 29.6 29.1 29.7 29.1 29.0  --  29.9   MCHC g/dL 31.4* 32.1 31.3* 32.4 32.5 32.8  --  33.4   RDW % 13.7 13.3 13.6 13.0 12.8 12.9  --  12.7   RDW-SD fl 46.7 44.0 45.5 43.4 41.6 41.6  --  42.3   MPV fL 10.1 9.9 10.4 10.2 10.8 10.4  --  10.7   NEUTROPHIL % %  --   --   --   --  78.9*  --   --   --    LYMPHOCYTE % %  --   --   --   --  13.6*  --   --   --    MONOCYTES % %  --   --   --   --  3.5*  --   --   --    EOSINOPHIL % %  --   --   --   --  1.0  --   --   --    BASOPHIL % %  --   --   --   --  0.4  --   --   --    IMM GRAN % %  --   --   --   --  2.6*  --   --   --    NEUTROS ABS 10*3/mm3 14.57* 10.79* 9.34* 13.39* 17.59*  --   --  18.76*   LYMPHS ABS 10*3/mm3  --   --   --   --  3.02  --   --   --    MONOS ABS 10*3/mm3  --   --   --   --  0.79  --   --   --    EOS ABS 10*3/mm3 0.74* 0.46* 0.82* 0.47* 0.22  --    < >  --    BASOS ABS 10*3/mm3  --   --   --   --  0.08  --   --   --    IMMATURE GRANS (ABS) 10*3/mm3  --   --   --   --  0.57*  --   --   --    NRBC /100 WBC 0.2  --   --   --  0.0  --   --  1.0*  0.1    < > = values in this interval not displayed.             Results from last 7 days   Lab Units 05/24/23 2039 05/24/23  0191   PROCALCITONIN ng/mL  --  1.21*   LACTATE mmol/L 1.8 2.3*             Results from last 7 days   Lab Units 05/25/23  1843 05/24/23  1838 05/24/23  1731   BLOODCX   --  No growth at 5 days Staphylococcus, coagulase negative*   WOUNDCX  Moderate growth (3+) Normal Skin Carmen  --   --    BCIDPCR   --   --  Staph spp, not aureus or lugdunensis. Identification by BCID2 PCR.*         Results from last 7 days   Lab Units 05/24/23  1754   NITRITE UA  Negative   WBC UA /HPF Too Numerous to Count*   BACTERIA UA /HPF 4+*   SQUAM EPITHEL UA /HPF 0-2           Imaging:  Imaging Results (Last 24 Hours)     ** No results found for the last 24 hours. **             I reviewed the patient's new clinical results / labs / tests / procedures      Assessment/Plan     Active Hospital  Problems    Diagnosis  POA   • **Necrotizing soft tissue infection [M79.89]  Yes   • Fasciitis [M72.9]  Yes   • Hypokalemia [E87.6]  Yes   • Hypomagnesemia [E83.42]  Yes   • Immobility syndrome [M62.3]  Yes   • Sepsis, due to unspecified organism, unspecified whether acute organ dysfunction present [A41.9]  Yes   • Diabetes mellitus [E11.9]  Yes   • Spinal stenosis of lumbar region [M48.061]  Yes   • Diabetic peripheral neuropathy [E11.42]  Yes   • Obstructive sleep apnea syndrome [G47.33]  Yes   • Hypothyroidism [E03.9]  Yes      Resolved Hospital Problems   No resolved problems to display.         1.  Sepsis secondary to right necrotic gluteal decubitus with necrotizing fasciitis.  Patient is status post excision and debridement on 5/25/2023 by surgery and their help is highly appreciated.  Wound cultures showed mixed anaerobes and normal bunny...  Blood cultures is 1 out of 2 with coagulase-negative which ID thinks its contamination.  .  CT scan of the pelvis revealed decubitus with gas extending to the deep fascia.  Infectious disease stopped IV Vanco and Zosyn yesterday and started the patient on p.o. Augmentin for the next 1 week.  White count is worse today but no fever.  Surgery placed wound VAC.   2.  Immobility secondary to spinal stenosis and peripheral neuropathy.  3.  Type 2 diabetes.  A1c is 11.3.  On Glucomander.  Acceptable Accu-Cheks.  Continue current regimen.  4.  Hypokalemia/hypomagnesemia.  Resolved with substitution.    5.  Hypothyroidism.  TSH is elevated.  Synthroid increased 3 days ago..  6.  Hypoxemia.  Chest x-ray with possible atelectasis and vascular congestion.  Continue incentive spirometry.  IV fluid DC'd   7.  UTI.  On antibiotics  8.  Chronic disease anemia.  Hemoglobin stabilizing.  Continue to monitor.  Stable hemoglobin.    9.  Elevated alkaline phosphatase.  Asymptomatic with benign GI examination.  Stable..  Will monitor.  10.  Thrombocytosis.  Mostly reactive.      Discussed  my findings and plan of treatment with the patient/nurse/CCP  Disposition.  To be determined based on clinical course.  Eventually home with family and home health versus SNF.  Timing to be determined.        Cassie Cesar MD  [facility]  05/30/23  13:59 EDT

## 2023-05-30 NOTE — PROGRESS NOTES
"General Surgery  Progress Note    CC: Follow-up necrotizing fasciitis right buttock    POD#5 debridement skin and soft tissue infection of right buttock secondary to necrotizing fasciitis    S: She is tolerating the wound VAC thus far with mild right hip pain.    O:/70 (BP Location: Left arm, Patient Position: Lying)   Pulse 70   Temp 97.7 °F (36.5 °C) (Oral)   Resp 18   Ht 165.1 cm (65\")   Wt 128 kg (281 lb 9.6 oz)   SpO2 91%   BMI 46.86 kg/m²     Intake & Output:  UOP: 250 mL/24 hrs  BM x1    GENERAL: alert, well appearing, and in no distress  HEENT: normocephalic, atraumatic, moist mucous membranes, clear sclerae   CHEST: clear to auscultation, no wheezes, rales or rhonchi, symmetric air entry  CARDIAC: regular rate and rhythm    ABDOMEN: Soft, morbidly obese, nontender, nondistended  EXTREMITIES: Right buttock wound is dressed with wound VAC, there is a good seal with no surrounding cellulitis of the skin  SKIN: Warm and moist, no rashes    LABS  Results from last 7 days   Lab Units 05/30/23  0558 05/29/23  0539 05/28/23  0651   WBC 10*3/mm3 18.44* 15.41* 13.73*   HEMOGLOBIN g/dL 9.6* 9.4* 9.1*   HEMATOCRIT % 30.6* 29.3* 29.1*   PLATELETS 10*3/mm3 495* 461* 443   MONOCYTES % % 6.0 1.0* 3.0*     Results from last 7 days   Lab Units 05/30/23 0558 05/29/23  0539 05/28/23  0651   SODIUM mmol/L 141 137 140   POTASSIUM mmol/L 4.3 3.9 3.8   CHLORIDE mmol/L 108* 104 103   CO2 mmol/L 26.0 26.0 26.0   BUN mg/dL 7 8 11   CREATININE mg/dL 0.90 0.88 0.81   CALCIUM mg/dL 8.0* 8.4* 7.8*   BILIRUBIN mg/dL 0.3 0.5 0.4   ALK PHOS U/L 276* 266* 198*   ALT (SGPT) U/L 14 14 13   AST (SGOT) U/L 34* 34* 24   GLUCOSE mg/dL 184* 174* 169*             A/P: 53 y.o. female POD#5 debridement skin and soft tissue infection of right buttock secondary to necrotizing fasciitis    Continue wound VAC changes 3 times weekly.  Once a home wound VAC has been approved and home health is arranged, she can be discharged to home.  I will " continue to follow along while here.    Elizabeth Adame MD  General, Robotic, and Endoscopic Surgery  [facility]    [address], [address]  [place] [address]  P: [phone]  F: [phone]

## 2023-05-31 LAB
ALBUMIN SERPL-MCNC: 2.9 G/DL (ref 3.5–5.2)
ALBUMIN/GLOB SERPL: 0.8 G/DL
ALP SERPL-CCNC: 265 U/L (ref 39–117)
ALT SERPL W P-5'-P-CCNC: 12 U/L (ref 1–33)
ANION GAP SERPL CALCULATED.3IONS-SCNC: 8.1 MMOL/L (ref 5–15)
AST SERPL-CCNC: 22 U/L (ref 1–32)
BASOPHILS # BLD MANUAL: 0.46 10*3/MM3 (ref 0–0.2)
BASOPHILS NFR BLD MANUAL: 2 % (ref 0–1.5)
BILIRUB SERPL-MCNC: 0.3 MG/DL (ref 0–1.2)
BUN SERPL-MCNC: 7 MG/DL (ref 6–20)
BUN/CREAT SERPL: 7.4 (ref 7–25)
C DIFF TOX GENS STL QL NAA+PROBE: NEGATIVE
CALCIUM SPEC-SCNC: 8.3 MG/DL (ref 8.6–10.5)
CHLORIDE SERPL-SCNC: 108 MMOL/L (ref 98–107)
CO2 SERPL-SCNC: 25.9 MMOL/L (ref 22–29)
CREAT SERPL-MCNC: 0.94 MG/DL (ref 0.57–1)
CRP SERPL-MCNC: 11.14 MG/DL (ref 0–0.5)
D-LACTATE SERPL-SCNC: 1.8 MMOL/L (ref 0.5–2)
DEPRECATED RDW RBC AUTO: 44.9 FL (ref 37–54)
EGFRCR SERPLBLD CKD-EPI 2021: 73.6 ML/MIN/1.73
EOSINOPHIL # BLD MANUAL: 1.37 10*3/MM3 (ref 0–0.4)
EOSINOPHIL NFR BLD MANUAL: 6 % (ref 0.3–6.2)
ERYTHROCYTE [DISTWIDTH] IN BLOOD BY AUTOMATED COUNT: 13.2 % (ref 12.3–15.4)
GLOBULIN UR ELPH-MCNC: 3.6 GM/DL
GLUCOSE BLDC GLUCOMTR-MCNC: 157 MG/DL (ref 70–130)
GLUCOSE BLDC GLUCOMTR-MCNC: 199 MG/DL (ref 70–130)
GLUCOSE BLDC GLUCOMTR-MCNC: 213 MG/DL (ref 70–130)
GLUCOSE BLDC GLUCOMTR-MCNC: 225 MG/DL (ref 70–130)
GLUCOSE SERPL-MCNC: 236 MG/DL (ref 65–99)
HCT VFR BLD AUTO: 29.3 % (ref 34–46.6)
HGB BLD-MCNC: 9.3 G/DL (ref 12–15.9)
LAB AP CASE REPORT: NORMAL
LAB AP CLINICAL INFORMATION: NORMAL
LYMPHOCYTES # BLD MANUAL: 1.37 10*3/MM3 (ref 0.7–3.1)
LYMPHOCYTES NFR BLD MANUAL: 1 % (ref 5–12)
MAGNESIUM SERPL-MCNC: 2.4 MG/DL (ref 1.6–2.6)
MCH RBC QN AUTO: 29.2 PG (ref 26.6–33)
MCHC RBC AUTO-ENTMCNC: 31.7 G/DL (ref 31.5–35.7)
MCV RBC AUTO: 92.1 FL (ref 79–97)
METAMYELOCYTES NFR BLD MANUAL: 1 % (ref 0–0)
MONOCYTES # BLD: 0.23 10*3/MM3 (ref 0.1–0.9)
MYELOCYTES NFR BLD MANUAL: 3 % (ref 0–0)
NEUTROPHILS # BLD AUTO: 18.01 10*3/MM3 (ref 1.7–7)
NEUTROPHILS NFR BLD MANUAL: 79 % (ref 42.7–76)
PATH REPORT.FINAL DX SPEC: NORMAL
PATH REPORT.GROSS SPEC: NORMAL
PATHOLOGY REVIEW: YES
PLAT MORPH BLD: NORMAL
PLATELET # BLD AUTO: 475 10*3/MM3 (ref 140–450)
PMV BLD AUTO: 9.9 FL (ref 6–12)
POTASSIUM SERPL-SCNC: 4.3 MMOL/L (ref 3.5–5.2)
PROCALCITONIN SERPL-MCNC: 0.44 NG/ML (ref 0–0.25)
PROMYELOCYTES NFR BLD MANUAL: 2 % (ref 0–0)
PROT SERPL-MCNC: 6.5 G/DL (ref 6–8.5)
RBC # BLD AUTO: 3.18 10*6/MM3 (ref 3.77–5.28)
RBC MORPH BLD: NORMAL
SODIUM SERPL-SCNC: 142 MMOL/L (ref 136–145)
VARIANT LYMPHS NFR BLD MANUAL: 6 % (ref 19.6–45.3)
WBC MORPH BLD: NORMAL
WBC NRBC COR # BLD: 22.8 10*3/MM3 (ref 3.4–10.8)

## 2023-05-31 PROCEDURE — 84145 PROCALCITONIN (PCT): CPT | Performed by: HOSPITALIST

## 2023-05-31 PROCEDURE — 99232 SBSQ HOSP IP/OBS MODERATE 35: CPT | Performed by: INTERNAL MEDICINE

## 2023-05-31 PROCEDURE — 85007 BL SMEAR W/DIFF WBC COUNT: CPT | Performed by: INTERNAL MEDICINE

## 2023-05-31 PROCEDURE — 63710000001 INSULIN LISPRO (HUMAN) PER 5 UNITS: Performed by: SURGERY

## 2023-05-31 PROCEDURE — 86140 C-REACTIVE PROTEIN: CPT | Performed by: HOSPITALIST

## 2023-05-31 PROCEDURE — 82948 REAGENT STRIP/BLOOD GLUCOSE: CPT

## 2023-05-31 PROCEDURE — 80053 COMPREHEN METABOLIC PANEL: CPT | Performed by: INTERNAL MEDICINE

## 2023-05-31 PROCEDURE — 87493 C DIFF AMPLIFIED PROBE: CPT | Performed by: NURSE PRACTITIONER

## 2023-05-31 PROCEDURE — 99232 SBSQ HOSP IP/OBS MODERATE 35: CPT | Performed by: SURGERY

## 2023-05-31 PROCEDURE — 25010000002 ENOXAPARIN PER 10 MG: Performed by: HOSPITALIST

## 2023-05-31 PROCEDURE — 85025 COMPLETE CBC W/AUTO DIFF WBC: CPT | Performed by: INTERNAL MEDICINE

## 2023-05-31 PROCEDURE — 83735 ASSAY OF MAGNESIUM: CPT | Performed by: INTERNAL MEDICINE

## 2023-05-31 PROCEDURE — 83605 ASSAY OF LACTIC ACID: CPT | Performed by: HOSPITALIST

## 2023-05-31 RX ORDER — LOPERAMIDE HYDROCHLORIDE 2 MG/1
2 CAPSULE ORAL 4 TIMES DAILY PRN
Status: DISCONTINUED | OUTPATIENT
Start: 2023-05-31 | End: 2023-06-06 | Stop reason: HOSPADM

## 2023-05-31 RX ORDER — ENOXAPARIN SODIUM 100 MG/ML
40 INJECTION SUBCUTANEOUS EVERY 12 HOURS SCHEDULED
Status: DISCONTINUED | OUTPATIENT
Start: 2023-05-31 | End: 2023-06-06 | Stop reason: HOSPADM

## 2023-05-31 RX ORDER — GABAPENTIN 300 MG/1
300 CAPSULE ORAL EVERY 12 HOURS SCHEDULED
Status: DISCONTINUED | OUTPATIENT
Start: 2023-05-31 | End: 2023-06-06 | Stop reason: HOSPADM

## 2023-05-31 RX ADMIN — LEVOTHYROXINE SODIUM 250 MCG: 0.12 TABLET ORAL at 05:23

## 2023-05-31 RX ADMIN — GABAPENTIN 300 MG: 300 CAPSULE ORAL at 21:14

## 2023-05-31 RX ADMIN — AMOXICILLIN AND CLAVULANATE POTASSIUM 1 TABLET: 875; 125 TABLET, FILM COATED ORAL at 08:12

## 2023-05-31 RX ADMIN — INSULIN LISPRO 10 UNITS: 100 INJECTION, SOLUTION INTRAVENOUS; SUBCUTANEOUS at 12:41

## 2023-05-31 RX ADMIN — HYDROCODONE BITARTRATE AND ACETAMINOPHEN 1 TABLET: 7.5; 325 TABLET ORAL at 08:17

## 2023-05-31 RX ADMIN — LOPERAMIDE HYDROCHLORIDE 2 MG: 2 CAPSULE ORAL at 17:41

## 2023-05-31 RX ADMIN — PANTOPRAZOLE SODIUM 40 MG: 40 TABLET, DELAYED RELEASE ORAL at 05:23

## 2023-05-31 RX ADMIN — INSULIN LISPRO 14 UNITS: 100 INJECTION, SOLUTION INTRAVENOUS; SUBCUTANEOUS at 17:41

## 2023-05-31 RX ADMIN — INSULIN LISPRO 9 UNITS: 100 INJECTION, SOLUTION INTRAVENOUS; SUBCUTANEOUS at 08:13

## 2023-05-31 RX ADMIN — GABAPENTIN 300 MG: 300 CAPSULE ORAL at 12:40

## 2023-05-31 RX ADMIN — MAGNESIUM OXIDE 400 MG (241.3 MG MAGNESIUM) TABLET 400 MG: TABLET at 21:14

## 2023-05-31 RX ADMIN — AMOXICILLIN AND CLAVULANATE POTASSIUM 1 TABLET: 875; 125 TABLET, FILM COATED ORAL at 21:14

## 2023-05-31 RX ADMIN — ENOXAPARIN SODIUM 40 MG: 100 INJECTION SUBCUTANEOUS at 21:14

## 2023-05-31 RX ADMIN — ATORVASTATIN CALCIUM 80 MG: 80 TABLET, FILM COATED ORAL at 08:12

## 2023-05-31 RX ADMIN — POTASSIUM CHLORIDE 20 MEQ: 750 TABLET, EXTENDED RELEASE ORAL at 08:12

## 2023-05-31 RX ADMIN — MAGNESIUM OXIDE 400 MG (241.3 MG MAGNESIUM) TABLET 400 MG: TABLET at 08:12

## 2023-05-31 RX ADMIN — ENOXAPARIN SODIUM 40 MG: 100 INJECTION SUBCUTANEOUS at 14:16

## 2023-05-31 RX ADMIN — POTASSIUM CHLORIDE 20 MEQ: 750 TABLET, EXTENDED RELEASE ORAL at 17:41

## 2023-05-31 RX ADMIN — OXYCODONE HYDROCHLORIDE AND ACETAMINOPHEN 250 MG: 500 TABLET ORAL at 08:17

## 2023-05-31 RX ADMIN — INSULIN GLARGINE-YFGN 35 UNITS: 100 INJECTION, SOLUTION SUBCUTANEOUS at 21:19

## 2023-05-31 NOTE — NURSING NOTE
05/31/23 1056   Wound 05/24/23 2101 Right lateral hip Soft Tissue Necrosis   Placement Date/Time: 05/24/23 2101   Present on Hospital Admission: Yes  Side: Right  Orientation: lateral  Location: hip  Primary Wound Type: Soft Tissue Necrosis  Number of Sutures Placed: 0   Dressing Appearance dry;intact   Base moist;red;yellow   Periwound redness;swelling   Periwound Temperature warm   Periwound Skin Turgor soft   Edges open   Undermining [Depth (cm)/Location] 12 to 6 o'clok 5 cm depth   Drainage Amount moderate   Care, Wound cleansed with;sterile water;dressing removed;negative pressure wound therapy   Dressing Care dressing changed;foam;transparent film;skin barrier agent applied   Periwound Care barrier film applied   NPWT (Negative Pressure Wound Therapy) 05/29/23 1531 right lateral hip   Placement Date/Time: 05/29/23 1531   Location: right lateral hip   Therapy Setting continuous therapy   Dressing foam, black   Pressure Setting 125 mmHg   Sponges Inserted 1   Sponges Removed 2   Finger sweep complete Yes     Wound/Ostomy: Follow up NPWT. Existing dressing changed, wound bed assessed, described above, new dressing placed, patient tolerated well, good seal noted. Continue plan to change m/w/f. Please re-consult for any additional needs.

## 2023-05-31 NOTE — PROGRESS NOTES
"[facility] Clinical Pharmacy Services: Enoxaparin Consult    Vanessa Root has a pharmacy consult to dose prophylactic enoxaparin per Dr. Garcia's request.     Indication: VTE Prophylaxis  Home Anticoagulation: None     Relevant clinical data and objective history reviewed:  53 y.o. female 165.1 cm (65\") 127 kg (280 lb 3.3 oz)   Body mass index is 46.63 kg/m².   Results from last 7 days   Lab Units 05/31/23  0702   PLATELETS 10*3/mm3 475*     Estimated Creatinine Clearance: 95 mL/min (by C-G formula based on SCr of 0.94 mg/dL).    Assessment/Plan    Will start patient on 40mg subcutaneous every 12 hours, adjusted for renal function. Consult order will be discontinued but pharmacy will continue to follow.     Leobardo Bowser, [facility]  Clinical Pharmacist    "

## 2023-05-31 NOTE — CASE MANAGEMENT/SOCIAL WORK
Continued Stay Note  [facility]     Patient Name: Vanessa Root  MRN: [id number]  Today's Date: 5/31/2023    Admit Date: 5/24/2023    Plan: SNF   Discharge Plan     Row Name 05/31/23 1449       Plan    Plan SNF    Patient/Family in Agreement with Plan yes    Plan Comments Spoke with pt at bedside, [facility] and [facility] have both declined pt, she asked for referral to [facility] and [facility], sent await evaluation. [facility] has accepted in [facility], will reach out for bed status, unsure when pt ready - CCP will follow - Nevaeh GRULLON               Discharge Codes    No documentation.               Expected Discharge Date and Time     Expected Discharge Date Expected Discharge Time    Jun 2, 2023             Nevaeh Mancini RN

## 2023-05-31 NOTE — PLAN OF CARE
Goal Outcome Evaluation:  Plan of Care Reviewed With: patient        Progress: no change  Outcome Evaluation: Pt. alert and oriented, SR, no apparent pain or distress noted, normal mood and affect, had 3x loose yellow stool, informed [facility] for stool exam came negative for C.Diff, had episode of low SpO2 84-88% while sleeping on CPAP.

## 2023-05-31 NOTE — CONSULTS
Consult for rule out C diff review. Patient reviewed, C diff testing negative, no treatment given prior to testing. Okay to remove Contact Spore Precautions, RN notified.

## 2023-05-31 NOTE — PLAN OF CARE
Goal Outcome Evaluation:   VSS on room air; pt uses CPAP for sleep as needed; PRN imodium given for diarrhea; blood glucose monitored; good output from wound vac; PRN norco given for pain; wound vac dressing changed today per wound care RN; scheduled gabapentin started for nerve pain and pt responding well to intervention; awaiting CT of abdomen; lovenox started fr DVT prophylaxis; bed locked and in low postion; bed alarm set; call light in reach; q2 turn.

## 2023-05-31 NOTE — PROGRESS NOTES
[facility]     LOS: 7 days     Subjective:    CC:Wound Check, Headache, and Nausea    DIET:  Diet Order   Procedures   • Diet: Diabetic Diets; Consistent Carbohydrate; Texture: Regular Texture (IDDSI 7); Fluid Consistency: Thin (IDDSI 0)     Patient describes pain with changes of wound VAC.  Surgery has added Neurontin  Tolerating oral intake without any difficulty  Denies any chest pain or shortness of air    Objective:    Vital Signs:  Temp:  [97.9 °F (36.6 °C)-98.3 °F (36.8 °C)] 98 °F (36.7 °C)  Heart Rate:  [80-89] 82  Resp:  [18] 18  BP: (115-124)/(81-94) 118/83    SpO2:  [93 %-100 %] 93 %  on  Flow (L/min):  [2-4] 4;   Device (Oxygen Therapy): room air  Body mass index is 46.63 kg/m².    Physical Exam  Constitutional:       Appearance: Normal appearance.      Comments: Very pleasant female she is on BiPAP on in the room, immobile appearing   HENT:      Head: Normocephalic and atraumatic.   Cardiovascular:      Rate and Rhythm: Normal rate and regular rhythm.      Heart sounds: No murmur heard.    No friction rub.   Pulmonary:      Effort: Pulmonary effort is normal.      Breath sounds: Normal breath sounds.   Abdominal:      General: Bowel sounds are normal. There is no distension.      Palpations: Abdomen is soft.      Tenderness: There is no abdominal tenderness.   Skin:     General: Skin is warm and dry.   Neurological:      General: No focal deficit present.      Mental Status: She is alert.   Psychiatric:         Mood and Affect: Mood normal.         Behavior: Behavior normal.         Results Review:    Glucose   Date Value Ref Range Status   05/31/2023 236 (H) 65 - 99 mg/dL Final   05/30/2023 184 (H) 65 - 99 mg/dL Final   05/29/2023 174 (H) 65 - 99 mg/dL Final     Results from last 7 days   Lab Units 05/31/23  0702   WBC 10*3/mm3 22.80*   HEMOGLOBIN g/dL 9.3*   HEMATOCRIT % 29.3*   PLATELETS 10*3/mm3 475*     Results from last 7 days   Lab Units 05/31/23  0702   SODIUM  mmol/L 142   POTASSIUM mmol/L 4.3   CHLORIDE mmol/L 108*   CO2 mmol/L 25.9   BUN mg/dL 7   CREATININE mg/dL 0.94   CALCIUM mg/dL 8.3*   BILIRUBIN mg/dL 0.3   ALK PHOS U/L 265*   ALT (SGPT) U/L 12   AST (SGOT) U/L 22   GLUCOSE mg/dL 236*         Results from last 7 days   Lab Units 05/31/23  0702   MAGNESIUM mg/dL 2.4         Cultures:  Blood Culture   Date Value Ref Range Status   05/24/2023 No growth at 5 days  Final     Wound Culture   Date Value Ref Range Status   05/25/2023 Moderate growth (3+) Normal Skin Carmen  Final       I have reviewed daily medications and changes in CPOE    Scheduled meds  amoxicillin-clavulanate, 1 tablet, Oral, Q12H  vitamin C, 250 mg, Oral, Daily  atorvastatin, 80 mg, Oral, Daily  enoxaparin, 40 mg, Subcutaneous, Q12H  gabapentin, 300 mg, Oral, Q12H  insulin glargine, 1-200 Units, Subcutaneous, Nightly - Glucommander  insulin lispro, 1-200 Units, Subcutaneous, 4x Daily With Meals & Nightly  levothyroxine, 250 mcg, Oral, Q AM  magnesium oxide, 400 mg, Oral, BID  pantoprazole, 40 mg, Oral, Q AM  potassium chloride, 20 mEq, Oral, BID With Meals        Pharmacy to Dose enoxaparin (LOVENOX),       PRN meds  •  acetaminophen  •  ALPRAZolam  •  Calcium Replacement - Follow Nurse / BPA Driven Protocol  •  dextrose  •  dextrose  •  glucagon (human recombinant)  •  HYDROcodone-acetaminophen  •  insulin lispro  •  loperamide  •  Magnesium Standard Dose Replacement - Follow Nurse / BPA Driven Protocol  •  melatonin  •  Morphine  •  ondansetron **OR** ondansetron  •  Pharmacy to Dose enoxaparin (LOVENOX)  •  Phosphorus Replacement - Follow Nurse / BPA Driven Protocol  •  Potassium Replacement - Follow Nurse / BPA Driven Protocol  •  sodium chloride        Necrotizing soft tissue infection    Diabetes mellitus    Spinal stenosis of lumbar region    Obstructive sleep apnea syndrome    Hypothyroidism    Diabetic peripheral neuropathy    Sepsis, due to unspecified organism, unspecified whether acute  organ dysfunction present    Immobility syndrome    Fasciitis    Hypokalemia    Hypomagnesemia        Assessment/Plan:  Right necrotic gluteal decubitus with necrotizing fasciitis with sepsis  -Patient's status post debridement 5/25  -Followed by infectious disease and now on augemtin  -White blood cell count is elevated, procal is better    Immobility secondary to spinal stenosis and peripheral neuropathy    Type 2 diabetes  -Patient is on Glucomander  -Blood sugars acceptable    Hypokalemia and hypomagnesemia  -Have been replaced    Hypothyroidism  -Continue with Synthroid at higher dose    Chronic kidney disease  -Stable    UTI  -Antibiotics    DVT PPX: lovenox 40 bid for dvt PPx      Marv Garcia MD  05/31/23  18:14 EDT

## 2023-05-31 NOTE — PROGRESS NOTES
ID note for Necrotizing fasciitis of right buttocks    Subjective: sleepy but awakens.  No fevers  No pain  +BM    Objective:   Temp:  [97.7 °F (36.5 °C)-98.3 °F (36.8 °C)] 98.3 °F (36.8 °C)  Heart Rate:  [80-89] 80  Resp:  [18] 18  BP: (115-124)/(81-94) 124/81  GENERAL: Awake and alert, in no acute distress.   HEENT: Hearing is grossly normal.   LUNGS:normal respiratory effort.   SKIN: no cutaneous eruptions in exposed areas    Wbc 22.8  Cr 0.94    glc 166-227  Duplex venography negative for LE DVT    Bcx 1/2 ConS  5/25 OR cx: Nl skin bunny and mixed anaerobes  5/31 C. difficile PCR negative    A/p  1.  Sepsis secondary #2  2.  Infected right buttocks decubitus wound with cellulitis and necrotizing soft tissue infection  3.  Diabetes mellitus type 2, uncontrolled with hyperglycemia, complicating above  4.  Coag negative staph bacteremia, likely contaminant    WBC continues to increase, doubt progressive sepsis but will be mindful of that possibility and plan to check repeat bcx if febrile  Cont augmentin for now  C. difficile was negative.  Duplex venography negative for DVT  I will check CT abdomen pelvis to look for any deep nidus of infection.  She overall seems relatively stable and has normal temperatures.

## 2023-05-31 NOTE — PROGRESS NOTES
"General Surgery  Progress Note    CC: Follow-up necrotizing fasciitis right buttock    POD#6 debridement skin and soft tissue infection of right buttock secondary to necrotizing fasciitis    S: She struggled with the wound VAC change this morning as she was already having a lot of neuropathic pain in her right lower extremity and buttock beforehand.    O:/81 (BP Location: Left arm, Patient Position: Lying)   Pulse 80   Temp 98.3 °F (36.8 °C) (Oral)   Resp 18   Ht 165.1 cm (65\")   Wt 127 kg (280 lb 3.3 oz)   SpO2 100%   BMI 46.63 kg/m²     GENERAL: alert, well appearing, and in no distress  HEENT: normocephalic, atraumatic, moist mucous membranes, clear sclerae   CHEST: clear to auscultation, no wheezes, rales or rhonchi, symmetric air entry  CARDIAC: regular rate and rhythm    ABDOMEN: Soft, morbidly obese, nontender, nondistended  EXTREMITIES: Right buttock wound is dressed with wound VAC, there is a good seal with no surrounding cellulitis of the skin  SKIN: Warm and moist, no rashes    LABS  Results from last 7 days   Lab Units 05/31/23  0702 05/30/23  0558 05/29/23  0539   WBC 10*3/mm3 22.80* 18.44* 15.41*   HEMOGLOBIN g/dL 9.3* 9.6* 9.4*   HEMATOCRIT % 29.3* 30.6* 29.3*   PLATELETS 10*3/mm3 475* 495* 461*   MONOCYTES % % 1.0* 6.0 1.0*     Results from last 7 days   Lab Units 05/31/23  0702 05/30/23  0558 05/29/23  0539   SODIUM mmol/L 142 141 137   POTASSIUM mmol/L 4.3 4.3 3.9   CHLORIDE mmol/L 108* 108* 104   CO2 mmol/L 25.9 26.0 26.0   BUN mg/dL 7 7 8   CREATININE mg/dL 0.94 0.90 0.88   CALCIUM mg/dL 8.3* 8.0* 8.4*   BILIRUBIN mg/dL 0.3 0.3 0.5   ALK PHOS U/L 265* 276* 266*   ALT (SGPT) U/L 12 14 14   AST (SGOT) U/L 22 34* 34*   GLUCOSE mg/dL 236* 184* 174*             A/P: 53 y.o. female POD#6 debridement skin and soft tissue infection of right buttock secondary to necrotizing fasciitis    Continue wound VAC Monday/Wednesday/Friday changes.  She struggled with the wound VAC change today, so I " have started her on twice daily gabapentin to help with neuromuscular pain.  Case management has yet to work on home wound VAC approval.  I will ask them to begin that process through [facility].    Elizabeth Admae MD  General, Robotic, and Endoscopic Surgery  [facility]    [address], [address]  [place] [address]  P: [phone]  F: [phone]

## 2023-06-01 ENCOUNTER — TELEPHONE (OUTPATIENT)
Dept: NEUROLOGY | Facility: CLINIC | Age: 53
End: 2023-06-01

## 2023-06-01 ENCOUNTER — APPOINTMENT (OUTPATIENT)
Dept: CT IMAGING | Facility: HOSPITAL | Age: 53
End: 2023-06-01
Payer: MEDICARE

## 2023-06-01 LAB
ALBUMIN SERPL-MCNC: 2.7 G/DL (ref 3.5–5.2)
ALBUMIN/GLOB SERPL: 0.8 G/DL
ALP SERPL-CCNC: 249 U/L (ref 39–117)
ALT SERPL W P-5'-P-CCNC: 12 U/L (ref 1–33)
ANION GAP SERPL CALCULATED.3IONS-SCNC: 6 MMOL/L (ref 5–15)
AST SERPL-CCNC: 19 U/L (ref 1–32)
BILIRUB SERPL-MCNC: 0.3 MG/DL (ref 0–1.2)
BUN SERPL-MCNC: 7 MG/DL (ref 6–20)
BUN/CREAT SERPL: 9 (ref 7–25)
CALCIUM SPEC-SCNC: 7.8 MG/DL (ref 8.6–10.5)
CHLORIDE SERPL-SCNC: 108 MMOL/L (ref 98–107)
CO2 SERPL-SCNC: 27 MMOL/L (ref 22–29)
CREAT SERPL-MCNC: 0.78 MG/DL (ref 0.57–1)
DEPRECATED RDW RBC AUTO: 46.7 FL (ref 37–54)
EGFRCR SERPLBLD CKD-EPI 2021: 92.1 ML/MIN/1.73
EOSINOPHIL # BLD MANUAL: 1.19 10*3/MM3 (ref 0–0.4)
EOSINOPHIL NFR BLD MANUAL: 5.1 % (ref 0.3–6.2)
ERYTHROCYTE [DISTWIDTH] IN BLOOD BY AUTOMATED COUNT: 13.7 % (ref 12.3–15.4)
GLOBULIN UR ELPH-MCNC: 3.5 GM/DL
GLUCOSE BLDC GLUCOMTR-MCNC: 168 MG/DL (ref 70–130)
GLUCOSE BLDC GLUCOMTR-MCNC: 183 MG/DL (ref 70–130)
GLUCOSE BLDC GLUCOMTR-MCNC: 194 MG/DL (ref 70–130)
GLUCOSE BLDC GLUCOMTR-MCNC: 304 MG/DL (ref 70–130)
GLUCOSE SERPL-MCNC: 167 MG/DL (ref 65–99)
HCT VFR BLD AUTO: 29.5 % (ref 34–46.6)
HGB BLD-MCNC: 9.4 G/DL (ref 12–15.9)
LYMPHOCYTES # BLD MANUAL: 4.04 10*3/MM3 (ref 0.7–3.1)
LYMPHOCYTES NFR BLD MANUAL: 4.1 % (ref 5–12)
MAGNESIUM SERPL-MCNC: 2.4 MG/DL (ref 1.6–2.6)
MCH RBC QN AUTO: 29.7 PG (ref 26.6–33)
MCHC RBC AUTO-ENTMCNC: 31.9 G/DL (ref 31.5–35.7)
MCV RBC AUTO: 93.1 FL (ref 79–97)
MONOCYTES # BLD: 0.96 10*3/MM3 (ref 0.1–0.9)
NEUTROPHILS # BLD AUTO: 17.17 10*3/MM3 (ref 1.7–7)
NEUTROPHILS NFR BLD MANUAL: 73.5 % (ref 42.7–76)
NRBC BLD AUTO-RTO: 0.2 /100 WBC (ref 0–0.2)
PLAT MORPH BLD: NORMAL
PLATELET # BLD AUTO: 440 10*3/MM3 (ref 140–450)
PMV BLD AUTO: 9.6 FL (ref 6–12)
POIKILOCYTOSIS BLD QL SMEAR: ABNORMAL
POLYCHROMASIA BLD QL SMEAR: ABNORMAL
POTASSIUM SERPL-SCNC: 4.6 MMOL/L (ref 3.5–5.2)
PROCALCITONIN SERPL-MCNC: 0.37 NG/ML (ref 0–0.25)
PROT SERPL-MCNC: 6.2 G/DL (ref 6–8.5)
RBC # BLD AUTO: 3.17 10*6/MM3 (ref 3.77–5.28)
SODIUM SERPL-SCNC: 141 MMOL/L (ref 136–145)
SPHEROCYTES BLD QL SMEAR: ABNORMAL
VARIANT LYMPHS NFR BLD MANUAL: 17.3 % (ref 19.6–45.3)
WBC MORPH BLD: NORMAL
WBC NRBC COR # BLD: 23.36 10*3/MM3 (ref 3.4–10.8)

## 2023-06-01 PROCEDURE — 63710000001 INSULIN LISPRO (HUMAN) PER 5 UNITS: Performed by: SURGERY

## 2023-06-01 PROCEDURE — 74177 CT ABD & PELVIS W/CONTRAST: CPT

## 2023-06-01 PROCEDURE — 84145 PROCALCITONIN (PCT): CPT | Performed by: INTERNAL MEDICINE

## 2023-06-01 PROCEDURE — 99233 SBSQ HOSP IP/OBS HIGH 50: CPT | Performed by: INTERNAL MEDICINE

## 2023-06-01 PROCEDURE — 25010000002 ENOXAPARIN PER 10 MG: Performed by: HOSPITALIST

## 2023-06-01 PROCEDURE — 85007 BL SMEAR W/DIFF WBC COUNT: CPT | Performed by: INTERNAL MEDICINE

## 2023-06-01 PROCEDURE — 83735 ASSAY OF MAGNESIUM: CPT | Performed by: INTERNAL MEDICINE

## 2023-06-01 PROCEDURE — 25510000001 IOPAMIDOL 61 % SOLUTION: Performed by: HOSPITALIST

## 2023-06-01 PROCEDURE — 82948 REAGENT STRIP/BLOOD GLUCOSE: CPT

## 2023-06-01 PROCEDURE — 80053 COMPREHEN METABOLIC PANEL: CPT | Performed by: INTERNAL MEDICINE

## 2023-06-01 PROCEDURE — 85025 COMPLETE CBC W/AUTO DIFF WBC: CPT | Performed by: INTERNAL MEDICINE

## 2023-06-01 PROCEDURE — 99232 SBSQ HOSP IP/OBS MODERATE 35: CPT | Performed by: SURGERY

## 2023-06-01 RX ADMIN — ENOXAPARIN SODIUM 40 MG: 100 INJECTION SUBCUTANEOUS at 21:53

## 2023-06-01 RX ADMIN — IOPAMIDOL 85 ML: 612 INJECTION, SOLUTION INTRAVENOUS at 07:42

## 2023-06-01 RX ADMIN — INSULIN GLARGINE-YFGN 35 UNITS: 100 INJECTION, SOLUTION SUBCUTANEOUS at 22:01

## 2023-06-01 RX ADMIN — AMOXICILLIN AND CLAVULANATE POTASSIUM 1 TABLET: 875; 125 TABLET, FILM COATED ORAL at 21:53

## 2023-06-01 RX ADMIN — GABAPENTIN 300 MG: 300 CAPSULE ORAL at 08:45

## 2023-06-01 RX ADMIN — MAGNESIUM OXIDE 400 MG (241.3 MG MAGNESIUM) TABLET 400 MG: TABLET at 21:53

## 2023-06-01 RX ADMIN — AMOXICILLIN AND CLAVULANATE POTASSIUM 1 TABLET: 875; 125 TABLET, FILM COATED ORAL at 08:44

## 2023-06-01 RX ADMIN — HYDROCODONE BITARTRATE AND ACETAMINOPHEN 1 TABLET: 7.5; 325 TABLET ORAL at 08:44

## 2023-06-01 RX ADMIN — PANTOPRAZOLE SODIUM 40 MG: 40 TABLET, DELAYED RELEASE ORAL at 06:32

## 2023-06-01 RX ADMIN — INSULIN LISPRO 1 UNITS: 100 INJECTION, SOLUTION INTRAVENOUS; SUBCUTANEOUS at 22:00

## 2023-06-01 RX ADMIN — POTASSIUM CHLORIDE 20 MEQ: 750 TABLET, EXTENDED RELEASE ORAL at 08:43

## 2023-06-01 RX ADMIN — INSULIN LISPRO 12 UNITS: 100 INJECTION, SOLUTION INTRAVENOUS; SUBCUTANEOUS at 17:57

## 2023-06-01 RX ADMIN — INSULIN LISPRO 15 UNITS: 100 INJECTION, SOLUTION INTRAVENOUS; SUBCUTANEOUS at 13:14

## 2023-06-01 RX ADMIN — POTASSIUM CHLORIDE 20 MEQ: 750 TABLET, EXTENDED RELEASE ORAL at 17:57

## 2023-06-01 RX ADMIN — OXYCODONE HYDROCHLORIDE AND ACETAMINOPHEN 250 MG: 500 TABLET ORAL at 08:44

## 2023-06-01 RX ADMIN — MAGNESIUM OXIDE 400 MG (241.3 MG MAGNESIUM) TABLET 400 MG: TABLET at 08:43

## 2023-06-01 RX ADMIN — LOPERAMIDE HYDROCHLORIDE 2 MG: 2 CAPSULE ORAL at 18:01

## 2023-06-01 RX ADMIN — GABAPENTIN 300 MG: 300 CAPSULE ORAL at 21:53

## 2023-06-01 RX ADMIN — INSULIN LISPRO 9 UNITS: 100 INJECTION, SOLUTION INTRAVENOUS; SUBCUTANEOUS at 08:44

## 2023-06-01 RX ADMIN — ENOXAPARIN SODIUM 40 MG: 100 INJECTION SUBCUTANEOUS at 08:44

## 2023-06-01 RX ADMIN — LEVOTHYROXINE SODIUM 250 MCG: 0.12 TABLET ORAL at 06:32

## 2023-06-01 RX ADMIN — ATORVASTATIN CALCIUM 80 MG: 80 TABLET, FILM COATED ORAL at 08:44

## 2023-06-01 NOTE — PROGRESS NOTES
ID note for Necrotizing fasciitis of right buttocks    Subjective:    No fevers  Pain improved with gabapentin    Objective:   Temp:  [97.5 °F (36.4 °C)-98 °F (36.7 °C)] 97.5 °F (36.4 °C)  Heart Rate:  [75-84] 80  Resp:  [18] 18  BP: (106-118)/(68-83) 108/68  GENERAL: Awake and alert, in no acute distress.   HEENT: Hearing is grossly normal.   LUNGS:normal respiratory effort.   SKIN: no cutaneous eruptions in exposed areas    Wbc 23  Cr 0.78    glc 157-213  PCT 0.44  CT abdomen pelvis shows some retroperitoneal adenopathy along with edema/ascites which is thought to be reactive.  Independently reviewed lung windows and does have some left lower lobe atelectasis versus consolidation    Bcx 1/2 ConS  5/25 OR cx: Nl skin bunny and mixed anaerobes  5/31 C. difficile PCR negative    A/p  1.  Sepsis secondary #2  2.  Infected right buttocks decubitus wound with cellulitis and necrotizing soft tissue infection  3.  Diabetes mellitus type 2, uncontrolled with hyperglycemia, complicating above  4.  Coag negative staph bacteremia, likely contaminant    WBC continues to increase, doubt progressive sepsis but will be mindful of that possibility and plan to check repeat bcx if febrile  Cont augmentin for now  C. difficile was negative.  Duplex venography negative for DVT  CT a/p reassuring  PCT was mildly elevated and did have some changes in LLL on CT.  Will repeat pct today and if worsening considering modifying abx. Of note, she was on vanc and zosyn when her wbc started to increase  She overall seems relatively stable and has normal temperatures.

## 2023-06-01 NOTE — PROGRESS NOTES
"Nutrition Services    Patient Name:  Vanessa Root  YOB: 1970  MRN: [id number]  Admit Date:  2023      Assessment Date:  23    Comment: LOS/Skin    52 y/o female with right necrotic gluteal decubitus with necrotizing fasciitis with sepsis, status post debridement  and wound vac placed .   Pt eating well. Agreed to adding Boost Plus TID with meals and Logan BID to support wound healing.   RD will continue to follow.     CLINICAL NUTRITION ASSESSMENT      Reason for Assessment Pressure Injury and/or Non-Healing Wound   Diagnosis/Problem Necrotizing soft tissue infection   Medical & Surgical Hx Past Medical History:   Diagnosis Date   • Arthritis    • Bipolar 1 disorder    • Cancer     uterine   • COVID-19    • Depression    • Diabetes mellitus    • Frequent UTI    • GERD (gastroesophageal reflux disease)    • Hyperlipidemia    • Migraine    • Murmur, heart    • Ovarian cyst    • Stroke        Past Surgical History:   Procedure Laterality Date   •  SECTION  2003   • HYSTERECTOMY     • TONSILLECTOMY     • WOUND DEBRIDEMENT N/A 2023    Procedure: Debridement necrotizing tissue  right buttock wound;  Surgeon: Elizabeth Adame MD;  Location: [facility];  Service: General;  Laterality: N/A;        Current Problems      Encounter Information        Nutrition History    Food Preferences    Supplements    Factors Affecting Intake No factors at this time   Tests/Procedures CT scan     Anthropometrics        Current Height   Current Weight  BMI kg/m2 Height: 165.1 cm (65\")  Weight: 127 kg (279 lb 3.2 oz) (23)  Body mass index is 46.46 kg/m².     Adj BMI (if applicable)    BMI Category Obese, Class III (40 or higher)       Admission Weight 292#   Ideal Body Weight (IBW) 125#     Adj IBW (if applicable)    Usual Body Weight (UBW)    Weight Change/Trend Other: fluctuate       Weight history: Wt Readings from Last 30 Encounters:   23 127 kg (279 lb " 3.2 oz)   05/31/23 0529 127 kg (280 lb 3.3 oz)   05/30/23 0500 128 kg (281 lb 9.6 oz)   05/29/23 0510 (!) 139 kg (306 lb 6.4 oz)   05/28/23 2144 (!) 138 kg (305 lb)   05/28/23 0542 (!) 139 kg (305 lb 12.5 oz)   05/27/23 0530 132 kg (291 lb 0.1 oz)   05/25/23 0338 133 kg (292 lb 15.9 oz)   11/29/21 1405 113 kg (249 lb)        Estimated/Assessed Needs        Energy Requirements    Weight for Calculation 133kg   Method for Estimation  18 kcal/kg, 20 kcal/kg   EST Needs (kcal/day) 1431-8899       Protein Requirements    Weight for Calculation 57kg   EST Protein Needs (g/kg) 2.0 gm/kg, 2.5 gm/kg   EST Daily Needs (g/day) 114-143g       Fluid Requirements     Method for Estimation 1 mL/kcal    Estimated Needs (mL/day)        Fluid Deficit    Current Na Level (mEq/L)    Desired Na Level (mEq/L)    Estimated Fluid Deficit (L)       Labs        Pertinent Labs    Results from last 7 days   Lab Units 06/01/23 0454 05/31/23  0702 05/30/23  0558   SODIUM mmol/L 141 142 141   POTASSIUM mmol/L 4.6 4.3 4.3   CHLORIDE mmol/L 108* 108* 108*   CO2 mmol/L 27.0 25.9 26.0   BUN mg/dL 7 7 7   CREATININE mg/dL 0.78 0.94 0.90   CALCIUM mg/dL 7.8* 8.3* 8.0*   BILIRUBIN mg/dL 0.3 0.3 0.3   ALK PHOS U/L 249* 265* 276*   ALT (SGPT) U/L 12 12 14   AST (SGOT) U/L 19 22 34*   GLUCOSE mg/dL 167* 236* 184*     Results from last 7 days   Lab Units 06/01/23 0454 06/01/23  0453 05/31/23  0702 05/30/23  0558   MAGNESIUM mg/dL 2.4  --  2.4 2.4   HEMOGLOBIN g/dL  --  9.4* 9.3* 9.6*   HEMATOCRIT %  --  29.5* 29.3* 30.6*   WBC 10*3/mm3  --  23.36* 22.80* 18.44*   ALBUMIN g/dL 2.7*  --  2.9* 2.7*     Results from last 7 days   Lab Units 06/01/23  0453 05/31/23  0702 05/30/23  0558 05/29/23  0539 05/28/23  0651   PLATELETS 10*3/mm3 440 475* 495* 461* 443     No results found for: COVID19  Lab Results   Component Value Date    HGBA1C 11.30 (H) 05/24/2023          Medications            Scheduled Medications amoxicillin-clavulanate, 1 tablet, Oral,  Q12H  vitamin C, 250 mg, Oral, Daily  atorvastatin, 80 mg, Oral, Daily  enoxaparin, 40 mg, Subcutaneous, Q12H  gabapentin, 300 mg, Oral, Q12H  insulin glargine, 1-200 Units, Subcutaneous, Nightly - Glucommander  insulin lispro, 1-200 Units, Subcutaneous, 4x Daily With Meals & Nightly  levothyroxine, 250 mcg, Oral, Q AM  magnesium oxide, 400 mg, Oral, BID  pantoprazole, 40 mg, Oral, Q AM  potassium chloride, 20 mEq, Oral, BID With Meals        Infusions      PRN Medications •  acetaminophen  •  ALPRAZolam  •  Calcium Replacement - Follow Nurse / BPA Driven Protocol  •  dextrose  •  dextrose  •  glucagon (human recombinant)  •  HYDROcodone-acetaminophen  •  insulin lispro  •  loperamide  •  Magnesium Standard Dose Replacement - Follow Nurse / BPA Driven Protocol  •  melatonin  •  Morphine  •  ondansetron **OR** ondansetron  •  Phosphorus Replacement - Follow Nurse / BPA Driven Protocol  •  Potassium Replacement - Follow Nurse / BPA Driven Protocol  •  sodium chloride     Physical Findings          Physical Appearance alert, obese, oriented, on oxygen therapy, blind   Oral/Mouth Cavity tooth or teeth missing   Edema  2+ (mild), 3+ (moderate), 4+ (severe)   Gastrointestinal last bowel movement:5/30   Skin  wound VAC R lateral hip   Tubes/Drains/Lines none   NFPE No clinical signs of muscle wasting or fat loss   --  Current Nutrition Orders & Evaluation of Intake       Oral Nutrition     Food Allergies Other: raw onion and pepper   Current PO Diet Diet: Diabetic Diets; Consistent Carbohydrate; Texture: Regular Texture (IDDSI 7); Fluid Consistency: Thin (IDDSI 0)   Supplement    PO Evaluation     Trending % PO Intake 100% lunch     Nutrition Diagnosis        Nutrition Dx Problem 1 Problem: Increased Nutrient Needs  Etiology: Medical Diagnosis  Signs/Symptoms: Report/Observation wound    Comment:      INTERVENTION / PLAN OF CARE  Intervention Goal        Intervention Goal(s) Maintain nutrition status, Meet estimated needs,  Disease management/therapy, Maintain intake and Maintain weight     Nutrition Intervention        RD Action Supplement provided, Encourage intake and Follow Tx Progress     Prescription         Diet     Supplement/Snack Boost GC and Logan BID   EN/PN     Prescription Ordered Yes     Monitor/Evaluation        Monitor Per protocol   Discharge Plan Pending clinical course   Education Will instruct as appropriate     RD to follow per protocol.       Electronically signed by:  Denisse Mcneil RD  06/01/23 14:28 EDT

## 2023-06-01 NOTE — PLAN OF CARE
Goal Outcome Evaluation:   VSS on Room air - 3L nasal cannula; cpap used as needed for sleep; pt down for CT of abdomen this shift; wound vac output becoming lighter/less; q2 turn.

## 2023-06-01 NOTE — TELEPHONE ENCOUNTER
Left a voice message stating unless neurology has seen patient while she is in hospital she could not get MRI done as this time.

## 2023-06-01 NOTE — PROGRESS NOTES
"General Surgery  Progress Note    CC: Follow-up necrotizing fasciitis right buttock    POD#7 debridement skin and soft tissue infection of right buttock secondary to necrotizing fasciitis    S: She is resting comfortably and reports improvement in her pain with gabapentin.    O:BP 97/48 (BP Location: Left arm, Patient Position: Lying)   Pulse 81   Temp 98.6 °F (37 °C) (Oral)   Resp 18   Ht 165.1 cm (65\")   Wt 127 kg (279 lb 3.2 oz)   SpO2 97%   BMI 46.46 kg/m²     GENERAL: alert, well appearing, and in no distress  HEENT: normocephalic, atraumatic, moist mucous membranes, clear sclerae   CHEST: clear to auscultation, no wheezes, rales or rhonchi, symmetric air entry  CARDIAC: regular rate and rhythm    ABDOMEN: Soft, morbidly obese, nontender, nondistended  EXTREMITIES: Right buttock wound is dressed with wound VAC, there is a good seal with no surrounding cellulitis of the skin  SKIN: Warm and moist, no rashes    LABS  Results from last 7 days   Lab Units 06/01/23  0453 05/31/23  0702 05/30/23  0558   WBC 10*3/mm3 23.36* 22.80* 18.44*   HEMOGLOBIN g/dL 9.4* 9.3* 9.6*   HEMATOCRIT % 29.5* 29.3* 30.6*   PLATELETS 10*3/mm3 440 475* 495*   MONOCYTES % % 4.1* 1.0* 6.0     Results from last 7 days   Lab Units 06/01/23  0454 05/31/23  0702 05/30/23  0558   SODIUM mmol/L 141 142 141   POTASSIUM mmol/L 4.6 4.3 4.3   CHLORIDE mmol/L 108* 108* 108*   CO2 mmol/L 27.0 25.9 26.0   BUN mg/dL 7 7 7   CREATININE mg/dL 0.78 0.94 0.90   CALCIUM mg/dL 7.8* 8.3* 8.0*   BILIRUBIN mg/dL 0.3 0.3 0.3   ALK PHOS U/L 249* 265* 276*   ALT (SGPT) U/L 12 12 14   AST (SGOT) U/L 19 22 34*   GLUCOSE mg/dL 167* 236* 184*             A/P: 53 y.o. female POD#7 debridement skin and soft tissue infection of right buttock secondary to necrotizing fasciitis    Case management is working on subacute rehab arrangements.  As soon as these are complete, she can be discharged with her wound VAC in place.  I reviewed her CT abdomen/pelvis done today, " which shows no significant pathology that would account for her leukocytosis.    Elizabeth Adame MD  General, Robotic, and Endoscopic Surgery  [facility]    [address], [address]  [place] [address]  P: [phone]  F: [phone]

## 2023-06-01 NOTE — PLAN OF CARE
Goal Outcome Evaluation:  Plan of Care Reviewed With: patient        Progress: no change  Outcome Evaluation: Pt A&OX4. No c/o pain. VAC in place. ADD pump on bed. Q2 hr turns.

## 2023-06-01 NOTE — TELEPHONE ENCOUNTER
PT CALLING TO ADVISE SHE IS CURRENTLY IN [facility] FOR A SURGERY.     DR YU ORDERED AN MRI AND SHE IS SCHEDULED FOR THAT AT [facility].  SHE WILL HAVE TO PAY FOR TRANSPORTATION TO THIS MRI.    PT ASKING IF MRI CAN BE DONE AT HOSPITAL WHILE SHE IS THERE    PLEASE ADVISE    THANK YOU

## 2023-06-01 NOTE — PROGRESS NOTES
[facility]     LOS: 8 days     Subjective:    CC:Wound Check, Headache, and Nausea    DIET:  Diet Order   Procedures   • Diet: Diabetic Diets; Consistent Carbohydrate; Texture: Regular Texture (IDDSI 7); Fluid Consistency: Thin (IDDSI 0)     Patient is sleepy today because she says she just took her neurontin  No new complaints    Objective:    Vital Signs:  Temp:  [97.5 °F (36.4 °C)-98.6 °F (37 °C)] 98.6 °F (37 °C)  Heart Rate:  [75-84] 82  Resp:  [18] 18  BP: ()/(48-78) 97/48    SpO2:  [93 %-100 %] 97 %  on  Flow (L/min):  [2-3] 3;   Device (Oxygen Therapy): nasal cannula  Body mass index is 46.46 kg/m².    Physical Exam  Constitutional:       Appearance: Normal appearance.      Comments: Very pleasant female she is on BiPAP on in the room, immobile appearing   HENT:      Head: Normocephalic and atraumatic.   Cardiovascular:      Rate and Rhythm: Normal rate and regular rhythm.      Heart sounds: No murmur heard.    No friction rub.   Pulmonary:      Effort: Pulmonary effort is normal.      Breath sounds: Normal breath sounds.   Abdominal:      General: Bowel sounds are normal. There is no distension.      Palpations: Abdomen is soft.      Tenderness: There is no abdominal tenderness.   Skin:     General: Skin is warm and dry.   Neurological:      General: No focal deficit present.      Mental Status: She is alert.   Psychiatric:         Mood and Affect: Mood normal.         Behavior: Behavior normal.         Results Review:    Glucose   Date Value Ref Range Status   06/01/2023 167 (H) 65 - 99 mg/dL Final   05/31/2023 236 (H) 65 - 99 mg/dL Final   05/30/2023 184 (H) 65 - 99 mg/dL Final     Results from last 7 days   Lab Units 06/01/23  0453   WBC 10*3/mm3 23.36*   HEMOGLOBIN g/dL 9.4*   HEMATOCRIT % 29.5*   PLATELETS 10*3/mm3 440     Results from last 7 days   Lab Units 06/01/23  0454   SODIUM mmol/L 141   POTASSIUM mmol/L 4.6   CHLORIDE mmol/L 108*   CO2 mmol/L 27.0   BUN mg/dL  7   CREATININE mg/dL 0.78   CALCIUM mg/dL 7.8*   BILIRUBIN mg/dL 0.3   ALK PHOS U/L 249*   ALT (SGPT) U/L 12   AST (SGOT) U/L 19   GLUCOSE mg/dL 167*         Results from last 7 days   Lab Units 06/01/23  0454   MAGNESIUM mg/dL 2.4         Cultures:  Blood Culture   Date Value Ref Range Status   05/24/2023 No growth at 5 days  Final     Wound Culture   Date Value Ref Range Status   05/25/2023 Moderate growth (3+) Normal Skin Carmen  Final       I have reviewed daily medications and changes in CPOE    Scheduled meds  amoxicillin-clavulanate, 1 tablet, Oral, Q12H  vitamin C, 250 mg, Oral, Daily  atorvastatin, 80 mg, Oral, Daily  enoxaparin, 40 mg, Subcutaneous, Q12H  gabapentin, 300 mg, Oral, Q12H  insulin glargine, 1-200 Units, Subcutaneous, Nightly - Glucommander  insulin lispro, 1-200 Units, Subcutaneous, 4x Daily With Meals & Nightly  levothyroxine, 250 mcg, Oral, Q AM  magnesium oxide, 400 mg, Oral, BID  pantoprazole, 40 mg, Oral, Q AM  potassium chloride, 20 mEq, Oral, BID With Meals           PRN meds  •  acetaminophen  •  ALPRAZolam  •  Calcium Replacement - Follow Nurse / BPA Driven Protocol  •  dextrose  •  dextrose  •  glucagon (human recombinant)  •  HYDROcodone-acetaminophen  •  insulin lispro  •  loperamide  •  Magnesium Standard Dose Replacement - Follow Nurse / BPA Driven Protocol  •  melatonin  •  Morphine  •  ondansetron **OR** ondansetron  •  Phosphorus Replacement - Follow Nurse / BPA Driven Protocol  •  Potassium Replacement - Follow Nurse / BPA Driven Protocol  •  sodium chloride        Necrotizing soft tissue infection    Diabetes mellitus    Spinal stenosis of lumbar region    Obstructive sleep apnea syndrome    Hypothyroidism    Diabetic peripheral neuropathy    Sepsis, due to unspecified organism, unspecified whether acute organ dysfunction present    Immobility syndrome    Fasciitis    Hypokalemia    Hypomagnesemia        Assessment/Plan:  Right necrotic gluteal decubitus with necrotizing  fasciitis with sepsis  -s/p debridement 5/25  -Followed by infectious disease and now on augemtin  -White blood cell count is elevated, procal is better  -ct reviewed  -d/ surgery    Immobility secondary to spinal stenosis and peripheral neuropathy    Type 2 diabetes  -Patient is on Glucomander  -Blood sugars acceptable    Hypokalemia and hypomagnesemia  -Have been replaced    Hypothyroidism  -Continue with Synthroid at higher dose    Chronic kidney disease  -Stable    UTI  -Antibiotics    Adenopathy  -Follow-up CT scan of the abdomen and pelvis in 6 months    DVT PPX: lovenox 40 bid for dvt PPx      Marv Garcia MD  06/01/23  18:19 EDT

## 2023-06-01 NOTE — CASE MANAGEMENT/SOCIAL WORK
Continued Stay Note  [facility]     Patient Name: Vanessa Root  MRN: [id number]  Today's Date: 6/1/2023    Admit Date: 5/24/2023    Plan: Home with    Discharge Plan     Row Name 06/01/23 3527       Plan    Plan Home with     Patient/Family in Agreement with Plan yes    Plan Comments Spoke with Gaby/Nany, pt does not have a skillable need for SNF. Wound vac doesn't count. [facility]  has accepted for wound vac care at home. Spoke with patient dtr Dulce, she is okay with her d/c home potentially this weekend, she stated they are rearranging rooms so that she has the bigger room to accommodate her needs. She states she has all the equipment she needs. They have a ramp to enter the home no stairs. They generally use [employer] stretcher van to get to appointments. Referral has been sent to [facility] house calls by previous CCP. Wound care orders noted, [facility], will need order entered by Dr. Adame, she is aware of need. CCP will follow - Nevaeh GRULLON               Discharge Codes    No documentation.               Expected Discharge Date and Time     Expected Discharge Date Expected Discharge Time    Jun 2, 2023             Nevaeh Mancini RN

## 2023-06-01 NOTE — DISCHARGE PLACEMENT REQUEST
"Vanessa Villanueva (53 y.o. Female)     Date of Birth   1970    Social Security Number       Address   [address] [id number]    Home Phone   [phone]    MRN   [id number]       [facility]    Marital Status   Single                            Admission Date   5/24/23    Admission Type   Emergency    Admitting Provider   Rosa Alvarado MD    Attending Provider   Marv Garcia MD    Department, Room/Bed   [address], [address]       Discharge Date       Discharge Disposition       Discharge Destination                               Attending Provider: Marv Garcia MD    Allergies: Clemastine Fumarate, Ziprasidone, Aripiprazole, Sulfa Antibiotics, Latex, Lisinopril    Isolation: None   Infection: None   Code Status: CPR    Ht: 165.1 cm (65\")   Wt: 127 kg (279 lb 3.2 oz)    Admission Cmt: None   Principal Problem: Necrotizing soft tissue infection [M79.89]                 Active Insurance as of 5/24/2023     Primary Coverage     Payor Plan Insurance Group Employer/Plan Group    MEDICARE MEDICARE A & B      Payor Plan Address Payor Plan Phone Number Payor Plan Fax Number Effective Dates    [address] [phone]  4/1/2003 - None Entered    [facility] [address]       Subscriber Name Subscriber Birth Date Member ID       VANESSA VILLANUEVA 1970 [id number]           Secondary Coverage     Payor Plan Insurance Group Employer/Plan Group    [place]      Payor Plan Address Payor Plan Phone Number Payor Plan Fax Number Effective Dates    [address] [phone]  11/16/2021 - None Entered    [place] [address]       Subscriber Name Subscriber Birth Date Member ID       VANESSA VILLAUNEVA 1970 [id number]                 Emergency Contacts      (Rel.) Home Phone Work Phone Mobile Phone    WHITNEY VILLANUEVA (Daughter) -- -- [phone]    Pauline Villanueva (Mother) -- -- [phone]              "

## 2023-06-02 ENCOUNTER — APPOINTMENT (OUTPATIENT)
Dept: CT IMAGING | Facility: HOSPITAL | Age: 53
End: 2023-06-02
Payer: MEDICARE

## 2023-06-02 LAB
ALBUMIN SERPL-MCNC: 2.7 G/DL (ref 3.5–5.2)
ALBUMIN/GLOB SERPL: 0.8 G/DL
ALP SERPL-CCNC: 263 U/L (ref 39–117)
ALT SERPL W P-5'-P-CCNC: 13 U/L (ref 1–33)
ANION GAP SERPL CALCULATED.3IONS-SCNC: 8.3 MMOL/L (ref 5–15)
AST SERPL-CCNC: 19 U/L (ref 1–32)
BASOPHILS # BLD MANUAL: 0.24 10*3/MM3 (ref 0–0.2)
BASOPHILS NFR BLD MANUAL: 1 % (ref 0–1.5)
BILIRUB SERPL-MCNC: 0.3 MG/DL (ref 0–1.2)
BUN SERPL-MCNC: 9 MG/DL (ref 6–20)
BUN/CREAT SERPL: 11 (ref 7–25)
CALCIUM SPEC-SCNC: 8.1 MG/DL (ref 8.6–10.5)
CHLORIDE SERPL-SCNC: 107 MMOL/L (ref 98–107)
CO2 SERPL-SCNC: 26.7 MMOL/L (ref 22–29)
CREAT SERPL-MCNC: 0.82 MG/DL (ref 0.57–1)
DEPRECATED RDW RBC AUTO: 45.3 FL (ref 37–54)
EGFRCR SERPLBLD CKD-EPI 2021: 86.7 ML/MIN/1.73
EOSINOPHIL # BLD MANUAL: 1.21 10*3/MM3 (ref 0–0.4)
EOSINOPHIL NFR BLD MANUAL: 5 % (ref 0.3–6.2)
ERYTHROCYTE [DISTWIDTH] IN BLOOD BY AUTOMATED COUNT: 13.4 % (ref 12.3–15.4)
GLOBULIN UR ELPH-MCNC: 3.6 GM/DL
GLUCOSE BLDC GLUCOMTR-MCNC: 119 MG/DL (ref 70–130)
GLUCOSE BLDC GLUCOMTR-MCNC: 133 MG/DL (ref 70–130)
GLUCOSE BLDC GLUCOMTR-MCNC: 138 MG/DL (ref 70–130)
GLUCOSE BLDC GLUCOMTR-MCNC: 163 MG/DL (ref 70–130)
GLUCOSE SERPL-MCNC: 127 MG/DL (ref 65–99)
HCT VFR BLD AUTO: 28.7 % (ref 34–46.6)
HGB BLD-MCNC: 9 G/DL (ref 12–15.9)
LYMPHOCYTES # BLD MANUAL: 2.41 10*3/MM3 (ref 0.7–3.1)
LYMPHOCYTES NFR BLD MANUAL: 5 % (ref 5–12)
MAGNESIUM SERPL-MCNC: 2.3 MG/DL (ref 1.6–2.6)
MCH RBC QN AUTO: 28.8 PG (ref 26.6–33)
MCHC RBC AUTO-ENTMCNC: 31.4 G/DL (ref 31.5–35.7)
MCV RBC AUTO: 91.7 FL (ref 79–97)
MONOCYTES # BLD: 1.21 10*3/MM3 (ref 0.1–0.9)
MYELOCYTES NFR BLD MANUAL: 1 % (ref 0–0)
NEUTROPHILS # BLD AUTO: 18.81 10*3/MM3 (ref 1.7–7)
NEUTROPHILS NFR BLD MANUAL: 78 % (ref 42.7–76)
NRBC BLD AUTO-RTO: 0.3 /100 WBC (ref 0–0.2)
NRBC SPEC MANUAL: 1 /100 WBC (ref 0–0.2)
PLAT MORPH BLD: NORMAL
PLATELET # BLD AUTO: 436 10*3/MM3 (ref 140–450)
PMV BLD AUTO: 10 FL (ref 6–12)
POTASSIUM SERPL-SCNC: 4.4 MMOL/L (ref 3.5–5.2)
PROT SERPL-MCNC: 6.3 G/DL (ref 6–8.5)
RBC # BLD AUTO: 3.13 10*6/MM3 (ref 3.77–5.28)
RBC MORPH BLD: NORMAL
SODIUM SERPL-SCNC: 142 MMOL/L (ref 136–145)
VARIANT LYMPHS NFR BLD MANUAL: 10 % (ref 19.6–45.3)
WBC MORPH BLD: NORMAL
WBC NRBC COR # BLD: 24.12 10*3/MM3 (ref 3.4–10.8)

## 2023-06-02 PROCEDURE — 25010000002 MORPHINE PER 10 MG: Performed by: SURGERY

## 2023-06-02 PROCEDURE — 82948 REAGENT STRIP/BLOOD GLUCOSE: CPT

## 2023-06-02 PROCEDURE — 85025 COMPLETE CBC W/AUTO DIFF WBC: CPT | Performed by: INTERNAL MEDICINE

## 2023-06-02 PROCEDURE — 80053 COMPREHEN METABOLIC PANEL: CPT | Performed by: INTERNAL MEDICINE

## 2023-06-02 PROCEDURE — 25010000002 ENOXAPARIN PER 10 MG: Performed by: HOSPITALIST

## 2023-06-02 PROCEDURE — 63710000001 INSULIN LISPRO (HUMAN) PER 5 UNITS: Performed by: SURGERY

## 2023-06-02 PROCEDURE — 99232 SBSQ HOSP IP/OBS MODERATE 35: CPT | Performed by: SURGERY

## 2023-06-02 PROCEDURE — 63710000001 INSULIN GLARGINE PER 5 UNITS: Performed by: SURGERY

## 2023-06-02 PROCEDURE — 99232 SBSQ HOSP IP/OBS MODERATE 35: CPT | Performed by: INTERNAL MEDICINE

## 2023-06-02 PROCEDURE — 25010000002 ONDANSETRON PER 1 MG: Performed by: SURGERY

## 2023-06-02 PROCEDURE — 71250 CT THORAX DX C-: CPT

## 2023-06-02 PROCEDURE — 85007 BL SMEAR W/DIFF WBC COUNT: CPT | Performed by: INTERNAL MEDICINE

## 2023-06-02 PROCEDURE — 83735 ASSAY OF MAGNESIUM: CPT | Performed by: INTERNAL MEDICINE

## 2023-06-02 RX ADMIN — OXYCODONE HYDROCHLORIDE AND ACETAMINOPHEN 250 MG: 500 TABLET ORAL at 08:32

## 2023-06-02 RX ADMIN — LEVOTHYROXINE SODIUM 250 MCG: 0.12 TABLET ORAL at 05:40

## 2023-06-02 RX ADMIN — ATORVASTATIN CALCIUM 80 MG: 80 TABLET, FILM COATED ORAL at 08:32

## 2023-06-02 RX ADMIN — INSULIN LISPRO 13 UNITS: 100 INJECTION, SOLUTION INTRAVENOUS; SUBCUTANEOUS at 18:50

## 2023-06-02 RX ADMIN — HYDROCODONE BITARTRATE AND ACETAMINOPHEN 1 TABLET: 7.5; 325 TABLET ORAL at 13:57

## 2023-06-02 RX ADMIN — ENOXAPARIN SODIUM 40 MG: 100 INJECTION SUBCUTANEOUS at 08:32

## 2023-06-02 RX ADMIN — MAGNESIUM OXIDE 400 MG (241.3 MG MAGNESIUM) TABLET 400 MG: TABLET at 08:31

## 2023-06-02 RX ADMIN — AMOXICILLIN AND CLAVULANATE POTASSIUM 1 TABLET: 875; 125 TABLET, FILM COATED ORAL at 08:31

## 2023-06-02 RX ADMIN — MAGNESIUM OXIDE 400 MG (241.3 MG MAGNESIUM) TABLET 400 MG: TABLET at 21:17

## 2023-06-02 RX ADMIN — PANTOPRAZOLE SODIUM 40 MG: 40 TABLET, DELAYED RELEASE ORAL at 05:40

## 2023-06-02 RX ADMIN — INSULIN LISPRO 11 UNITS: 100 INJECTION, SOLUTION INTRAVENOUS; SUBCUTANEOUS at 08:33

## 2023-06-02 RX ADMIN — HYDROCODONE BITARTRATE AND ACETAMINOPHEN 1 TABLET: 7.5; 325 TABLET ORAL at 19:56

## 2023-06-02 RX ADMIN — LOPERAMIDE HYDROCHLORIDE 2 MG: 2 CAPSULE ORAL at 21:17

## 2023-06-02 RX ADMIN — ONDANSETRON 4 MG: 2 INJECTION INTRAMUSCULAR; INTRAVENOUS at 19:39

## 2023-06-02 RX ADMIN — POTASSIUM CHLORIDE 20 MEQ: 750 TABLET, EXTENDED RELEASE ORAL at 08:32

## 2023-06-02 RX ADMIN — GABAPENTIN 300 MG: 300 CAPSULE ORAL at 21:17

## 2023-06-02 RX ADMIN — INSULIN GLARGINE-YFGN 32 UNITS: 100 INJECTION, SOLUTION SUBCUTANEOUS at 22:30

## 2023-06-02 RX ADMIN — AMOXICILLIN AND CLAVULANATE POTASSIUM 1 TABLET: 875; 125 TABLET, FILM COATED ORAL at 21:17

## 2023-06-02 RX ADMIN — GABAPENTIN 300 MG: 300 CAPSULE ORAL at 08:32

## 2023-06-02 RX ADMIN — LOPERAMIDE HYDROCHLORIDE 2 MG: 2 CAPSULE ORAL at 13:21

## 2023-06-02 RX ADMIN — MORPHINE SULFATE 4 MG: 2 INJECTION, SOLUTION INTRAMUSCULAR; INTRAVENOUS at 21:30

## 2023-06-02 RX ADMIN — ENOXAPARIN SODIUM 40 MG: 100 INJECTION SUBCUTANEOUS at 21:17

## 2023-06-02 RX ADMIN — INSULIN LISPRO 9 UNITS: 100 INJECTION, SOLUTION INTRAVENOUS; SUBCUTANEOUS at 13:21

## 2023-06-02 NOTE — PROGRESS NOTES
"General Surgery  Progress Note    CC: Follow-up necrotizing fasciitis right buttock    POD#8 debridement skin and soft tissue infection of right buttock secondary to necrotizing fasciitis    S: She is feeling emotional, having just returned from a CT of the chest.  She is having mild right hip pain.  She remains afebrile, but her white blood cell count is persistently elevated at 24,000.    O:/78 (BP Location: Left arm, Patient Position: Lying)   Pulse 82   Temp 99.4 °F (37.4 °C) (Oral)   Resp 18   Ht 165.1 cm (65\")   Wt 132 kg (291 lb 14.4 oz)   SpO2 96%   BMI 48.57 kg/m²     GENERAL: alert, well appearing, and in no distress  HEENT: normocephalic, atraumatic, moist mucous membranes, clear sclerae   CHEST: clear to auscultation, no wheezes, rales or rhonchi, symmetric air entry  CARDIAC: regular rate and rhythm    ABDOMEN: Soft, morbidly obese, nontender, nondistended  EXTREMITIES: Right buttock wound is dressed with wound VAC, there is a good seal with no surrounding cellulitis of the skin  SKIN: Warm and moist, no rashes    LABS  Results from last 7 days   Lab Units 06/02/23  0620 06/01/23  0453 05/31/23  0702   WBC 10*3/mm3 24.12* 23.36* 22.80*   HEMOGLOBIN g/dL 9.0* 9.4* 9.3*   HEMATOCRIT % 28.7* 29.5* 29.3*   PLATELETS 10*3/mm3 436 440 475*   MONOCYTES % % 5.0 4.1* 1.0*     Results from last 7 days   Lab Units 06/02/23  0620 06/01/23  0454 05/31/23  0702   SODIUM mmol/L 142 141 142   POTASSIUM mmol/L 4.4 4.6 4.3   CHLORIDE mmol/L 107 108* 108*   CO2 mmol/L 26.7 27.0 25.9   BUN mg/dL 9 7 7   CREATININE mg/dL 0.82 0.78 0.94   CALCIUM mg/dL 8.1* 7.8* 8.3*   BILIRUBIN mg/dL 0.3 0.3 0.3   ALK PHOS U/L 263* 249* 265*   ALT (SGPT) U/L 13 12 12   AST (SGOT) U/L 19 19 22   GLUCOSE mg/dL 127* 167* 236*             A/P: 53 y.o. female POD#8 debridement skin and soft tissue infection of right buttock secondary to necrotizing fasciitis    Her white blood cell count continues to be elevated.  I have asked the " wound ostomy nurses to take photographs of the decubitus right hip wound so I can make sure there is no residual soft tissue or muscle necrosis accounting for her leukocytosis.    Elizabeth Adame MD  General, Robotic, and Endoscopic Surgery  [facility]    [address], [address]  [place] [address]  P: [phone]  F: [phone]

## 2023-06-02 NOTE — PROGRESS NOTES
[facility]     LOS: 9 days     Subjective:    CC:Wound Check, Headache, and Nausea    DIET:  Diet Order   Procedures   • Diet: Diabetic Diets; Consistent Carbohydrate; Texture: Regular Texture (IDDSI 7); Fluid Consistency: Thin (IDDSI 0)   good appetite  Started neurontin and this has helped with pain of wound and neuropathy  Patient is more awake and alert      Objective:    Vital Signs:  Temp:  [97.6 °F (36.4 °C)-99.6 °F (37.6 °C)] 98.9 °F (37.2 °C)  Heart Rate:  [81-86] 82  Resp:  [18] 18  BP: ()/(48-95) 108/64    SpO2:  [93 %-97 %] 96 %  on  Flow (L/min):  [3] 3;   Device (Oxygen Therapy): CPAP  Body mass index is 48.57 kg/m².    Physical Exam  Constitutional:       Appearance: Normal appearance.      Comments: Very pleasant female she is on BiPAP on in the room, immobile appearing   HENT:      Head: Normocephalic and atraumatic.   Cardiovascular:      Rate and Rhythm: Normal rate and regular rhythm.      Heart sounds: No murmur heard.    No friction rub.   Pulmonary:      Effort: Pulmonary effort is normal.      Breath sounds: Normal breath sounds.   Abdominal:      General: Bowel sounds are normal. There is no distension.      Palpations: Abdomen is soft.      Tenderness: There is no abdominal tenderness.   Skin:     General: Skin is warm and dry.   Neurological:      General: No focal deficit present.      Mental Status: She is alert.   Psychiatric:         Mood and Affect: Mood normal.         Behavior: Behavior normal.         Results Review:    Glucose   Date Value Ref Range Status   06/02/2023 127 (H) 65 - 99 mg/dL Final   06/01/2023 167 (H) 65 - 99 mg/dL Final   05/31/2023 236 (H) 65 - 99 mg/dL Final     Results from last 7 days   Lab Units 06/02/23  0620   WBC 10*3/mm3 24.12*   HEMOGLOBIN g/dL 9.0*   HEMATOCRIT % 28.7*   PLATELETS 10*3/mm3 436     Results from last 7 days   Lab Units 06/02/23  0620   SODIUM mmol/L 142   POTASSIUM mmol/L 4.4   CHLORIDE mmol/L 107   CO2  mmol/L 26.7   BUN mg/dL 9   CREATININE mg/dL 0.82   CALCIUM mg/dL 8.1*   BILIRUBIN mg/dL 0.3   ALK PHOS U/L 263*   ALT (SGPT) U/L 13   AST (SGOT) U/L 19   GLUCOSE mg/dL 127*         Results from last 7 days   Lab Units 06/02/23  0620   MAGNESIUM mg/dL 2.3         Cultures:  Blood Culture   Date Value Ref Range Status   05/24/2023 No growth at 5 days  Final     Wound Culture   Date Value Ref Range Status   05/25/2023 Moderate growth (3+) Normal Skin Carmen  Final       I have reviewed daily medications and changes in CPOE    Scheduled meds  amoxicillin-clavulanate, 1 tablet, Oral, Q12H  vitamin C, 250 mg, Oral, Daily  atorvastatin, 80 mg, Oral, Daily  enoxaparin, 40 mg, Subcutaneous, Q12H  gabapentin, 300 mg, Oral, Q12H  insulin glargine, 1-200 Units, Subcutaneous, Nightly - Glucommander  insulin lispro, 1-200 Units, Subcutaneous, 4x Daily With Meals & Nightly  levothyroxine, 250 mcg, Oral, Q AM  magnesium oxide, 400 mg, Oral, BID  pantoprazole, 40 mg, Oral, Q AM  potassium chloride, 20 mEq, Oral, BID With Meals           PRN meds  •  acetaminophen  •  ALPRAZolam  •  Calcium Replacement - Follow Nurse / BPA Driven Protocol  •  dextrose  •  dextrose  •  glucagon (human recombinant)  •  HYDROcodone-acetaminophen  •  insulin lispro  •  loperamide  •  Magnesium Standard Dose Replacement - Follow Nurse / BPA Driven Protocol  •  melatonin  •  Morphine  •  ondansetron **OR** ondansetron  •  Phosphorus Replacement - Follow Nurse / BPA Driven Protocol  •  Potassium Replacement - Follow Nurse / BPA Driven Protocol  •  sodium chloride        Necrotizing soft tissue infection    Diabetes mellitus    Spinal stenosis of lumbar region    Obstructive sleep apnea syndrome    Hypothyroidism    Diabetic peripheral neuropathy    Sepsis, due to unspecified organism, unspecified whether acute organ dysfunction present    Immobility syndrome    Fasciitis    Hypokalemia    Hypomagnesemia        Assessment/Plan:  Right necrotic gluteal  decubitus with necrotizing fasciitis with sepsis  -s/p debridement 5/25  -Followed by infectious disease and now on augemtin  -White blood cell count is still elevated, procal is slightly better  -ct reviewed  -d/w surgery  -await ID re-eval    Immobility secondary to spinal stenosis and peripheral neuropathy    Type 2 diabetes  -Patient is on Glucomander  -Blood sugars acceptable    Hypokalemia and hypomagnesemia  -Have been replaced    Hypothyroidism  -Continue with Synthroid at higher dose  -tsh 22, she was off thyroid meds x 3 months prior to admission    Chronic kidney disease  -Stable    UTI  -Antibiotics augmentin    Adenopathy  -Follow-up CT scan of the abdomen and pelvis in 6 months    Neuropathy  -patient is asking that neurontin be reorder on discharge    DVT PPX: lovenox 40 bid for dvt PPx      Marv Garcia MD  06/02/23  09:25 EDT

## 2023-06-02 NOTE — PLAN OF CARE
Problem: Adult Inpatient Plan of Care  Goal: Absence of Hospital-Acquired Illness or Injury  Intervention: Identify and Manage Fall Risk  Recent Flowsheet Documentation  Taken 6/2/2023 1800 by Lauren Kowalski RN  Safety Promotion/Fall Prevention: safety round/check completed  Taken 6/2/2023 1600 by Lauren Kowalski RN  Safety Promotion/Fall Prevention: safety round/check completed  Taken 6/2/2023 1400 by Lauren Kowalski RN  Safety Promotion/Fall Prevention: safety round/check completed  Taken 6/2/2023 1200 by Lauren Kowalski RN  Safety Promotion/Fall Prevention: safety round/check completed  Taken 6/2/2023 1000 by Lauren Kowalski RN  Safety Promotion/Fall Prevention: safety round/check completed  Taken 6/2/2023 0800 by Lauren Kowalski RN  Safety Promotion/Fall Prevention: safety round/check completed  Intervention: Prevent Skin Injury  Recent Flowsheet Documentation  Taken 6/2/2023 1800 by Lauren Kowalski RN  Body Position: supine  Taken 6/2/2023 1600 by Lauren Kowalski RN  Body Position:   tilted   right  Taken 6/2/2023 1400 by Lauren Kowalski RN  Body Position: supine  Skin Protection:   adhesive use limited   tubing/devices free from skin contact  Taken 6/2/2023 1200 by Lauren Kowalski RN  Body Position:   tilted   left  Taken 6/2/2023 1000 by Lauren Kowalski RN  Body Position: sitting up in bed  Taken 6/2/2023 0800 by Lauren Kowalski RN  Body Position: supine  Skin Protection:   adhesive use limited   tubing/devices free from skin contact  Intervention: Prevent and Manage VTE (Venous Thromboembolism) Risk  Recent Flowsheet Documentation  Taken 6/2/2023 1800 by Lauren Kowalski RN  Activity Management: activity encouraged  Taken 6/2/2023 1600 by Lauren Kowalski RN  Activity Management: activity encouraged  Taken 6/2/2023 1400 by Lauren Kowalski RN  Activity Management: activity encouraged  VTE Prevention/Management: (Lovenox) other (see comments)  Range of Motion: active ROM (range of motion) encouraged  Taken  6/2/2023 1200 by Lauren Kowalski, RN  Activity Management: activity encouraged  Taken 6/2/2023 1000 by Lauren Kowalski, RN  Activity Management: activity encouraged  Taken 6/2/2023 0800 by Lauren Kowalski, RN  Activity Management: activity encouraged  VTE Prevention/Management: (Lovenox) other (see comments)  Range of Motion: active ROM (range of motion) encouraged   Goal Outcome Evaluation:

## 2023-06-02 NOTE — NURSING NOTE
06/02/23 1502   Wound 05/24/23 2101 Right lateral hip Soft Tissue Necrosis   Placement Date/Time: 05/24/23 2101   Present on Hospital Admission: Yes  Side: Right  Orientation: lateral  Location: hip  Primary Wound Type: Soft Tissue Necrosis  Number of Sutures Placed: 0   Dressing Appearance dry;intact   Base granulating;non-granulating;moist;red   Periwound redness;swelling   Periwound Temperature warm   Periwound Skin Turgor soft   Edges open   Wound Length (cm) 8 cm   Wound Width (cm) 13 cm   Wound Depth (cm) 2 cm   Wound Surface Area (cm^2) 104 cm^2   Wound Volume (cm^3) 208 cm^3   Undermining [Depth (cm)/Location] 12 to 6, 5 cm at 5 o;clok   Drainage Characteristics/Odor serosanguineous   Drainage Amount small   Care, Wound cleansed with;sterile water;negative pressure wound therapy   Dressing Care dressing changed;foam   Periwound Care barrier film applied   NPWT (Negative Pressure Wound Therapy) 05/29/23 1531 right lateral hip   Placement Date/Time: 05/29/23 1531   Location: right lateral hip   Therapy Setting continuous therapy   Dressing foam, black   Pressure Setting 125 mmHg   Sponges Inserted 2   Sponges Removed 1     Wound/Ostomy: Follow up NPWT. Patient is alert, oriented, pain medication was given per floor nurse.The hospital's wound vac machine has been  disconnected, home health vac connected, without difficulty, patient tolerated well, good seal noted. Please re-consult for any additional needs.

## 2023-06-02 NOTE — PROGRESS NOTES
ID note for Necrotizing fasciitis of right buttocks    Subjective:    No fevers  She had a rough night and did not sleep well.  She denies any respiratory symptoms.  On room air during day at cpap at night.    Objective:   Temp:  [97.6 °F (36.4 °C)-99.6 °F (37.6 °C)] 98.9 °F (37.2 °C)  Heart Rate:  [81-86] 82  Resp:  [18] 18  BP: ()/(48-95) 108/64  GENERAL: Awake and alert, in no acute distress.   HEENT: Hearing is grossly normal.   LUNGS:normal respiratory effort.   SKIN: no cutaneous eruptions in exposed areas    White count 24.1, hemoglobin 9, platelets 436  Creatinine 0.82    Bcx 1/2 ConS  5/25 OR cx: Nl skin bunny and mixed anaerobes  5/31 C. difficile PCR negative    A/p  1.  Sepsis secondary #2  2.  Infected right buttocks decubitus wound with cellulitis and necrotizing soft tissue infection  3.  Diabetes mellitus type 2, uncontrolled with hyperglycemia, complicating above  4.  Coag negative staph bacteremia, likely contaminant    Her white count has been stable in the last couple days and very slowly increasing.  Overall she continues to be very stable without any clear infectious symptoms.  Her CT abdomen pelvis was pretty reassuring.  Procalcitonin is decreasing.  I am going to check duplex venography of the upper extremity and a CT chest just to complete the work-up.  Continue Augmentin.

## 2023-06-03 ENCOUNTER — APPOINTMENT (OUTPATIENT)
Dept: CARDIOLOGY | Facility: HOSPITAL | Age: 53
End: 2023-06-03
Payer: MEDICARE

## 2023-06-03 LAB
ALBUMIN SERPL-MCNC: 2.7 G/DL (ref 3.5–5.2)
ALBUMIN/GLOB SERPL: 0.7 G/DL
ALP SERPL-CCNC: 259 U/L (ref 39–117)
ALT SERPL W P-5'-P-CCNC: 13 U/L (ref 1–33)
ANION GAP SERPL CALCULATED.3IONS-SCNC: 8.8 MMOL/L (ref 5–15)
AST SERPL-CCNC: 17 U/L (ref 1–32)
BASOPHILS # BLD MANUAL: 0.21 10*3/MM3 (ref 0–0.2)
BASOPHILS NFR BLD MANUAL: 1 % (ref 0–1.5)
BH CV UPPER VENOUS LEFT AXILLARY AUGMENT: NORMAL
BH CV UPPER VENOUS LEFT AXILLARY COMPRESS: NORMAL
BH CV UPPER VENOUS LEFT AXILLARY PHASIC: NORMAL
BH CV UPPER VENOUS LEFT AXILLARY SPONT: NORMAL
BH CV UPPER VENOUS LEFT BASILIC FOREARM COMPRESS: NORMAL
BH CV UPPER VENOUS LEFT BASILIC UPPER COLOR: 1
BH CV UPPER VENOUS LEFT BASILIC UPPER COMPRESS: NORMAL
BH CV UPPER VENOUS LEFT BASILIC UPPER THROMBUS: NORMAL
BH CV UPPER VENOUS LEFT BRACHIAL COMPRESS: NORMAL
BH CV UPPER VENOUS LEFT CEPHALIC FOREARM COLOR: 1
BH CV UPPER VENOUS LEFT CEPHALIC FOREARM COMPRESS: NORMAL
BH CV UPPER VENOUS LEFT CEPHALIC FOREARM THROMBUS: NORMAL
BH CV UPPER VENOUS LEFT CEPHALIC UPPER COLOR: 1
BH CV UPPER VENOUS LEFT CEPHALIC UPPER COMPRESS: NORMAL
BH CV UPPER VENOUS LEFT CEPHALIC UPPER THROMBUS: NORMAL
BH CV UPPER VENOUS LEFT INTERNAL JUGULAR AUGMENT: NORMAL
BH CV UPPER VENOUS LEFT INTERNAL JUGULAR COMPRESS: NORMAL
BH CV UPPER VENOUS LEFT INTERNAL JUGULAR PHASIC: NORMAL
BH CV UPPER VENOUS LEFT INTERNAL JUGULAR SPONT: NORMAL
BH CV UPPER VENOUS LEFT MED CUBITAL COLOR: 1
BH CV UPPER VENOUS LEFT MED CUBITAL COMPRESS: NORMAL
BH CV UPPER VENOUS LEFT MED CUBITAL THROMBUS: NORMAL
BH CV UPPER VENOUS LEFT RADIAL COMPRESS: NORMAL
BH CV UPPER VENOUS LEFT SUBCLAVIAN AUGMENT: NORMAL
BH CV UPPER VENOUS LEFT SUBCLAVIAN COMPRESS: NORMAL
BH CV UPPER VENOUS LEFT SUBCLAVIAN PHASIC: NORMAL
BH CV UPPER VENOUS LEFT SUBCLAVIAN SPONT: NORMAL
BH CV UPPER VENOUS LEFT ULNAR COMPRESS: NORMAL
BH CV UPPER VENOUS RIGHT AXILLARY AUGMENT: NORMAL
BH CV UPPER VENOUS RIGHT AXILLARY COMPRESS: NORMAL
BH CV UPPER VENOUS RIGHT AXILLARY PHASIC: NORMAL
BH CV UPPER VENOUS RIGHT AXILLARY SPONT: NORMAL
BH CV UPPER VENOUS RIGHT BASILIC FOREARM COMPRESS: NORMAL
BH CV UPPER VENOUS RIGHT BASILIC UPPER COMPRESS: NORMAL
BH CV UPPER VENOUS RIGHT BRACHIAL COMPRESS: NORMAL
BH CV UPPER VENOUS RIGHT CEPHALIC FOREARM COLOR: 1
BH CV UPPER VENOUS RIGHT CEPHALIC FOREARM COMPRESS: NORMAL
BH CV UPPER VENOUS RIGHT CEPHALIC FOREARM THROMBUS: NORMAL
BH CV UPPER VENOUS RIGHT CEPHALIC UPPER COLOR: 1
BH CV UPPER VENOUS RIGHT CEPHALIC UPPER COMPRESS: NORMAL
BH CV UPPER VENOUS RIGHT CEPHALIC UPPER THROMBUS: NORMAL
BH CV UPPER VENOUS RIGHT INTERNAL JUGULAR AUGMENT: NORMAL
BH CV UPPER VENOUS RIGHT INTERNAL JUGULAR COMPRESS: NORMAL
BH CV UPPER VENOUS RIGHT INTERNAL JUGULAR PHASIC: NORMAL
BH CV UPPER VENOUS RIGHT INTERNAL JUGULAR SPONT: NORMAL
BH CV UPPER VENOUS RIGHT RADIAL COMPRESS: NORMAL
BH CV UPPER VENOUS RIGHT SUBCLAVIAN AUGMENT: NORMAL
BH CV UPPER VENOUS RIGHT SUBCLAVIAN COMPRESS: NORMAL
BH CV UPPER VENOUS RIGHT SUBCLAVIAN PHASIC: NORMAL
BH CV UPPER VENOUS RIGHT SUBCLAVIAN SPONT: NORMAL
BH CV UPPER VENOUS RIGHT ULNAR COMPRESS: NORMAL
BH CV VAS PRELIMINARY FINDINGS SCRIPTING: 1
BILIRUB SERPL-MCNC: 0.3 MG/DL (ref 0–1.2)
BUN SERPL-MCNC: 14 MG/DL (ref 6–20)
BUN/CREAT SERPL: 14.9 (ref 7–25)
CALCIUM SPEC-SCNC: 8.2 MG/DL (ref 8.6–10.5)
CHLORIDE SERPL-SCNC: 110 MMOL/L (ref 98–107)
CO2 SERPL-SCNC: 25.2 MMOL/L (ref 22–29)
CREAT SERPL-MCNC: 0.94 MG/DL (ref 0.57–1)
DEPRECATED RDW RBC AUTO: 44.3 FL (ref 37–54)
EGFRCR SERPLBLD CKD-EPI 2021: 73.6 ML/MIN/1.73
EOSINOPHIL # BLD MANUAL: 0.83 10*3/MM3 (ref 0–0.4)
EOSINOPHIL NFR BLD MANUAL: 4 % (ref 0.3–6.2)
ERYTHROCYTE [DISTWIDTH] IN BLOOD BY AUTOMATED COUNT: 13.2 % (ref 12.3–15.4)
GLOBULIN UR ELPH-MCNC: 3.8 GM/DL
GLUCOSE BLDC GLUCOMTR-MCNC: 109 MG/DL (ref 70–130)
GLUCOSE BLDC GLUCOMTR-MCNC: 112 MG/DL (ref 70–130)
GLUCOSE BLDC GLUCOMTR-MCNC: 142 MG/DL (ref 70–130)
GLUCOSE BLDC GLUCOMTR-MCNC: 144 MG/DL (ref 70–130)
GLUCOSE SERPL-MCNC: 144 MG/DL (ref 65–99)
HCT VFR BLD AUTO: 29.2 % (ref 34–46.6)
HGB BLD-MCNC: 9.1 G/DL (ref 12–15.9)
LYMPHOCYTES # BLD MANUAL: 3.58 10*3/MM3 (ref 0.7–3.1)
LYMPHOCYTES NFR BLD MANUAL: 2 % (ref 5–12)
MAGNESIUM SERPL-MCNC: 2.2 MG/DL (ref 1.6–2.6)
MCH RBC QN AUTO: 28.3 PG (ref 26.6–33)
MCHC RBC AUTO-ENTMCNC: 31.2 G/DL (ref 31.5–35.7)
MCV RBC AUTO: 91 FL (ref 79–97)
MONOCYTES # BLD: 0.42 10*3/MM3 (ref 0.1–0.9)
NEUTROPHILS # BLD AUTO: 15.79 10*3/MM3 (ref 1.7–7)
NEUTROPHILS NFR BLD MANUAL: 75.8 % (ref 42.7–76)
PLAT MORPH BLD: NORMAL
PLATELET # BLD AUTO: 428 10*3/MM3 (ref 140–450)
PMV BLD AUTO: 9.9 FL (ref 6–12)
POTASSIUM SERPL-SCNC: 4.8 MMOL/L (ref 3.5–5.2)
PROT SERPL-MCNC: 6.5 G/DL (ref 6–8.5)
RBC # BLD AUTO: 3.21 10*6/MM3 (ref 3.77–5.28)
RBC MORPH BLD: NORMAL
SODIUM SERPL-SCNC: 144 MMOL/L (ref 136–145)
VARIANT LYMPHS NFR BLD MANUAL: 17.2 % (ref 19.6–45.3)
WBC MORPH BLD: NORMAL
WBC NRBC COR # BLD: 20.83 10*3/MM3 (ref 3.4–10.8)

## 2023-06-03 PROCEDURE — 85007 BL SMEAR W/DIFF WBC COUNT: CPT | Performed by: INTERNAL MEDICINE

## 2023-06-03 PROCEDURE — 93970 EXTREMITY STUDY: CPT

## 2023-06-03 PROCEDURE — 63710000001 INSULIN LISPRO (HUMAN) PER 5 UNITS: Performed by: SURGERY

## 2023-06-03 PROCEDURE — 82948 REAGENT STRIP/BLOOD GLUCOSE: CPT

## 2023-06-03 PROCEDURE — 83735 ASSAY OF MAGNESIUM: CPT | Performed by: INTERNAL MEDICINE

## 2023-06-03 PROCEDURE — 85025 COMPLETE CBC W/AUTO DIFF WBC: CPT | Performed by: INTERNAL MEDICINE

## 2023-06-03 PROCEDURE — 80053 COMPREHEN METABOLIC PANEL: CPT | Performed by: INTERNAL MEDICINE

## 2023-06-03 PROCEDURE — 25010000002 ENOXAPARIN PER 10 MG: Performed by: HOSPITALIST

## 2023-06-03 PROCEDURE — 99233 SBSQ HOSP IP/OBS HIGH 50: CPT | Performed by: INTERNAL MEDICINE

## 2023-06-03 RX ADMIN — OXYCODONE HYDROCHLORIDE AND ACETAMINOPHEN 250 MG: 500 TABLET ORAL at 10:09

## 2023-06-03 RX ADMIN — LOPERAMIDE HYDROCHLORIDE 2 MG: 2 CAPSULE ORAL at 20:17

## 2023-06-03 RX ADMIN — INSULIN GLARGINE-YFGN 32 UNITS: 100 INJECTION, SOLUTION SUBCUTANEOUS at 21:00

## 2023-06-03 RX ADMIN — MAGNESIUM OXIDE 400 MG (241.3 MG MAGNESIUM) TABLET 400 MG: TABLET at 20:13

## 2023-06-03 RX ADMIN — HYDROCODONE BITARTRATE AND ACETAMINOPHEN 1 TABLET: 7.5; 325 TABLET ORAL at 14:38

## 2023-06-03 RX ADMIN — ENOXAPARIN SODIUM 40 MG: 100 INJECTION SUBCUTANEOUS at 10:09

## 2023-06-03 RX ADMIN — GABAPENTIN 300 MG: 300 CAPSULE ORAL at 20:13

## 2023-06-03 RX ADMIN — POTASSIUM CHLORIDE 20 MEQ: 750 TABLET, EXTENDED RELEASE ORAL at 17:58

## 2023-06-03 RX ADMIN — ENOXAPARIN SODIUM 40 MG: 100 INJECTION SUBCUTANEOUS at 20:12

## 2023-06-03 RX ADMIN — INSULIN LISPRO 11 UNITS: 100 INJECTION, SOLUTION INTRAVENOUS; SUBCUTANEOUS at 08:09

## 2023-06-03 RX ADMIN — POTASSIUM CHLORIDE 20 MEQ: 750 TABLET, EXTENDED RELEASE ORAL at 10:09

## 2023-06-03 RX ADMIN — AMOXICILLIN AND CLAVULANATE POTASSIUM 1 TABLET: 875; 125 TABLET, FILM COATED ORAL at 20:12

## 2023-06-03 RX ADMIN — INSULIN LISPRO 11 UNITS: 100 INJECTION, SOLUTION INTRAVENOUS; SUBCUTANEOUS at 17:58

## 2023-06-03 RX ADMIN — GABAPENTIN 300 MG: 300 CAPSULE ORAL at 10:09

## 2023-06-03 RX ADMIN — LEVOTHYROXINE SODIUM 250 MCG: 0.12 TABLET ORAL at 06:15

## 2023-06-03 RX ADMIN — AMOXICILLIN AND CLAVULANATE POTASSIUM 1 TABLET: 875; 125 TABLET, FILM COATED ORAL at 10:10

## 2023-06-03 RX ADMIN — INSULIN LISPRO 7 UNITS: 100 INJECTION, SOLUTION INTRAVENOUS; SUBCUTANEOUS at 12:55

## 2023-06-03 RX ADMIN — HYDROCODONE BITARTRATE AND ACETAMINOPHEN 1 TABLET: 7.5; 325 TABLET ORAL at 06:15

## 2023-06-03 RX ADMIN — HYDROCODONE BITARTRATE AND ACETAMINOPHEN 1 TABLET: 7.5; 325 TABLET ORAL at 20:13

## 2023-06-03 RX ADMIN — ATORVASTATIN CALCIUM 80 MG: 80 TABLET, FILM COATED ORAL at 10:09

## 2023-06-03 RX ADMIN — MAGNESIUM OXIDE 400 MG (241.3 MG MAGNESIUM) TABLET 400 MG: TABLET at 10:09

## 2023-06-03 RX ADMIN — PANTOPRAZOLE SODIUM 40 MG: 40 TABLET, DELAYED RELEASE ORAL at 06:15

## 2023-06-03 NOTE — PLAN OF CARE
Problem: Adult Inpatient Plan of Care  Goal: Plan of Care Review  Outcome: Ongoing, Progressing  Goal: Patient-Specific Goal (Individualized)  Outcome: Ongoing, Progressing  Goal: Absence of Hospital-Acquired Illness or Injury  Outcome: Ongoing, Progressing  Goal: Optimal Comfort and Wellbeing  Outcome: Ongoing, Progressing  Goal: Readiness for Transition of Care  Outcome: Ongoing, Progressing     Problem: Fall Injury Risk  Goal: Absence of Fall and Fall-Related Injury  Outcome: Ongoing, Progressing     Problem: Skin Injury Risk Increased  Goal: Skin Health and Integrity  Outcome: Ongoing, Progressing     Problem: Diabetes Comorbidity  Goal: Blood Glucose Level Within Targeted Range  Outcome: Ongoing, Progressing     Problem: Obstructive Sleep Apnea Risk or Actual Comorbidity Management  Goal: Unobstructed Breathing During Sleep  Outcome: Ongoing, Progressing     Problem: Adjustment to Illness (Sepsis/Septic Shock)  Goal: Optimal Coping  Outcome: Ongoing, Progressing     Problem: Bleeding (Sepsis/Septic Shock)  Goal: Absence of Bleeding  Outcome: Ongoing, Progressing     Problem: Glycemic Control Impaired (Sepsis/Septic Shock)  Goal: Blood Glucose Level Within Desired Range  Outcome: Ongoing, Progressing     Problem: Infection Progression (Sepsis/Septic Shock)  Goal: Absence of Infection Signs and Symptoms  Outcome: Ongoing, Progressing     Problem: Nutrition Impaired (Sepsis/Septic Shock)  Goal: Optimal Nutrition Intake  Outcome: Ongoing, Progressing   Goal Outcome Evaluation:         Patient having numerous episodes of diarrhea this evening. VSS and patient got PRN pain medication due to complaints of pain from wound vac placement.

## 2023-06-03 NOTE — PROGRESS NOTES
Name: Vanessa Root ADMIT: 2023   : 1970  PCP: Reese Batista MD    MRN: [id number] LOS: 10 days   AGE/SEX: 53 y.o. female  ROOM: [facility]     Subjective   Subjective   Patient lying comfortably in bed.  Patient without any complaints.  Patient eating well.  No fevers or chills.  White blood cell count downtrending.    Review of Systems   Constitutional:  Negative for chills and fever.   Respiratory:  Negative for cough and shortness of breath.    Cardiovascular:  Negative for chest pain and leg swelling.   Gastrointestinal:  Negative for abdominal pain, diarrhea and nausea.   As above     Objective   Objective   Vital Signs  Temp:  [97.8 °F (36.6 °C)-99.4 °F (37.4 °C)] 97.8 °F (36.6 °C)  Heart Rate:  [75-94] 75  Resp:  [17-18] 17  BP: (120-137)/(71-85) 125/74  SpO2:  [90 %-97 %] 97 %  on  Flow (L/min):  [2-3] 2;   Device (Oxygen Therapy): nasal cannula  Body mass index is 46.29 kg/m².  Physical Exam  Constitutional:       General: She is not in acute distress.     Appearance: She is obese. She is not diaphoretic.   HENT:      Mouth/Throat:      Mouth: Mucous membranes are moist.      Pharynx: Oropharynx is clear.   Cardiovascular:      Rate and Rhythm: Normal rate and regular rhythm.   Pulmonary:      Effort: Pulmonary effort is normal. No respiratory distress.      Breath sounds: No wheezing.   Abdominal:      General: Abdomen is flat. There is no distension.      Palpations: Abdomen is soft.   Musculoskeletal:         General: No swelling or deformity. Normal range of motion.   Skin:     Comments: Wound VAC on gluteal wound   Neurological:      General: No focal deficit present.      Mental Status: She is alert. Mental status is at baseline.       Results Review     I reviewed the patient's new clinical results.  Results from last 7 days   Lab Units 23  0643 23  0620 23  0453 23  0702   WBC 10*3/mm3 20.83* 24.12* 23.36* 22.80*   HEMOGLOBIN g/dL 9.1* 9.0* 9.4* 9.3*    PLATELETS 10*3/mm3 428 436 440 475*     Results from last 7 days   Lab Units 06/03/23  0643 06/02/23 0620 06/01/23 0454 05/31/23  0702   SODIUM mmol/L 144 142 141 142   POTASSIUM mmol/L 4.8 4.4 4.6 4.3   CHLORIDE mmol/L 110* 107 108* 108*   CO2 mmol/L 25.2 26.7 27.0 25.9   BUN mg/dL 14 9 7 7   CREATININE mg/dL 0.94 0.82 0.78 0.94   GLUCOSE mg/dL 144* 127* 167* 236*   Estimated Creatinine Clearance: 94.6 mL/min (by C-G formula based on SCr of 0.94 mg/dL).  Results from last 7 days   Lab Units 06/03/23 0643 06/02/23 0620 06/01/23 0454 05/31/23  0702   ALBUMIN g/dL 2.7* 2.7* 2.7* 2.9*   BILIRUBIN mg/dL 0.3 0.3 0.3 0.3   ALK PHOS U/L 259* 263* 249* 265*   AST (SGOT) U/L 17 19 19 22   ALT (SGPT) U/L 13 13 12 12     Results from last 7 days   Lab Units 06/03/23 0643 06/02/23 0620 06/01/23 0454 05/31/23  0702   CALCIUM mg/dL 8.2* 8.1* 7.8* 8.3*   ALBUMIN g/dL 2.7* 2.7* 2.7* 2.9*   MAGNESIUM mg/dL 2.2 2.3 2.4 2.4     Results from last 7 days   Lab Units 06/01/23 0454 05/31/23  1117 05/31/23  0702   PROCALCITONIN ng/mL 0.37*  --  0.44*   LACTATE mmol/L  --  1.8  --    No results found for: COVID19  Glucose   Date/Time Value Ref Range Status   06/03/2023 1113 144 (H) 70 - 130 mg/dL Final     Comment:     Meter: [id number] : [id number] Audrey SHARMA   06/03/2023 0755 142 (H) 70 - 130 mg/dL Final     Comment:     Meter: [id number] : [id number] Charuclay Rga    06/02/2023 2211 119 70 - 130 mg/dL Final     Comment:     Meter: [id number] : [email or web address] Cornelius Burnette RN   06/02/2023 1821 138 (H) 70 - 130 mg/dL Final     Comment:     Meter: [id number] : [id number] Dex Del Castillo RN   06/02/2023 1315 163 (H) 70 - 130 mg/dL Final     Comment:     Meter: [id number] : [id number] Dex Del Castillo RN   06/02/2023 0821 133 (H) 70 - 130 mg/dL Final     Comment:     Meter: [id number] : [id number] Dex Del Castillo RN   06/01/2023 2155 194 (H) 70 - 130 mg/dL Final     Comment:     Meter: [id number]  : [id number] Estiven Serrano RN       Duplex Venous Upper Extremity - Bilateral CAR    Acute right upper extremity superficial thrombophlebitis noted in the   cephalic (upper arm) and cephalic (forearm).    Acute left upper extremity superficial thrombophlebitis noted in the   basilic (upper arm), cephalic (upper arm), cephalic (forearm) and median   cubital.    All other vessels appear normal.    I reviewed the patient's daily medications.  Scheduled Medications  amoxicillin-clavulanate, 1 tablet, Oral, Q12H  vitamin C, 250 mg, Oral, Daily  atorvastatin, 80 mg, Oral, Daily  enoxaparin, 40 mg, Subcutaneous, Q12H  gabapentin, 300 mg, Oral, Q12H  insulin glargine, 1-200 Units, Subcutaneous, Nightly - Glucommander  insulin lispro, 1-200 Units, Subcutaneous, 4x Daily With Meals & Nightly  levothyroxine, 250 mcg, Oral, Q AM  magnesium oxide, 400 mg, Oral, BID  pantoprazole, 40 mg, Oral, Q AM  potassium chloride, 20 mEq, Oral, BID With Meals    Infusions   Diet  Diet: Diabetic Diets; Consistent Carbohydrate; Texture: Regular Texture (IDDSI 7); Fluid Consistency: Thin (IDDSI 0)         I have personally reviewed:  [x]  Laboratory   [x]  Microbiology   [x]  Radiology   []  EKG/Telemetry   []  Cardiology/Vascular   []  Pathology   [x]  Records     Assessment/Plan     Active Hospital Problems    Diagnosis  POA    **Necrotizing soft tissue infection [M79.89]  Yes    Fasciitis [M72.9]  Yes    Hypokalemia [E87.6]  Yes    Hypomagnesemia [E83.42]  Yes    Immobility syndrome [M62.3]  Yes    Sepsis, due to unspecified organism, unspecified whether acute organ dysfunction present [A41.9]  Yes    Diabetes mellitus [E11.9]  Yes    Spinal stenosis of lumbar region [M48.061]  Yes    Diabetic peripheral neuropathy [E11.42]  Yes    Obstructive sleep apnea syndrome [G47.33]  Yes    Hypothyroidism [E03.9]  Yes      Resolved Hospital Problems   No resolved problems to display.       53 y.o. female admitted with Necrotizing soft tissue  infection.    Right necrotic gluteal decubitus with necrotizing fasciitis with sepsis  Left lower lobe aspiration pneumonia  Bilateral superficial thrombophlebitis of upper extremities, no DVT  -s/p debridement 5/25  -Leukocytosis downtrending  -CT chest with patchy consolidation in the left lower lobe.  Recommend follow-up imaging to ensure resolution.  -Discussed with infectious disease, continue Augmentin    Immobility secondary to spinal stenosis and peripheral neuropathy     Type 2 diabetes  -Patient is on Glucomander  -Blood sugars acceptable     Hypokalemia and hypomagnesemia  -Have been replaced     Hypothyroidism  -Continue with Synthroid at higher dose  -tsh 22, she was off thyroid meds x 3 months prior to admission     CKD stage II  -Stable          Adenopathy  -Follow-up CT scan of the abdomen and pelvis in 6 months     Neuropathy  -patient is asking that neurontin be reorder on discharge. Nagi reviewed, no Neurontin noted.    Pharmacy to dose Lovenox for DVT prophylaxis.  Full code.  Discussed with patient, nursing staff, and consulting provider.  Anticipate discharge home  Monday when she gets her hospital bed.    Expected Discharge Date: 6/3/2023; Expected Discharge Time:       Yohannes Bey MD  [facility]  06/03/23  11:40 EDT

## 2023-06-03 NOTE — PLAN OF CARE
Goal Outcome Evaluation:  Plan of Care Reviewed With: patient        Progress: no change     Patient A & O x 4. Makes needs known. Continues with wound vac. PRN Norco given earlier. Continues with glucomander. No s/s of further pain or distress noted.

## 2023-06-03 NOTE — PROGRESS NOTES
ID note for Necrotizing fasciitis of right buttocks    Subjective:    No fevers  She had a rough night and did not sleep well.  She denies any respiratory symptoms.  On room air during day at cpap at night.    Objective:   Temp:  [97.8 °F (36.6 °C)-99.4 °F (37.4 °C)] 97.8 °F (36.6 °C)  Heart Rate:  [75-94] 75  Resp:  [17-18] 17  BP: (120-137)/(71-85) 125/74  GENERAL: Awake and alert, in no acute distress.   HEENT: Hearing is grossly normal.   LUNGS:normal respiratory effort.   SKIN: no cutaneous eruptions in exposed areas    White count white count 20.83, hemoglobin 9.1, platelets 428  Creatinine 0.94  Glucose 119 - 163    Bcx 1/2 ConS  5/25 OR cx: Nl skin bunny and mixed anaerobes  5/31 C. difficile PCR negative    A/p  1.  Sepsis secondary #2  2.  Infected right buttocks decubitus wound with cellulitis and necrotizing soft tissue infection  3.  Diabetes mellitus type 2, uncontrolled with hyperglycemia, complicating above  4.  Coag negative staph bacteremia, likely contaminant  5.  Left lower lobe aspiration pneumonia    CT chest personally reviewed does appear to show some left lower lobe infiltrate and she probably had an aspiration pneumonia that was responsible for the leukocytosis.  This seems to be improving as she is afebrile.  Her white count is down finally.  We will continue the Augmentin.   The duplex venography of the upper extremity showed no DVT but shows some superficial venous thrombophlebitis  Discussed with Dr. Bey, appreciate his help

## 2023-06-04 LAB
ALBUMIN SERPL-MCNC: 2.6 G/DL (ref 3.5–5.2)
ALBUMIN/GLOB SERPL: 0.6 G/DL
ALP SERPL-CCNC: 265 U/L (ref 39–117)
ALT SERPL W P-5'-P-CCNC: 15 U/L (ref 1–33)
ANION GAP SERPL CALCULATED.3IONS-SCNC: 6 MMOL/L (ref 5–15)
AST SERPL-CCNC: 19 U/L (ref 1–32)
BASOPHILS # BLD MANUAL: 0.17 10*3/MM3 (ref 0–0.2)
BASOPHILS NFR BLD MANUAL: 1 % (ref 0–1.5)
BILIRUB SERPL-MCNC: 0.3 MG/DL (ref 0–1.2)
BUN SERPL-MCNC: 12 MG/DL (ref 6–20)
BUN/CREAT SERPL: 12.6 (ref 7–25)
CALCIUM SPEC-SCNC: 8.9 MG/DL (ref 8.6–10.5)
CHLORIDE SERPL-SCNC: 107 MMOL/L (ref 98–107)
CO2 SERPL-SCNC: 26 MMOL/L (ref 22–29)
CREAT SERPL-MCNC: 0.95 MG/DL (ref 0.57–1)
DEPRECATED RDW RBC AUTO: 43.6 FL (ref 37–54)
EGFRCR SERPLBLD CKD-EPI 2021: 72.7 ML/MIN/1.73
EOSINOPHIL # BLD MANUAL: 0.34 10*3/MM3 (ref 0–0.4)
EOSINOPHIL NFR BLD MANUAL: 2 % (ref 0.3–6.2)
ERYTHROCYTE [DISTWIDTH] IN BLOOD BY AUTOMATED COUNT: 13.4 % (ref 12.3–15.4)
GLOBULIN UR ELPH-MCNC: 4.2 GM/DL
GLUCOSE BLDC GLUCOMTR-MCNC: 109 MG/DL (ref 70–130)
GLUCOSE BLDC GLUCOMTR-MCNC: 119 MG/DL (ref 70–130)
GLUCOSE BLDC GLUCOMTR-MCNC: 121 MG/DL (ref 70–130)
GLUCOSE BLDC GLUCOMTR-MCNC: 126 MG/DL (ref 70–130)
GLUCOSE BLDC GLUCOMTR-MCNC: 137 MG/DL (ref 70–130)
GLUCOSE BLDC GLUCOMTR-MCNC: 138 MG/DL (ref 70–130)
GLUCOSE BLDC GLUCOMTR-MCNC: 169 MG/DL (ref 70–130)
GLUCOSE SERPL-MCNC: 122 MG/DL (ref 65–99)
HCT VFR BLD AUTO: 28.3 % (ref 34–46.6)
HGB BLD-MCNC: 9.1 G/DL (ref 12–15.9)
LYMPHOCYTES # BLD MANUAL: 2.76 10*3/MM3 (ref 0.7–3.1)
LYMPHOCYTES NFR BLD MANUAL: 3 % (ref 5–12)
MAGNESIUM SERPL-MCNC: 2.1 MG/DL (ref 1.6–2.6)
MCH RBC QN AUTO: 29.1 PG (ref 26.6–33)
MCHC RBC AUTO-ENTMCNC: 32.2 G/DL (ref 31.5–35.7)
MCV RBC AUTO: 90.4 FL (ref 79–97)
METAMYELOCYTES NFR BLD MANUAL: 2 % (ref 0–0)
MONOCYTES # BLD: 0.51 10*3/MM3 (ref 0.1–0.9)
MYELOCYTES NFR BLD MANUAL: 3 % (ref 0–0)
NEUTROPHILS # BLD AUTO: 12.4 10*3/MM3 (ref 1.7–7)
NEUTROPHILS NFR BLD MANUAL: 72.7 % (ref 42.7–76)
PLAT MORPH BLD: NORMAL
PLATELET # BLD AUTO: 411 10*3/MM3 (ref 140–450)
PMV BLD AUTO: 9.8 FL (ref 6–12)
POTASSIUM SERPL-SCNC: 4.8 MMOL/L (ref 3.5–5.2)
PROT SERPL-MCNC: 6.8 G/DL (ref 6–8.5)
RBC # BLD AUTO: 3.13 10*6/MM3 (ref 3.77–5.28)
RBC MORPH BLD: NORMAL
SODIUM SERPL-SCNC: 139 MMOL/L (ref 136–145)
VARIANT LYMPHS NFR BLD MANUAL: 16.2 % (ref 19.6–45.3)
WBC MORPH BLD: NORMAL
WBC NRBC COR # BLD: 17.05 10*3/MM3 (ref 3.4–10.8)

## 2023-06-04 PROCEDURE — 85025 COMPLETE CBC W/AUTO DIFF WBC: CPT | Performed by: INTERNAL MEDICINE

## 2023-06-04 PROCEDURE — 85007 BL SMEAR W/DIFF WBC COUNT: CPT | Performed by: INTERNAL MEDICINE

## 2023-06-04 PROCEDURE — 25010000002 ONDANSETRON PER 1 MG: Performed by: SURGERY

## 2023-06-04 PROCEDURE — 25010000002 ENOXAPARIN PER 10 MG: Performed by: HOSPITALIST

## 2023-06-04 PROCEDURE — 80053 COMPREHEN METABOLIC PANEL: CPT | Performed by: INTERNAL MEDICINE

## 2023-06-04 PROCEDURE — 83735 ASSAY OF MAGNESIUM: CPT | Performed by: INTERNAL MEDICINE

## 2023-06-04 PROCEDURE — 82948 REAGENT STRIP/BLOOD GLUCOSE: CPT

## 2023-06-04 PROCEDURE — 99232 SBSQ HOSP IP/OBS MODERATE 35: CPT | Performed by: INTERNAL MEDICINE

## 2023-06-04 PROCEDURE — 63710000001 INSULIN LISPRO (HUMAN) PER 5 UNITS: Performed by: SURGERY

## 2023-06-04 RX ORDER — HYDROXYZINE HYDROCHLORIDE 25 MG/1
25 TABLET, FILM COATED ORAL 3 TIMES DAILY PRN
Status: DISCONTINUED | OUTPATIENT
Start: 2023-06-04 | End: 2023-06-06 | Stop reason: HOSPADM

## 2023-06-04 RX ADMIN — ATORVASTATIN CALCIUM 80 MG: 80 TABLET, FILM COATED ORAL at 08:30

## 2023-06-04 RX ADMIN — ENOXAPARIN SODIUM 40 MG: 100 INJECTION SUBCUTANEOUS at 20:05

## 2023-06-04 RX ADMIN — INSULIN LISPRO 12 UNITS: 100 INJECTION, SOLUTION INTRAVENOUS; SUBCUTANEOUS at 17:09

## 2023-06-04 RX ADMIN — MAGNESIUM OXIDE 400 MG (241.3 MG MAGNESIUM) TABLET 400 MG: TABLET at 08:30

## 2023-06-04 RX ADMIN — INSULIN LISPRO 4 UNITS: 100 INJECTION, SOLUTION INTRAVENOUS; SUBCUTANEOUS at 12:26

## 2023-06-04 RX ADMIN — PANTOPRAZOLE SODIUM 40 MG: 40 TABLET, DELAYED RELEASE ORAL at 06:21

## 2023-06-04 RX ADMIN — INSULIN GLARGINE-YFGN 29 UNITS: 100 INJECTION, SOLUTION SUBCUTANEOUS at 22:08

## 2023-06-04 RX ADMIN — AMOXICILLIN AND CLAVULANATE POTASSIUM 1 TABLET: 875; 125 TABLET, FILM COATED ORAL at 20:05

## 2023-06-04 RX ADMIN — LOPERAMIDE HYDROCHLORIDE 2 MG: 2 CAPSULE ORAL at 13:22

## 2023-06-04 RX ADMIN — GABAPENTIN 300 MG: 300 CAPSULE ORAL at 08:29

## 2023-06-04 RX ADMIN — HYDROCODONE BITARTRATE AND ACETAMINOPHEN 1 TABLET: 7.5; 325 TABLET ORAL at 03:13

## 2023-06-04 RX ADMIN — HYDROCODONE BITARTRATE AND ACETAMINOPHEN 1 TABLET: 7.5; 325 TABLET ORAL at 10:33

## 2023-06-04 RX ADMIN — HYDROXYZINE HYDROCHLORIDE 25 MG: 25 TABLET ORAL at 13:22

## 2023-06-04 RX ADMIN — POTASSIUM CHLORIDE 20 MEQ: 750 TABLET, EXTENDED RELEASE ORAL at 08:29

## 2023-06-04 RX ADMIN — INSULIN LISPRO 11 UNITS: 100 INJECTION, SOLUTION INTRAVENOUS; SUBCUTANEOUS at 08:30

## 2023-06-04 RX ADMIN — AMOXICILLIN AND CLAVULANATE POTASSIUM 1 TABLET: 875; 125 TABLET, FILM COATED ORAL at 08:30

## 2023-06-04 RX ADMIN — LEVOTHYROXINE SODIUM 250 MCG: 0.12 TABLET ORAL at 06:21

## 2023-06-04 RX ADMIN — POTASSIUM CHLORIDE 20 MEQ: 750 TABLET, EXTENDED RELEASE ORAL at 17:12

## 2023-06-04 RX ADMIN — GABAPENTIN 300 MG: 300 CAPSULE ORAL at 20:05

## 2023-06-04 RX ADMIN — ENOXAPARIN SODIUM 40 MG: 100 INJECTION SUBCUTANEOUS at 08:30

## 2023-06-04 RX ADMIN — ONDANSETRON 4 MG: 2 INJECTION INTRAMUSCULAR; INTRAVENOUS at 10:45

## 2023-06-04 RX ADMIN — OXYCODONE HYDROCHLORIDE AND ACETAMINOPHEN 250 MG: 500 TABLET ORAL at 08:31

## 2023-06-04 NOTE — PLAN OF CARE
Problem: Adult Inpatient Plan of Care  Goal: Plan of Care Review  Outcome: Ongoing, Progressing  Goal: Patient-Specific Goal (Individualized)  Outcome: Ongoing, Progressing  Goal: Absence of Hospital-Acquired Illness or Injury  Outcome: Ongoing, Progressing  Goal: Optimal Comfort and Wellbeing  Outcome: Ongoing, Progressing  Goal: Readiness for Transition of Care  Outcome: Ongoing, Progressing     Problem: Fall Injury Risk  Goal: Absence of Fall and Fall-Related Injury  Outcome: Ongoing, Progressing     Problem: Skin Injury Risk Increased  Goal: Skin Health and Integrity  Outcome: Ongoing, Progressing     Problem: Diabetes Comorbidity  Goal: Blood Glucose Level Within Targeted Range  Outcome: Ongoing, Progressing     Problem: Obstructive Sleep Apnea Risk or Actual Comorbidity Management  Goal: Unobstructed Breathing During Sleep  Outcome: Ongoing, Progressing     Problem: Adjustment to Illness (Sepsis/Septic Shock)  Goal: Optimal Coping  Outcome: Ongoing, Progressing     Problem: Bleeding (Sepsis/Septic Shock)  Goal: Absence of Bleeding  Outcome: Ongoing, Progressing     Problem: Glycemic Control Impaired (Sepsis/Septic Shock)  Goal: Blood Glucose Level Within Desired Range  Outcome: Ongoing, Progressing     Problem: Infection Progression (Sepsis/Septic Shock)  Goal: Absence of Infection Signs and Symptoms  Outcome: Ongoing, Progressing     Problem: Nutrition Impaired (Sepsis/Septic Shock)  Goal: Optimal Nutrition Intake  Outcome: Ongoing, Progressing   Goal Outcome Evaluation:

## 2023-06-04 NOTE — PLAN OF CARE
Goal Outcome Evaluation:  Plan of Care Reviewed With: patient        Progress: no change     Patient continues with wound vac. Makes needs known. PRN pain medication given and was effective. Turned and repositioned by staff. No further c/o pain. No s/s of distress noted.

## 2023-06-04 NOTE — PROGRESS NOTES
ID note for Necrotizing fasciitis of right buttocks    Subjective:    No fevers  She has some irritation from the wound VAC but no hip pain.  On room air.    Objective:   Temp:  [97.6 °F (36.4 °C)-99.2 °F (37.3 °C)] 97.6 °F (36.4 °C)  Heart Rate:  [78-85] 80  Resp:  [18] 18  BP: ()/(65-87) 144/75  GENERAL: Awake and alert, in no acute distress.   HEENT: Hearing is grossly normal.   LUNGS:normal respiratory effort.   SKIN: no cutaneous eruptions in exposed areas    White count 17, hemoglobin 9.1, platelets 411  Creatinine 0.95  Glucose 109 through 144  Bcx 1/2 ConS  5/25 OR cx: Nl skin bunny and mixed anaerobes  5/31 C. difficile PCR negative    A/p  1.  Sepsis secondary #2  2.  Infected right buttocks decubitus wound with cellulitis and necrotizing soft tissue infection  3.  Diabetes mellitus type 2, uncontrolled with hyperglycemia, complicating above  4.  Coag negative staph bacteremia, likely contaminant  5.  Left lower lobe aspiration pneumonia    White count continues to improve.  Overall making good progress with no concern for renegade sepsis.  We will discontinue Augmentin after today's doses.  The duplex venography of the upper extremity showed no DVT but shows some superficial venous thrombophlebitis

## 2023-06-04 NOTE — PROGRESS NOTES
Name: Vanessa Root ADMIT: 2023   : 1970  PCP: Reese Batista MD    MRN: [id number] LOS: 11 days   AGE/SEX: 53 y.o. female  ROOM: [facility]     Subjective   Subjective   Patient lying comfortably in bed.  Patient without any complaints.  No fevers or chills.  Patient eating well.  White blood cell count continues to downtrend.    Review of Systems   Constitutional:  Negative for chills and fever.   Respiratory:  Negative for cough and shortness of breath.    Cardiovascular:  Negative for chest pain and leg swelling.   Gastrointestinal:  Negative for abdominal pain, diarrhea and nausea.   As above     Objective   Objective   Vital Signs  Temp:  [97.6 °F (36.4 °C)-99.2 °F (37.3 °C)] 97.6 °F (36.4 °C)  Heart Rate:  [78-85] 80  Resp:  [18] 18  BP: ()/(65-87) 144/75  SpO2:  [92 %-96 %] 96 %  on  Flow (L/min):  [1] 1;   Device (Oxygen Therapy): room air  Body mass index is 45.71 kg/m².  Physical Exam  Constitutional:       General: She is not in acute distress.     Appearance: She is obese. She is not diaphoretic.   HENT:      Mouth/Throat:      Mouth: Mucous membranes are moist.      Pharynx: Oropharynx is clear.   Cardiovascular:      Rate and Rhythm: Normal rate and regular rhythm.   Pulmonary:      Effort: Pulmonary effort is normal. No respiratory distress.      Breath sounds: No wheezing.   Abdominal:      General: Abdomen is flat. There is no distension.      Palpations: Abdomen is soft.   Musculoskeletal:         General: No swelling or deformity. Normal range of motion.   Skin:     Comments: Wound VAC on gluteal wound   Neurological:      General: No focal deficit present.      Mental Status: She is alert. Mental status is at baseline.       Results Review     I reviewed the patient's new clinical results.  Results from last 7 days   Lab Units 23  0621 23  0643 23  0620 23  0453   WBC 10*3/mm3 17.05* 20.83* 24.12* 23.36*   HEMOGLOBIN g/dL 9.1* 9.1* 9.0* 9.4*    PLATELETS 10*3/mm3 411 428 436 440       Results from last 7 days   Lab Units 06/04/23  0621 06/03/23  0643 06/02/23  0620 06/01/23  0454   SODIUM mmol/L 139 144 142 141   POTASSIUM mmol/L 4.8 4.8 4.4 4.6   CHLORIDE mmol/L 107 110* 107 108*   CO2 mmol/L 26.0 25.2 26.7 27.0   BUN mg/dL 12 14 9 7   CREATININE mg/dL 0.95 0.94 0.82 0.78   GLUCOSE mg/dL 122* 144* 127* 167*     Estimated Creatinine Clearance: 93.1 mL/min (by C-G formula based on SCr of 0.95 mg/dL).  Results from last 7 days   Lab Units 06/04/23  0621 06/03/23 0643 06/02/23 0620 06/01/23  0454   ALBUMIN g/dL 2.6* 2.7* 2.7* 2.7*   BILIRUBIN mg/dL 0.3 0.3 0.3 0.3   ALK PHOS U/L 265* 259* 263* 249*   AST (SGOT) U/L 19 17 19 19   ALT (SGPT) U/L 15 13 13 12       Results from last 7 days   Lab Units 06/04/23  0621 06/03/23 0643 06/02/23 0620 06/01/23  0454   CALCIUM mg/dL 8.9 8.2* 8.1* 7.8*   ALBUMIN g/dL 2.6* 2.7* 2.7* 2.7*   MAGNESIUM mg/dL 2.1 2.2 2.3 2.4       Results from last 7 days   Lab Units 06/01/23  0454 05/31/23  1117 05/31/23  0702   PROCALCITONIN ng/mL 0.37*  --  0.44*   LACTATE mmol/L  --  1.8  --      No results found for: COVID19  Glucose   Date/Time Value Ref Range Status   06/04/2023 0750 126 70 - 130 mg/dL Final     Comment:     Meter: [id number] : [id number] Elizabet Reis NA   06/04/2023 0554 119 70 - 130 mg/dL Final     Comment:     Meter: [id number] : [id number] Fortino Granda NA   06/03/2023 2050 112 70 - 130 mg/dL Final     Comment:     Meter: [id number] : [id number] Fortino Jesus Alberto NA   06/03/2023 1626 109 70 - 130 mg/dL Final     Comment:     Meter: [id number] : [id number] Labial Rao Billings NA   06/03/2023 1113 144 (H) 70 - 130 mg/dL Final     Comment:     Meter: [id number] : [id number] Labial Rao Billings NA   06/03/2023 0755 142 (H) 70 - 130 mg/dL Final     Comment:     Meter: [id number] : [id number] Charu Marilyn NA   06/02/2023 2211 119 70 - 130 mg/dL Final     Comment:     Meter: [id number] :  [email or web address] Cornelius Burnette RN       Duplex Venous Upper Extremity - Bilateral CAR    Acute right upper extremity superficial thrombophlebitis noted in the   cephalic (upper arm) and cephalic (forearm).    Acute left upper extremity superficial thrombophlebitis noted in the   basilic (upper arm), cephalic (upper arm), cephalic (forearm) and median   cubital.    All other vessels appear normal.    I reviewed the patient's daily medications.  Scheduled Medications  amoxicillin-clavulanate, 1 tablet, Oral, Q12H  vitamin C, 250 mg, Oral, Daily  atorvastatin, 80 mg, Oral, Daily  enoxaparin, 40 mg, Subcutaneous, Q12H  gabapentin, 300 mg, Oral, Q12H  insulin glargine, 1-200 Units, Subcutaneous, Nightly - Glucommander  insulin lispro, 1-200 Units, Subcutaneous, 4x Daily With Meals & Nightly  levothyroxine, 250 mcg, Oral, Q AM  pantoprazole, 40 mg, Oral, Q AM  potassium chloride, 20 mEq, Oral, BID With Meals    Infusions   Diet  Diet: Diabetic Diets; Consistent Carbohydrate; Texture: Regular Texture (IDDSI 7); Fluid Consistency: Thin (IDDSI 0)         I have personally reviewed:  [x]  Laboratory   [x]  Microbiology   [x]  Radiology   []  EKG/Telemetry   []  Cardiology/Vascular   []  Pathology   [x]  Records     Assessment/Plan     Active Hospital Problems    Diagnosis  POA    **Necrotizing soft tissue infection [M79.89]  Yes    Fasciitis [M72.9]  Yes    Hypokalemia [E87.6]  Yes    Hypomagnesemia [E83.42]  Yes    Immobility syndrome [M62.3]  Yes    Sepsis, due to unspecified organism, unspecified whether acute organ dysfunction present [A41.9]  Yes    Diabetes mellitus [E11.9]  Yes    Spinal stenosis of lumbar region [M48.061]  Yes    Diabetic peripheral neuropathy [E11.42]  Yes    Obstructive sleep apnea syndrome [G47.33]  Yes    Hypothyroidism [E03.9]  Yes      Resolved Hospital Problems   No resolved problems to display.       53 y.o. female admitted with Necrotizing soft tissue infection.    Right necrotic gluteal decubitus  with necrotizing fasciitis with sepsis  Left lower lobe aspiration pneumonia  Bilateral superficial thrombophlebitis of upper extremities, no DVT  -s/p debridement 5/25  -Leukocytosis continues to downtrend  -CT chest with patchy consolidation in the left lower lobe.  Recommend follow-up imaging to ensure resolution.  -Discussed with infectious disease, continue Augmentin    Immobility secondary to spinal stenosis and peripheral neuropathy     Type 2 diabetes  -Patient is on Glucomander  -Blood sugars acceptable     Hypokalemia and hypomagnesemia  -Have been replaced     Hypothyroidism  -Continue with Synthroid at higher dose  -tsh 22, she was off thyroid meds x 3 months prior to admission     CKD stage II  -Stable    Anxiety-alprazolam worked well for her.  However have changed her to hydroxyzine and she has requested hydroxyzine prescription on discharge     Adenopathy  -Follow-up CT scan of the abdomen and pelvis in 6 months     Neuropathy  -patient is asking that neurontin be reorder on discharge. Nagi reviewed, no Neurontin noted.  Discussed with patient and states that she has been was on this in the past and it really helped her neuropathy.    Pharmacy to dose Lovenox for DVT prophylaxis.  Full code.  Discussed with patient, nursing staff, and consulting provider.  Anticipate discharge home  Monday when she gets her hospital bed.    Expected Discharge Date: 6/3/2023; Expected Discharge Time:       Yohannes Bey MD  [facility]  06/04/23  10:32 EDT

## 2023-06-05 LAB
ALBUMIN SERPL-MCNC: 2.8 G/DL (ref 3.5–5.2)
ALBUMIN/GLOB SERPL: 0.7 G/DL
ALP SERPL-CCNC: 268 U/L (ref 39–117)
ALT SERPL W P-5'-P-CCNC: 14 U/L (ref 1–33)
ANION GAP SERPL CALCULATED.3IONS-SCNC: 11 MMOL/L (ref 5–15)
AST SERPL-CCNC: 24 U/L (ref 1–32)
BASOPHILS # BLD AUTO: 0.09 10*3/MM3 (ref 0–0.2)
BASOPHILS NFR BLD AUTO: 0.6 % (ref 0–1.5)
BILIRUB SERPL-MCNC: 0.3 MG/DL (ref 0–1.2)
BUN SERPL-MCNC: 11 MG/DL (ref 6–20)
BUN/CREAT SERPL: 13.3 (ref 7–25)
CALCIUM SPEC-SCNC: 9.1 MG/DL (ref 8.6–10.5)
CHLORIDE SERPL-SCNC: 104 MMOL/L (ref 98–107)
CO2 SERPL-SCNC: 25 MMOL/L (ref 22–29)
CREAT SERPL-MCNC: 0.83 MG/DL (ref 0.57–1)
DEPRECATED RDW RBC AUTO: 43.5 FL (ref 37–54)
EGFRCR SERPLBLD CKD-EPI 2021: 85.5 ML/MIN/1.73
EOSINOPHIL # BLD AUTO: 0.57 10*3/MM3 (ref 0–0.4)
EOSINOPHIL NFR BLD AUTO: 3.6 % (ref 0.3–6.2)
ERYTHROCYTE [DISTWIDTH] IN BLOOD BY AUTOMATED COUNT: 13.1 % (ref 12.3–15.4)
GLOBULIN UR ELPH-MCNC: 4 GM/DL
GLUCOSE BLDC GLUCOMTR-MCNC: 111 MG/DL (ref 70–130)
GLUCOSE BLDC GLUCOMTR-MCNC: 117 MG/DL (ref 70–130)
GLUCOSE BLDC GLUCOMTR-MCNC: 122 MG/DL (ref 70–130)
GLUCOSE BLDC GLUCOMTR-MCNC: 146 MG/DL (ref 70–130)
GLUCOSE BLDC GLUCOMTR-MCNC: 97 MG/DL (ref 70–130)
GLUCOSE SERPL-MCNC: 109 MG/DL (ref 65–99)
HCT VFR BLD AUTO: 29.6 % (ref 34–46.6)
HGB BLD-MCNC: 9.5 G/DL (ref 12–15.9)
IMM GRANULOCYTES # BLD AUTO: 0.73 10*3/MM3 (ref 0–0.05)
IMM GRANULOCYTES NFR BLD AUTO: 4.6 % (ref 0–0.5)
LYMPHOCYTES # BLD AUTO: 3.35 10*3/MM3 (ref 0.7–3.1)
LYMPHOCYTES NFR BLD AUTO: 21 % (ref 19.6–45.3)
MAGNESIUM SERPL-MCNC: 1.9 MG/DL (ref 1.6–2.6)
MCH RBC QN AUTO: 28.9 PG (ref 26.6–33)
MCHC RBC AUTO-ENTMCNC: 32.1 G/DL (ref 31.5–35.7)
MCV RBC AUTO: 90 FL (ref 79–97)
MONOCYTES # BLD AUTO: 0.61 10*3/MM3 (ref 0.1–0.9)
MONOCYTES NFR BLD AUTO: 3.8 % (ref 5–12)
NEUTROPHILS NFR BLD AUTO: 10.57 10*3/MM3 (ref 1.7–7)
NEUTROPHILS NFR BLD AUTO: 66.4 % (ref 42.7–76)
NRBC BLD AUTO-RTO: 0.3 /100 WBC (ref 0–0.2)
PLATELET # BLD AUTO: 409 10*3/MM3 (ref 140–450)
PMV BLD AUTO: 9.8 FL (ref 6–12)
POTASSIUM SERPL-SCNC: 4.7 MMOL/L (ref 3.5–5.2)
PROT SERPL-MCNC: 6.8 G/DL (ref 6–8.5)
RBC # BLD AUTO: 3.29 10*6/MM3 (ref 3.77–5.28)
SODIUM SERPL-SCNC: 140 MMOL/L (ref 136–145)
WBC NRBC COR # BLD: 15.92 10*3/MM3 (ref 3.4–10.8)

## 2023-06-05 PROCEDURE — 63710000001 INSULIN LISPRO (HUMAN) PER 5 UNITS: Performed by: SURGERY

## 2023-06-05 PROCEDURE — 85025 COMPLETE CBC W/AUTO DIFF WBC: CPT | Performed by: INTERNAL MEDICINE

## 2023-06-05 PROCEDURE — 99232 SBSQ HOSP IP/OBS MODERATE 35: CPT | Performed by: INTERNAL MEDICINE

## 2023-06-05 PROCEDURE — 25010000002 ENOXAPARIN PER 10 MG: Performed by: HOSPITALIST

## 2023-06-05 PROCEDURE — 82948 REAGENT STRIP/BLOOD GLUCOSE: CPT

## 2023-06-05 PROCEDURE — 80053 COMPREHEN METABOLIC PANEL: CPT | Performed by: INTERNAL MEDICINE

## 2023-06-05 PROCEDURE — 83735 ASSAY OF MAGNESIUM: CPT | Performed by: INTERNAL MEDICINE

## 2023-06-05 PROCEDURE — 99232 SBSQ HOSP IP/OBS MODERATE 35: CPT | Performed by: SURGERY

## 2023-06-05 RX ADMIN — HYDROCODONE BITARTRATE AND ACETAMINOPHEN 1 TABLET: 7.5; 325 TABLET ORAL at 22:19

## 2023-06-05 RX ADMIN — ENOXAPARIN SODIUM 40 MG: 100 INJECTION SUBCUTANEOUS at 22:19

## 2023-06-05 RX ADMIN — INSULIN GLARGINE-YFGN 26 UNITS: 100 INJECTION, SOLUTION SUBCUTANEOUS at 22:19

## 2023-06-05 RX ADMIN — Medication 3 MG: at 22:19

## 2023-06-05 RX ADMIN — ZINC OXIDE 1 APPLICATION: 200 OINTMENT TOPICAL at 22:20

## 2023-06-05 RX ADMIN — LEVOTHYROXINE SODIUM 250 MCG: 0.12 TABLET ORAL at 06:15

## 2023-06-05 RX ADMIN — HYDROCODONE BITARTRATE AND ACETAMINOPHEN 1 TABLET: 7.5; 325 TABLET ORAL at 10:05

## 2023-06-05 RX ADMIN — OXYCODONE HYDROCHLORIDE AND ACETAMINOPHEN 250 MG: 500 TABLET ORAL at 08:59

## 2023-06-05 RX ADMIN — ATORVASTATIN CALCIUM 80 MG: 80 TABLET, FILM COATED ORAL at 08:59

## 2023-06-05 RX ADMIN — POTASSIUM CHLORIDE 20 MEQ: 750 TABLET, EXTENDED RELEASE ORAL at 08:59

## 2023-06-05 RX ADMIN — LOPERAMIDE HYDROCHLORIDE 2 MG: 2 CAPSULE ORAL at 22:18

## 2023-06-05 RX ADMIN — GABAPENTIN 300 MG: 300 CAPSULE ORAL at 08:59

## 2023-06-05 RX ADMIN — INSULIN LISPRO 7 UNITS: 100 INJECTION, SOLUTION INTRAVENOUS; SUBCUTANEOUS at 09:00

## 2023-06-05 RX ADMIN — INSULIN LISPRO 1 UNITS: 100 INJECTION, SOLUTION INTRAVENOUS; SUBCUTANEOUS at 18:09

## 2023-06-05 RX ADMIN — GABAPENTIN 300 MG: 300 CAPSULE ORAL at 22:19

## 2023-06-05 RX ADMIN — POTASSIUM CHLORIDE 20 MEQ: 750 TABLET, EXTENDED RELEASE ORAL at 18:09

## 2023-06-05 RX ADMIN — ENOXAPARIN SODIUM 40 MG: 100 INJECTION SUBCUTANEOUS at 08:59

## 2023-06-05 RX ADMIN — ACETAMINOPHEN 650 MG: 325 TABLET, FILM COATED ORAL at 08:59

## 2023-06-05 RX ADMIN — PANTOPRAZOLE SODIUM 40 MG: 40 TABLET, DELAYED RELEASE ORAL at 06:15

## 2023-06-05 RX ADMIN — ZINC OXIDE 1 APPLICATION: 200 OINTMENT TOPICAL at 14:14

## 2023-06-05 NOTE — PROGRESS NOTES
"General Surgery  Progress Note    CC: Follow-up necrotizing fasciitis right buttock    POD#11 debridement skin and soft tissue infection of right buttock secondary to necrotizing fasciitis    S: Her white blood cell count has been coming down nicely over the last few days.  I examined the wound today with the wound ostomy team in the skin, subcutaneous fat, and exposed muscles appear healthy with no necrotic tissue or malodorous drainage.    O:/62 (BP Location: Right arm, Patient Position: Lying)   Pulse 83   Temp 98.2 °F (36.8 °C) (Oral)   Resp 18   Ht 165.1 cm (65\")   Wt 129 kg (284 lb 9.6 oz)   SpO2 93%   BMI 47.36 kg/m²     GENERAL: alert, well appearing, and in no distress  HEENT: normocephalic, atraumatic, moist mucous membranes, clear sclerae   CHEST: clear to auscultation, no wheezes, rales or rhonchi, symmetric air entry  CARDIAC: regular rate and rhythm    ABDOMEN: Soft, morbidly obese, nontender, nondistended  EXTREMITIES: Right buttock wound is dressed with wound VAC, the wound VAC was removed and revealed healthy pink granulation tissue beginning to form over all surfaces with no evidence for purulent exudate, fibrinous slough, or necrotic tissue  SKIN: Warm and moist, no rashes    LABS  Results from last 7 days   Lab Units 06/05/23  0606 06/04/23  0621 06/03/23  0643 06/02/23  0620   WBC 10*3/mm3 15.92* 17.05* 20.83* 24.12*   HEMOGLOBIN g/dL 9.5* 9.1* 9.1* 9.0*   HEMATOCRIT % 29.6* 28.3* 29.2* 28.7*   PLATELETS 10*3/mm3 409 411 428 436   MONOCYTES % %  --  3.0* 2.0* 5.0     Results from last 7 days   Lab Units 06/05/23  0607 06/04/23  0621 06/03/23  0643   SODIUM mmol/L 140 139 144   POTASSIUM mmol/L 4.7 4.8 4.8   CHLORIDE mmol/L 104 107 110*   CO2 mmol/L 25.0 26.0 25.2   BUN mg/dL 11 12 14   CREATININE mg/dL 0.83 0.95 0.94   CALCIUM mg/dL 9.1 8.9 8.2*   BILIRUBIN mg/dL 0.3 0.3 0.3   ALK PHOS U/L 268* 265* 259*   ALT (SGPT) U/L 14 15 13   AST (SGOT) U/L 24 19 17   GLUCOSE mg/dL 109* 122* " 144*             A/P: 53 y.o. female POD#11 debridement skin and soft tissue infection of right buttock secondary to necrotizing fasciitis    Continue wound VAC.  She can be discharged at any time and follow-up with me in about a month.  I have ordered home health to manage her wound VAC at home, which has been arranged through [facility].    Elizabeth Adame MD  General, Robotic, and Endoscopic Surgery  [facility]    [address], [address]  [place] [address]  P: [phone]  F: [phone]

## 2023-06-05 NOTE — PLAN OF CARE
Goal Outcome Evaluation:   VSS on room air; pt uses cpap as needed for sleep; q2 turn; wound vac dressing changed this shift; possible dc tomorrow.

## 2023-06-05 NOTE — PROGRESS NOTES
ID note for Necrotizing fasciitis of right buttocks    Subjective:    On CPAP now.  Afebrile.  Last dose Augmentin overnight.  Concerned that she does not have hospital bed at home    Objective:   Temp:  [97.6 °F (36.4 °C)-98.2 °F (36.8 °C)] 98.2 °F (36.8 °C)  Heart Rate:  [76-84] 83  Resp:  [18-20] 18  BP: (110-166)/() 110/62  GENERAL: Awake and alert, in no acute distress.   HEENT: Hearing is grossly normal.   LUNGS:normal respiratory effort.   SKIN: no cutaneous eruptions in exposed areas    White count 15.92, hemoglobin 9.5, platelets 409  Creatinine 0.83  Glucose 109 through 169  Bcx 1/2 ConS  5/25 OR cx: Nl skin bunny and mixed anaerobes  5/31 C. difficile PCR negative    A/p  1.  Sepsis secondary #2  2.  Infected right buttocks decubitus wound with cellulitis and necrotizing soft tissue infection  3.  Diabetes mellitus type 2, uncontrolled with hyperglycemia, complicating above  4.  Coag negative staph bacteremia, likely contaminant  5.  Left lower lobe aspiration pneumonia    Now off Augmentin.  No evidence of renegade sepsis.  Will need frequent turning glycemic control to prevent recurrent infection.  Plan discharge with wound VAC.  Nothing more to add from my perspective, but we will be happy to reevaluate should infectious concerns evolve

## 2023-06-05 NOTE — PROGRESS NOTES
Continued Stay Note  [facility]     Patient Name: Vanessa Root  MRN: [id number]  Today's Date: 6/5/2023    Admit Date: 5/24/2023    Plan: Home with family and HH   Discharge Plan       Row Name 06/05/23 1247       Plan    Plan Home with family and HH    Plan Comments Spoke with pt's mom, pt/ family requesting speciality mattress and trapeze for home hospital bed. Orders placed in Epic/pending cosign. Yi with [employer] will arrange mattress/trapeze delivery. [facility] accepted.  Patient will require stretcher transport home.                   Discharge Codes    No documentation.                 Expected Discharge Date and Time       Expected Discharge Date Expected Discharge Time    Laonzo 3, 2023               Chrissy Hill RN

## 2023-06-05 NOTE — PLAN OF CARE
Goal Outcome Evaluation:  Plan of Care Reviewed With: patient        Progress: no change  Outcome Evaluation: VSS; Pt alert and oriented; Wound vac to Right hip intact; cartridge was changed per RN; Pt wore home cpap; Turned when pt would let is turn her; waffle boots to feet; purewick in place; possible discharge today; will continue to monitor

## 2023-06-05 NOTE — NURSING NOTE
06/05/23 1026   Wound 05/24/23 2101 Right lateral hip Soft Tissue Necrosis   Placement Date/Time: 05/24/23 2101   Present on Hospital Admission: Yes  Side: Right  Orientation: lateral  Location: hip  Primary Wound Type: Soft Tissue Necrosis  Number of Sutures Placed: 0   Dressing Appearance moist drainage;dressing loose   Base granulating;non-granulating;epithelialization;moist;red   Periwound excoriated;redness;swelling   Periwound Temperature warm   Periwound Skin Turgor soft   Edges open   Wound Length (cm) 8 cm   Wound Width (cm) 13 cm   Wound Depth (cm) 2 cm   Wound Surface Area (cm^2) 104 cm^2   Wound Volume (cm^3) 208 cm^3   Undermining [Depth (cm)/Location] 12 to 6, 7 cm at 3   Drainage Characteristics/Odor serosanguineous   Drainage Amount small   Care, Wound cleansed with;sterile water;negative pressure wound therapy   Dressing Care dressing changed;foam   Periwound Care barrier film applied   NPWT (Negative Pressure Wound Therapy) 06/05/23 1026 Rt Hip   Placement Date/Time: 06/05/23 1026   Location: Rt Hip   Therapy Setting continuous therapy   Dressing foam, black   Pressure Setting 125 mmHg   Sponges Inserted 2   Sponges Removed 1   Finger sweep complete Yes     Wound/Ostomy: Follow up NPWT. Patient still in the hospital, she has not been discharged, since Friday she is  already in the home health wound vac, we will put the [facility] hospital back. New dressing placed, good seal noted, patient tolerated well. Rn  notified. Continue plan to change m/w/f.   In addition red discoloration, excoriate area with appearance of fungal infection noted, Magic cream order into Epic.   Please re-consult for any additional needs.

## 2023-06-06 ENCOUNTER — READMISSION MANAGEMENT (OUTPATIENT)
Dept: CALL CENTER | Facility: HOSPITAL | Age: 53
End: 2023-06-06
Payer: MEDICARE

## 2023-06-06 VITALS
BODY MASS INDEX: 47.35 KG/M2 | OXYGEN SATURATION: 93 % | DIASTOLIC BLOOD PRESSURE: 71 MMHG | RESPIRATION RATE: 18 BRPM | TEMPERATURE: 98.3 F | HEIGHT: 65 IN | SYSTOLIC BLOOD PRESSURE: 123 MMHG | WEIGHT: 284.17 LBS | HEART RATE: 80 BPM

## 2023-06-06 PROBLEM — E87.6 HYPOKALEMIA: Status: RESOLVED | Noted: 2023-05-26 | Resolved: 2023-06-06

## 2023-06-06 LAB
ALBUMIN SERPL-MCNC: 2.9 G/DL (ref 3.5–5.2)
ALBUMIN/GLOB SERPL: 0.8 G/DL
ALP SERPL-CCNC: 270 U/L (ref 39–117)
ALT SERPL W P-5'-P-CCNC: 11 U/L (ref 1–33)
ANION GAP SERPL CALCULATED.3IONS-SCNC: 11 MMOL/L (ref 5–15)
AST SERPL-CCNC: 24 U/L (ref 1–32)
BASOPHILS # BLD AUTO: 0.11 10*3/MM3 (ref 0–0.2)
BASOPHILS NFR BLD AUTO: 0.7 % (ref 0–1.5)
BILIRUB SERPL-MCNC: 0.3 MG/DL (ref 0–1.2)
BUN SERPL-MCNC: 12 MG/DL (ref 6–20)
BUN/CREAT SERPL: 12.4 (ref 7–25)
CALCIUM SPEC-SCNC: 9.3 MG/DL (ref 8.6–10.5)
CHLORIDE SERPL-SCNC: 102 MMOL/L (ref 98–107)
CO2 SERPL-SCNC: 25 MMOL/L (ref 22–29)
CREAT SERPL-MCNC: 0.97 MG/DL (ref 0.57–1)
DEPRECATED RDW RBC AUTO: 43.1 FL (ref 37–54)
EGFRCR SERPLBLD CKD-EPI 2021: 70.9 ML/MIN/1.73
EOSINOPHIL # BLD AUTO: 0.55 10*3/MM3 (ref 0–0.4)
EOSINOPHIL NFR BLD AUTO: 3.4 % (ref 0.3–6.2)
ERYTHROCYTE [DISTWIDTH] IN BLOOD BY AUTOMATED COUNT: 13.1 % (ref 12.3–15.4)
GLOBULIN UR ELPH-MCNC: 3.6 GM/DL
GLUCOSE BLDC GLUCOMTR-MCNC: 107 MG/DL (ref 70–130)
GLUCOSE SERPL-MCNC: 117 MG/DL (ref 65–99)
HCT VFR BLD AUTO: 28.2 % (ref 34–46.6)
HGB BLD-MCNC: 9 G/DL (ref 12–15.9)
IMM GRANULOCYTES # BLD AUTO: 0.5 10*3/MM3 (ref 0–0.05)
IMM GRANULOCYTES NFR BLD AUTO: 3.1 % (ref 0–0.5)
LYMPHOCYTES # BLD AUTO: 3.28 10*3/MM3 (ref 0.7–3.1)
LYMPHOCYTES NFR BLD AUTO: 20.6 % (ref 19.6–45.3)
MAGNESIUM SERPL-MCNC: 2 MG/DL (ref 1.6–2.6)
MCH RBC QN AUTO: 28.7 PG (ref 26.6–33)
MCHC RBC AUTO-ENTMCNC: 31.9 G/DL (ref 31.5–35.7)
MCV RBC AUTO: 89.8 FL (ref 79–97)
MONOCYTES # BLD AUTO: 0.68 10*3/MM3 (ref 0.1–0.9)
MONOCYTES NFR BLD AUTO: 4.3 % (ref 5–12)
NEUTROPHILS NFR BLD AUTO: 10.84 10*3/MM3 (ref 1.7–7)
NEUTROPHILS NFR BLD AUTO: 67.9 % (ref 42.7–76)
NRBC BLD AUTO-RTO: 0.3 /100 WBC (ref 0–0.2)
PLATELET # BLD AUTO: 384 10*3/MM3 (ref 140–450)
PMV BLD AUTO: 9.7 FL (ref 6–12)
POTASSIUM SERPL-SCNC: 4.7 MMOL/L (ref 3.5–5.2)
PROT SERPL-MCNC: 6.5 G/DL (ref 6–8.5)
RBC # BLD AUTO: 3.14 10*6/MM3 (ref 3.77–5.28)
SODIUM SERPL-SCNC: 138 MMOL/L (ref 136–145)
WBC NRBC COR # BLD: 15.96 10*3/MM3 (ref 3.4–10.8)

## 2023-06-06 PROCEDURE — 83735 ASSAY OF MAGNESIUM: CPT | Performed by: INTERNAL MEDICINE

## 2023-06-06 PROCEDURE — 82948 REAGENT STRIP/BLOOD GLUCOSE: CPT

## 2023-06-06 PROCEDURE — 80053 COMPREHEN METABOLIC PANEL: CPT | Performed by: INTERNAL MEDICINE

## 2023-06-06 PROCEDURE — 63710000001 INSULIN LISPRO (HUMAN) PER 5 UNITS: Performed by: SURGERY

## 2023-06-06 PROCEDURE — 25010000002 ENOXAPARIN PER 10 MG: Performed by: HOSPITALIST

## 2023-06-06 PROCEDURE — 85025 COMPLETE CBC W/AUTO DIFF WBC: CPT | Performed by: INTERNAL MEDICINE

## 2023-06-06 RX ORDER — GABAPENTIN 300 MG/1
300 CAPSULE ORAL EVERY 12 HOURS SCHEDULED
Qty: 18 CAPSULE | Refills: 0 | Status: SHIPPED | OUTPATIENT
Start: 2023-06-06 | End: 2023-06-06 | Stop reason: SDUPTHER

## 2023-06-06 RX ORDER — INSULIN ASPART 100 [IU]/ML
INJECTION, SOLUTION INTRAVENOUS; SUBCUTANEOUS
Start: 2023-06-06 | End: 2023-06-14 | Stop reason: SDUPTHER

## 2023-06-06 RX ORDER — NALOXONE HYDROCHLORIDE 4 MG/.1ML
SPRAY NASAL
Qty: 2 EACH | Refills: 0 | Status: SHIPPED | OUTPATIENT
Start: 2023-06-06

## 2023-06-06 RX ORDER — HYDROCODONE BITARTRATE AND ACETAMINOPHEN 7.5; 325 MG/1; MG/1
1 TABLET ORAL EVERY 4 HOURS PRN
Qty: 8 TABLET | Refills: 0 | Status: SHIPPED | OUTPATIENT
Start: 2023-06-06 | End: 2023-06-07

## 2023-06-06 RX ORDER — HYDROCODONE BITARTRATE AND ACETAMINOPHEN 7.5; 325 MG/1; MG/1
1 TABLET ORAL EVERY 4 HOURS PRN
Qty: 8 TABLET | Refills: 0 | Status: SHIPPED | OUTPATIENT
Start: 2023-06-06 | End: 2023-06-06 | Stop reason: SDUPTHER

## 2023-06-06 RX ORDER — GABAPENTIN 300 MG/1
300 CAPSULE ORAL EVERY 12 HOURS SCHEDULED
Qty: 18 CAPSULE | Refills: 0 | Status: SHIPPED | OUTPATIENT
Start: 2023-06-06 | End: 2023-06-14 | Stop reason: SDUPTHER

## 2023-06-06 RX ADMIN — PANTOPRAZOLE SODIUM 40 MG: 40 TABLET, DELAYED RELEASE ORAL at 06:36

## 2023-06-06 RX ADMIN — HYDROCODONE BITARTRATE AND ACETAMINOPHEN 1 TABLET: 7.5; 325 TABLET ORAL at 11:37

## 2023-06-06 RX ADMIN — OXYCODONE HYDROCHLORIDE AND ACETAMINOPHEN 250 MG: 500 TABLET ORAL at 08:23

## 2023-06-06 RX ADMIN — LOPERAMIDE HYDROCHLORIDE 2 MG: 2 CAPSULE ORAL at 04:28

## 2023-06-06 RX ADMIN — HYDROCODONE BITARTRATE AND ACETAMINOPHEN 1 TABLET: 7.5; 325 TABLET ORAL at 04:28

## 2023-06-06 RX ADMIN — LEVOTHYROXINE SODIUM 250 MCG: 0.12 TABLET ORAL at 06:36

## 2023-06-06 RX ADMIN — POTASSIUM CHLORIDE 20 MEQ: 750 TABLET, EXTENDED RELEASE ORAL at 08:23

## 2023-06-06 RX ADMIN — LOPERAMIDE HYDROCHLORIDE 2 MG: 2 CAPSULE ORAL at 11:37

## 2023-06-06 RX ADMIN — ATORVASTATIN CALCIUM 80 MG: 80 TABLET, FILM COATED ORAL at 08:23

## 2023-06-06 RX ADMIN — ENOXAPARIN SODIUM 40 MG: 100 INJECTION SUBCUTANEOUS at 08:23

## 2023-06-06 RX ADMIN — ZINC OXIDE 1 APPLICATION: 200 OINTMENT TOPICAL at 08:24

## 2023-06-06 RX ADMIN — INSULIN LISPRO 3 UNITS: 100 INJECTION, SOLUTION INTRAVENOUS; SUBCUTANEOUS at 08:37

## 2023-06-06 RX ADMIN — SALINE NASAL SPRAY 2 SPRAY: 1.5 SOLUTION NASAL at 08:24

## 2023-06-06 RX ADMIN — GABAPENTIN 300 MG: 300 CAPSULE ORAL at 08:23

## 2023-06-06 NOTE — DISCHARGE SUMMARY
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]  Yes    Sepsis, due to unspecified organism, unspecified whether acute organ dysfunction present [A41.9]  Yes    Diabetes mellitus [E11.9]  Yes    Spinal stenosis of lumbar region [M48.061]  Yes    Diabetic peripheral neuropathy [E11.42]  Yes    Obstructive sleep apnea syndrome [G47.33]  Yes    Hypothyroidism [E03.9]  Yes      Resolved Hospital Problems    Diagnosis Date Resolved POA    Hypokalemia [E87.6] 06/06/2023 Yes     Procedure(s):  Debridement necrotizing tissue  right buttock wound     Consults       Date and Time Order Name Status Description    5/25/2023  8:37 AM Inpatient General Surgery Consult Completed     5/25/2023  1:56 AM Inpatient Infectious Diseases Consult Completed     5/24/2023  6:22 PM [facility] (on-call MD unless specified) Details            Hospital Course  53 y.o. female admitted with sepsis secondary to right necrotic gluteal decubitus with necrotizing fasciitis. Patient is immobile from spinal stenosis and has been bedbound for approximately 3 years. She was seen by infectious diseases as well as general surgery. On 5/25/2023 she underwent excision of necrotic skin, subcutaneous fat, and muscular fascia of right buttock (300 cm²). Antibiotics were managed by infectious diseases and she completed antibiotics on 6/4/2023. Currently has a wound VAC. She also had left lower lobe patchy consolidation that will need follow-up CT chest in about 4 weeks to ensure resolution. Surgery would like to see her in about a month. She will continue wound VAC and that has been arranged. ID recommended frequent turning and glycemic control to prevent recurrent infection.      A1c was 11.30% and while in the hospital she was controlled on Glucomander (21 units of basal insulin and 18 units of mealtime insulin scheduled for today). At home she is on 60 units Tresiba and NovoLog up to 16 units with meals (nearly 3 times what she was getting in the hospital) in addition to metformin and Sitagliptin. She will need close follow-up with PCP for now will discharge on previous regimen suspect conditions at home are different than the hospital conditions.    Her TSH was elevated at around 22. Levothyroxine was increased to 200 mcg however it looks like she is on 275 mcg daily at home and apparently she was off thyroid medications for 3 months prior to admission. Recommend recheck TSH as an outpatient in a 2-4 weeks.    She was also started on gabapentin for neuropathy that helped.    I discussed the patient's findings and my recommendations with patient and nursing staff. Patient feels ready to be discharged home today. Arrangements are being made for her to have a new hospital bed.    Temp:  [98.3 °F (36.8 °C)-98.5 °F (36.9 °C)] 98.3 °F (36.8 °C)  Heart Rate:  [77-84] 80  Resp:  [18] 18  BP: (119-126)/(71-90) 123/71  Body mass index is 47.29 kg/m².    Physical Exam  Constitutional:       General: She is not in acute distress.     Appearance: She is obese. She is not toxic-appearing.   Eyes:      Extraocular Movements: Extraocular movements intact.   Cardiovascular:      Rate and Rhythm: Normal rate and regular rhythm.   Pulmonary:      Effort: No respiratory distress.      Breath sounds: Normal breath sounds. No wheezing or rhonchi.   Abdominal:      Palpations: Abdomen is soft.      Tenderness: There is no abdominal tenderness. There is no guarding or rebound.   Neurological:      General: No focal deficit present.      Mental Status: She is oriented to person, place, and time.     Disposition: Home or Self Care       Discharge Medications        New Medications        Instructions Start  "Date   gabapentin 300 MG capsule  Commonly known as: NEURONTIN   300 mg, Oral, Every 12 Hours Scheduled      HYDROcodone-acetaminophen 7.5-325 MG per tablet  Commonly known as: NORCO   1 tablet, Oral, Every 4 Hours PRN      hydrocortisone-liver oil-zinc oxide in bacitracin-nystatin   Topical, Every 12 Hours Scheduled             Continue These Medications        Instructions Start Date   atorvastatin 80 MG tablet  Commonly known as: LIPITOR   80 mg, Oral, Daily      ezetimibe 10 MG tablet  Commonly known as: ZETIA   10 mg, Oral, Daily      fluticasone 50 MCG/ACT nasal spray  Commonly known as: Flonase   2 sprays, Nasal, Daily      Insulin Pen Needle 32G X 4 MM misc   Use as directed with insulin with meals and at night      Insulin Syringe 31G X 5/16\" 1 ML misc   No dose, route, or frequency recorded.      levothyroxine 75 MCG tablet  Commonly known as: SYNTHROID, LEVOTHROID   Take one a day.      levothyroxine 200 MCG tablet  Commonly known as: SYNTHROID, LEVOTHROID   200 mcg, Oral, Daily      metFORMIN 1000 MG tablet  Commonly known as: GLUCOPHAGE   1,000 mg, Oral, 2 Times Daily With Meals      NovoLOG FlexPen 100 UNIT/ML solution pen-injector sc pen  Generic drug: insulin aspart   Use as directed up to 16 U with each meal      omeprazole 40 MG capsule  Commonly known as: priLOSEC   40 mg, Oral, Daily      ondansetron 4 MG tablet  Commonly known as: Zofran   4 mg, Oral, Every 8 Hours PRN      SITagliptin 100 MG tablet  Commonly known as: Januvia   100 mg, Oral, Daily      Tresiba FlexTouch 200 UNIT/ML solution pen-injector pen injection  Generic drug: Insulin Degludec   60 Units, Subcutaneous, Daily      vitamin C 250 MG tablet  Commonly known as: ASCORBIC ACID   250 mg, Oral, Daily      Vitamin D3 1.25 MG (05857 UT) capsule   50,000 Units, Oral, Every 7 Days             Stop These Medications      fluconazole 100 MG tablet  Commonly known as: DIFLUCAN     ibuprofen 800 MG tablet  Commonly known as: ADVIL,MOTRIN   "           Diet Instructions       Diet: Regular/House Diet, Diabetic Diets; Consistent Carbohydrate      Discharge Diet:  Regular/House Diet  Diabetic Diets       Diabetic Diet: Consistent Carbohydrate    Texture: Regular Texture (IDDSI 7)    Fluid Consistency: Thin (IDDSI 0)           Activity Instructions       Activity as Tolerated             Additional Instructions for the Follow-ups that You Need to Schedule       Ambulatory Referral to Home Health (Hospital)   As directed      Face to Face Visit Date: 5/29/2023    Follow-up provider for Plan of Care?: I will be treating the patient on an ongoing basis.  Please send me the Plan of Care for signature.    Follow-up provider: FLACO ADAME [[id number]]    Reason/Clinical Findings: Necrotizing fasciitis, poorly controlled diabetes    Describe mobility limitations that make leaving home difficult: spinal stenosis, chronic immobility, recovering from necrotizing fasciitis surgery    Nursing/Therapeutic Services Requested: Skilled Nursing    Skilled nursing orders: Wound vac application and instruction    Frequency: 1 Week 1         Call MD for problems / concerns.   As directed             Contact information for follow-up providers       Reese Batista MD .    Specialty: Internal Medicine  Why: Follow-up CT of the chest 4 weeks to ensure resolution of left lower lobe patchy consolidation., Lab check TSH in 2-4 weeks (resuming levothyroxine TSH 22)  Contact information:  [address]  [address]  [facility] [id number] [phone]               Flaco Adame MD Follow up in 4 week(s).    Specialty: General Surgery  Contact information:  [address] [facility] [facility]  [address]  [facility] [id number] [phone]                       Contact information for after-discharge care       Durable Medical Equipment       [facility] .    Service: Durable Medical Equipment  Contact information:  [address] [facility]  [address]  [phone]                     Home Medical Care       [facility] .    Service: Home Health Services  Contact information:  [address] [place], [place] [id number] [phone]                                No future appointments.     Gerardo Burks MD  [facility]  06/06/23    Discharge time spent greater than 30 minutes.

## 2023-06-06 NOTE — PROGRESS NOTES
Continued Stay Note  [facility]     Patient Name: Vanessa Root  MRN: [id number]  Today's Date: 6/6/2023    Admit Date: 5/24/2023    Plan: Home with family and HH   Discharge Plan       Row Name 06/06/23 1149       Plan    Final Discharge Disposition Code 06 - home with home health care    Final Note DC home via [employer] Stretcher. Rec'd call back from Lois, Olivier is aware of dc today. Home vac placed by wound vac. [place] s/w pt's mom re: DME, new mattress and trapeze will be delivered this week. Patient does not qualify for new bed frame because current bed is only 3 years old, new bed will be private pay per Medicare guidelines. Patient requested SCDs for home use. Dr. Burks notified, verbal orders for SCDs placed in Epic. Yi with [employer] notfiied and she will arrange delivery of SCDs. Patient and family notified of transport schedule.      Row Name 06/06/23 1107       Plan    Plan Home with family and HH    Plan Comments Yi with [employer] is arranging a new hospital bed, mattress will not be available prior to dc. Patient will use old bed until new bed can be delivered/set up. Long discussion with patient and pt's mom regarding DME, HH and wound vac. Patient is a agreeable with dc home today via [employer] stretcher. Olivier  notified of dc. Wound care is removing hospital vac and replacing with home vac. [employer] stretcher scheduled to transport today @ 1230, s/w Shabnam [phone] conf # [id number].                   Discharge Codes    No documentation.                 Expected Discharge Date and Time       Expected Discharge Date Expected Discharge Time    Jun 6, 2023               Chrissy Hill RN

## 2023-06-06 NOTE — OUTREACH NOTE
Prep Survey      Flowsheet Row Responses   [facility] patient discharged from? [place]   Is LACE score < 7 ? No   Eligibility [facility]   Date of Admission 05/24/23   Date of Discharge 06/06/23   Discharge Disposition Home-Health Care Svc   Discharge diagnosis Necrotizing soft tissue infection- Debridement necrotizing tissue  right buttock wound Sepsis   Does the patient have one of the following disease processes/diagnoses(primary or secondary)? Sepsis   Does the patient have Home health ordered? Yes   What is the Home health agency?  [facility]   Is there a DME ordered? Yes   What DME was ordered? new mattress and trapeze- [employer]   Prep survey completed? Yes            Lela ELLIOTT - Registered Nurse

## 2023-06-06 NOTE — PLAN OF CARE
Goal Outcome Evaluation:               Patient transporting home today via commercial stretcher transport to home with family and Home health.  Home wound vac device put into place by Wound care team moments ago.

## 2023-06-06 NOTE — NURSING NOTE
Wound/Ostomy: We see patient going home today, Hospital's wound vac is discontinued, Home health wound vac placed without difficulty, good seal noted, patient tolerated well. Please re-consult for any additional needs.

## 2023-06-06 NOTE — PROGRESS NOTES
Continued Stay Note  [facility]     Patient Name: Vanessa Root  MRN: [id number]  Today's Date: 6/6/2023    Admit Date: 5/24/2023    Plan: Home with family and HH   Discharge Plan       Row Name 06/06/23 1102       Plan    Plan Home with family and HH    Plan Comments Yi with [employer] is arranging a new hospital bed, mattress will not be available prior to dc. Patient will use old bed until new bed can be delivered/set up. Long discussion with patient and pt's mom regarding DME, HH and wound vac. Patient is a agreeable with dc home today [facility] [employer]ound care is removing hospital vac and replacing with home vac. [employer] stretcher scheduled to transport today @ 1230, s/w ClaudiaLuther [phone] conf # [id number].                   Discharge Codes    No documentation.                 Expected Discharge Date and Time       Expected Discharge Date Expected Discharge Time    Jun 6, 2023               Chrissy Hill RN

## 2023-06-07 ENCOUNTER — TRANSITIONAL CARE MANAGEMENT TELEPHONE ENCOUNTER (OUTPATIENT)
Dept: CALL CENTER | Facility: HOSPITAL | Age: 53
End: 2023-06-07
Payer: MEDICARE

## 2023-06-07 DIAGNOSIS — E03.9 ACQUIRED HYPOTHYROIDISM: ICD-10-CM

## 2023-06-07 DIAGNOSIS — E11.42 TYPE 2 DIABETES MELLITUS WITH DIABETIC POLYNEUROPATHY, WITH LONG-TERM CURRENT USE OF INSULIN: ICD-10-CM

## 2023-06-07 DIAGNOSIS — Z79.4 TYPE 2 DIABETES MELLITUS WITH DIABETIC POLYNEUROPATHY, WITH LONG-TERM CURRENT USE OF INSULIN: ICD-10-CM

## 2023-06-07 RX ORDER — INSULIN DEGLUDEC 200 U/ML
60 INJECTION, SOLUTION SUBCUTANEOUS DAILY
OUTPATIENT
Start: 2023-06-07

## 2023-06-07 RX ORDER — LEVOTHYROXINE SODIUM 0.07 MG/1
TABLET ORAL
Qty: 30 TABLET | Refills: 11 | OUTPATIENT
Start: 2023-06-07

## 2023-06-07 RX ORDER — INSULIN ASPART 100 [IU]/ML
INJECTION, SOLUTION INTRAVENOUS; SUBCUTANEOUS
Start: 2023-06-07

## 2023-06-07 NOTE — OUTREACH NOTE
Call Center TCM Note      Flowsheet Row Responses   [facility] patient discharged from? [place]   Does the patient have one of the following disease processes/diagnoses(primary or secondary)? Sepsis   TCM attempt successful? Yes   Call start time 0815   Call end time 0820   Discharge diagnosis Necrotizing soft tissue infection- Debridement necrotizing tissue  right buttock wound Sepsis   Meds reviewed with patient/caregiver? Yes   Is the patient having any side effects they believe may be caused by any medication additions or changes? No   Does the patient have all medications related to this admission filled (includes all antibiotics, inhalers, nebulizers,steroids,etc.) Yes   Is the patient taking all medications as directed (includes completed medication regime)? Yes   Medication comments States she needs refills on her levothyroxine and Novolog   Comments Pt states she needs a video visit.  Will message PCP regarding this.   Does the patient have an appointment with their PCP within 7 days of discharge? No   What is the Home health agency?  [facility]   Has home health visited the patient within 72 hours of discharge? Call prior to 72 hours   Home health comments Coming today   What DME was ordered? new mattress and trapeze- [employer]   Has all DME been delivered? No   DME comments States she was told it would take a few days to be delivered   Psychosocial issues? Yes   Psychosocial comments Pt is bedbound   Did the patient receive a copy of their discharge instructions? Yes   Nursing interventions Reviewed instructions with patient   What is the patient's perception of their health status since discharge? Improving   Nursing interventions Nurse provided patient education   Is the patient/caregiver able to teach back TIME? T emperature - higher or lower than normal, I nfection - may have signs and symptoms of an infection, M ental Decline - confused, sleepy, difficult to  mallory, ELISA xtremely Ill - severe pain, discomfort, shortness of breath   Nursing interventions Nurse provided patient education   Is patient/caregiver able to teach back steps to recovery at home? Eat a balanced diet, Rest and regain strength, Set small, achievable goals for return to baseline health, Make a list of questions for PCP appoinment   Is the patient/caregiver able to teach back signs and symptoms of worsening condition: Rapid heart rate (>90), Fever, Hyperthermia, Shortness of breath/rapid respiratory rate, Altered mental status(confusion/coma)   If the patient is a current smoker, are they able to teach back resources for cessation? Not a smoker   Is the patient/caregiver able to teach back the hierarchy of who to call/visit for symptoms/problems? PCP, Specialist, Home health nurse, Urgent Care, ED, 911 Yes   TCM call completed? Yes   Wrap up additional comments Will message PCP for video visit and refills.   Call end time 0820            Leonora Dangelo LPN    6/7/2023, 08:24 EDT

## 2023-06-07 NOTE — TELEPHONE ENCOUNTER
Caller: Vanessa Root    Relationship: Self    Best call back number: [phone] (Home)     Requested Prescriptions:   Requested Prescriptions     Pending Prescriptions Disp Refills    levothyroxine (SYNTHROID, LEVOTHROID) 75 MCG tablet 30 tablet 11     Sig: Take one a day.    insulin aspart (NovoLOG FlexPen) 100 UNIT/ML solution pen-injector sc pen       Sig: Use as directed up to 16 U with each meal    Tresiba FlexTouch 200 UNIT/ML solution pen-injector pen injection       Sig: Inject 60 Units under the skin into the appropriate area as directed Daily.        Pharmacy where request should be sent:      Last office visit with prescribing clinician: Visit date not found   Last telemedicine visit with prescribing clinician: Visit date not found   Next office visit with prescribing clinician: Visit date not found     Additional details provided by patient OUT OF BOTH MEDICATIONS     Does the patient have less than a 3 day supply:  [x] Yes  [] No    Would you like a call back once the refill request has been completed: [x] Yes [] No    If the office needs to give you a call back, can they leave a voicemail: [x] Yes [] No    Howie Blanco Rep   06/07/23 10:23 EDT

## 2023-06-08 ENCOUNTER — TELEPHONE (OUTPATIENT)
Dept: FAMILY MEDICINE CLINIC | Facility: CLINIC | Age: 53
End: 2023-06-08
Payer: MEDICARE

## 2023-06-08 DIAGNOSIS — E03.9 ACQUIRED HYPOTHYROIDISM: ICD-10-CM

## 2023-06-08 DIAGNOSIS — E11.42 TYPE 2 DIABETES MELLITUS WITH DIABETIC POLYNEUROPATHY, WITH LONG-TERM CURRENT USE OF INSULIN: Primary | ICD-10-CM

## 2023-06-08 DIAGNOSIS — Z79.4 TYPE 2 DIABETES MELLITUS WITH DIABETIC POLYNEUROPATHY, WITH LONG-TERM CURRENT USE OF INSULIN: Primary | ICD-10-CM

## 2023-06-08 RX ORDER — BLOOD-GLUCOSE METER
1 KIT MISCELLANEOUS AS NEEDED
Qty: 1 EACH | Refills: 0 | Status: SHIPPED | OUTPATIENT
Start: 2023-06-08

## 2023-06-08 NOTE — TELEPHONE ENCOUNTER
Kendra from home health called to let you know that Vanessa is stating she has no glucometer to check her blood sugars and is requesting one.  Pharmacy is the [facility] on file.  Kendra's number if needed is [phone].

## 2023-06-09 ENCOUNTER — TELEPHONE (OUTPATIENT)
Dept: SURGERY | Facility: CLINIC | Age: 53
End: 2023-06-09
Payer: MEDICARE

## 2023-06-09 NOTE — TELEPHONE ENCOUNTER
Voice mail from Armida Aguayo [phone] requesting a call back for orders for one additional visit for social work.

## 2023-06-14 ENCOUNTER — TELEMEDICINE (OUTPATIENT)
Dept: FAMILY MEDICINE CLINIC | Facility: CLINIC | Age: 53
End: 2023-06-14
Payer: MEDICARE

## 2023-06-14 DIAGNOSIS — Z09 HOSPITAL DISCHARGE FOLLOW-UP: Primary | ICD-10-CM

## 2023-06-14 DIAGNOSIS — E11.42 DIABETIC PERIPHERAL NEUROPATHY ASSOCIATED WITH TYPE 2 DIABETES MELLITUS: ICD-10-CM

## 2023-06-14 DIAGNOSIS — L89.894 PRESSURE ULCER OF OTHER SITE, STAGE 4: ICD-10-CM

## 2023-06-14 DIAGNOSIS — E11.42 TYPE 2 DIABETES MELLITUS WITH DIABETIC POLYNEUROPATHY, WITH LONG-TERM CURRENT USE OF INSULIN: ICD-10-CM

## 2023-06-14 DIAGNOSIS — E03.9 ACQUIRED HYPOTHYROIDISM: ICD-10-CM

## 2023-06-14 DIAGNOSIS — A41.9 SEPSIS, DUE TO UNSPECIFIED ORGANISM, UNSPECIFIED WHETHER ACUTE ORGAN DYSFUNCTION PRESENT: ICD-10-CM

## 2023-06-14 DIAGNOSIS — M79.89 NECROTIZING SOFT TISSUE INFECTION: ICD-10-CM

## 2023-06-14 DIAGNOSIS — D50.8 IRON DEFICIENCY ANEMIA SECONDARY TO INADEQUATE DIETARY IRON INTAKE: ICD-10-CM

## 2023-06-14 DIAGNOSIS — R93.89 ABNORMAL CT OF THE CHEST: ICD-10-CM

## 2023-06-14 DIAGNOSIS — Z79.4 TYPE 2 DIABETES MELLITUS WITH DIABETIC POLYNEUROPATHY, WITH LONG-TERM CURRENT USE OF INSULIN: ICD-10-CM

## 2023-06-14 PROBLEM — M48.062 SPINAL STENOSIS OF LUMBAR REGION WITH NEUROGENIC CLAUDICATION: Status: ACTIVE | Noted: 2023-06-05

## 2023-06-14 PROBLEM — M72.6 NECROTIZING FASCIITIS: Status: ACTIVE | Noted: 2023-06-05

## 2023-06-14 PROBLEM — M62.3 IMMOBILITY SYNDROME (PARAPLEGIC): Status: ACTIVE | Noted: 2023-06-05

## 2023-06-14 RX ORDER — GABAPENTIN 300 MG/1
300 CAPSULE ORAL EVERY 12 HOURS SCHEDULED
Qty: 60 CAPSULE | Refills: 3 | Status: SHIPPED | OUTPATIENT
Start: 2023-06-14

## 2023-06-14 RX ORDER — INSULIN ASPART 100 [IU]/ML
INJECTION, SOLUTION INTRAVENOUS; SUBCUTANEOUS
Qty: 9 ML | Refills: 3 | Status: SHIPPED | OUTPATIENT
Start: 2023-06-14

## 2023-06-14 RX ORDER — INSULIN DEGLUDEC 200 U/ML
60 INJECTION, SOLUTION SUBCUTANEOUS DAILY
Qty: 9 ML | Refills: 3 | Status: SHIPPED | OUTPATIENT
Start: 2023-06-14

## 2023-06-14 RX ORDER — BLOOD-GLUCOSE METER
1 KIT MISCELLANEOUS AS NEEDED
Qty: 1 EACH | Refills: 0 | Status: SHIPPED | OUTPATIENT
Start: 2023-06-14

## 2023-06-14 NOTE — PROGRESS NOTES
Subjective   Vanessa Root is a 53 y.o. female.     No chief complaint on file.      History of Present Illness   Within 48 business hours after discharge our office contacted her via telephone to coordinate her care and needs.      6/6/2023     1:57 PM   Date of TCM Phone Call   [facility]   Date of Admission 5/24/2023   Date of Discharge 6/6/2023   Discharge Disposition Home-Health Care [facility] [place]     Risk for Readmission (LACE) Score: 14 (6/6/2023  6:00 AM)      Patient was admitted to [facility]  ALL records were obtained and reviewed and /or discussed with admitting physician  Date of admission 5/24/2023  Date of discharge 6/6/2023  Diagnosis necrotizing soft tissue infection of fasciitis, mobility, sepsis, diabetes, spinal stenosis lumbar region, diabetic peripheral neuropathy  Hospital Course   53 y.o. female admitted with sepsis secondary to right necrotic gluteal decubitus with necrotizing fasciitis. Patient is immobile from spinal stenosis and has been bedbound for approximately 3 years. She was seen by infectious diseases as well as general surgery. On 5/25/2023 she underwent excision of necrotic skin, subcutaneous fat, and muscular fascia of right buttock (300 cm²). Antibiotics were managed by infectious diseases and she completed antibiotics on 6/4/2023. Currently has a wound VAC. She also had left lower lobe patchy consolidation that will need follow-up CT chest in about 4 weeks to ensure resolution. Surgery would like to see her in about a month. She will continue wound VAC and that has been arranged. ID recommended frequent turning and glycemic control to prevent recurrent infection.      A1c was 11.30% and while in the hospital she was controlled on Glucomander (21 units of basal insulin and 18 units of mealtime insulin scheduled for today). At home she is on 60 units Tresiba and NovoLog up to 16 units with meals (nearly 3 times what she was getting in the  hospital) in addition to metformin and Sitagliptin. She will need close follow-up with PCP for now will discharge on previous regimen suspect conditions at home are different than the hospital conditions.     Her TSH was elevated at around 22. Levothyroxine was increased to 200 mcg however it looks like she is on 275 mcg daily at home and apparently she was off thyroid medications for 3 months prior to admission. Recommend recheck TSH as an outpatient in a 2-4 weeks.     She was also started on gabapentin for neuropathy that helped.  Medications upon discharge gabapentin 300 mg twice daily, Norco 7.5-325 every 4 hours as needed, atorvastatin 80 mg daily, Zetia 10 mg daily, Flonase 2 sprays each nostril daily, levothyroxine 275 mcg daily, metformin 1000 mg twice daily, NovoLog 16 units with meals, Tresiba 60 units at night, omeprazole 40 mg daily, Januvia 100 mg daily, vitamin C and vitamin D  Disposition Home  Follow up with general surgery and home health NP to come in 2 days to the home  Currently c/o fatigue and sleepy secondary to pain medications, Patient needs new glucose meter and insulin.  Has not been able to check her blood sugars at home as she does not have a glucometer.  Is continue use the insulin.  States he is using the pain medicine which is actually making her sleepy.  She is also using the gabapentin which is helping with her uncomfortable discomfort in her feet.  Condition stable    I reviewed and requested records labs and diagnostics from the hospital with the patient and family.  The patient is to follow-up with specialist as discussed and directed.  If any problems arise or further questions develop patient is to call or to contact us for any needs.    The following portions of the patient's history were reviewed and updated as appropriate: allergies, current medications, past family history, past medical history, past social history, past surgical history and problem list.    Past Medical  History:   Diagnosis Date    Arthritis     Bipolar 1 disorder     Cancer     uterine    COVID-19     Depression     Diabetes mellitus     Frequent UTI     GERD (gastroesophageal reflux disease)     Hyperlipidemia     Migraine     Murmur, heart     Ovarian cyst     Stroke        Past Surgical History:   Procedure Laterality Date     SECTION  2003    HYSTERECTOMY      TONSILLECTOMY      WOUND DEBRIDEMENT N/A 2023    Procedure: Debridement necrotizing tissue  right buttock wound;  Surgeon: Elizabeht Adame MD;  Location: [facility] OR;  Service: General;  Laterality: N/A;       Family History   Problem Relation Age of Onset    Arthritis Mother     Diabetes Mother     Migraines Mother     Stroke Mother     Arthritis Father     Cancer Maternal Grandmother     Thyroid disease Maternal Grandmother     Cancer Maternal Grandfather     Cancer Paternal Grandmother     Thyroid disease Paternal Grandmother     Cancer Paternal Grandfather        Social History     Socioeconomic History    Marital status: Single   Tobacco Use    Smoking status: Former    Smokeless tobacco: Never   Vaping Use    Vaping Use: Never used   Substance and Sexual Activity    Alcohol use: Never    Drug use: Never    Sexual activity: Defer       Current Outpatient Medications   Medication Sig Dispense Refill    gabapentin (NEURONTIN) 300 MG capsule Take 1 capsule by mouth Every 12 (Twelve) Hours. 60 capsule 3    glucose blood test strip 1 each by Other route 3 (Three) Times a Day. Test blood sugar once daily before a meal and as needed. 100 each 5    glucose monitor monitoring kit 1 each As Needed (Check blood sugar once daily before a meal and record results.). 1 each 0    insulin aspart (NovoLOG FlexPen) 100 UNIT/ML solution pen-injector sc pen Use as directed up to 16 U with each meal 9 mL 3    Tresiba FlexTouch 200 UNIT/ML solution pen-injector pen injection Inject 60 Units under the skin into the appropriate area as directed  "Daily. 9 mL 3    atorvastatin (LIPITOR) 80 MG tablet Take 1 tablet by mouth Daily. 30 tablet 4    Cholecalciferol (Vitamin D3) 1.25 MG (50543 UT) capsule Take 1 capsule by mouth Every 7 (Seven) Days. 12 capsule 3    ezetimibe (ZETIA) 10 MG tablet TAKE 1 TABLET BY MOUTH DAILY 30 tablet 4    fluticasone (Flonase) 50 MCG/ACT nasal spray 2 sprays into the nostril(s) as directed by provider Daily. 16 g 6    hydrocortisone-liver oil-zinc oxide in bacitracin-nystatin Apply 1 application topically to the appropriate area as directed Every 12 (Twelve) Hours. 120 g 0    Insulin Pen Needle 32G X 4 MM misc Use as directed with insulin with meals and at night 200 each 4    Insulin Syringe 31G X 5/16\" 1 ML misc       levothyroxine (SYNTHROID, LEVOTHROID) 200 MCG tablet Take 1 tablet by mouth Daily. 30 tablet 4    levothyroxine (SYNTHROID, LEVOTHROID) 75 MCG tablet Take one a day. 30 tablet 11    metFORMIN (GLUCOPHAGE) 1000 MG tablet Take 1 tablet by mouth 2 (Two) Times a Day With Meals. 60 tablet 4    naloxone (NARCAN) 4 MG/0.1ML nasal spray Call 911. Don't prime. [place] in 1 nostril for overdose. Repeat in 2-3 minutes in other nostril if no or minimal breathing/responsiveness. 2 each 0    omeprazole (priLOSEC) 40 MG capsule Take 1 capsule by mouth Daily. 30 capsule 3    ondansetron (Zofran) 4 MG tablet Take 1 tablet by mouth Every 8 (Eight) Hours As Needed for Nausea or Vomiting. 20 tablet 0    SITagliptin (Januvia) 100 MG tablet Take 1 tablet by mouth Daily. 30 tablet 5    vitamin C (ASCORBIC ACID) 250 MG tablet Take 1 tablet by mouth Daily. 30 tablet 6     No current facility-administered medications for this visit.       Review of Systems   Constitutional:  Positive for fatigue. Negative for activity change, appetite change, fever, unexpected weight gain and unexpected weight loss.   HENT:  Negative for nosebleeds, rhinorrhea, trouble swallowing and voice change.    Eyes:  Negative for visual disturbance.   Respiratory:  " Negative for cough, chest tightness, shortness of breath and wheezing.    Cardiovascular:  Negative for chest pain, palpitations and leg swelling.   Gastrointestinal:  Negative for abdominal pain, blood in stool, constipation, diarrhea, nausea, vomiting, GERD and indigestion.   Genitourinary:  Negative for dysuria, frequency and hematuria.   Musculoskeletal:  Negative for arthralgias, back pain and myalgias.        Patient is bedridden and unable to get out of bed or move herself very easily.   Skin:  Negative for rash and wound.        Skin wound buttock with pain utilizing wound VAC.   Neurological:  Negative for dizziness, tremors, weakness, light-headedness, numbness, headache and memory problem.   Hematological:  Negative for adenopathy. Does not bruise/bleed easily.   Psychiatric/Behavioral:  Positive for sleep disturbance. Negative for depressed mood. The patient is not nervous/anxious.      Objective   There were no vitals filed for this visit.  There is no height or weight on file to calculate BMI.  Physical Exam  Constitutional:       Appearance: She is obese.      Comments: Patient is obviously very sleepy as we woke her to start this visit.   Pulmonary:      Effort: Pulmonary effort is normal. No respiratory distress.   Neurological:      General: No focal deficit present.      Mental Status: She is alert and oriented to person, place, and time.   Psychiatric:         Mood and Affect: Mood normal.         Behavior: Behavior normal.         Thought Content: Thought content normal.         Judgment: Judgment normal.       Continue with the home health and wound care nursing.  Will continue the current care and monitoring.  Patient needs insulin.  Assessment & Plan   Diagnoses and all orders for this visit:    1. Hospital discharge follow-up (Primary)  -     Ambulatory Referral to Case Management [facility] - High Risk Care Management (transportation assistance), Gaps in Care    2. Sepsis, due to unspecified  organism, unspecified whether acute organ dysfunction present  -     Ambulatory Referral to Case Management [facility] - High Risk Care Management (transportation assistance), Gaps in Care    3. Abnormal CT of the chest  -     CT Chest Without Contrast; Future  -     Ambulatory Referral to Case Management [facility] - High Risk Care Management (transportation assistance), Gaps in Care    4. Acquired hypothyroidism    5. Type 2 diabetes mellitus with diabetic polyneuropathy, with long-term current use of insulin  -     Tresiba FlexTouch 200 UNIT/ML solution pen-injector pen injection; Inject 60 Units under the skin into the appropriate area as directed Daily.  Dispense: 9 mL; Refill: 3  -     insulin aspart (NovoLOG FlexPen) 100 UNIT/ML solution pen-injector sc pen; Use as directed up to 16 U with each meal  Dispense: 9 mL; Refill: 3  -     glucose monitor monitoring kit; 1 each As Needed (Check blood sugar once daily before a meal and record results.).  Dispense: 1 each; Refill: 0  -     glucose blood test strip; 1 each by Other route 3 (Three) Times a Day. Test blood sugar once daily before a meal and as needed.  Dispense: 100 each; Refill: 5  -     Ambulatory Referral to Case Management [facility] - High Risk Care Management (transportation assistance), Gaps in Care    6. Necrotizing soft tissue infection  -     Ambulatory Referral to Case Management [facility] - High Risk Care Management (transportation assistance), Gaps in Care    7. Iron deficiency anemia secondary to inadequate dietary iron intake    8. Pressure ulcer of other site, stage 4  -     Ambulatory Referral to Case Management [facility] - High Risk Care Management (transportation assistance), Gaps in Care    9. Diabetic peripheral neuropathy associated with type 2 diabetes mellitus  -     gabapentin (NEURONTIN) 300 MG capsule; Take 1 capsule by mouth Every 12 (Twelve) Hours.  Dispense: 60 capsule; Refill: 3    After reviewing long discussion with the patient on this visit we will  continue the current medication and have home health evaluate have case management call her and contact her about transportation issues and continued care.  I would also like to try and work with the nurse practitioner who will be coming to the home to help in the care of this patient.  At this time we are limited in what we can do as she is home and bedbound.  Cost of transportation is exorbitant for her since she is requiring a gurney and medical transport for transportation.  She will continue with her antibiotics and her wound care with the wound care nurse coming to the home.  She will need to have a CT repeat screening in 1 month.  She will also need to have her thyroid hormone levels in 1 month which I believe is going to be taking care of by the nurse practitioners can be coming to the home.  We discussed her insulin and diabetic management which she will continue with the insulin at home monitoring her blood sugars.  I stressed the importance of controlling her diabetes for her wound care.  She also needs to have continuous changing in position so she keeps the pressure off of her skin to develop no further wounds.      Unable to complete visit using a video connection to the patient. A phone visit was used to complete this visits. Total time of discussion was 35 minutes.            COVID-19 Precautions - Patient was compliant in wearing a mask. When I saw the patient, I used appropriate personal protective equipment (PPE) including mask and eye shield (standard procedure).  Additionally, I used gown and gloves if indicated.  Hand hygiene was completed before and after seeing the patient.  Dictated utilizing Dragon Dictation

## 2023-06-15 ENCOUNTER — READMISSION MANAGEMENT (OUTPATIENT)
Dept: CALL CENTER | Facility: HOSPITAL | Age: 53
End: 2023-06-15
Payer: MEDICARE

## 2023-06-15 DIAGNOSIS — E03.9 ACQUIRED HYPOTHYROIDISM: ICD-10-CM

## 2023-06-15 RX ORDER — LEVOTHYROXINE SODIUM 0.2 MG/1
200 TABLET ORAL DAILY
Qty: 30 TABLET | Refills: 4 | Status: SHIPPED | OUTPATIENT
Start: 2023-06-15

## 2023-06-15 NOTE — TELEPHONE ENCOUNTER
Caller: Vanessa Root    Relationship: Self    Best call back number: [phone]     Requested Prescriptions:   Requested Prescriptions     Pending Prescriptions Disp Refills    levothyroxine (SYNTHROID, LEVOTHROID) 200 MCG tablet 30 tablet 4     Sig: Take 1 tablet by mouth Daily.        Pharmacy where request should be sent: [id number] - [place], [place] - [address] AT [facility] [address] [facility] [address] - [phone]  - [phone] FX     Last office visit with prescribing clinician: Visit date not found   Last telemedicine visit with prescribing clinician: 6/14/2023   Next office visit with prescribing clinician: Visit date not found     Additional details provided by patient: PATIENT HAS LESS THAN 3 DAYS REMAINING     Does the patient have less than a 3 day supply:  [x] Yes  [] No    Would you like a call back once the refill request has been completed: [] Yes [x] No    Howie Armenta Rep   06/15/23 10:10 EDT

## 2023-06-15 NOTE — TELEPHONE ENCOUNTER
LOV            3/14/23 telemed  NOV            not listed - bedbound  Last RF       3/24/22  Protocol      not met  No thyroid labs listed      JOSE ALFREDO Weiner/SYBILR

## 2023-06-15 NOTE — OUTREACH NOTE
Sepsis Week 2 Survey      Flowsheet Row Responses   [facility] patient discharged from? [facility]   Does the patient have one of the following disease processes/diagnoses(primary or secondary)? Sepsis   Week 2 attempt successful? Yes   Call start time 1356   Call end time 1359   Discharge diagnosis Necrotizing soft tissue infection- Debridement necrotizing tissue  right buttock wound Sepsis   Person spoke with today (if not patient) and relationship patient   Meds reviewed with patient/caregiver? Yes   Is the patient taking all medications as directed (includes completed medication regime)? Yes   Does the patient have a primary care provider?  Yes   Comments regarding PCP patient had televisit with pcp yesterday. HHNP coming to see patient today.   Has the patient kept scheduled appointments due by today? N/A   What is the Home health agency?  [facility]   Has home health visited the patient within 72 hours of discharge? Yes   Psychosocial comments Pt is bedbound   Did the patient receive a copy of their discharge instructions? Yes   Nursing interventions Reviewed instructions with patient   What is the patient's perception of their health status since discharge? Improving   Nursing interventions Nurse provided patient education   Is the patient/caregiver able to teach back TIME? T emperature - higher or lower than normal, I nfection - may have signs and symptoms of an infection, M ental Decline - confused, sleepy, difficult to arouse, E xtremely Ill - severe pain, discomfort, shortness of breath   Is the patient/caregiver able to teach back signs and symptoms of worsening condition: Rapid heart rate (>90), Fever, Shortness of breath/rapid respiratory rate, Altered mental status(confusion/coma)   Is the patient/caregiver able to teach back the hierarchy of who to call/visit for symptoms/problems? PCP, Specialist, Home health nurse, Urgent Care, ED, 911 Yes   Week 2 call  "completed? Yes   Wrap up additional comments Patient states she is doing well- she did a video visit with pcp yesterday. She has the  NP coming tomorrow tomorrow to visit her- She will request Atarax 25 mg PRN for anxiety vs xanax that makes her feel \"drunk\". no s/s of infection noted- Wound vac is doing well. Patient would like one more call next week. no other concerns or questions noted.            Lela ELLIOTT - Registered Nurse  "

## 2023-06-16 ENCOUNTER — REFERRAL TRIAGE (OUTPATIENT)
Dept: CASE MANAGEMENT | Facility: OTHER | Age: 53
End: 2023-06-16
Payer: MEDICARE

## 2023-06-18 NOTE — PROGRESS NOTES
Enter Query Response Below      Query Response:         Right buttock cellulitis due to type 2 diabetes        If applicable, please update the problem list.       Patient: Vanessa Root        : 1970  Account: [id number]           Admit Date: 2023        How to Respond to this query:       a. Click New Note     b. Answer query within the yellow box.                c. Update the Problem List, if applicable.      If you have any questions about this query contact me at: [email or web address]     Dr. Burks    53 year old female with type 2 diabetes was admitted  for pressure ulcer and cellulitis of right buttock with sepsis and necrotizing fasciitis. Blood glucose 325, hemoglobin A1C 11.3 on admission.     Treatment: excisional debridement, IV zosyn, sliding scale insulin    Can this be specified as:    Right buttock cellulitis due to type 2 diabetes  Right buttock cellulitis due to pressure ulcer only, unrelated to type 2 diabetes  Other, please specify________  Unable to clinically determine    By submitting this query, we are merely seeking further clarification of documentation to accurately reflect all conditions that you are monitoring, evaluating, treating or that extend the hospitalization or utilize additional resources of care. Please utilize your independent clinical judgment when addressing the question(s) above.     This query and your response, once completed, will be entered into the legal medical record.    Sincerely,  Agnes Reed RN CCDS  Clinical Documentation Integrity Program

## 2023-08-25 ENCOUNTER — HOSPITAL ENCOUNTER (INPATIENT)
Facility: HOSPITAL | Age: 53
LOS: 4 days | Discharge: HOME | DRG: 175 | End: 2023-08-29
Attending: EMERGENCY MEDICINE | Admitting: INTERNAL MEDICINE
Payer: MEDICARE

## 2023-08-25 ENCOUNTER — APPOINTMENT (OUTPATIENT)
Dept: CARDIOLOGY | Facility: HOSPITAL | Age: 53
DRG: 175 | End: 2023-08-25
Payer: MEDICARE

## 2023-08-25 ENCOUNTER — APPOINTMENT (OUTPATIENT)
Dept: CT IMAGING | Facility: HOSPITAL | Age: 53
DRG: 175 | End: 2023-08-25
Payer: MEDICARE

## 2023-08-25 DIAGNOSIS — L89.219 PRESSURE INJURY OF SKIN OF RIGHT HIP, UNSPECIFIED INJURY STAGE: ICD-10-CM

## 2023-08-25 DIAGNOSIS — E03.9 ACQUIRED HYPOTHYROIDISM: ICD-10-CM

## 2023-08-25 DIAGNOSIS — E11.69 TYPE 2 DIABETES MELLITUS WITH OTHER SPECIFIED COMPLICATION, WITHOUT LONG-TERM CURRENT USE OF INSULIN: ICD-10-CM

## 2023-08-25 DIAGNOSIS — E87.20 LACTIC ACIDOSIS: ICD-10-CM

## 2023-08-25 DIAGNOSIS — E66.9 OBESITY, UNSPECIFIED CLASSIFICATION, UNSPECIFIED OBESITY TYPE, UNSPECIFIED WHETHER SERIOUS COMORBIDITY PRESENT: ICD-10-CM

## 2023-08-25 DIAGNOSIS — I26.99 PULMONARY EMBOLUS, RIGHT: ICD-10-CM

## 2023-08-25 DIAGNOSIS — N39.0 ACUTE UTI: Primary | ICD-10-CM

## 2023-08-25 PROBLEM — E66.01 OBESITY, CLASS III, BMI 40-49.9 (MORBID OBESITY): Status: ACTIVE | Noted: 2023-08-25

## 2023-08-25 PROBLEM — Z86.73 HISTORY OF ISCHEMIC STROKE: Status: ACTIVE | Noted: 2023-08-25

## 2023-08-25 PROBLEM — D72.829 LEUKOCYTOSIS: Status: ACTIVE | Noted: 2023-08-25

## 2023-08-25 PROBLEM — E66.813 OBESITY, CLASS III, BMI 40-49.9 (MORBID OBESITY): Status: ACTIVE | Noted: 2023-08-25

## 2023-08-25 PROBLEM — T83.511A URINARY TRACT INFECTION ASSOCIATED WITH INDWELLING URETHRAL CATHETER: Status: ACTIVE | Noted: 2023-08-25

## 2023-08-25 LAB
ALBUMIN SERPL-MCNC: 2.8 G/DL (ref 3.5–5.2)
ALBUMIN/GLOB SERPL: 0.7 G/DL
ALP SERPL-CCNC: 269 U/L (ref 39–117)
ALT SERPL W P-5'-P-CCNC: 17 U/L (ref 1–33)
ANION GAP SERPL CALCULATED.3IONS-SCNC: 11 MMOL/L (ref 5–15)
AST SERPL-CCNC: 37 U/L (ref 1–32)
BACTERIA UR QL AUTO: ABNORMAL /HPF
BASOPHILS # BLD AUTO: 0.13 10*3/MM3 (ref 0–0.2)
BASOPHILS NFR BLD AUTO: 1.1 % (ref 0–1.5)
BILIRUB SERPL-MCNC: 0.4 MG/DL (ref 0–1.2)
BILIRUB UR QL STRIP: NEGATIVE
BUN SERPL-MCNC: 11 MG/DL (ref 6–20)
BUN/CREAT SERPL: 12.6 (ref 7–25)
CALCIUM SPEC-SCNC: 8.8 MG/DL (ref 8.6–10.5)
CHLORIDE SERPL-SCNC: 104 MMOL/L (ref 98–107)
CLARITY UR: ABNORMAL
CO2 SERPL-SCNC: 25 MMOL/L (ref 22–29)
COLOR UR: YELLOW
CREAT SERPL-MCNC: 0.87 MG/DL (ref 0.57–1)
D-LACTATE SERPL-SCNC: 2 MMOL/L (ref 0.5–2)
D-LACTATE SERPL-SCNC: 2.5 MMOL/L (ref 0.5–2)
DEPRECATED RDW RBC AUTO: 44.1 FL (ref 37–54)
EGFRCR SERPLBLD CKD-EPI 2021: 80.8 ML/MIN/1.73
EOSINOPHIL # BLD AUTO: 0.83 10*3/MM3 (ref 0–0.4)
EOSINOPHIL NFR BLD AUTO: 6.7 % (ref 0.3–6.2)
ERYTHROCYTE [DISTWIDTH] IN BLOOD BY AUTOMATED COUNT: 13.8 % (ref 12.3–15.4)
GLOBULIN UR ELPH-MCNC: 4 GM/DL
GLUCOSE BLDC GLUCOMTR-MCNC: 114 MG/DL (ref 70–130)
GLUCOSE SERPL-MCNC: 161 MG/DL (ref 65–99)
GLUCOSE UR STRIP-MCNC: ABNORMAL MG/DL
HCT VFR BLD AUTO: 38 % (ref 34–46.6)
HGB BLD-MCNC: 12.1 G/DL (ref 12–15.9)
HGB UR QL STRIP.AUTO: ABNORMAL
HYALINE CASTS UR QL AUTO: ABNORMAL /LPF
IMM GRANULOCYTES # BLD AUTO: 0.05 10*3/MM3 (ref 0–0.05)
IMM GRANULOCYTES NFR BLD AUTO: 0.4 % (ref 0–0.5)
KETONES UR QL STRIP: NEGATIVE
LEUKOCYTE ESTERASE UR QL STRIP.AUTO: ABNORMAL
LYMPHOCYTES # BLD AUTO: 2.65 10*3/MM3 (ref 0.7–3.1)
LYMPHOCYTES NFR BLD AUTO: 21.5 % (ref 19.6–45.3)
MCH RBC QN AUTO: 28.1 PG (ref 26.6–33)
MCHC RBC AUTO-ENTMCNC: 31.8 G/DL (ref 31.5–35.7)
MCV RBC AUTO: 88.2 FL (ref 79–97)
MONOCYTES # BLD AUTO: 0.54 10*3/MM3 (ref 0.1–0.9)
MONOCYTES NFR BLD AUTO: 4.4 % (ref 5–12)
NEUTROPHILS NFR BLD AUTO: 65.9 % (ref 42.7–76)
NEUTROPHILS NFR BLD AUTO: 8.11 10*3/MM3 (ref 1.7–7)
NITRITE UR QL STRIP: NEGATIVE
NRBC BLD AUTO-RTO: 0 /100 WBC (ref 0–0.2)
PH UR STRIP.AUTO: 5.5 [PH] (ref 5–8)
PLATELET # BLD AUTO: 408 10*3/MM3 (ref 140–450)
PMV BLD AUTO: 10.2 FL (ref 6–12)
POTASSIUM SERPL-SCNC: 3.8 MMOL/L (ref 3.5–5.2)
PROCALCITONIN SERPL-MCNC: 0.3 NG/ML (ref 0–0.25)
PROT SERPL-MCNC: 6.8 G/DL (ref 6–8.5)
PROT UR QL STRIP: ABNORMAL
RBC # BLD AUTO: 4.31 10*6/MM3 (ref 3.77–5.28)
RBC # UR STRIP: ABNORMAL /HPF
REF LAB TEST METHOD: ABNORMAL
SODIUM SERPL-SCNC: 140 MMOL/L (ref 136–145)
SP GR UR STRIP: >=1.03 (ref 1–1.03)
SQUAMOUS #/AREA URNS HPF: ABNORMAL /HPF
TSH SERPL DL<=0.05 MIU/L-ACNC: 33.6 UIU/ML (ref 0.27–4.2)
UROBILINOGEN UR QL STRIP: ABNORMAL
WBC # UR STRIP: ABNORMAL /HPF
WBC NRBC COR # BLD: 12.31 10*3/MM3 (ref 3.4–10.8)

## 2023-08-25 PROCEDURE — 81001 URINALYSIS AUTO W/SCOPE: CPT | Performed by: PHYSICIAN ASSISTANT

## 2023-08-25 PROCEDURE — 25010000002 CEFTRIAXONE PER 250 MG: Performed by: INTERNAL MEDICINE

## 2023-08-25 PROCEDURE — 84443 ASSAY THYROID STIM HORMONE: CPT | Performed by: INTERNAL MEDICINE

## 2023-08-25 PROCEDURE — 80053 COMPREHEN METABOLIC PANEL: CPT | Performed by: PHYSICIAN ASSISTANT

## 2023-08-25 PROCEDURE — 87086 URINE CULTURE/COLONY COUNT: CPT | Performed by: PHYSICIAN ASSISTANT

## 2023-08-25 PROCEDURE — 63710000001 INSULIN GLARGINE PER 5 UNITS: Performed by: INTERNAL MEDICINE

## 2023-08-25 PROCEDURE — P9612 CATHETERIZE FOR URINE SPEC: HCPCS

## 2023-08-25 PROCEDURE — 87077 CULTURE AEROBIC IDENTIFY: CPT | Performed by: PHYSICIAN ASSISTANT

## 2023-08-25 PROCEDURE — 83605 ASSAY OF LACTIC ACID: CPT | Performed by: PHYSICIAN ASSISTANT

## 2023-08-25 PROCEDURE — 84145 PROCALCITONIN (PCT): CPT | Performed by: PHYSICIAN ASSISTANT

## 2023-08-25 PROCEDURE — 85025 COMPLETE CBC W/AUTO DIFF WBC: CPT | Performed by: PHYSICIAN ASSISTANT

## 2023-08-25 PROCEDURE — 93970 EXTREMITY STUDY: CPT

## 2023-08-25 PROCEDURE — 87040 BLOOD CULTURE FOR BACTERIA: CPT | Performed by: PHYSICIAN ASSISTANT

## 2023-08-25 PROCEDURE — 74177 CT ABD & PELVIS W/CONTRAST: CPT

## 2023-08-25 PROCEDURE — 99285 EMERGENCY DEPT VISIT HI MDM: CPT

## 2023-08-25 PROCEDURE — 25010000002 VANCOMYCIN 10 G RECONSTITUTED SOLUTION: Performed by: PHYSICIAN ASSISTANT

## 2023-08-25 PROCEDURE — 87186 SC STD MICRODIL/AGAR DIL: CPT | Performed by: PHYSICIAN ASSISTANT

## 2023-08-25 PROCEDURE — 87150 DNA/RNA AMPLIFIED PROBE: CPT | Performed by: PHYSICIAN ASSISTANT

## 2023-08-25 PROCEDURE — 25510000001 IOPAMIDOL 61 % SOLUTION: Performed by: EMERGENCY MEDICINE

## 2023-08-25 PROCEDURE — 82948 REAGENT STRIP/BLOOD GLUCOSE: CPT

## 2023-08-25 RX ORDER — DEXTROSE MONOHYDRATE 25 G/50ML
25 INJECTION, SOLUTION INTRAVENOUS
Status: DISCONTINUED | OUTPATIENT
Start: 2023-08-25 | End: 2023-08-29 | Stop reason: HOSPADM

## 2023-08-25 RX ORDER — ACETAMINOPHEN 650 MG/1
650 SUPPOSITORY RECTAL EVERY 4 HOURS PRN
Status: DISCONTINUED | OUTPATIENT
Start: 2023-08-25 | End: 2023-08-29 | Stop reason: HOSPADM

## 2023-08-25 RX ORDER — SODIUM CHLORIDE 0.9 % (FLUSH) 0.9 %
3 SYRINGE (ML) INJECTION EVERY 12 HOURS SCHEDULED
Status: DISCONTINUED | OUTPATIENT
Start: 2023-08-25 | End: 2023-08-29 | Stop reason: HOSPADM

## 2023-08-25 RX ORDER — ASCORBIC ACID 500 MG
250 TABLET ORAL DAILY
Status: DISCONTINUED | OUTPATIENT
Start: 2023-08-25 | End: 2023-08-29 | Stop reason: HOSPADM

## 2023-08-25 RX ORDER — ATORVASTATIN CALCIUM 80 MG/1
80 TABLET, FILM COATED ORAL DAILY
Status: DISCONTINUED | OUTPATIENT
Start: 2023-08-26 | End: 2023-08-29 | Stop reason: HOSPADM

## 2023-08-25 RX ORDER — ACETAMINOPHEN 160 MG/5ML
650 SOLUTION ORAL EVERY 4 HOURS PRN
Status: DISCONTINUED | OUTPATIENT
Start: 2023-08-25 | End: 2023-08-29 | Stop reason: HOSPADM

## 2023-08-25 RX ORDER — ONDANSETRON 4 MG/1
4 TABLET, FILM COATED ORAL EVERY 6 HOURS PRN
Status: DISCONTINUED | OUTPATIENT
Start: 2023-08-25 | End: 2023-08-29 | Stop reason: HOSPADM

## 2023-08-25 RX ORDER — LEVOTHYROXINE SODIUM 0.1 MG/1
200 TABLET ORAL
Status: DISCONTINUED | OUTPATIENT
Start: 2023-08-26 | End: 2023-08-26

## 2023-08-25 RX ORDER — SODIUM CHLORIDE 0.9 % (FLUSH) 0.9 %
3-10 SYRINGE (ML) INJECTION AS NEEDED
Status: DISCONTINUED | OUTPATIENT
Start: 2023-08-25 | End: 2023-08-29 | Stop reason: HOSPADM

## 2023-08-25 RX ORDER — SODIUM CHLORIDE 0.9 % (FLUSH) 0.9 %
10 SYRINGE (ML) INJECTION AS NEEDED
Status: DISCONTINUED | OUTPATIENT
Start: 2023-08-25 | End: 2023-08-29 | Stop reason: HOSPADM

## 2023-08-25 RX ORDER — INSULIN LISPRO 100 [IU]/ML
5 INJECTION, SOLUTION INTRAVENOUS; SUBCUTANEOUS
Status: DISCONTINUED | OUTPATIENT
Start: 2023-08-25 | End: 2023-08-29 | Stop reason: HOSPADM

## 2023-08-25 RX ORDER — IBUPROFEN 600 MG/1
1 TABLET ORAL
Status: DISCONTINUED | OUTPATIENT
Start: 2023-08-25 | End: 2023-08-29 | Stop reason: HOSPADM

## 2023-08-25 RX ORDER — ACETAMINOPHEN 325 MG/1
650 TABLET ORAL EVERY 4 HOURS PRN
Status: DISCONTINUED | OUTPATIENT
Start: 2023-08-25 | End: 2023-08-29 | Stop reason: HOSPADM

## 2023-08-25 RX ORDER — LEVOTHYROXINE SODIUM 0.03 MG/1
25 TABLET ORAL
Status: DISCONTINUED | OUTPATIENT
Start: 2023-08-26 | End: 2023-08-26

## 2023-08-25 RX ORDER — INSULIN LISPRO 100 [IU]/ML
3-14 INJECTION, SOLUTION INTRAVENOUS; SUBCUTANEOUS
Status: DISCONTINUED | OUTPATIENT
Start: 2023-08-25 | End: 2023-08-29 | Stop reason: HOSPADM

## 2023-08-25 RX ORDER — SODIUM CHLORIDE 9 MG/ML
40 INJECTION, SOLUTION INTRAVENOUS AS NEEDED
Status: DISCONTINUED | OUTPATIENT
Start: 2023-08-25 | End: 2023-08-29 | Stop reason: HOSPADM

## 2023-08-25 RX ORDER — HYDROXYZINE HYDROCHLORIDE 25 MG/1
25 TABLET, FILM COATED ORAL 3 TIMES DAILY PRN
Status: DISCONTINUED | OUTPATIENT
Start: 2023-08-25 | End: 2023-08-29 | Stop reason: HOSPADM

## 2023-08-25 RX ORDER — ENOXAPARIN SODIUM 100 MG/ML
1 INJECTION SUBCUTANEOUS ONCE
Status: DISCONTINUED | OUTPATIENT
Start: 2023-08-25 | End: 2023-08-26 | Stop reason: SDUPTHER

## 2023-08-25 RX ORDER — PANTOPRAZOLE SODIUM 40 MG/1
40 TABLET, DELAYED RELEASE ORAL
Status: DISCONTINUED | OUTPATIENT
Start: 2023-08-26 | End: 2023-08-29 | Stop reason: HOSPADM

## 2023-08-25 RX ORDER — UREA 10 %
3 LOTION (ML) TOPICAL NIGHTLY PRN
Status: DISCONTINUED | OUTPATIENT
Start: 2023-08-25 | End: 2023-08-29 | Stop reason: HOSPADM

## 2023-08-25 RX ORDER — FAMOTIDINE 20 MG/1
20 TABLET, FILM COATED ORAL 2 TIMES DAILY PRN
Status: DISCONTINUED | OUTPATIENT
Start: 2023-08-25 | End: 2023-08-29 | Stop reason: HOSPADM

## 2023-08-25 RX ORDER — HYDROCODONE BITARTRATE AND ACETAMINOPHEN 7.5; 325 MG/1; MG/1
1 TABLET ORAL EVERY 4 HOURS PRN
COMMUNITY

## 2023-08-25 RX ORDER — GABAPENTIN 300 MG/1
300 CAPSULE ORAL EVERY 12 HOURS SCHEDULED
Status: DISCONTINUED | OUTPATIENT
Start: 2023-08-25 | End: 2023-08-29 | Stop reason: HOSPADM

## 2023-08-25 RX ORDER — VANCOMYCIN HYDROCHLORIDE 1 G/200ML
1000 INJECTION, SOLUTION INTRAVENOUS EVERY 12 HOURS
Status: DISCONTINUED | OUTPATIENT
Start: 2023-08-26 | End: 2023-08-26

## 2023-08-25 RX ORDER — FLUTICASONE PROPIONATE 50 MCG
2 SPRAY, SUSPENSION (ML) NASAL DAILY
Status: DISCONTINUED | OUTPATIENT
Start: 2023-08-25 | End: 2023-08-29 | Stop reason: HOSPADM

## 2023-08-25 RX ORDER — ONDANSETRON 2 MG/ML
4 INJECTION INTRAMUSCULAR; INTRAVENOUS EVERY 6 HOURS PRN
Status: DISCONTINUED | OUTPATIENT
Start: 2023-08-25 | End: 2023-08-29 | Stop reason: HOSPADM

## 2023-08-25 RX ORDER — HYDROCODONE BITARTRATE AND ACETAMINOPHEN 5; 325 MG/1; MG/1
1 TABLET ORAL EVERY 4 HOURS PRN
Status: DISCONTINUED | OUTPATIENT
Start: 2023-08-25 | End: 2023-08-29 | Stop reason: HOSPADM

## 2023-08-25 RX ORDER — NICOTINE POLACRILEX 4 MG
15 LOZENGE BUCCAL
Status: DISCONTINUED | OUTPATIENT
Start: 2023-08-25 | End: 2023-08-29 | Stop reason: HOSPADM

## 2023-08-25 RX ORDER — DOCUSATE SODIUM 100 MG/1
100 CAPSULE, LIQUID FILLED ORAL 2 TIMES DAILY PRN
Status: DISCONTINUED | OUTPATIENT
Start: 2023-08-25 | End: 2023-08-29 | Stop reason: HOSPADM

## 2023-08-25 RX ADMIN — INSULIN GLARGINE 40 UNITS: 100 INJECTION, SOLUTION SUBCUTANEOUS at 22:27

## 2023-08-25 RX ADMIN — CEFTRIAXONE 2000 MG: 2 INJECTION, POWDER, FOR SOLUTION INTRAMUSCULAR; INTRAVENOUS at 16:15

## 2023-08-25 RX ADMIN — OXYCODONE HYDROCHLORIDE AND ACETAMINOPHEN 250 MG: 500 TABLET ORAL at 21:03

## 2023-08-25 RX ADMIN — VANCOMYCIN HYDROCHLORIDE 2250 MG: 10 INJECTION, POWDER, LYOPHILIZED, FOR SOLUTION INTRAVENOUS at 22:27

## 2023-08-25 RX ADMIN — SODIUM CHLORIDE 1000 ML: 9 INJECTION, SOLUTION INTRAVENOUS at 12:51

## 2023-08-25 RX ADMIN — GABAPENTIN 300 MG: 300 CAPSULE ORAL at 21:03

## 2023-08-25 RX ADMIN — IOPAMIDOL 85 ML: 612 INJECTION, SOLUTION INTRAVENOUS at 12:04

## 2023-08-25 RX ADMIN — Medication 3 ML: at 21:11

## 2023-08-25 NOTE — ED NOTES
Nursing report ED to floor  Vanessa Root  53 y.o.  female    HPI :   Chief Complaint   Patient presents with    Dysuria       Admitting doctor:   Marv Saucedo MD    Admitting diagnosis:   The primary encounter diagnosis was Acute UTI. Diagnoses of Pressure injury of skin of right hip, unspecified injury stage, Pulmonary embolus, right, Obesity, unspecified classification, unspecified obesity type, unspecified whether serious comorbidity present, Type 2 diabetes mellitus with other specified complication, without long-term current use of insulin, and Lactic acidosis were also pertinent to this visit.    Code status:   Current Code Status       Date Active Code Status Order ID Comments User Context       8/25/2023 1604 CPR (Attempt to Resuscitate) [id number]  Marv Saucedo MD ED        Question Answer    Code Status (Patient has no pulse and is not breathing) CPR (Attempt to Resuscitate)    Medical Interventions (Patient has pulse or is breathing) Full Support    Level Of Support Discussed With Patient                    Allergies:   Clemastine fumarate, Ziprasidone, Aripiprazole, Sulfa antibiotics, Latex, and Lisinopril    Isolation:   No active isolations    Intake and Output    Intake/Output Summary (Last 24 hours) at 8/25/2023 1619  Last data filed at 8/25/2023 1407  Gross per 24 hour   Intake 1000 ml   Output --   Net 1000 ml       Weight:       08/25/23  1031   Weight: 118 kg (260 lb)       Most recent vitals:   Vitals:    08/25/23 1228 08/25/23 1331 08/25/23 1510 08/25/23 1559   BP:  110/78 102/52 113/44   Pulse: 76 74 82 81   Resp:       Temp:       TempSrc:       SpO2: 94% 95% 91% 96%   Weight:       Height:           Active LDAs/IV Access:   Lines, Drains & Airways       Active LDAs       Name Placement date Placement time Site Days    Peripheral IV 08/25/23 1114 Right Antecubital 08/25/23  1114  Antecubital  less than 1    Urethral Catheter 16 Fr. 08/25/23  1142  -- less than 1       "              Labs (abnormal labs have a star):   Labs Reviewed   COMPREHENSIVE METABOLIC PANEL - Abnormal; Notable for the following components:       Result Value    Glucose 161 (*)     Albumin 2.8 (*)     AST (SGOT) 37 (*)     Alkaline Phosphatase 269 (*)     All other components within normal limits    Narrative:     GFR Normal >60  Chronic Kidney Disease <60  Kidney Failure <15     CBC WITH AUTO DIFFERENTIAL - Abnormal; Notable for the following components:    WBC 12.31 (*)     Monocyte % 4.4 (*)     Eosinophil % 6.7 (*)     Neutrophils, Absolute 8.11 (*)     Eosinophils, Absolute 0.83 (*)     All other components within normal limits   LACTIC ACID, PLASMA - Abnormal; Notable for the following components:    Lactate 2.5 (*)     All other components within normal limits   PROCALCITONIN - Abnormal; Notable for the following components:    Procalcitonin 0.30 (*)     All other components within normal limits    Narrative:     As a Marker for Sepsis (Non-Neonates):    1. <0.5 ng/mL represents a low risk of severe sepsis and/or septic shock.  2. >2 ng/mL represents a high risk of severe sepsis and/or septic shock.    As a Marker for Lower Respiratory Tract Infections that require antibiotic therapy:    PCT on Admission    Antibiotic Therapy       6-12 Hrs later    >0.5                Strongly Recommended  >0.25 - <0.5        Recommended   0.1 - 0.25          Discouraged              Remeasure/reassess PCT  <0.1                Strongly Discouraged     Remeasure/reassess PCT    As 28 day mortality risk marker: \"Change in Procalcitonin Result\" (>80% or <=80%) if Day 0 (or Day 1) and Day 4 values are available. Refer to http://www.bras-pct-calculator.com    Change in PCT <=80%  A decrease of PCT levels below or equal to 80% defines a positive change in PCT test result representing a higher risk for 28-day all-cause mortality of patients diagnosed with severe sepsis for septic shock.    Change in PCT >80%  A decrease of " PCT levels of more than 80% defines a negative change in PCT result representing a lower risk for 28-day all-cause mortality of patients diagnosed with severe sepsis or septic shock.      URINALYSIS W/ CULTURE IF INDICATED - Abnormal; Notable for the following components:    Appearance, UA Cloudy (*)     Glucose,  mg/dL (Trace) (*)     Blood, UA Small (1+) (*)     Protein, UA 30 mg/dL (1+) (*)     Leuk Esterase, UA Moderate (2+) (*)     All other components within normal limits    Narrative:     In absence of clinical symptoms, the presence of pyuria, bacteria, and/or nitrites on the urinalysis result does not correlate with infection.   URINALYSIS, MICROSCOPIC ONLY - Abnormal; Notable for the following components:    RBC, UA 3-5 (*)     WBC, UA Too Numerous to Count (*)     Squamous Epithelial Cells, UA 3-6 (*)     All other components within normal limits   LACTIC ACID, REFLEX - Normal   BLOOD CULTURE   BLOOD CULTURE   URINE CULTURE   POCT GLUCOSE FINGERSTICK   POCT GLUCOSE FINGERSTICK   POCT GLUCOSE FINGERSTICK   POCT GLUCOSE FINGERSTICK   CBC AND DIFFERENTIAL    Narrative:     The following orders were created for panel order CBC & Differential.  Procedure                               Abnormality         Status                     ---------                               -----------         ------                     CBC Auto Differential[[id number]]        Abnormal            Final result                 Please view results for these tests on the individual orders.       EKG:   No orders to display       Meds given in ED:   Medications   sodium chloride 0.9 % flush 10 mL (has no administration in time range)   Enoxaparin Sodium (LOVENOX) syringe 120 mg (has no administration in time range)   vancomycin 2250 mg/500 mL 0.9% NS IVPB ([facility]) (has no administration in time range)   Pharmacy to dose vancomycin (has no administration in time range)   cefTRIAXone (ROCEPHIN) 2,000 mg in sodium chloride 0.9 % 100 mL  IVPB-VTB (2,000 mg Intravenous New Bag 8/25/23 4525)   dextrose (GLUTOSE) oral gel 15 g (has no administration in time range)   dextrose (D50W) (25 g/50 mL) IV injection 25 g (has no administration in time range)   glucagon (GLUCAGEN) injection 1 mg (has no administration in time range)   insulin glargine (LANTUS, SEMGLEE) injection 40 Units (has no administration in time range)   insulin lispro (HUMALOG/ADMELOG) injection 3-14 Units (has no administration in time range)   insulin lispro (HUMALOG/ADMELOG) injection 5 Units (has no administration in time range)   sodium chloride 0.9 % flush 3 mL (has no administration in time range)   sodium chloride 0.9 % flush 3-10 mL (has no administration in time range)   sodium chloride 0.9 % infusion 40 mL (has no administration in time range)   acetaminophen (TYLENOL) tablet 650 mg (has no administration in time range)     Or   acetaminophen (TYLENOL) 160 MG/5ML solution 650 mg (has no administration in time range)     Or   acetaminophen (TYLENOL) suppository 650 mg (has no administration in time range)   famotidine (PEPCID) tablet 20 mg (has no administration in time range)   ondansetron (ZOFRAN) tablet 4 mg (has no administration in time range)     Or   ondansetron (ZOFRAN) injection 4 mg (has no administration in time range)   melatonin tablet 3 mg (has no administration in time range)   Potassium Replacement - Follow Nurse / BPA Driven Protocol (has no administration in time range)   Magnesium Standard Dose Replacement - Follow Nurse / BPA Driven Protocol (has no administration in time range)   Phosphorus Replacement - Follow Nurse / BPA Driven Protocol (has no administration in time range)   Calcium Replacement - Follow Nurse / BPA Driven Protocol (has no administration in time range)   docusate sodium (COLACE) capsule 100 mg (has no administration in time range)   atorvastatin (LIPITOR) tablet 80 mg (has no administration in time range)   fluticasone (FLONASE) 50 MCG/ACT  nasal spray 2 spray (has no administration in time range)   gabapentin (NEURONTIN) capsule 300 mg (has no administration in time range)   levothyroxine (SYNTHROID, LEVOTHROID) tablet 200 mcg (has no administration in time range)   levothyroxine (SYNTHROID, LEVOTHROID) tablet 25 mcg (has no administration in time range)   pantoprazole (PROTONIX) EC tablet 40 mg (has no administration in time range)   linagliptin (TRADJENTA) tablet 5 mg (has no administration in time range)   ascorbic acid (VITAMIN C) tablet 250 mg (has no administration in time range)   HYDROcodone-acetaminophen (NORCO) 5-325 MG per tablet 1 tablet (has no administration in time range)   hydrOXYzine (ATARAX) tablet 25 mg (has no administration in time range)   iopamidol (ISOVUE-300) 61 % injection 100 mL (85 mL Intravenous Given 8/25/23 1204)   sodium chloride 0.9 % bolus 1,000 mL (0 mL Intravenous Stopped 8/25/23 1407)       Imaging results:  No radiology results for the last day    Ambulatory status:   - slide    Social issues:   Social History     Socioeconomic History    Marital status: Single   Tobacco Use    Smoking status: Former    Smokeless tobacco: Never   Vaping Use    Vaping Use: Never used   Substance and Sexual Activity    Alcohol use: Never    Drug use: Never    Sexual activity: Defer       NIH Stroke Scale:       Stephanie Ochoa RN  08/25/23 16:19 EDT

## 2023-08-25 NOTE — H&P
[facility] Medicine Services  HISTORY AND PHYSICAL    Patient Name: Vanessa Root  : 1970  MRN: [id number]  Primary Care Physician: Anat Castellanos APRN  Date of admission: 2023    Subjective   Subjective   Chief Complaint:  Multiple complaints, weakness, urinary discoloration and burning with Quiroz catheter that is chronic, foul-smelling wound    HPI:  Vanessa Root is a 53 y.o. female with multiple chronic medical problems as listed below who had a history of necrotizing fasciitis earlier this year and is status postdebridement and has been on a wound VAC with home health nursing.  She suffers from morbid obesity and immobility syndrome and is relatively bedbound.  She reports she has a chronic Quiroz catheter to keep her wound dry.  Patient presents to the hospital with a couple day history of discolored burning urine with Quiroz catheter.  She has had increased weakness.  She has pain in her right hip that is chronic and moderate and worse with palpation and improved with pain medication.  She says her home health nurse arrived to the house today and felt her wound was more foul-smelling and red than usual with her wound VAC.  She was found to have Quiroz catheter associated urinary tract infection and pulmonary embolism.  She is being admitted for further medical management.    Review of Systems   Constitutional:  Positive for fatigue. Negative for fever.   HENT: Negative.     Eyes: Negative.    Respiratory:  Positive for shortness of breath. Negative for cough.    Gastrointestinal: Negative.    Endocrine: Negative.    Genitourinary:  Positive for dysuria. Negative for flank pain.   Musculoskeletal:  Positive for back pain. Negative for neck pain.   Skin:  Positive for wound. Negative for rash.   Allergic/Immunologic: Negative.    Neurological: Negative.    Hematological: Negative.    Psychiatric/Behavioral: Negative.        All other systems reviewed and are negative.     Personal  History     Past Medical History:   Diagnosis Date    Arthritis     Bipolar 1 disorder     Cancer     uterine    COVID-19     Depression     Diabetes mellitus     Frequent UTI     GERD (gastroesophageal reflux disease)     Hyperlipidemia     Migraine     Murmur, heart     Ovarian cyst     Stroke        Past Surgical History:   Procedure Laterality Date     SECTION  2003    HYSTERECTOMY      TONSILLECTOMY      WOUND DEBRIDEMENT N/A 2023    Procedure: Debridement necrotizing tissue  right buttock wound;  Surgeon: Elizabeth Adame MD;  Location: [facility];  Service: General;  Laterality: N/A;       Family History: family history includes Arthritis in her father and mother; Cancer in her maternal grandfather, maternal grandmother, paternal grandfather, and paternal grandmother; Diabetes in her mother; Migraines in her mother; Stroke in her mother; Thyroid disease in her maternal grandmother and paternal grandmother. Other pertinent FHx was reviewed and unremarkable.     Social History:  reports that she has quit smoking. She has never used smokeless tobacco. She reports that she does not drink alcohol and does not use drugs.        Medications:  Available home medication information reviewed.    Allergies   Allergen Reactions    Clemastine Fumarate Other (See Comments)     SEVERE ORBITAL SWELLING    Ziprasidone Other (See Comments)     EXTREME SLEEPINESS  EXTREME SLEEPINESS      Aripiprazole Other (See Comments)     MIGRAINES  MIGRAINES      Sulfa Antibiotics Nausea Only    Latex Rash    Lisinopril Cough       Objective   Objective   Vital Signs:   Temp:  [97.6 °F (36.4 °C)] 97.6 °F (36.4 °C)  Heart Rate:  [74-85] 77  Resp:  [16] 16  BP: (102-125)/(44-91) 125/91        Physical Exam   Constitutional: Awake, alert, chronically ill-appearing but nontoxic  Eyes: PERRLA, sclerae anicteric, no conjunctival injection  HENT: NCAT, mucous membranes moist  Neck: Supple, no thyromegaly, no  lymphadenopathy, trachea midline  Respiratory: Rare cough and coarse sound, no wheezing, nonlabored breathing, moderate inspiration  Cardiovascular: Pulse rate is normal, palpable radial pulses bilaterally  Gastrointestinal: Morbidly obese, soft, nontender, nondistended  Musculoskeletal: Right hip wound is present with foul smell, BMI is 43, mild bilateral ankle edema, no clubbing or cyanosis to extremities  Psychiatric: Anxious affect, cooperative  Neurologic: Oriented, strength symmetric in all extremities, Cranial Nerves grossly intact to confrontation, speech clear  Skin: Right hip wound, no jaundice no rashes  Quiroz catheter is in place and has been changed in the emergency room    Results from last 7 days   Lab Units 08/25/23  1114   WBC 10*3/mm3 12.31*   HEMOGLOBIN g/dL 12.1   HEMATOCRIT % 38.0   PLATELETS 10*3/mm3 408     Results from last 7 days   Lab Units 08/25/23  1511 08/25/23  1114   SODIUM mmol/L  --  140   POTASSIUM mmol/L  --  3.8   CHLORIDE mmol/L  --  104   CO2 mmol/L  --  25.0   BUN mg/dL  --  11   CREATININE mg/dL  --  0.87   GLUCOSE mg/dL  --  161*   CALCIUM mg/dL  --  8.8   ALT (SGPT) U/L  --  17   AST (SGOT) U/L  --  37*   LACTATE mmol/L 2.0 2.5*   PROCALCITONIN ng/mL  --  0.30*     Estimated Creatinine Clearance: 98.3 mL/min (by C-G formula based on SCr of 0.87 mg/dL).  Brief Urine Lab Results  (Last result in the past 365 days)        Color   Clarity   Blood   Leuk Est   Nitrite   Protein   CREAT   Urine HCG        08/25/23 1407 Yellow   Cloudy   Small (1+)   Moderate (2+)   Negative   30 mg/dL (1+)                 Imaging Results (Last 24 Hours)       Procedure Component Value Units Date/Time    CT Abdomen Pelvis With Contrast [[id number]] Collected: 08/25/23 1404     Updated: 08/25/23 1654    Narrative:      CT ABDOMEN AND PELVIS WITH IV CONTRAST     HISTORY: 53-year-old female with dysuria. Right hip wound. Upper  extremity thrombophlebitis. Paraplegia. Right buttock necrotizing  tissue  debridement 05/25/2023.     TECHNIQUE: Radiation dose reduction techniques were utilized, including  automated exposure control and exposure modulation based on body size.   3 mm images were obtained through the abdomen and pelvis after the  administration of IV contrast. Compared with previous CT 06/01/2023.     FINDINGS:  1. On the first few images of the examination, there is a suggestion of  possible tiny pulmonary thromboemboli in right lower lobe pulmonary  artery branches.     2. Hepatomegaly and extensive hepatic steatosis, unchanged. Gallbladder,  pancreas, spleen, adrenals, and kidneys appear unremarkable. There is no  acute bowel abnormality.     3. Quiroz catheter is within the vaginal canal, now within the urinary  bladder. Urinary bladder appears diffusely thick-walled which may be  related to the bladder being collapsed, but please correlate clinically  for acute UTI/cystitis.     4. There is no fluid collection or abscess at the debridement site at  the lateral aspect of the right hip/gluteal musculature. There is  heterogeneous admixture of contrast within the deep pelvic veins and the  right common femoral vein. No definite thrombus is seen, but cannot be  excluded.     5. There has been resolution of the previously seen anasarca/third  spacing of fluid. Pleural effusions have resolved.     The findings concerning for possible pulmonary thromboemboli were  discussed with FELECIA Crews.     This report was finalized on 8/25/2023 4:51 PM by Dr. Zeinab Stevenson M.D.                 Assessment & Plan   Assessment & Plan     Active Hospital Problems    Diagnosis  POA    **Pulmonary embolism [I26.99]  Yes    Leukocytosis [D72.829]  Yes    Urinary tract infection associated with indwelling urethral catheter [T83.511A, N39.0]  Yes    Obesity, Class III, BMI 40-49.9 (morbid obesity) [E66.01]  Yes    Lactic acidosis [E87.20]  Yes    History of ischemic stroke [Z86.73]  Not Applicable    Pressure ulcer of  other site, stage 4 [L89.894]  Yes    Spinal stenosis of lumbar region with neurogenic claudication [M48.062]  Yes    Immobility syndrome [M62.3]  Yes    Hyperlipidemia [E78.5]  Yes    GERD (gastroesophageal reflux disease) [K21.9]  Yes    Type 2 diabetes mellitus with diabetic polyneuropathy [E11.42]  Yes    Bipolar 1 disorder [F31.9]  Yes    Depression [F32.A]  Yes    Arthritis [M19.90]  Yes    Restless legs [G25.81]  Yes    Obstructive sleep apnea syndrome [G47.33]  Yes    Hypothyroidism [E03.9]  Yes     53-year-old female presents to the emergency department with complaint of weakness and foul-smelling right hip wound from previous episode of necrotizing fasciitis and catheter associated dysuria is found to have acute pulmonary embolus, catheter associated UTI, and stage IV wound.    Pulmonary embolism:  Possible tiny PE on right lower lobe on CT abdomen.  Patient started on Lovenox.  Check lower extremity duplex.  Check CT scan chest to further evaluate    Catheter associated UTI:  Ceftriaxone initiated.  Send urine for culture.  Change Quiroz catheter.  Likely this is the etiology for lactic acidosis and leukocytosis.    Stage IV wound:  Consult wound care.  Consult her previous general surgeon.  Supportive care and symptom treatment and focused wound care.  Antibiotics ceftriaxone initiated as per above.  Wound is foul-smelling.  Blood cultures.    Immobility syndrome/spinal stenosis: Chronic issue.  Supportive care and symptom treatment.  PT OT.    Diabetes with neuropathy:  Check A1c.  Monitor glucose and adjust insulin as needed.  Gabapentin for neuropathy continued    Hyperlipidemia: Statin.  Stable.    Hypothyroid: Continue home Synthroid which is very high dose.  Check TSH.    GERD: PPI.  Stable.    Morbid obesity: Recommend improved diet and gradual healthy weight loss.  Complicates all aspects of care.  Supportive care    DVT prophylaxis: Anticoagulated    CODE STATUS:    Code Status and Medical  Interventions:   Ordered at: 08/25/23 1604     Level Of Support Discussed With:    Patient     Code Status (Patient has no pulse and is not breathing):    CPR (Attempt to Resuscitate)     Medical Interventions (Patient has pulse or is breathing):    Full Support         Marv Saucedo MD  08/25/23

## 2023-08-25 NOTE — ED NOTES
Pt has been dx c necrotizing fascitis on right hip (has had surgery) and infection on left 4th finger which has been treated and nail fell off - infection has returned.  She c/o burning and pressure and a lot of sediment in her urine - she has a stokes cath placed

## 2023-08-25 NOTE — ED PROVIDER NOTES
EMERGENCY DEPARTMENT ENCOUNTER    Room Number:  05/05  Date of encounter:  8/25/2023  PCP: Anat Castellanos APRN  Historian: Patient  Chronic or social conditions impacting care (social determinants of health): Full code from home        HPI:  Chief Complaint: Foul appearing urine, wound to right hip  A complete HPI/ROS/PMH/PSH/SH/FH are unobtainable due to: Nothing    Context: Vanessa Root is a 53 y.o. female who presents to the ED c/o multiple complaints.  Patient states over the past several days she has noticed more sediment and discoloration of urine in her chronic Quiroz.  The catheter has been present for the past 2 months.  Patient is bedbound secondary to chronic back problems.  She has a chronic indwelling Quiroz.  She states that the catheter seems to be intermittently obstructed from sediment.  She denies any significant abdominal pain, fevers, chills.    In addition patient is concerned about her wound to her right hip.  She has a prior history of necrotizing fasciitis status postdebridement in May 2023.  Patient does have a home health wound nurse coming to the house.  The nurse was concerned about a foul odor come from the wound today.    Review of prior external notes (non-ED):   I reviewed admission from 5/24/2023 through 6/6/2023.  Patient admitted for necrotizing fasciitis of the right posterior hip, diabetes, immobility, obesity.    Review of prior external test results outside of this encounter:  Reviewed a CBC from 6/28/2023.  This showed a white blood cell count of 13.8, hemoglobin 9.9.    PAST MEDICAL HISTORY  Active Ambulatory Problems     Diagnosis Date Noted   • Hyperlipidemia    • Diabetes mellitus    • GERD (gastroesophageal reflux disease)    • Bipolar 1 disorder    • Depression    • Arthritis    • Spinal stenosis of lumbar region 01/10/2017   • Sciatica of left side 02/27/2019   • Restless legs 09/02/2015   • Obstructive sleep apnea syndrome 09/09/2014   • Lumbosacral spondylosis  without myelopathy 2017   • Hypothyroidism 2014   • Facet arthritis of lumbar region 2014   • Degeneration of intervertebral disc of lumbar region 2014   • Diabetic peripheral neuropathy 2014   • Cancer of endometrium 2013   • Cerebrovascular accident 2015   • Left foot drop 01/10/2017   • Vitamin B12 deficiency    • Vitamin D deficiency    • Vitamin C deficiency    • H/O hypokalemia    • Iron deficiency anemia secondary to inadequate dietary iron intake    • Iron deficiency    • Muscle weakness 2020   • Hypocalcemia 2021   • Malignant neoplasm of uterus 2022   • Sepsis, due to unspecified organism, unspecified whether acute organ dysfunction present 2023   • Necrotizing soft tissue infection 2023   • Immobility syndrome 2023   • Fasciitis 2023   • Hypomagnesemia 2023   • Spinal stenosis of lumbar region with neurogenic claudication 2023   • Pressure ulcer of other site, stage 4 2023   • Necrotizing fasciitis 2023   • Immobility syndrome (paraplegic) 2023     Resolved Ambulatory Problems     Diagnosis Date Noted   • Hypokalemia 2023     Past Medical History:   Diagnosis Date   • Cancer    • COVID-19    • Frequent UTI    • Migraine    • Murmur, heart    • Ovarian cyst    • Stroke          PAST SURGICAL HISTORY  Past Surgical History:   Procedure Laterality Date   •  SECTION  2003   • HYSTERECTOMY     • TONSILLECTOMY     • WOUND DEBRIDEMENT N/A 2023    Procedure: Debridement necrotizing tissue  right buttock wound;  Surgeon: Elizabeth Adame MD;  Location: [facility];  Service: General;  Laterality: N/A;         FAMILY HISTORY  Family History   Problem Relation Age of Onset   • Arthritis Mother    • Diabetes Mother    • Migraines Mother    • Stroke Mother    • Arthritis Father    • Cancer Maternal Grandmother    • Thyroid disease Maternal Grandmother    • Cancer Maternal  Grandfather    • Cancer Paternal Grandmother    • Thyroid disease Paternal Grandmother    • Cancer Paternal Grandfather          SOCIAL HISTORY  Social History     Socioeconomic History   • Marital status: Single   Tobacco Use   • Smoking status: Former   • Smokeless tobacco: Never   Vaping Use   • Vaping Use: Never used   Substance and Sexual Activity   • Alcohol use: Never   • Drug use: Never   • Sexual activity: Defer         ALLERGIES  Clemastine fumarate, Ziprasidone, Aripiprazole, Sulfa antibiotics, Latex, and Lisinopril        REVIEW OF SYSTEMS  All systems reviewed and negative except for those discussed in HPI.       PHYSICAL EXAM    I have reviewed the triage vital signs and nursing notes.    ED Triage Vitals [08/25/23 1031]   Temp Heart Rate Resp BP SpO2   97.6 °F (36.4 °C) 85 16 111/80 95 %      Temp src Heart Rate Source Patient Position BP Location FiO2 (%)   Tympanic Monitor -- -- --       Physical Exam  GENERAL: Alert, oriented, chronically ill-appearing, not distressed  HENT: head atraumatic, no nuchal rigidity  EYES: no scleral icterus, EOMI  CV: regular rhythm, regular rate, no murmur  RESPIRATORY: normal effort, CTA  ABDOMEN: soft, nontender  MUSCULOSKELETAL: Large pressure wound to the right lateral hip with deep tunneling.  There is a foul odor.  This appears to be healing with good granulation tissue.  No significant surrounding erythema.  NEURO: alert, moves all extremities, follows commands  SKIN: warm, dry        LAB RESULTS  Recent Results (from the past 24 hour(s))   Comprehensive Metabolic Panel    Collection Time: 08/25/23 11:14 AM    Specimen: Blood   Result Value Ref Range    Glucose 161 (H) 65 - 99 mg/dL    BUN 11 6 - 20 mg/dL    Creatinine 0.87 0.57 - 1.00 mg/dL    Sodium 140 136 - 145 mmol/L    Potassium 3.8 3.5 - 5.2 mmol/L    Chloride 104 98 - 107 mmol/L    CO2 25.0 22.0 - 29.0 mmol/L    Calcium 8.8 8.6 - 10.5 mg/dL    Total Protein 6.8 6.0 - 8.5 g/dL    Albumin 2.8 (L) 3.5 - 5.2  g/dL    ALT (SGPT) 17 1 - 33 U/L    AST (SGOT) 37 (H) 1 - 32 U/L    Alkaline Phosphatase 269 (H) 39 - 117 U/L    Total Bilirubin 0.4 0.0 - 1.2 mg/dL    Globulin 4.0 gm/dL    A/G Ratio 0.7 g/dL    BUN/Creatinine Ratio 12.6 7.0 - 25.0    Anion Gap 11.0 5.0 - 15.0 mmol/L    eGFR 80.8 >60.0 mL/min/1.73   CBC Auto Differential    Collection Time: 08/25/23 11:14 AM    Specimen: Blood   Result Value Ref Range    WBC 12.31 (H) 3.40 - 10.80 10*3/mm3    RBC 4.31 3.77 - 5.28 10*6/mm3    Hemoglobin 12.1 12.0 - 15.9 g/dL    Hematocrit 38.0 34.0 - 46.6 %    MCV 88.2 79.0 - 97.0 fL    MCH 28.1 26.6 - 33.0 pg    MCHC 31.8 31.5 - 35.7 g/dL    RDW 13.8 12.3 - 15.4 %    RDW-SD 44.1 37.0 - 54.0 fl    MPV 10.2 6.0 - 12.0 fL    Platelets 408 140 - 450 10*3/mm3    Neutrophil % 65.9 42.7 - 76.0 %    Lymphocyte % 21.5 19.6 - 45.3 %    Monocyte % 4.4 (L) 5.0 - 12.0 %    Eosinophil % 6.7 (H) 0.3 - 6.2 %    Basophil % 1.1 0.0 - 1.5 %    Immature Grans % 0.4 0.0 - 0.5 %    Neutrophils, Absolute 8.11 (H) 1.70 - 7.00 10*3/mm3    Lymphocytes, Absolute 2.65 0.70 - 3.10 10*3/mm3    Monocytes, Absolute 0.54 0.10 - 0.90 10*3/mm3    Eosinophils, Absolute 0.83 (H) 0.00 - 0.40 10*3/mm3    Basophils, Absolute 0.13 0.00 - 0.20 10*3/mm3    Immature Grans, Absolute 0.05 0.00 - 0.05 10*3/mm3    nRBC 0.0 0.0 - 0.2 /100 WBC   Lactic Acid, Plasma    Collection Time: 08/25/23 11:14 AM    Specimen: Blood   Result Value Ref Range    Lactate 2.5 (C) 0.5 - 2.0 mmol/L   Procalcitonin    Collection Time: 08/25/23 11:14 AM    Specimen: Blood   Result Value Ref Range    Procalcitonin 0.30 (H) 0.00 - 0.25 ng/mL   Urinalysis With Culture If Indicated - Straight Cath    Collection Time: 08/25/23  2:07 PM    Specimen: Straight Cath; Urine   Result Value Ref Range    Color, UA Yellow Yellow, Straw    Appearance, UA Cloudy (A) Clear    pH, UA 5.5 5.0 - 8.0    Specific Gravity, UA >=1.030 1.005 - 1.030    Glucose,  mg/dL (Trace) (A) Negative    Ketones, UA Negative  Negative    Bilirubin, UA Negative Negative    Blood, UA Small (1+) (A) Negative    Protein, UA 30 mg/dL (1+) (A) Negative    Leuk Esterase, UA Moderate (2+) (A) Negative    Nitrite, UA Negative Negative    Urobilinogen, UA 1.0 E.U./dL 0.2 - 1.0 E.U./dL   Urinalysis, Microscopic Only - Straight Cath    Collection Time: 08/25/23  2:07 PM    Specimen: Straight Cath; Urine   Result Value Ref Range    RBC, UA 3-5 (A) None Seen, 0-2 /HPF    WBC, UA Too Numerous to Count (A) None Seen, 0-2 /HPF    Bacteria, UA None Seen None Seen /HPF    Squamous Epithelial Cells, UA 3-6 (A) None Seen, 0-2 /HPF    Hyaline Casts, UA 0-2 None Seen /LPF    Methodology Manual Light Microscopy    STAT Lactic Acid, Reflex    Collection Time: 08/25/23  3:11 PM    Specimen: Blood   Result Value Ref Range    Lactate 2.0 0.5 - 2.0 mmol/L       Ordered the above labs and independently reviewed the results.        RADIOLOGY  CT Abdomen Pelvis With Contrast    Result Date: 8/25/2023  CT ABDOMEN AND PELVIS WITH IV CONTRAST  HISTORY: 53-year-old female with dysuria. Right hip wound. Upper extremity thrombophlebitis. Paraplegia. Right buttock necrotizing tissue debridement 05/25/2023.  TECHNIQUE: Radiation dose reduction techniques were utilized, including automated exposure control and exposure modulation based on body size. 3 mm images were obtained through the abdomen and pelvis after the administration of IV contrast. Compared with previous CT 06/01/2023.  FINDINGS: 1. On the first few images of the examination, there is a suggestion of possible tiny pulmonary thromboemboli in right lower lobe pulmonary artery branches.  2. Hepatomegaly and extensive hepatic steatosis, unchanged. Gallbladder, pancreas, spleen, adrenals, and kidneys appear unremarkable. There is no acute bowel abnormality.  3. Quiroz catheter is within the vaginal canal, now within the urinary bladder. Urinary bladder appears diffusely thick-walled which may be related to the bladder  being collapsed, but please correlate clinically for acute UTI/cystitis.  4. There is no fluid collection or abscess at the debridement site at the lateral aspect of the right hip/gluteal musculature. There is heterogeneous admixture of contrast within the deep pelvic veins and the right common femoral vein. No definite thrombus is seen, but cannot be excluded.  5. There has been resolution of the previously seen anasarca/third spacing of fluid. Pleural effusions have resolved.  The findings concerning for possible pulmonary thromboemboli were discussed with FELECIA Crews.       I ordered the above noted radiological studies. Reviewed by me and discussed with radiologist.  See dictation for official radiology interpretation.      MEDICATIONS GIVEN IN ER    Medications   sodium chloride 0.9 % flush 10 mL (has no administration in time range)   cefTRIAXone (ROCEPHIN) 1,000 mg in sodium chloride 0.9 % 100 mL IVPB-VTB (has no administration in time range)   iopamidol (ISOVUE-300) 61 % injection 100 mL (85 mL Intravenous Given 8/25/23 1204)   sodium chloride 0.9 % bolus 1,000 mL (0 mL Intravenous Stopped 8/25/23 1407)         ADDITIONAL ORDERS CONSIDERED BUT NOT ORDERED:  Nothing      PROGRESS, DATA ANALYSIS, CONSULTS, AND MEDICAL DECISION MAKING    All labs have been independently interpreted by myself.  All radiology studies have been independently interpreted by myself and discussed with radiologist dictating the report.   EKG's independently interpreted by myself.  Discussion below represents my analysis of pertinent findings related to patient's condition, differential diagnosis, treatment plan and final disposition.    I have discussed case with Dr. Rosado, emergency room physician.  He has performed his own bedside examination and agrees with treatment plan.    ED Course as of 08/25/23 1557   Fri Aug 25, 2023   1052 Patient presents from home with reports of foul appearing urine in her Quiroz catheter, as well as  concern for wound over right posterior hip.  Patient denies any fevers, chills.  Plan to check urine, basic labs, CT imaging of the abdomen/pelvis [EE]   1136 WBC(!): 12.31  This was 15.96, 2 months ago [EE]   1156 Lactate(!!): 2.5 [EE]   1211 Procalcitonin(!): 0.30 [EE]   1503 CT imaging of the abdomen pelvis and plain interpreted myself shows questionable PE in the right lower lobe. [EE]   1520 Updated patient on work-up.  She denies any chest pain or shortness of breath.  I will hold off on anticoagulation until I discussed with hospitalist. [EE]   1534 Patient has multiple comorbidities and ongoing problems.  Plan to admit the patient for further evaluation. [EE]   1538 Lactate: 2.0 [EE]   1554 I discussed the case with Dr. Saucedo [facility].  He agrees to admit.  He would like to go and give the patient anticoagulation.  We will admit inpatient. [EE]      ED Course User Index  [EE] Srikanth Murguia PA       AS OF 15:42 EDT VITALS:    BP - 102/52  HR - 82  TEMP - 97.6 °F (36.4 °C) (Tympanic)  O2 SATS - 91%        DIAGNOSIS  Final diagnoses:   Acute UTI   Pressure injury of skin of right hip, unspecified injury stage   Pulmonary embolus, right   Obesity, unspecified classification, unspecified obesity type, unspecified whether serious comorbidity present   Type 2 diabetes mellitus with other specified complication, without long-term current use of insulin         DISPOSITION  Admitted      Dictated utilizing Dragon dictation     Srikanth Murguia PA  08/25/23 9865

## 2023-08-25 NOTE — ED PROVIDER NOTES
"The [facility] and I have discussed this patient's history, physical exam, and treatment plan.  I have reviewed the documentation and personally had a face to face interaction with the patient. I affirm the documentation and agree with the treatment and plan.  The attached note describes my personal findings.      I provided a substantive portion of the care of the patient.  I personally performed the physical exam in its entirety, and below are my findings.     Brief history of present illness: 53-year-old female concerned about urinary sediment and discoloration in her chronic indwelling Quiroz reports no fever chest pain or dyspnea at this time.    Physical exam:    /80   Pulse 85   Temp 97.6 °F (36.4 °C) (Tympanic)   Resp 16   Ht 165.1 cm (65\")   Wt 118 kg (260 lb)   SpO2 95%   BMI 43.27 kg/m²       Physical Exam   Constitutional: No distress.  Nontoxic but somewhat chronically ill-appearing  HENT:  Head: Normocephalic and atraumatic.   Oropharynx: Mucous membranes are moderately dry.  Cardiovascular: Pink warm and well perfused throughout.    Pulmonary/Chest: No respiratory distress.  No tachypnea or increased work of breathing appreciated.    Abdominal: Soft. There is no tenderness. There is no rebound and no guarding.   Quiroz catheter at bedside has very cloudy urine.  Neurological: Alert and oriented.  Speech fluent and intelligible  Skin: Skin is pink, warm, and dry.   Psychiatric: Mood and affect normal.   Nursing note and vitals reviewed.        MDM: Agree with planned laboratory assessment       Gustavo Rosado MD  08/25/23 1109    "

## 2023-08-26 ENCOUNTER — APPOINTMENT (OUTPATIENT)
Dept: CT IMAGING | Facility: HOSPITAL | Age: 53
DRG: 175 | End: 2023-08-26
Payer: MEDICARE

## 2023-08-26 LAB
ANION GAP SERPL CALCULATED.3IONS-SCNC: 8.3 MMOL/L (ref 5–15)
BACTERIA BLD CULT: NORMAL
BH CV LOW VAS RIGHT GREATER SAPH BK VESSEL: 1
BH CV LOWER VASCULAR LEFT COMMON FEMORAL AUGMENT: NORMAL
BH CV LOWER VASCULAR LEFT COMMON FEMORAL COMPETENT: NORMAL
BH CV LOWER VASCULAR LEFT COMMON FEMORAL COMPRESS: NORMAL
BH CV LOWER VASCULAR LEFT COMMON FEMORAL PHASIC: NORMAL
BH CV LOWER VASCULAR LEFT COMMON FEMORAL SPONT: NORMAL
BH CV LOWER VASCULAR LEFT DISTAL FEMORAL COMPRESS: NORMAL
BH CV LOWER VASCULAR LEFT GASTRONEMIUS COMPRESS: NORMAL
BH CV LOWER VASCULAR LEFT GREATER SAPH AK COMPRESS: NORMAL
BH CV LOWER VASCULAR LEFT GREATER SAPH BK COMPRESS: NORMAL
BH CV LOWER VASCULAR LEFT LESSER SAPH COMPRESS: NORMAL
BH CV LOWER VASCULAR LEFT MID FEMORAL AUGMENT: NORMAL
BH CV LOWER VASCULAR LEFT MID FEMORAL COMPETENT: NORMAL
BH CV LOWER VASCULAR LEFT MID FEMORAL COMPRESS: NORMAL
BH CV LOWER VASCULAR LEFT MID FEMORAL PHASIC: NORMAL
BH CV LOWER VASCULAR LEFT MID FEMORAL SPONT: NORMAL
BH CV LOWER VASCULAR LEFT PERONEAL COMPRESS: NORMAL
BH CV LOWER VASCULAR LEFT POPLITEAL AUGMENT: NORMAL
BH CV LOWER VASCULAR LEFT POPLITEAL COMPETENT: NORMAL
BH CV LOWER VASCULAR LEFT POPLITEAL COMPRESS: NORMAL
BH CV LOWER VASCULAR LEFT POPLITEAL PHASIC: NORMAL
BH CV LOWER VASCULAR LEFT POPLITEAL SPONT: NORMAL
BH CV LOWER VASCULAR LEFT POSTERIOR TIBIAL COMPRESS: NORMAL
BH CV LOWER VASCULAR LEFT PROFUNDA FEMORAL COMPRESS: NORMAL
BH CV LOWER VASCULAR LEFT PROXIMAL FEMORAL COMPRESS: NORMAL
BH CV LOWER VASCULAR LEFT SAPHENOFEMORAL JUNCTION COMPRESS: NORMAL
BH CV LOWER VASCULAR RIGHT COMMON FEMORAL AUGMENT: NORMAL
BH CV LOWER VASCULAR RIGHT COMMON FEMORAL COMPETENT: NORMAL
BH CV LOWER VASCULAR RIGHT COMMON FEMORAL COMPRESS: NORMAL
BH CV LOWER VASCULAR RIGHT COMMON FEMORAL PHASIC: NORMAL
BH CV LOWER VASCULAR RIGHT COMMON FEMORAL SPONT: NORMAL
BH CV LOWER VASCULAR RIGHT DISTAL FEMORAL COMPRESS: NORMAL
BH CV LOWER VASCULAR RIGHT GASTRONEMIUS COMPRESS: NORMAL
BH CV LOWER VASCULAR RIGHT GREATER SAPH AK COMPRESS: NORMAL
BH CV LOWER VASCULAR RIGHT GREATER SAPH BK COMPRESS: NORMAL
BH CV LOWER VASCULAR RIGHT GREATER SAPH BK THROMBUS: NORMAL
BH CV LOWER VASCULAR RIGHT LESSER SAPH COMPRESS: NORMAL
BH CV LOWER VASCULAR RIGHT MID FEMORAL AUGMENT: NORMAL
BH CV LOWER VASCULAR RIGHT MID FEMORAL COMPETENT: NORMAL
BH CV LOWER VASCULAR RIGHT MID FEMORAL COMPRESS: NORMAL
BH CV LOWER VASCULAR RIGHT MID FEMORAL PHASIC: NORMAL
BH CV LOWER VASCULAR RIGHT MID FEMORAL SPONT: NORMAL
BH CV LOWER VASCULAR RIGHT PERONEAL COMPRESS: NORMAL
BH CV LOWER VASCULAR RIGHT POPLITEAL AUGMENT: NORMAL
BH CV LOWER VASCULAR RIGHT POPLITEAL COMPETENT: NORMAL
BH CV LOWER VASCULAR RIGHT POPLITEAL COMPRESS: NORMAL
BH CV LOWER VASCULAR RIGHT POPLITEAL PHASIC: NORMAL
BH CV LOWER VASCULAR RIGHT POPLITEAL SPONT: NORMAL
BH CV LOWER VASCULAR RIGHT POSTERIOR TIBIAL COMPRESS: NORMAL
BH CV LOWER VASCULAR RIGHT PROFUNDA FEMORAL COMPRESS: NORMAL
BH CV LOWER VASCULAR RIGHT PROXIMAL FEMORAL COMPRESS: NORMAL
BH CV LOWER VASCULAR RIGHT SAPHENOFEMORAL JUNCTION COMPRESS: NORMAL
BH CV POP FLUID COLLECT LEFT: 1
BH CV VAS PRELIMINARY FINDINGS SCRIPTING: 1
BOTTLE TYPE: NORMAL
BUN SERPL-MCNC: 9 MG/DL (ref 6–20)
BUN/CREAT SERPL: 12.5 (ref 7–25)
CALCIUM SPEC-SCNC: 8.2 MG/DL (ref 8.6–10.5)
CHLORIDE SERPL-SCNC: 107 MMOL/L (ref 98–107)
CO2 SERPL-SCNC: 22.7 MMOL/L (ref 22–29)
CREAT SERPL-MCNC: 0.72 MG/DL (ref 0.57–1)
DEPRECATED RDW RBC AUTO: 45.6 FL (ref 37–54)
EGFRCR SERPLBLD CKD-EPI 2021: 101.4 ML/MIN/1.73
ERYTHROCYTE [DISTWIDTH] IN BLOOD BY AUTOMATED COUNT: 14.1 % (ref 12.3–15.4)
GLUCOSE BLDC GLUCOMTR-MCNC: 122 MG/DL (ref 70–130)
GLUCOSE BLDC GLUCOMTR-MCNC: 123 MG/DL (ref 70–130)
GLUCOSE BLDC GLUCOMTR-MCNC: 139 MG/DL (ref 70–130)
GLUCOSE BLDC GLUCOMTR-MCNC: 141 MG/DL (ref 70–130)
GLUCOSE SERPL-MCNC: 126 MG/DL (ref 65–99)
HCT VFR BLD AUTO: 34.4 % (ref 34–46.6)
HGB BLD-MCNC: 10.7 G/DL (ref 12–15.9)
MCH RBC QN AUTO: 27.9 PG (ref 26.6–33)
MCHC RBC AUTO-ENTMCNC: 31.1 G/DL (ref 31.5–35.7)
MCV RBC AUTO: 89.6 FL (ref 79–97)
PLATELET # BLD AUTO: 397 10*3/MM3 (ref 140–450)
PMV BLD AUTO: 10 FL (ref 6–12)
POTASSIUM SERPL-SCNC: 3.6 MMOL/L (ref 3.5–5.2)
POTASSIUM SERPL-SCNC: 4.4 MMOL/L (ref 3.5–5.2)
RBC # BLD AUTO: 3.84 10*6/MM3 (ref 3.77–5.28)
SODIUM SERPL-SCNC: 138 MMOL/L (ref 136–145)
WBC NRBC COR # BLD: 11.45 10*3/MM3 (ref 3.4–10.8)

## 2023-08-26 PROCEDURE — 25510000001 IOPAMIDOL PER 1 ML: Performed by: HOSPITALIST

## 2023-08-26 PROCEDURE — 36415 COLL VENOUS BLD VENIPUNCTURE: CPT | Performed by: INTERNAL MEDICINE

## 2023-08-26 PROCEDURE — 63710000001 INSULIN GLARGINE PER 5 UNITS: Performed by: INTERNAL MEDICINE

## 2023-08-26 PROCEDURE — 84132 ASSAY OF SERUM POTASSIUM: CPT | Performed by: HOSPITALIST

## 2023-08-26 PROCEDURE — 82948 REAGENT STRIP/BLOOD GLUCOSE: CPT

## 2023-08-26 PROCEDURE — 80048 BASIC METABOLIC PNL TOTAL CA: CPT | Performed by: INTERNAL MEDICINE

## 2023-08-26 PROCEDURE — 25010000002 ONDANSETRON PER 1 MG: Performed by: INTERNAL MEDICINE

## 2023-08-26 PROCEDURE — 71275 CT ANGIOGRAPHY CHEST: CPT

## 2023-08-26 PROCEDURE — 99221 1ST HOSP IP/OBS SF/LOW 40: CPT | Performed by: SURGERY

## 2023-08-26 PROCEDURE — 25010000002 CEFTRIAXONE PER 250 MG: Performed by: INTERNAL MEDICINE

## 2023-08-26 PROCEDURE — 63710000001 INSULIN LISPRO (HUMAN) PER 5 UNITS: Performed by: INTERNAL MEDICINE

## 2023-08-26 PROCEDURE — 85027 COMPLETE CBC AUTOMATED: CPT | Performed by: INTERNAL MEDICINE

## 2023-08-26 RX ORDER — POTASSIUM CHLORIDE 750 MG/1
40 TABLET, FILM COATED, EXTENDED RELEASE ORAL EVERY 4 HOURS
Status: COMPLETED | OUTPATIENT
Start: 2023-08-26 | End: 2023-08-26

## 2023-08-26 RX ORDER — NYSTATIN 100000 [USP'U]/G
POWDER TOPICAL EVERY 12 HOURS SCHEDULED
Status: DISCONTINUED | OUTPATIENT
Start: 2023-08-26 | End: 2023-08-29 | Stop reason: HOSPADM

## 2023-08-26 RX ADMIN — INSULIN LISPRO 5 UNITS: 100 INJECTION, SOLUTION INTRAVENOUS; SUBCUTANEOUS at 08:24

## 2023-08-26 RX ADMIN — PANTOPRAZOLE SODIUM 40 MG: 40 TABLET, DELAYED RELEASE ORAL at 05:38

## 2023-08-26 RX ADMIN — INSULIN LISPRO 5 UNITS: 100 INJECTION, SOLUTION INTRAVENOUS; SUBCUTANEOUS at 17:19

## 2023-08-26 RX ADMIN — FLUTICASONE PROPIONATE 2 SPRAY: 50 SPRAY, METERED NASAL at 10:04

## 2023-08-26 RX ADMIN — POTASSIUM CHLORIDE 40 MEQ: 750 TABLET, EXTENDED RELEASE ORAL at 12:44

## 2023-08-26 RX ADMIN — APIXABAN 10 MG: 5 TABLET, FILM COATED ORAL at 17:19

## 2023-08-26 RX ADMIN — HYDROCODONE BITARTRATE AND ACETAMINOPHEN 1 TABLET: 5; 325 TABLET ORAL at 20:21

## 2023-08-26 RX ADMIN — INSULIN GLARGINE 40 UNITS: 100 INJECTION, SOLUTION SUBCUTANEOUS at 20:21

## 2023-08-26 RX ADMIN — GABAPENTIN 300 MG: 300 CAPSULE ORAL at 08:24

## 2023-08-26 RX ADMIN — ACETAMINOPHEN 650 MG: 325 TABLET, FILM COATED ORAL at 10:03

## 2023-08-26 RX ADMIN — INSULIN LISPRO 5 UNITS: 100 INJECTION, SOLUTION INTRAVENOUS; SUBCUTANEOUS at 12:44

## 2023-08-26 RX ADMIN — Medication 3 ML: at 20:22

## 2023-08-26 RX ADMIN — POTASSIUM CHLORIDE 40 MEQ: 750 TABLET, EXTENDED RELEASE ORAL at 08:24

## 2023-08-26 RX ADMIN — LEVOTHYROXINE SODIUM 200 MCG: 0.1 TABLET ORAL at 05:38

## 2023-08-26 RX ADMIN — IOPAMIDOL 95 ML: 755 INJECTION, SOLUTION INTRAVENOUS at 11:18

## 2023-08-26 RX ADMIN — LEVOTHYROXINE SODIUM 25 MCG: 0.03 TABLET ORAL at 05:56

## 2023-08-26 RX ADMIN — ONDANSETRON 4 MG: 2 INJECTION INTRAMUSCULAR; INTRAVENOUS at 00:31

## 2023-08-26 RX ADMIN — NYSTATIN: 100000 POWDER TOPICAL at 20:21

## 2023-08-26 RX ADMIN — CEFTRIAXONE 2000 MG: 2 INJECTION, POWDER, FOR SOLUTION INTRAMUSCULAR; INTRAVENOUS at 17:20

## 2023-08-26 RX ADMIN — NYSTATIN: 100000 POWDER TOPICAL at 08:24

## 2023-08-26 RX ADMIN — GABAPENTIN 300 MG: 300 CAPSULE ORAL at 20:21

## 2023-08-26 NOTE — PROGRESS NOTES
"[facility] Clinical Pharmacy Services: Vancomycin Pharmacokinetic Initial Consult Note    Vanessa Root is a 53 y.o. female who is on day 1 of pharmacy to dose vancomycin.    Indication: Skin and Soft Tissue  Consulting Provider: Dr. Saucedo  Planned Duration of Therapy: 7 d  Loading Dose Ordered or Given: 2250 mg on 8/25 at 2227  MRSA PCR performed: None  Culture/Source:   8/25 bld cxs pending  8/25 Urine cx pending  Target: -600 mg/L.hr   Pertinent Vanc Dosing History:   Other Antimicrobials: Rocephin    Vitals/Labs  Ht: 165.1 cm (65\"); Wt: 118 kg (260 lb)  Temp Readings from Last 1 Encounters:   08/25/23 97.7 °F (36.5 °C) (Oral)    Estimated Creatinine Clearance: 98.3 mL/min (by C-G formula based on SCr of 0.87 mg/dL).       Results from last 7 days   Lab Units 08/25/23  1114   CREATININE mg/dL 0.87   WBC 10*3/mm3 12.31*     Assessment/Plan:    Vancomycin Dose:   1000 mg IV every  12  hours  Predictive AUC level for the dose ordered is 541 mg/L.hr, which is within the target of 400-600 mg/L.hr  Vanc Trough has been ordered for 8/27 at 1030     Pharmacy will follow patient's kidney function and will adjust doses and obtain levels as necessary. Thank you for involving pharmacy in this patient's care. Please contact pharmacy with any questions or concerns.                           Shane Pinzon [facility]  Clinical Pharmacist    "

## 2023-08-26 NOTE — PLAN OF CARE
Goal Outcome Evaluation:  Plan of Care Reviewed With: patient        Progress: no change  Outcome Evaluation: Pt. A&Ox4.  Dressing change performed on right hip.  VSS.  WCTM for the rest of the shift.

## 2023-08-26 NOTE — PROGRESS NOTES
Name: Vanessa Root ADMIT: 2023   : 1970  PCP: Anat Castellanos APRN    MRN: [id number] LOS: 1 days   AGE/SEX: 53 y.o. female  ROOM: [facility]     Subjective   Subjective   Feels OK, several questions but overall doing well. No CP or SOA       Objective   Objective   Vital Signs  Temp:  [97.7 °F (36.5 °C)-98.6 °F (37 °C)] 97.7 °F (36.5 °C)  Heart Rate:  [77-89] 88  Resp:  [18-20] 18  BP: ()/(44-97) 132/97  SpO2:  [91 %-99 %] 95 %  on   ;   Device (Oxygen Therapy): room air  Body mass index is 41.31 kg/m².  Physical Exam  Vitals reviewed.   Constitutional:       General: She is not in acute distress.     Appearance: She is morbidly obese.   Eyes:      General: No scleral icterus.  Cardiovascular:      Rate and Rhythm: Normal rate and regular rhythm.   Pulmonary:      Effort: Pulmonary effort is normal.      Breath sounds: Normal breath sounds.   Abdominal:      Palpations: Abdomen is soft.      Tenderness: There is no abdominal tenderness.   Musculoskeletal:         General: No tenderness.      Right lower leg: No edema.      Left lower leg: No edema.   Neurological:      Mental Status: She is alert and oriented to person, place, and time.   Psychiatric:         Mood and Affect: Mood normal.     Results Review     I reviewed the patient's new clinical results.  Results from last 7 days   Lab Units 23  0527 23  1114   WBC 10*3/mm3 11.45* 12.31*   HEMOGLOBIN g/dL 10.7* 12.1   PLATELETS 10*3/mm3 397 408     Results from last 7 days   Lab Units 23  0527 23  1114   SODIUM mmol/L 138 140   POTASSIUM mmol/L 3.6 3.8   CHLORIDE mmol/L 107 104   CO2 mmol/L 22.7 25.0   BUN mg/dL 9 11   CREATININE mg/dL 0.72 0.87   GLUCOSE mg/dL 126* 161*   EGFR mL/min/1.73 101.4 80.8     Results from last 7 days   Lab Units 23  1114   ALBUMIN g/dL 2.8*   BILIRUBIN mg/dL 0.4   ALK PHOS U/L 269*   AST (SGOT) U/L 37*   ALT (SGPT) U/L 17     Results from last 7 days   Lab Units 23  4749  08/25/23  1114   CALCIUM mg/dL 8.2* 8.8   ALBUMIN g/dL  --  2.8*     Results from last 7 days   Lab Units 08/25/23  1511 08/25/23  1114   PROCALCITONIN ng/mL  --  0.30*   LACTATE mmol/L 2.0 2.5*     Glucose   Date/Time Value Ref Range Status   08/26/2023 1233 123 70 - 130 mg/dL Final   08/26/2023 0622 122 70 - 130 mg/dL Final   08/25/2023 2036 114 70 - 130 mg/dL Final       No radiology results for the last day    I have personally reviewed all medications:  Scheduled Medications  vitamin C, 250 mg, Oral, Daily  atorvastatin, 80 mg, Oral, Daily  cefTRIAXone, 2,000 mg, Intravenous, Q24H  enoxaparin, 1 mg/kg, Subcutaneous, Once  fluticasone, 2 spray, Nasal, Daily  gabapentin, 300 mg, Oral, Q12H  insulin glargine, 40 Units, Subcutaneous, Nightly  insulin lispro, 3-14 Units, Subcutaneous, 4x Daily AC & at Bedtime  insulin lispro, 5 Units, Subcutaneous, TID With Meals  levothyroxine, 200 mcg, Oral, Q AM  levothyroxine, 25 mcg, Oral, Q AM  linagliptin, 5 mg, Oral, Daily  nystatin, , Topical, Q12H  pantoprazole, 40 mg, Oral, Q AM  sodium chloride, 3 mL, Intravenous, Q12H  vancomycin, 1,000 mg, Intravenous, Q12H    Infusions  Pharmacy to dose vancomycin,     Diet  Diet: Diabetic Diets; Consistent Carbohydrate; Texture: Regular Texture (IDDSI 7); Fluid Consistency: Thin (IDDSI 0)    I have personally reviewed:  [x]  Laboratory   []  Microbiology   [x]  Radiology   [x]  EKG/Telemetry  [x]  Cardiology/Vascular   []  Pathology    []  Records       Assessment/Plan     Active Hospital Problems    Diagnosis  POA    **Pulmonary embolism [I26.99]  Yes    Leukocytosis [D72.829]  Yes    Urinary tract infection associated with indwelling urethral catheter [T83.511A, N39.0]  Yes    Obesity, Class III, BMI 40-49.9 (morbid obesity) [E66.01]  Yes    Lactic acidosis [E87.20]  Yes    History of ischemic stroke [Z86.73]  Not Applicable    Pressure ulcer of other site, stage 4 [L89.894]  Yes    Spinal stenosis of lumbar region with neurogenic  claudication [M48.062]  Yes    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       53 y.o. female with chronic right hip wound admitted with weakness, CAUTI and incidentally found  Pulmonary embolism.    ?PE- await CTA for confirmation. Full dose Lovenox ordered in ED but ?never given. Since she is stable and CTA is done but just awaiting read, will hold off on AC for now until confirmed. Likely start Eliquis if confirmed    Right hip wound- mgmt per Dr Boles, help appreciated. Poss wound VAC replacement Monday  - d/c vanc if no concern for wound infection    UTI- present on admit, chronic indwelling cath. F/u cx and cont ceftriaxone.    DM2- fair ctrl on current regimen, no changes    Hypothyroidism- TSH 33.6. Prev 22.5 in May. Will increase Synthroid, recheck in 3-4 weeks.    Dispo: Likely home 2-3 days  Dw RN and patient      Reese Vyas MD  [facility]  08/26/23  13:47 EDT

## 2023-08-26 NOTE — PLAN OF CARE
Problem: Skin Injury Risk Increased  Goal: Skin Health and Integrity  Outcome: Ongoing, Progressing  Intervention: Optimize Skin Protection  Description: Perform a full pressure injury risk assessment, as indicated by screening, upon admission to care unit.  Reassess skin (injury risk, full inspection) frequently (e.g., scheduled interval, with change in condition) to provide optimal early detection and prevention.  Maintain adequate tissue perfusion (e.g., encourage fluid balance; avoid crossing legs, constrictive clothing or devices) to promote tissue oxygenation.  Maintain head of bed at lowest degree of elevation tolerated, considering medical condition and other restrictions.  Avoid positioning onto an area that remains reddened.  Minimize incontinence and moisture (e.g., toileting schedule; moisture-wicking pad, diaper or incontinence collection device; skin moisture barrier).  Cleanse skin promptly and gently when soiled utilizing a pH-balanced cleanser.  Relieve and redistribute pressure (e.g., scheduled position changes, weight shifts, use of support surface, medical device repositioning, protective dressing application, use of positioning device, microclimate control, use of pressure-injury-monitor  Encourage increased activity, such as sitting in a chair at the bedside or early mobilization, when able to tolerate.  Recent Flowsheet Documentation  Taken 8/26/2023 1422 by Melina Root, RN  Pressure Reduction Techniques: frequent weight shift encouraged  Head of Bed (HOB) Positioning: [facility] elevated  Pressure Reduction Devices: alternating pressure pump (ADD)  Skin Protection:   adhesive use limited   incontinence pads utilized   tubing/devices free from skin contact  Taken 8/26/2023 0824 by Melina Root, RN  Pressure Reduction Techniques: frequent weight shift encouraged  Head of Bed (HOB) Positioning: HOB elevated  Pressure Reduction Devices: alternating pressure pump (ADD)  Skin Protection:    adhesive use limited   incontinence pads utilized   tubing/devices free from skin contact   Goal Outcome Evaluation:  Plan of Care Reviewed With: patient        Progress: improving

## 2023-08-26 NOTE — PROGRESS NOTES
"[facility] Clinical Pharmacy Services: Vancomycin Monitoring Note    Vanessa Root is a 53 y.o. female who is on day 2/5 of pharmacy to dose vancomycin for Skin and Soft Tissue.    Previous Vancomycin Dose:   1000 mg IV every  12  hours  Updated Cultures and Sensitivities: 8/25 bld cxs pending  8/25 Urine cx pending      Vitals/Labs  Ht: 165.1 cm (65\"); Wt: 113 kg (248 lb 3.8 oz)   Temp Readings from Last 1 Encounters:   08/26/23 98.2 °F (36.8 °C) (Oral)     Estimated Creatinine Clearance: 115.9 mL/min (by C-G formula based on SCr of 0.72 mg/dL).       Results from last 7 days   Lab Units 08/26/23  0527 08/25/23  1114   CREATININE mg/dL 0.72 0.87   WBC 10*3/mm3 11.45* 12.31*     Assessment/Plan    Current Vancomycin Dose: 1000 mg IV every  12  hours; provides a predicted  mg/L.hr   Next Level Date and Time: Vanc Trough on 8/27 at 1030  We will continue to monitor patient changes and renal function     Thank you for involving pharmacy in this patient's care. Please contact pharmacy with any questions or concerns.       Stefani Jacobsen PharmD  Clinical Pharmacist    8/27 3350 ADDENDUM: Vancomycin stopped 8/26.  No further need for therapeutic drug monitoring.  Pharmacy will sign off; please re-consult if needed.    Thank you for allowing us to participate in the care of this patient.    Stefani Jacobsen, Ruby, MPH, BCPS    "

## 2023-08-26 NOTE — SIGNIFICANT NOTE
No PT needs at this time. Patient is bedbound at BL, transfers to/from w/c using a trapeze bar and lift with assistance from her daughter. PT will sign off at this time.

## 2023-08-26 NOTE — CONSULTS
Cc: Foul-smelling odor from right hip wound    History of presenting illness:   This is a morbidly obese bedbound 53-year-old female who underwent debridement of her right hip wound described as necrotizing in May 2023 by Dr. Adame.  She has been treated by home health since that time and has not apparently followed up with Dr. Adame in the office.  In general the wound has improved, but the patient's wound VAC was taken off a couple of days ago and there was a foul odor.    Past Medical History: Bipolar disorder, migraines, type 2 diabetes, obesity, spinal stenosis, gastroesophageal reflux disease, hyperlipidemia    Past Surgical History:  section, hysterectomy, tonsillectomy, debridement of right hip wound done by Dr. Adame May 2023    Medications: Reviewed and reconciled in epic    Allergies: Reviewed and reconciled in epic    Social History: Non-smoker, lives at home with her mother and daughter, requires quite a lot of care    Family History: Negative for colon or rectal cancer    Review of Systems:  Constitutional: Denies fever, chills, change in weight  Neck: no swollen glands or dysphagia or odynophagia  Respiratory: negative for SOB, cough, hemoptysis or wheezing  Cardiovascular: negative for chest pain, palpitations or peripheral edema  Gastrointestinal: Denies nausea, vomiting, abdominal pain      Physical Exam:  BMI: 41.3  General: alert and oriented, appropriate, no acute distress  Eyes: No scleral icterus, extraocular movements are intact  Neck: Supple without lymphadenopathy or thyromegaly, trachea is in the midline  Respiratory: There is good bilateral chest expansion, no use of accessory muscles is noted  Cardiovascular: No jugular venous distention or peripheral edema is seen  Gastrointestinal: Soft and benign, no mass or hernia felt  Skin: On the posterior lateral aspect of the right buttock there is an open wound, approximately 6 x 7 cm in diameter, no purulent debris, wound base  is quite clean, no fluctuance and no surrounding erythema    Laboratory data: White blood cell count 11.5 hemoglobin 10.7 creatinine 0.7    Imaging data: CT essentially unremarkable outside of hepatic steatosis.  Specifically, no evidence of undrained collection near her wound.      Assessment and plan:   -Malodorous wound of the right hip after debridement a couple of months ago  -I suspect this is related to the wound VAC and currently there is no odor and certainly no sign of any infection or need for any further debridement  -I think a couple of days of wet-to-dry dressings is reasonable and after that I would encourage her to replace the wound VAC, perhaps as soon as Monday  -No plans for any further treatment while inpatient  -May follow-up with Dr. Adame in the office as an outpatient.  We will sign off, please reconsult as needed.      Oskar Boles MD, FACS  General, Minimally Invasive and Endoscopic Surgery  [facility]    [address], [address]  [place], [place], [address]  P: [phone]  F: [phone]

## 2023-08-27 LAB
ANION GAP SERPL CALCULATED.3IONS-SCNC: 9 MMOL/L (ref 5–15)
BACTERIA SPEC AEROBE CULT: ABNORMAL
BUN SERPL-MCNC: 8 MG/DL (ref 6–20)
BUN/CREAT SERPL: 8.7 (ref 7–25)
CALCIUM SPEC-SCNC: 8.4 MG/DL (ref 8.6–10.5)
CHLORIDE SERPL-SCNC: 106 MMOL/L (ref 98–107)
CO2 SERPL-SCNC: 23 MMOL/L (ref 22–29)
CREAT SERPL-MCNC: 0.92 MG/DL (ref 0.57–1)
DEPRECATED RDW RBC AUTO: 44.5 FL (ref 37–54)
EGFRCR SERPLBLD CKD-EPI 2021: 75.5 ML/MIN/1.73
ERYTHROCYTE [DISTWIDTH] IN BLOOD BY AUTOMATED COUNT: 13.6 % (ref 12.3–15.4)
GLUCOSE BLDC GLUCOMTR-MCNC: 125 MG/DL (ref 70–130)
GLUCOSE BLDC GLUCOMTR-MCNC: 145 MG/DL (ref 70–130)
GLUCOSE BLDC GLUCOMTR-MCNC: 152 MG/DL (ref 70–130)
GLUCOSE BLDC GLUCOMTR-MCNC: 164 MG/DL (ref 70–130)
GLUCOSE SERPL-MCNC: 118 MG/DL (ref 65–99)
GRAM STN SPEC: ABNORMAL
HCT VFR BLD AUTO: 33.6 % (ref 34–46.6)
HGB BLD-MCNC: 10.5 G/DL (ref 12–15.9)
ISOLATED FROM: ABNORMAL
MCH RBC QN AUTO: 27.9 PG (ref 26.6–33)
MCHC RBC AUTO-ENTMCNC: 31.3 G/DL (ref 31.5–35.7)
MCV RBC AUTO: 89.4 FL (ref 79–97)
PLATELET # BLD AUTO: 404 10*3/MM3 (ref 140–450)
PMV BLD AUTO: 10.1 FL (ref 6–12)
POTASSIUM SERPL-SCNC: 4.2 MMOL/L (ref 3.5–5.2)
RBC # BLD AUTO: 3.76 10*6/MM3 (ref 3.77–5.28)
SODIUM SERPL-SCNC: 138 MMOL/L (ref 136–145)
WBC NRBC COR # BLD: 10.95 10*3/MM3 (ref 3.4–10.8)

## 2023-08-27 PROCEDURE — 36415 COLL VENOUS BLD VENIPUNCTURE: CPT | Performed by: INTERNAL MEDICINE

## 2023-08-27 PROCEDURE — 82948 REAGENT STRIP/BLOOD GLUCOSE: CPT

## 2023-08-27 PROCEDURE — 63710000001 INSULIN LISPRO (HUMAN) PER 5 UNITS: Performed by: INTERNAL MEDICINE

## 2023-08-27 PROCEDURE — 25010000002 CEFTRIAXONE PER 250 MG: Performed by: INTERNAL MEDICINE

## 2023-08-27 PROCEDURE — 63710000001 INSULIN GLARGINE PER 5 UNITS: Performed by: INTERNAL MEDICINE

## 2023-08-27 PROCEDURE — 80048 BASIC METABOLIC PNL TOTAL CA: CPT | Performed by: INTERNAL MEDICINE

## 2023-08-27 PROCEDURE — 85027 COMPLETE CBC AUTOMATED: CPT | Performed by: INTERNAL MEDICINE

## 2023-08-27 RX ORDER — LOPERAMIDE HYDROCHLORIDE 2 MG/1
2 CAPSULE ORAL 4 TIMES DAILY PRN
Status: DISCONTINUED | OUTPATIENT
Start: 2023-08-27 | End: 2023-08-29 | Stop reason: HOSPADM

## 2023-08-27 RX ADMIN — INSULIN GLARGINE 40 UNITS: 100 INJECTION, SOLUTION SUBCUTANEOUS at 21:34

## 2023-08-27 RX ADMIN — OXYCODONE HYDROCHLORIDE AND ACETAMINOPHEN 250 MG: 500 TABLET ORAL at 08:39

## 2023-08-27 RX ADMIN — INSULIN LISPRO 3 UNITS: 100 INJECTION, SOLUTION INTRAVENOUS; SUBCUTANEOUS at 12:23

## 2023-08-27 RX ADMIN — ATORVASTATIN CALCIUM 80 MG: 80 TABLET, FILM COATED ORAL at 08:40

## 2023-08-27 RX ADMIN — LOPERAMIDE HYDROCHLORIDE 2 MG: 2 CAPSULE ORAL at 16:53

## 2023-08-27 RX ADMIN — NYSTATIN: 100000 POWDER TOPICAL at 08:40

## 2023-08-27 RX ADMIN — INSULIN LISPRO 3 UNITS: 100 INJECTION, SOLUTION INTRAVENOUS; SUBCUTANEOUS at 16:44

## 2023-08-27 RX ADMIN — GABAPENTIN 300 MG: 300 CAPSULE ORAL at 21:34

## 2023-08-27 RX ADMIN — HYDROXYZINE HYDROCHLORIDE 25 MG: 25 TABLET ORAL at 21:34

## 2023-08-27 RX ADMIN — CEFTRIAXONE 2000 MG: 2 INJECTION, POWDER, FOR SOLUTION INTRAMUSCULAR; INTRAVENOUS at 16:57

## 2023-08-27 RX ADMIN — APIXABAN 10 MG: 5 TABLET, FILM COATED ORAL at 21:34

## 2023-08-27 RX ADMIN — Medication 3 ML: at 21:34

## 2023-08-27 RX ADMIN — HYDROCODONE BITARTRATE AND ACETAMINOPHEN 1 TABLET: 5; 325 TABLET ORAL at 05:05

## 2023-08-27 RX ADMIN — GABAPENTIN 300 MG: 300 CAPSULE ORAL at 08:40

## 2023-08-27 RX ADMIN — LEVOTHYROXINE SODIUM 275 MCG: 0.17 TABLET ORAL at 05:03

## 2023-08-27 RX ADMIN — INSULIN LISPRO 5 UNITS: 100 INJECTION, SOLUTION INTRAVENOUS; SUBCUTANEOUS at 16:46

## 2023-08-27 RX ADMIN — APIXABAN 10 MG: 5 TABLET, FILM COATED ORAL at 08:39

## 2023-08-27 RX ADMIN — PANTOPRAZOLE SODIUM 40 MG: 40 TABLET, DELAYED RELEASE ORAL at 05:04

## 2023-08-27 RX ADMIN — FLUTICASONE PROPIONATE 2 SPRAY: 50 SPRAY, METERED NASAL at 08:40

## 2023-08-27 RX ADMIN — NYSTATIN: 100000 POWDER TOPICAL at 21:34

## 2023-08-27 RX ADMIN — INSULIN LISPRO 5 UNITS: 100 INJECTION, SOLUTION INTRAVENOUS; SUBCUTANEOUS at 12:22

## 2023-08-27 RX ADMIN — INSULIN LISPRO 5 UNITS: 100 INJECTION, SOLUTION INTRAVENOUS; SUBCUTANEOUS at 08:40

## 2023-08-27 RX ADMIN — LOPERAMIDE HYDROCHLORIDE 2 MG: 2 CAPSULE ORAL at 12:22

## 2023-08-27 NOTE — PROGRESS NOTES
Name: Vanessa Root ADMIT: 2023   : 1970  PCP: Anat Castellanos APRN    MRN: [id number] LOS: 2 days   AGE/SEX: 53 y.o. female  ROOM: [facility]     Subjective   Subjective   Complained of loose stool earlier.  Has history of IBS and this is not new for her.  Denies chest pain or shortness of breath       Objective   Objective   Vital Signs  Temp:  [97.5 °F (36.4 °C)-99 °F (37.2 °C)] 98.1 °F (36.7 °C)  Heart Rate:  [77-87] 79  Resp:  [18] 18  BP: (104-146)/(73-85) 146/85  SpO2:  [95 %-97 %] 95 %  on   ;   Device (Oxygen Therapy): room air  Body mass index is 41.31 kg/m².  Physical Exam  Vitals reviewed.   Constitutional:       General: She is not in acute distress.     Appearance: She is morbidly obese.   Eyes:      General: No scleral icterus.  Cardiovascular:      Rate and Rhythm: Normal rate and regular rhythm.   Pulmonary:      Effort: Pulmonary effort is normal.      Breath sounds: Normal breath sounds.   Abdominal:      Palpations: Abdomen is soft.      Tenderness: There is no abdominal tenderness.   Musculoskeletal:         General: No tenderness.      Right lower leg: No edema.      Left lower leg: No edema.   Neurological:      Mental Status: She is alert and oriented to person, place, and time.   Psychiatric:         Mood and Affect: Mood normal.     Results Review     I reviewed the patient's new clinical results.  Results from last 7 days   Lab Units 23  0557 23  0527 23  1114   WBC 10*3/mm3 10.95* 11.45* 12.31*   HEMOGLOBIN g/dL 10.5* 10.7* 12.1   PLATELETS 10*3/mm3 404 397 408       Results from last 7 days   Lab Units 23  0557 23  1656 23  0527 23  1114   SODIUM mmol/L 138  --  138 140   POTASSIUM mmol/L 4.2 4.4 3.6 3.8   CHLORIDE mmol/L 106  --  107 104   CO2 mmol/L 23.0  --  22.7 25.0   BUN mg/dL 8  --  9 11   CREATININE mg/dL 0.92  --  0.72 0.87   GLUCOSE mg/dL 118*  --  126* 161*   EGFR mL/min/1.73 75.5  --  101.4 80.8       Results from  last 7 days   Lab Units 08/25/23  1114   ALBUMIN g/dL 2.8*   BILIRUBIN mg/dL 0.4   ALK PHOS U/L 269*   AST (SGOT) U/L 37*   ALT (SGPT) U/L 17       Results from last 7 days   Lab Units 08/27/23  0557 08/26/23  0527 08/25/23  1114   CALCIUM mg/dL 8.4* 8.2* 8.8   ALBUMIN g/dL  --   --  2.8*       Results from last 7 days   Lab Units 08/25/23  1511 08/25/23  1114   PROCALCITONIN ng/mL  --  0.30*   LACTATE mmol/L 2.0 2.5*       Glucose   Date/Time Value Ref Range Status   08/27/2023 1101 152 (H) 70 - 130 mg/dL Final   08/27/2023 0614 125 70 - 130 mg/dL Final   08/26/2023 2124 139 (H) 70 - 130 mg/dL Final   08/26/2023 1700 141 (H) 70 - 130 mg/dL Final   08/26/2023 1233 123 70 - 130 mg/dL Final   08/26/2023 0622 122 70 - 130 mg/dL Final   08/25/2023 2036 114 70 - 130 mg/dL Final       CT Angiogram Chest    Result Date: 8/26/2023  1.  Right-sided pulmonary emboli as seen on prior CT abdomen pelvis and described more detail above. This was discussed with Melina, the patient's nurse by telephone at 4:00 p.m. 2.  A few scattered sub-6 mm pulm nodules. Follow-up chest CT in 12 months be considered to ensure stability. 3.  Extensive hepatic steatosis. 4.  Other findings as above.  This report was finalized on 8/26/2023 4:15 PM by Dr. Khris Bledsoe M.D.       I have personally reviewed all medications:  Scheduled Medications  apixaban, 10 mg, Oral, Q12H  vitamin C, 250 mg, Oral, Daily  atorvastatin, 80 mg, Oral, Daily  cefTRIAXone, 2,000 mg, Intravenous, Q24H  fluticasone, 2 spray, Nasal, Daily  gabapentin, 300 mg, Oral, Q12H  insulin glargine, 40 Units, Subcutaneous, Nightly  insulin lispro, 3-14 Units, Subcutaneous, 4x Daily AC & at Bedtime  insulin lispro, 5 Units, Subcutaneous, TID With Meals  levothyroxine, 275 mcg, Oral, Q AM  linagliptin, 5 mg, Oral, Daily  nystatin, , Topical, Q12H  pantoprazole, 40 mg, Oral, Q AM  sodium chloride, 3 mL, Intravenous, Q12H    Infusions     Diet  Diet: Diabetic Diets; Consistent  Carbohydrate; Texture: Regular Texture (IDDSI 7); Fluid Consistency: Thin (IDDSI 0)    I have personally reviewed:  [x]  Laboratory   []  Microbiology   [x]  Radiology   [x]  EKG/Telemetry  [x]  Cardiology/Vascular   []  Pathology    []  Records       Assessment/Plan     Active Hospital Problems    Diagnosis  POA    **Pulmonary embolism [I26.99]  Yes    Leukocytosis [D72.829]  Yes    Urinary tract infection associated with indwelling urethral catheter [T83.511A, N39.0]  Yes    Obesity, Class III, BMI 40-49.9 (morbid obesity) [E66.01]  Yes    Lactic acidosis [E87.20]  Yes    History of ischemic stroke [Z86.73]  Not Applicable    Pressure ulcer of other site, stage 4 [L89.894]  Yes    Spinal stenosis of lumbar region with neurogenic claudication [M48.062]  Yes    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       53 y.o. female with chronic right hip wound admitted with weakness, CAUTI and incidentally found  Pulmonary embolism.    Multiple right-sided PE-no evidence for right heart strain or any hemodynamic instability.  Initiated on Eliquis, will have pharmacy check on cost.  - Doppler with only superficial thrombophlebitis on the right  - No prior history of clot but high risk for recurrence given immobility.  Would probably plan indefinite anticoagulation in this case    Right hip wound- mgmt per Dr Boles, help appreciated. Poss wound VAC replacement Monday  - d/c'ed vanc if no concern for wound infection    UTI- present on admit, chronic indwelling cath.  Culture with only few colonies of Klebsiella.   Continue ceftriaxone for now    DM2- fair ctrl on current regimen, no changes    Hypothyroidism- TSH 33.6. Prev 22.5 in May. Will increase Synthroid, recheck in 3-4 weeks.    Diarrhea/IBS: Pharmacy does not stock her usual home med.  Will use Imodium as needed.  Could be worsened by antibiotics as well      Dispo: Likely home 1-2 days  Hari RN and patient      Reese Vyas MD  [facility]  08/27/23  14:45 EDT     negative

## 2023-08-28 LAB
ANION GAP SERPL CALCULATED.3IONS-SCNC: 11 MMOL/L (ref 5–15)
BUN SERPL-MCNC: 6 MG/DL (ref 6–20)
BUN/CREAT SERPL: 8 (ref 7–25)
CALCIUM SPEC-SCNC: 8.3 MG/DL (ref 8.6–10.5)
CHLORIDE SERPL-SCNC: 104 MMOL/L (ref 98–107)
CO2 SERPL-SCNC: 21 MMOL/L (ref 22–29)
CREAT SERPL-MCNC: 0.75 MG/DL (ref 0.57–1)
DEPRECATED RDW RBC AUTO: 44 FL (ref 37–54)
EGFRCR SERPLBLD CKD-EPI 2021: 96.5 ML/MIN/1.73
ERYTHROCYTE [DISTWIDTH] IN BLOOD BY AUTOMATED COUNT: 13.4 % (ref 12.3–15.4)
GLUCOSE BLDC GLUCOMTR-MCNC: 119 MG/DL (ref 70–130)
GLUCOSE BLDC GLUCOMTR-MCNC: 135 MG/DL (ref 70–130)
GLUCOSE BLDC GLUCOMTR-MCNC: 154 MG/DL (ref 70–130)
GLUCOSE BLDC GLUCOMTR-MCNC: 159 MG/DL (ref 70–130)
GLUCOSE SERPL-MCNC: 122 MG/DL (ref 65–99)
HCT VFR BLD AUTO: 34.2 % (ref 34–46.6)
HGB BLD-MCNC: 10.5 G/DL (ref 12–15.9)
MCH RBC QN AUTO: 27.5 PG (ref 26.6–33)
MCHC RBC AUTO-ENTMCNC: 30.7 G/DL (ref 31.5–35.7)
MCV RBC AUTO: 89.5 FL (ref 79–97)
PLATELET # BLD AUTO: 418 10*3/MM3 (ref 140–450)
PMV BLD AUTO: 10.2 FL (ref 6–12)
POTASSIUM SERPL-SCNC: 4.1 MMOL/L (ref 3.5–5.2)
RBC # BLD AUTO: 3.82 10*6/MM3 (ref 3.77–5.28)
SODIUM SERPL-SCNC: 136 MMOL/L (ref 136–145)
WBC NRBC COR # BLD: 11.07 10*3/MM3 (ref 3.4–10.8)

## 2023-08-28 PROCEDURE — 85027 COMPLETE CBC AUTOMATED: CPT | Performed by: INTERNAL MEDICINE

## 2023-08-28 PROCEDURE — 63710000001 INSULIN GLARGINE PER 5 UNITS: Performed by: INTERNAL MEDICINE

## 2023-08-28 PROCEDURE — 82948 REAGENT STRIP/BLOOD GLUCOSE: CPT

## 2023-08-28 PROCEDURE — 99232 SBSQ HOSP IP/OBS MODERATE 35: CPT | Performed by: SURGERY

## 2023-08-28 PROCEDURE — 63710000001 INSULIN LISPRO (HUMAN) PER 5 UNITS: Performed by: INTERNAL MEDICINE

## 2023-08-28 PROCEDURE — 36415 COLL VENOUS BLD VENIPUNCTURE: CPT | Performed by: INTERNAL MEDICINE

## 2023-08-28 PROCEDURE — 80048 BASIC METABOLIC PNL TOTAL CA: CPT | Performed by: INTERNAL MEDICINE

## 2023-08-28 RX ADMIN — LINAGLIPTIN 5 MG: 5 TABLET, FILM COATED ORAL at 08:49

## 2023-08-28 RX ADMIN — HYDROCODONE BITARTRATE AND ACETAMINOPHEN 1 TABLET: 5; 325 TABLET ORAL at 20:59

## 2023-08-28 RX ADMIN — HYDROCODONE BITARTRATE AND ACETAMINOPHEN 1 TABLET: 5; 325 TABLET ORAL at 01:12

## 2023-08-28 RX ADMIN — INSULIN LISPRO 3 UNITS: 100 INJECTION, SOLUTION INTRAVENOUS; SUBCUTANEOUS at 13:09

## 2023-08-28 RX ADMIN — NYSTATIN: 100000 POWDER TOPICAL at 20:59

## 2023-08-28 RX ADMIN — INSULIN LISPRO 5 UNITS: 100 INJECTION, SOLUTION INTRAVENOUS; SUBCUTANEOUS at 18:15

## 2023-08-28 RX ADMIN — APIXABAN 10 MG: 5 TABLET, FILM COATED ORAL at 08:52

## 2023-08-28 RX ADMIN — APIXABAN 10 MG: 5 TABLET, FILM COATED ORAL at 20:59

## 2023-08-28 RX ADMIN — FLUTICASONE PROPIONATE 2 SPRAY: 50 SPRAY, METERED NASAL at 08:49

## 2023-08-28 RX ADMIN — Medication 3 ML: at 20:59

## 2023-08-28 RX ADMIN — OXYCODONE HYDROCHLORIDE AND ACETAMINOPHEN 250 MG: 500 TABLET ORAL at 08:49

## 2023-08-28 RX ADMIN — LEVOTHYROXINE SODIUM 275 MCG: 0.17 TABLET ORAL at 06:30

## 2023-08-28 RX ADMIN — INSULIN LISPRO 5 UNITS: 100 INJECTION, SOLUTION INTRAVENOUS; SUBCUTANEOUS at 13:09

## 2023-08-28 RX ADMIN — Medication 3 ML: at 08:50

## 2023-08-28 RX ADMIN — GABAPENTIN 300 MG: 300 CAPSULE ORAL at 20:59

## 2023-08-28 RX ADMIN — INSULIN LISPRO 5 UNITS: 100 INJECTION, SOLUTION INTRAVENOUS; SUBCUTANEOUS at 08:49

## 2023-08-28 RX ADMIN — INSULIN GLARGINE 40 UNITS: 100 INJECTION, SOLUTION SUBCUTANEOUS at 20:59

## 2023-08-28 RX ADMIN — HYDROXYZINE HYDROCHLORIDE 25 MG: 25 TABLET ORAL at 20:59

## 2023-08-28 RX ADMIN — INSULIN LISPRO 3 UNITS: 100 INJECTION, SOLUTION INTRAVENOUS; SUBCUTANEOUS at 20:59

## 2023-08-28 RX ADMIN — PANTOPRAZOLE SODIUM 40 MG: 40 TABLET, DELAYED RELEASE ORAL at 06:30

## 2023-08-28 RX ADMIN — NYSTATIN: 100000 POWDER TOPICAL at 08:49

## 2023-08-28 NOTE — NURSING NOTE
Wound/ostomy - consult received regarding patient's R hip wound POA. 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 Adame on 5/25/23, patient was sent home with NPWT and dressing is changed 3 times weekly by [facility] home health nurse. Initially wound size was 8x13x2 cm, per note of Dr. Boles wound size is approx 6x7 cm.     Patient presented to the hospital after the home health nurse felt the wound to be very malodorous. Patient was seen by Dr. Boles on 8/26 and he describes a clean wound bed with no odor and recommended to continue NS wet to dry and could resume wound vac dressing possibly Monday.     Spoke to patient, her home vac is at home, she uses a [employer] machine and not a [employer]CI, no one is able to bring it to the hospital. Patient is planning for d/c today or tomorrow. Discussed with patient and we will continue to NS wet to dry while here and home health nurse can replace vac once she goes home.

## 2023-08-28 NOTE — CASE MANAGEMENT/SOCIAL WORK
Discharge Planning Assessment  [facility]     Patient Name: Vanessa Root  MRN: [id number]  Today's Date: 8/28/2023    Admit Date: 8/25/2023    Plan: Home with family, lucas BURROWS, wound vac via [facility] at 2pm tomorrow   Discharge Needs Assessment       Row Name 08/28/23 1715       Living Environment    People in Home child(nicole), adult;parent(s)    Current Living Arrangements home    Potentially Unsafe Housing Conditions none    Primary Care Provided by child(nicole);parent(s)    Provides Primary Care For no one, unable/limited ability to care for self    Family Caregiver if Needed child(nicole), adult;parent(s)    Quality of Family Relationships helpful;involved;supportive    Able to Return to Prior Arrangements yes       Resource/Environmental Concerns    Resource/Environmental Concerns none    Transportation Concerns none       Transition Planning    Patient/Family Anticipates Transition to home with family;home with help/services    Patient/Family Anticipated Services at Transition home health care    Transportation Anticipated health plan transportation       Discharge Needs Assessment    Readmission Within the Last 30 Days no previous admission in last 30 days    Equipment Currently Used at Home other (see comments)    Concerns to be Addressed discharge planning;denies needs/concerns at this time    Anticipated Changes Related to Illness none    Equipment Needed After Discharge none    Discharge Facility/Level of Care Needs home with home health    Current Discharge Risk chronically ill;dependent with mobility/activities of daily living;physical impairment                   Discharge Plan       Row Name 08/28/23 1713       Plan    Plan Home with family, lucas BURROWS, wound vac via caliber at 2pm tomorrow    Patient/Family in Agreement with Plan yes    Provided Post Acute Provider List? N/A    Plan Comments CCP spoke with pt at bedside, introduced self and explained CCP role. Pt has visual impairments and extra  verbal cues were made. Verified facesheet and confirmed [facility] pharmacy [facility]. Pt denies problems with medication cost. Pt has Living will on file. Pt asked about POA and CCP offered to provide info on senior . Pt stated she was going to call . PCP is Anat FARNSWORTH with [facility]. Pt lives at home with her dtr Dulce [phone] and her mom Pauline. Pt is bedbound for the last 3 years, has a hospital bed, trapeze, lift device, SCD's and CPAP thru [employer]. Some equipment she has gotten from [employer] (Mattress and Trapeze). [facility] and has a wound vac at home. Pt has been to [facility] in the past. CCP discussed dc planning and pt confirms plan is home at dc and requests caliber stretcher transport. She requests tomorrow afternoon. Transportation arrangements made for 2pm tomorrow with [employer] reservation # [id number], cost is $265.00 private pay. [facility] provided contact # for pt and she states she will call them and give the approval as pt has an account with them. Pt asked regarding hospital bed that she is currently in for the cost. CCP explained would ask nurse manager. Pt denies any other dc needs. Called [facility]/ [facility]  for referral Wu BOCANEGRA/CCP                  Continued Care and Services - Admitted Since 8/25/2023       Home Medical Care       Service Provider Request Status Selected Services Address Phone Fax Patient Preferred    [facility]  LIAM MEDEL  [facility] [address] [place] [id number] [phone] --                  Selected Continued Care - Prior Encounters Includes continued care and service providers with selected services from prior encounters from 5/27/2023 to 8/28/2023      Discharged on 6/6/2023 Admission date: 5/24/2023 - Discharge disposition: Home-Health Care Svc      Durable Medical Equipment       Service Provider Selected  Services Address Phone Fax Patient Preferred    [facility] [address], [place] [id number] [phone] --              Home Medical Care       Service Provider Selected Services Address Phone Fax Patient Preferred    [facility] ,  Home Medical  [address], [place] [address] [phone] --                          Expected Discharge Date and Time       Expected Discharge Date Expected Discharge Time    Aug 30, 2023            Demographic Summary       Row Name 08/28/23 1715       General Information    Admission Type inpatient    Referral Source admission list    Reason for Consult discharge planning    Preferred Language English       Contact Information    Permission Granted to Share Info With facility ;family/designee                   Functional Status       Row Name 08/28/23 1715       Functional Status    Usual Activity Tolerance poor    Current Activity Tolerance poor       Functional Status, IADL    Medications completely dependent    Meal Preparation completely dependent    Housekeeping completely dependent    Laundry completely dependent    Shopping completely dependent       Mental Status    General Appearance WDL WDL       Mental Status Summary    Recent Changes in Mental Status/Cognitive Functioning no changes;vision       Employment/    Employment Status disabled                   Psychosocial    No documentation.                  Abuse/Neglect    No documentation.                  Legal    No documentation.                  Substance Abuse    No documentation.                  Patient Forms    No documentation.                     Grace Hart RN

## 2023-08-28 NOTE — PROGRESS NOTES
"General Surgery  Progress Note    CC: Follow-up right hip decubitus wound    S: She is tolerating NS wet to dry dressing changes to the right hip wound without pain.    ROS: Positive for malodorous urine from stokes catheter; denies any malodorous drainage from her right hip wound    O:BP 99/77 (BP Location: Right arm, Patient Position: Lying)   Pulse 92   Temp 98.2 °F (36.8 °C) (Oral)   Resp 16   Ht 165.1 cm (65\")   Wt 113 kg (248 lb 3.8 oz)   SpO2 96%   BMI 41.31 kg/m²     GENERAL: alert, well appearing, and in no distress  HEENT: normocephalic, atraumatic  CHEST: clear to auscultation, no wheezes, rales or rhonchi, symmetric air entry  CARDIAC: regular rate and rhythm    ABDOMEN: soft, obese, nontender  EXTREMITIES: no cyanosis, clubbing, or edema   SKIN: right hip wound looks very clean with healthy pink granulation tissue and no surrounding cellulitis, only a small amount of tunneling medially, no purulence noted    LABS  Results from last 7 days   Lab Units 08/28/23  0440 08/27/23  0557 08/26/23  0527   WBC 10*3/mm3 11.07* 10.95* 11.45*   HEMOGLOBIN g/dL 10.5* 10.5* 10.7*   HEMATOCRIT % 34.2 33.6* 34.4   PLATELETS 10*3/mm3 418 404 397     Results from last 7 days   Lab Units 08/28/23  0440 08/27/23  0557 08/26/23  1656 08/26/23  0527 08/25/23  1114   SODIUM mmol/L 136 138  --  138 140   POTASSIUM mmol/L 4.1 4.2 4.4 3.6 3.8   CHLORIDE mmol/L 104 106  --  107 104   CO2 mmol/L 21.0* 23.0  --  22.7 25.0   BUN mg/dL 6 8  --  9 11   CREATININE mg/dL 0.75 0.92  --  0.72 0.87   CALCIUM mg/dL 8.3* 8.4*  --  8.2* 8.8   BILIRUBIN mg/dL  --   --   --   --  0.4   ALK PHOS U/L  --   --   --   --  269*   ALT (SGPT) U/L  --   --   --   --  17   AST (SGOT) U/L  --   --   --   --  37*   GLUCOSE mg/dL 122* 118*  --  126* 161*             A/P: 53 y.o. female s/p remote debridement of right hip decubitus wound    The wound looks great with no sign of infection. Continue NS wet to dry dressing changes until Home Health is " scheduled to see her again this week (Wednesday) at which point her wound vac can be replaced. She can see me back in the office within the next 1-2 months for recheck of the wound.    Elizabeth Adame MD  General, Robotic, and Endoscopic Surgery  [facility]    [address], [address]  [place] [address]  P: [phone]  F: [phone]

## 2023-08-28 NOTE — DISCHARGE PLACEMENT REQUEST
"Vanessa Villanueva (53 y.o. Female)       Date of Birth   1970    Social Security Number       Address   [address] [place], [place] [address]    Home Phone   [phone]    MRN   [id number]       [place]   [facility]    Marital Status   Single                            Admission Date   8/25/23    Admission Type   Emergency    Admitting Provider   Marv Saucedo MD    Attending Provider   Reese Vyas MD    Department, Room/Bed   [address], [address]1       Discharge Date       Discharge Disposition       Discharge Destination                                 Attending Provider: Reese Vyas MD    Allergies: Clemastine Fumarate, Ziprasidone, Aripiprazole, Sulfa Antibiotics, Green Pepper, Latex, Lisinopril    Isolation: None   Infection: None   Code Status: CPR    Ht: 165.1 cm (65\")   Wt: 113 kg (248 lb 3.8 oz)    Admission Cmt: None   Principal Problem: Pulmonary embolism [I26.99]                   Active Insurance as of 8/25/2023       Primary Coverage       Payor Plan Insurance Group Employer/Plan Group    MEDICARE MEDICARE A & B        Payor Plan Address Payor Plan Phone Number Payor Plan Fax Number Effective Dates    [address] [phone]  4/1/2003 - None Entered    [facility] [id number]         Subscriber Name Subscriber Birth Date Member ID       VANESSA VILLANUEVA 1970 [id number]               Secondary Coverage       Payor Plan Insurance Group Employer/Plan Group    [place]        Payor Plan Address Payor Plan Phone Number Payor Plan Fax Number Effective Dates    [address] [phone]  11/16/2021 - None Entered    [place] [address]         Subscriber Name Subscriber Birth Date Member ID       VANESSA VILLANUEVA 1970 [id number]                     Emergency Contacts        (Rel.) Home Phone Work Phone Mobile Phone    WHITNEY VILLANUEVA (Daughter) -- -- [phone]    Pauline Villanueva " (Mother) -- -- [phone]

## 2023-08-28 NOTE — PROGRESS NOTES
Name: Vanessa Root ADMIT: 2023   : 1970  PCP: Anat Castellanos APRN    MRN: [id number] LOS: 3 days   AGE/SEX: 53 y.o. female  ROOM: [facility]     Subjective   Subjective   Denies chest pain or shortness of breath.  Wound care recs for wet-to-dry dressings for placement of wound VAC when she gets home noted       Objective   Objective   Vital Signs  Temp:  [97.5 °F (36.4 °C)-98.4 °F (36.9 °C)] 98.2 °F (36.8 °C)  Heart Rate:  [78-92] 92  Resp:  [16-18] 16  BP: ()/(64-92) 99/77  SpO2:  [96 %-98 %] 96 %  on   ;   Device (Oxygen Therapy): room air  Body mass index is 41.31 kg/m².  Physical Exam  Vitals reviewed.   Constitutional:       General: She is not in acute distress.     Appearance: She is morbidly obese.   Eyes:      General: No scleral icterus.  Cardiovascular:      Rate and Rhythm: Normal rate and regular rhythm.   Pulmonary:      Effort: Pulmonary effort is normal.      Breath sounds: Normal breath sounds.   Abdominal:      Palpations: Abdomen is soft.      Tenderness: There is no abdominal tenderness.   Musculoskeletal:         General: No tenderness.      Right lower leg: No edema.      Left lower leg: No edema.   Neurological:      Mental Status: She is alert and oriented to person, place, and time.   Psychiatric:         Mood and Affect: Mood normal.     Results Review     I reviewed the patient's new clinical results.  Results from last 7 days   Lab Units 23  0440 23  0557 23  0523  1114   WBC 10*3/mm3 11.07* 10.95* 11.45* 12.31*   HEMOGLOBIN g/dL 10.5* 10.5* 10.7* 12.1   PLATELETS 10*3/mm3 418 404 397 408       Results from last 7 days   Lab Units 23  0440 23  0557 23  1656 23  0527 23  1114   SODIUM mmol/L 136 138  --  138 140   POTASSIUM mmol/L 4.1 4.2 4.4 3.6 3.8   CHLORIDE mmol/L 104 106  --  107 104   CO2 mmol/L 21.0* 23.0  --  22.7 25.0   BUN mg/dL 6 8  --  9 11   CREATININE mg/dL 0.75 0.92  --  0.72 0.87   GLUCOSE  mg/dL 122* 118*  --  126* 161*   EGFR mL/min/1.73 96.5 75.5  --  101.4 80.8       Results from last 7 days   Lab Units 08/25/23  1114   ALBUMIN g/dL 2.8*   BILIRUBIN mg/dL 0.4   ALK PHOS U/L 269*   AST (SGOT) U/L 37*   ALT (SGPT) U/L 17       Results from last 7 days   Lab Units 08/28/23  0440 08/27/23  0557 08/26/23  0527 08/25/23  1114   CALCIUM mg/dL 8.3* 8.4* 8.2* 8.8   ALBUMIN g/dL  --   --   --  2.8*       Results from last 7 days   Lab Units 08/25/23  1511 08/25/23  1114   PROCALCITONIN ng/mL  --  0.30*   LACTATE mmol/L 2.0 2.5*       Glucose   Date/Time Value Ref Range Status   08/28/2023 1109 159 (H) 70 - 130 mg/dL Final   08/28/2023 0551 135 (H) 70 - 130 mg/dL Final   08/27/2023 2110 145 (H) 70 - 130 mg/dL Final   08/27/2023 1556 164 (H) 70 - 130 mg/dL Final   08/27/2023 1101 152 (H) 70 - 130 mg/dL Final   08/27/2023 0614 125 70 - 130 mg/dL Final   08/26/2023 2124 139 (H) 70 - 130 mg/dL Final       No radiology results for the last day    I have personally reviewed all medications:  Scheduled Medications  apixaban, 10 mg, Oral, Q12H  vitamin C, 250 mg, Oral, Daily  atorvastatin, 80 mg, Oral, Daily  cefTRIAXone, 2,000 mg, Intravenous, Q24H  fluticasone, 2 spray, Nasal, Daily  gabapentin, 300 mg, Oral, Q12H  insulin glargine, 40 Units, Subcutaneous, Nightly  insulin lispro, 3-14 Units, Subcutaneous, 4x Daily AC & at Bedtime  insulin lispro, 5 Units, Subcutaneous, TID With Meals  levothyroxine, 275 mcg, Oral, Q AM  linagliptin, 5 mg, Oral, Daily  nystatin, , Topical, Q12H  pantoprazole, 40 mg, Oral, Q AM  sodium chloride, 3 mL, Intravenous, Q12H    Infusions     Diet  Diet: Diabetic Diets; Consistent Carbohydrate; Texture: Regular Texture (IDDSI 7); Fluid Consistency: Thin (IDDSI 0)    I have personally reviewed:  [x]  Laboratory   []  Microbiology   [x]  Radiology   [x]  EKG/Telemetry  [x]  Cardiology/Vascular   []  Pathology    []  Records       Assessment/Plan     Active Hospital Problems    Diagnosis  POA     **Pulmonary embolism [I26.99]  Yes    Leukocytosis [D72.829]  Yes    Urinary tract infection associated with indwelling urethral catheter [T83.511A, N39.0]  Yes    Obesity, Class III, BMI 40-49.9 (morbid obesity) [E66.01]  Yes    Lactic acidosis [E87.20]  Yes    History of ischemic stroke [Z86.73]  Not Applicable    Pressure ulcer of other site, stage 4 [L89.894]  Yes    Spinal stenosis of lumbar region with neurogenic claudication [M48.062]  Yes    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       53 y.o. female with chronic right hip wound admitted with weakness, CAUTI and incidentally found  Pulmonary embolism.    Multiple right-sided PE-no evidence for right heart strain or any hemodynamic instability.   -Continue Eliquis.  Pharmacy confirms medication will be affordable for  - Doppler with only superficial thrombophlebitis on the right  - No prior history of clot but high risk for recurrence given immobility.  Would plan indefinite anticoagulation in this case    Right hip wound- mgmt per Dr Boles and wound care team.  Planning wet-to-dry dressings with transition to wound VAC therapy when she gets back home to be placed by home health nurse    UTI- present on admit, chronic indwelling cath.  Minimal growth on culture, has received 3 days of ceftriaxone which is probably enough, will discontinue.   -1 of 2 positive blood cultures for bacillus species which is a contaminant    DM2- fair ctrl on current regimen, no changes    Hypothyroidism- TSH 33.6. Prev 22.5 in May.  Increased Synthroid, recheck in  3-4 weeks.    Diarrhea/IBS: Pharmacy does not stock her usual home med.  Will use Imodium as needed.  Could be worsened by antibiotics as well      Dispo: Stable for discharge.  Patient says family readying her room today and she is comfortable with being discharged tomorrow.  CCP to arrange bariatric transport.      Reese Vyas MD  [facility]  08/28/23  15:19 EDT

## 2023-08-29 ENCOUNTER — READMISSION MANAGEMENT (OUTPATIENT)
Dept: CALL CENTER | Facility: HOSPITAL | Age: 53
End: 2023-08-29
Payer: MEDICARE

## 2023-08-29 VITALS
WEIGHT: 248.24 LBS | OXYGEN SATURATION: 98 % | RESPIRATION RATE: 18 BRPM | SYSTOLIC BLOOD PRESSURE: 113 MMHG | TEMPERATURE: 98.1 F | HEART RATE: 85 BPM | HEIGHT: 65 IN | DIASTOLIC BLOOD PRESSURE: 72 MMHG | BODY MASS INDEX: 41.36 KG/M2

## 2023-08-29 PROBLEM — E87.20 LACTIC ACIDOSIS: Status: RESOLVED | Noted: 2023-08-25 | Resolved: 2023-08-29

## 2023-08-29 PROBLEM — D72.829 LEUKOCYTOSIS: Status: RESOLVED | Noted: 2023-08-25 | Resolved: 2023-08-29

## 2023-08-29 PROBLEM — N39.0 URINARY TRACT INFECTION ASSOCIATED WITH INDWELLING URETHRAL CATHETER: Status: RESOLVED | Noted: 2023-08-25 | Resolved: 2023-08-29

## 2023-08-29 PROBLEM — T83.511A URINARY TRACT INFECTION ASSOCIATED WITH INDWELLING URETHRAL CATHETER: Status: RESOLVED | Noted: 2023-08-25 | Resolved: 2023-08-29

## 2023-08-29 LAB
GLUCOSE BLDC GLUCOMTR-MCNC: 126 MG/DL (ref 70–130)
GLUCOSE BLDC GLUCOMTR-MCNC: 136 MG/DL (ref 70–130)

## 2023-08-29 PROCEDURE — 63710000001 INSULIN LISPRO (HUMAN) PER 5 UNITS: Performed by: INTERNAL MEDICINE

## 2023-08-29 PROCEDURE — 82948 REAGENT STRIP/BLOOD GLUCOSE: CPT

## 2023-08-29 RX ORDER — TERBINAFINE HYDROCHLORIDE 250 MG/1
250 TABLET ORAL DAILY
Qty: 7 TABLET | Refills: 0 | Status: SHIPPED | OUTPATIENT
Start: 2023-08-29

## 2023-08-29 RX ORDER — LEVOTHYROXINE SODIUM 0.07 MG/1
TABLET ORAL
Qty: 30 TABLET | Refills: 0 | Status: SHIPPED | OUTPATIENT
Start: 2023-08-29

## 2023-08-29 RX ADMIN — GABAPENTIN 300 MG: 300 CAPSULE ORAL at 08:12

## 2023-08-29 RX ADMIN — FLUTICASONE PROPIONATE 2 SPRAY: 50 SPRAY, METERED NASAL at 08:13

## 2023-08-29 RX ADMIN — INSULIN LISPRO 5 UNITS: 100 INJECTION, SOLUTION INTRAVENOUS; SUBCUTANEOUS at 08:13

## 2023-08-29 RX ADMIN — HYDROCODONE BITARTRATE AND ACETAMINOPHEN 1 TABLET: 5; 325 TABLET ORAL at 12:01

## 2023-08-29 RX ADMIN — Medication 3 ML: at 08:13

## 2023-08-29 RX ADMIN — ATORVASTATIN CALCIUM 80 MG: 80 TABLET, FILM COATED ORAL at 08:12

## 2023-08-29 RX ADMIN — INSULIN LISPRO 5 UNITS: 100 INJECTION, SOLUTION INTRAVENOUS; SUBCUTANEOUS at 11:56

## 2023-08-29 RX ADMIN — PANTOPRAZOLE SODIUM 40 MG: 40 TABLET, DELAYED RELEASE ORAL at 06:38

## 2023-08-29 RX ADMIN — APIXABAN 10 MG: 5 TABLET, FILM COATED ORAL at 08:12

## 2023-08-29 RX ADMIN — OXYCODONE HYDROCHLORIDE AND ACETAMINOPHEN 250 MG: 500 TABLET ORAL at 08:12

## 2023-08-29 RX ADMIN — LEVOTHYROXINE SODIUM 275 MCG: 0.17 TABLET ORAL at 06:38

## 2023-08-29 RX ADMIN — NYSTATIN: 100000 POWDER TOPICAL at 08:13

## 2023-08-29 NOTE — DISCHARGE SUMMARY
Patient Name: Vanessa Root  : 1970  MRN: [id number]    Date of Admission: 2023  Date of Discharge:  2023  Primary Care Physician: Anat Castellanos APRN      Chief Complaint:   Dysuria      Discharge Diagnoses     Active Hospital Problems    Diagnosis  POA    **Pulmonary embolism [I26.99]  Yes    Obesity, Class III, BMI 40-49.9 (morbid obesity) [E66.01]  Yes    History of ischemic stroke [Z86.73]  Not Applicable    Pressure ulcer of other site, stage 4 [L89.894]  Yes    Spinal stenosis of lumbar region with neurogenic claudication [M48.062]  Yes    Immobility syndrome [M62.3]  Yes    Hyperlipidemia [E78.5]  Yes    GERD (gastroesophageal reflux disease) [K21.9]  Yes    Type 2 diabetes mellitus with diabetic polyneuropathy [E11.42]  Yes    Bipolar 1 disorder [F31.9]  Yes    Depression [F32.A]  Yes    Arthritis [M19.90]  Yes    Restless legs [G25.81]  Yes    Obstructive sleep apnea syndrome [G47.33]  Yes    Hypothyroidism [E03.9]  Yes      Resolved Hospital Problems    Diagnosis Date Resolved POA    Leukocytosis [D72.829] 2023 Yes    Urinary tract infection associated with indwelling urethral catheter [T83.511A, N39.0] 2023 Yes    Lactic acidosis [E87.20] 2023 Yes        Hospital Course     Ms. Root is a 53 y.o. female with a history of diabetes, spinal stenosis with immobility, morbid obesity, WILLIS, chronic indwelling Quiroz catheter, and recent hospitalization earlier this year with necrotizing fasciitis status post debridement who presented to [facility] initially after being told to report to the hospital by home health care nurse who had been there to change her wound VAC and noticed a foul smell and thought the wound looked red.  Patient also had been having some discolored urine and some burning around her Quiroz catheter.  She was seen in ER and started on IV antibiotics and subsequent admitted to the hospital.  CT showed no significant fluid  collection near her wound site but there was some thick walled component to the bladder.  Her UA did show too numerous to count white cells though also had some squamous cells as 1 would expect with chronic indwelling Quiroz.  She was started on empiric antibiotics to cover both wound and urine pathogens.  She was seen by general surgery who examined her wound and felt it was not infected at all.  They did wet-to-dry dressings here and have recommended the wound VAC be placed back when she gets home.  She has not been noted to have any purulent drainage from the wound while here.  She did receive antibiotics to cover UTI but culture only grew less than 25,000 colonies of Klebsiella which is basically unremarkable given her chronic indwelling Quiroz.  She received a few days of ceftriaxone which has now been discontinued.  She never displayed any signs of sepsis with only minimal leukocytosis and normal lactate.    Most significant finding this hospital stay was incidental note of right lower lobe PE on the initial CT abdomen that was done in ED.  She was started empirically on Lovenox and lower extremity Dopplers were done which showed only superficial thrombophlebitis in the right greater saphenous.  CT angiogram of the chest was ordered which did confirm several right-sided pulmonary emboli with scattered pulmonary nodules with recommendation for follow-up chest CT in 12 months.  I transition her over to Eliquis and she has tolerated that very well.  Pharmacy has confirmed that the medication is affordable for her.  She is stable for discharge home today with continued home health for wound care.    We also did note her TSH was 33.6.  When she was here several months ago it was elevated as well.  It does not seem like she is maybe absorbing this medication that well.  I have increased her dose up to 275 mcg daily and recommend rechecking in about a month.    Patient requested treatment for some onychomycosis of the  ring finger.        Day of Discharge     Subjective:  No new complaints today.  Extensive medication review with her    Physical Exam:  Temp:  [97.5 °F (36.4 °C)-98.2 °F (36.8 °C)] 98.1 °F (36.7 °C)  Heart Rate:  [85-89] 85  Resp:  [16-18] 18  BP: ()/(57-72) 113/72  Body mass index is 41.31 kg/m².  Physical Exam  Vitals reviewed.   Constitutional:       General: She is not in acute distress.     Appearance: She is morbidly obese.   Eyes:      General: No scleral icterus.  Cardiovascular:      Rate and Rhythm: Normal rate and regular rhythm.   Pulmonary:      Effort: Pulmonary effort is normal.      Breath sounds: Normal breath sounds.   Abdominal:      Palpations: Abdomen is soft.      Tenderness: There is no abdominal tenderness.   Musculoskeletal:         General: No tenderness.      Right lower leg: No edema.      Left lower leg: No edema.   Neurological:      Mental Status: She is alert and oriented to person, place, and time.   Psychiatric:         Mood and Affect: Mood normal.       Consultants     Consult Orders (all) (From admission, onward)       Start     Ordered    08/25/23 1600  Inpatient General Surgery Consult  Once        Specialty:  General Surgery  Provider:  Elizabeth Adame MD    08/25/23 1559    08/25/23 1532  [facility] (on-call MD unless specified) Details  Once        Specialty:  Hospitalist  Provider:  (Not yet assigned)    08/25/23 1531                  Procedures       Imaging Results (All)       Procedure Component Value Units Date/Time    CT Angiogram Chest [[id number]] Collected: 08/26/23 1604     Updated: 08/26/23 1619    Narrative:      CT ANGIOGRAM OF THE CHEST. MULTIPLE CORONAL, SAGITTAL, AND 3-D  RECONSTRUCTIONS.     HISTORY: Pulmonary emboli seen on CT abdomen and pelvis     TECHNIQUE: Radiation dose reduction techniques were utilized, including  automated exposure control and exposure modulation based on body size.   CT angiogram of the chest was performed. Multiple coronal,  sagittal, and  3-D reconstruction images were obtained.      COMPARISON: CT abdomen and pelvis 8/25/2023     FINDINGS:      Pericardial thickening and/or trace pericardial effusion is present.  There is extensive hepatic steatosis. No hilar, mediastinal or axillary  adenopathy by size criteria.     There are multiple pulmonary emboli within the right pulmonary arteries,  as seen on recent CT. The RV: LV ratio is not elevated at 0.9. The most  proximal extension into the distal right main pulmonary artery. No  pulmonary consolidation, pleural effusion or pneumothorax. Bibasilar  atelectasis and a pleural parenchymal scarring. A few scattered sub-6 mm  pulm nodules.     No suspicious lytic or blastic osseous lesions.       Impression:      1.  Right-sided pulmonary emboli as seen on prior CT abdomen pelvis and  described more detail above. This was discussed with Melina, the  patient's nurse by telephone at 4:00 p.m.  2.  A few scattered sub-6 mm pulm nodules. Follow-up chest CT in 12  months be considered to ensure stability.  3.  Extensive hepatic steatosis.  4.  Other findings as above.     This report was finalized on 8/26/2023 4:15 PM by Dr. Khris Bledsoe M.D.       CT Abdomen Pelvis With Contrast [[id number]] Collected: 08/25/23 1404     Updated: 08/25/23 1654    Narrative:      CT ABDOMEN AND PELVIS WITH IV CONTRAST     HISTORY: 53-year-old female with dysuria. Right hip wound. Upper  extremity thrombophlebitis. Paraplegia. Right buttock necrotizing tissue  debridement 05/25/2023.     TECHNIQUE: Radiation dose reduction techniques were utilized, including  automated exposure control and exposure modulation based on body size.   3 mm images were obtained through the abdomen and pelvis after the  administration of IV contrast. Compared with previous CT 06/01/2023.     FINDINGS:  1. On the first few images of the examination, there is a suggestion of  possible tiny pulmonary thromboemboli in right lower lobe  pulmonary  artery branches.     2. Hepatomegaly and extensive hepatic steatosis, unchanged. Gallbladder,  pancreas, spleen, adrenals, and kidneys appear unremarkable. There is no  acute bowel abnormality.     3. Quiroz catheter is within the vaginal canal, now within the urinary  bladder. Urinary bladder appears diffusely thick-walled which may be  related to the bladder being collapsed, but please correlate clinically  for acute UTI/cystitis.     4. There is no fluid collection or abscess at the debridement site at  the lateral aspect of the right hip/gluteal musculature. There is  heterogeneous admixture of contrast within the deep pelvic veins and the  right common femoral vein. No definite thrombus is seen, but cannot be  excluded.     5. There has been resolution of the previously seen anasarca/third  spacing of fluid. Pleural effusions have resolved.     The findings concerning for possible pulmonary thromboemboli were  discussed with FELECIA Crews.     This report was finalized on 8/25/2023 4:51 PM by Dr. Zeinab Stevenson M.D.             Results for orders placed during the hospital encounter of 08/25/23    Duplex Venous Lower Extremity - Bilateral CAR    Interpretation Summary    Acute right lower extremity superficial thrombophlebitis noted in the great saphenous (below knee).    Left popliteal fossa fluid collection.    All other veins appeared normal bilaterally.      Pertinent Labs     Results from last 7 days   Lab Units 08/28/23  0440 08/27/23  0557 08/26/23  0527 08/25/23  1114   WBC 10*3/mm3 11.07* 10.95* 11.45* 12.31*   HEMOGLOBIN g/dL 10.5* 10.5* 10.7* 12.1   PLATELETS 10*3/mm3 418 404 397 408     Results from last 7 days   Lab Units 08/28/23  0440 08/27/23  0557 08/26/23  1656 08/26/23  0527 08/25/23  1114   SODIUM mmol/L 136 138  --  138 140   POTASSIUM mmol/L 4.1 4.2 4.4 3.6 3.8   CHLORIDE mmol/L 104 106  --  107 104   CO2 mmol/L 21.0* 23.0  --  22.7 25.0   BUN mg/dL 6 8  --  9 11   CREATININE mg/dL  0.75 0.92  --  0.72 0.87   GLUCOSE mg/dL 122* 118*  --  126* 161*   EGFR mL/min/1.73 96.5 75.5  --  101.4 80.8     Results from last 7 days   Lab Units 08/25/23  1114   ALBUMIN g/dL 2.8*   BILIRUBIN mg/dL 0.4   ALK PHOS U/L 269*   AST (SGOT) U/L 37*   ALT (SGPT) U/L 17     Results from last 7 days   Lab Units 08/28/23  0440 08/27/23  0557 08/26/23  0527 08/25/23  1114   CALCIUM mg/dL 8.3* 8.4* 8.2* 8.8   ALBUMIN g/dL  --   --   --  2.8*               Invalid input(s): LDLCALC  Results from last 7 days   Lab Units 08/25/23  1407 08/25/23  1151 08/25/23  1114   BLOODCX   --  No growth at 4 days Bacillus species*   URINECX  <25,000 CFU/mL Klebsiella oxytoca*  50,000 CFU/mL Mixed Carmen Isolated  --   --    BCIDPCR   --   --  Negative by BCID PCR. Culture to Follow.         Test Results Pending at Discharge     Pending Labs       Order Current Status    Blood Culture - Blood, Arm, Left Preliminary result            Discharge Details        Discharge Medications        New Medications        Instructions Start Date   Eliquis 5 MG tablet tablet  Generic drug: apixaban   Take 2 tablets by mouth Every 12 (Twelve) Hours for 4 days, THEN 1 tablet Every 12 (Twelve) Hours for 30 days.   Start Date: August 29, 2023     terbinafine 250 MG tablet  Commonly known as: LamISIL   250 mg, Oral, Daily             Continue These Medications        Instructions Start Date   fluticasone 50 MCG/ACT nasal spray  Commonly known as: Flonase   2 sprays, Nasal, Daily      gabapentin 300 MG capsule  Commonly known as: NEURONTIN   300 mg, Oral, Every 12 Hours Scheduled      glucose blood test strip   1 each, Other, 3 Times Daily, Test blood sugar once daily before a meal and as needed.      glucose monitor monitoring kit   1 each, Does not apply, As Needed      HYDROcodone-acetaminophen 7.5-325 MG per tablet  Commonly known as: NORCO   1 tablet, Oral, Every 4 Hours PRN      hydrocortisone-liver oil-zinc oxide in bacitracin-nystatin   1 application  ", Topical, Every 12 Hours Scheduled      Insulin Pen Needle 32G X 4 MM misc   Use as directed with insulin with meals and at night      Insulin Syringe 31G X 5/16\" 1 ML misc   No dose, route, or frequency recorded.      levothyroxine 200 MCG tablet  Commonly known as: SYNTHROID, LEVOTHROID   200 mcg, Oral, Daily      levothyroxine 75 MCG tablet  Commonly known as: SYNTHROID, LEVOTHROID   Take one a day.      NovoLOG FlexPen 100 UNIT/ML solution pen-injector sc pen  Generic drug: insulin aspart   Use as directed up to 16 U with each meal      omeprazole 40 MG capsule  Commonly known as: priLOSEC   40 mg, Oral, Daily      ondansetron 4 MG tablet  Commonly known as: Zofran   4 mg, Oral, Every 8 Hours PRN      SITagliptin 100 MG tablet  Commonly known as: Januvia   100 mg, Oral, Daily      Tresiba FlexTouch 200 UNIT/ML solution pen-injector pen injection  Generic drug: Insulin Degludec   60 Units, Subcutaneous, Daily             Stop These Medications      atorvastatin 80 MG tablet  Commonly known as: LIPITOR     ezetimibe 10 MG tablet  Commonly known as: ZETIA     metFORMIN 1000 MG tablet  Commonly known as: GLUCOPHAGE     naloxone 4 MG/0.1ML nasal spray  Commonly known as: NARCAN     vitamin C 250 MG tablet  Commonly known as: ASCORBIC ACID     Vitamin D3 1.25 MG (19024 UT) capsule              Allergies   Allergen Reactions    Clemastine Fumarate Other (See Comments)     SEVERE ORBITAL SWELLING    Ziprasidone Other (See Comments)     EXTREME SLEEPINESS  EXTREME SLEEPINESS      Aripiprazole Other (See Comments)     MIGRAINES  MIGRAINES      Sulfa Antibiotics Nausea Only    Green Pepper Hives     unknown    Latex Rash    Lisinopril Cough       Discharge Disposition:  Home or Self Care      Discharge Diet:  Diet Order   Procedures    Diet: Diabetic Diets; Consistent Carbohydrate; Texture: Regular Texture (IDDSI 7); Fluid Consistency: Thin (IDDSI 0)       Discharge Activity:       CODE STATUS:    Code Status and Medical " Interventions:   Ordered at: 08/25/23 1604     Level Of Support Discussed With:    Patient     Code Status (Patient has no pulse and is not breathing):    CPR (Attempt to Resuscitate)     Medical Interventions (Patient has pulse or is breathing):    Full Support       Future Appointments   Date Time Provider Department Center   9/8/2023  3:30 PM [facility] 3  [facility]      Contact information for follow-up providers       Anat Castellanos APRN .    Specialty: Nurse Practitioner  Contact information:  [address] [address]  [phone]                       Contact information for after-discharge care       Home Medical Care       [facility] .    Service: Home Health Services  Contact information:  [address] [place] [address]  [phone]                                   Time Spent on Discharge:  Greater than 30 minutes      Reese Vyas MD  [facility]  08/29/23  13:33 EDT

## 2023-08-29 NOTE — CASE MANAGEMENT/SOCIAL WORK
Case Management Discharge Note      Final Note: Home with [facility], [facility] for Wound vac and family via juan daniel. terri rn/ccp    Provided Post Acute Provider List?: N/A    Selected Continued Care - Discharged on 8/29/2023 Admission date: 8/25/2023 - Discharge disposition: Home or Self Care      Destination    No services have been selected for the patient.                Durable Medical Equipment    No services have been selected for the patient.                Dialysis/Infusion    No services have been selected for the patient.                Home Medical Care       Service Provider Selected Services Address Phone Fax Patient Preferred    [facility] [address], [place] [address] [phone] --              Therapy    No services have been selected for the patient.                Community Resources    No services have been selected for the patient.                Community & DME    No services have been selected for the patient.                    Selected Continued Care - Prior Encounters Includes continued care and service providers with selected services from prior encounters from 5/27/2023 to 8/29/2023      Discharged on 6/6/2023 Admission date: 5/24/2023 - Discharge disposition: Home-Health Care Sv      Durable Medical Equipment       Service Provider Selected Services Address Phone Fax Patient Preferred    [facility] [address] [place] [id number] [phone] --              Home Medical Care       Service Provider Selected Services Address Phone Fax Patient Preferred    [facility] ,  Home Medical  [address], [place] [address] [phone] --                          Transportation Services  W/C Van: [facility]    Final Discharge  Disposition Code: 06 - home with home health care

## 2023-08-30 LAB — BACTERIA SPEC AEROBE CULT: NORMAL

## 2023-08-30 NOTE — OUTREACH NOTE
Prep Survey      Flowsheet Row Responses   [facility] facility patient discharged from? [facility]   Is LACE score < 7 ? No   Eligibility Readm Mgmt   Discharge diagnosis *Pulmonary embolism   Does the patient have one of the following disease processes/diagnoses(primary or secondary)? Other   Does the patient have Home health ordered? Yes   What is the Home health agency?  [facility]   Is there a DME ordered? Yes   What DME was ordered? [employer] per Wound Vac   Medication alerts for this patient Eliquis   Prep survey completed? Yes            Ann-Marie MONROE - Registered Nurse

## 2023-09-01 ENCOUNTER — READMISSION MANAGEMENT (OUTPATIENT)
Dept: CALL CENTER | Facility: HOSPITAL | Age: 53
End: 2023-09-01
Payer: MEDICARE

## 2023-09-01 NOTE — OUTREACH NOTE
Medical Week 1 Survey      Flowsheet Row Responses   [facility] patient discharged from? [place]   Does the patient have one of the following disease processes/diagnoses(primary or secondary)? Other   Week 1 attempt successful? Yes   Call start time 1039   Call end time 1043   Discharge diagnosis *Pulmonary embolism   Meds reviewed with patient/caregiver? Yes   Is the patient having any side effects they believe may be caused by any medication additions or changes? No   Does the patient have all medications ordered at discharge? Yes   Is the patient taking all medications as directed (includes completed medication regime)? Yes   Does the patient have a primary care provider?  Yes   Does the patient have an appointment with their PCP within 7 days of discharge? Yes   Has the patient kept scheduled appointments due by today? Yes   What is the Home health agency?  [facility]   Has home health visited the patient within 72 hours of discharge? Yes   What DME was ordered? [employer] per Wound Vac   Psychosocial issues? No   Did the patient receive a copy of their discharge instructions? Yes   Nursing interventions Reviewed instructions with patient, Educated on MyChart   What is the patient's perception of their health status since discharge? Improving   Is the patient/caregiver able to teach back signs and symptoms related to disease process for when to call PCP? Yes   Is the patient/caregiver able to teach back signs and symptoms related to disease process for when to call 911? Yes   Is the patient/caregiver able to teach back the hierarchy of who to call/visit for symptoms/problems? PCP, Specialist, Home health nurse, Urgent Care, ED, 911 Yes   If the patient is a current smoker, are they able to teach back resources for cessation? Not a smoker   Week 1 call completed? Yes   Would this patient benefit from a Referral to Amb Social Work? No   Is the patient interested in additional calls from an ambulatory case  manager? No   Wrap up additional comments Pt douing very well, very appreciative of care given in hospital   Call end time 1834            NICO ELLIOTT - Registered Nurse

## 2023-10-04 ENCOUNTER — TELEPHONE (OUTPATIENT)
Dept: SURGERY | Facility: CLINIC | Age: 53
End: 2023-10-04
Payer: MEDICARE

## 2023-10-04 NOTE — TELEPHONE ENCOUNTER
Amie with [facility] called to request orders for skilled nursing to change the patient's wound vac today. She would be getting this from the PCP, but the patient will not be able to established care with her new PCP until next week. Gave verbal orders.

## 2023-11-08 ENCOUNTER — TELEPHONE (OUTPATIENT)
Dept: SURGERY | Facility: CLINIC | Age: 53
End: 2023-11-08
Payer: MEDICARE

## 2023-11-08 ENCOUNTER — APPOINTMENT (OUTPATIENT)
Dept: CT IMAGING | Facility: HOSPITAL | Age: 53
End: 2023-11-08
Payer: MEDICARE

## 2023-11-08 ENCOUNTER — HOSPITAL ENCOUNTER (OUTPATIENT)
Facility: HOSPITAL | Age: 53
Discharge: HOME | End: 2023-11-13
Attending: EMERGENCY MEDICINE | Admitting: SURGERY
Payer: MEDICARE

## 2023-11-08 DIAGNOSIS — T14.8XXA INFECTED WOUND: Primary | ICD-10-CM

## 2023-11-08 DIAGNOSIS — E11.42 DIABETIC PERIPHERAL NEUROPATHY ASSOCIATED WITH TYPE 2 DIABETES MELLITUS: ICD-10-CM

## 2023-11-08 DIAGNOSIS — E87.20 LACTIC ACIDOSIS: ICD-10-CM

## 2023-11-08 DIAGNOSIS — R73.9 HYPERGLYCEMIA: ICD-10-CM

## 2023-11-08 DIAGNOSIS — D72.829 LEUKOCYTOSIS, UNSPECIFIED TYPE: ICD-10-CM

## 2023-11-08 DIAGNOSIS — L89.894 PRESSURE ULCER OF OTHER SITE, STAGE 4: ICD-10-CM

## 2023-11-08 DIAGNOSIS — L08.9 INFECTED WOUND: Primary | ICD-10-CM

## 2023-11-08 DIAGNOSIS — Z87.39 HISTORY OF NECROTIZING FASCIITIS: ICD-10-CM

## 2023-11-08 LAB
ALBUMIN SERPL-MCNC: 3.3 G/DL (ref 3.5–5.2)
ALBUMIN/GLOB SERPL: 0.9 G/DL
ALP SERPL-CCNC: 221 U/L (ref 39–117)
ALT SERPL W P-5'-P-CCNC: 11 U/L (ref 1–33)
ANION GAP SERPL CALCULATED.3IONS-SCNC: 11.1 MMOL/L (ref 5–15)
AST SERPL-CCNC: 24 U/L (ref 1–32)
BASOPHILS # BLD AUTO: 0.15 10*3/MM3 (ref 0–0.2)
BASOPHILS NFR BLD AUTO: 1.1 % (ref 0–1.5)
BILIRUB SERPL-MCNC: 0.3 MG/DL (ref 0–1.2)
BUN SERPL-MCNC: 8 MG/DL (ref 6–20)
BUN/CREAT SERPL: 10.3 (ref 7–25)
CALCIUM SPEC-SCNC: 8.7 MG/DL (ref 8.6–10.5)
CHLORIDE SERPL-SCNC: 104 MMOL/L (ref 98–107)
CO2 SERPL-SCNC: 23.9 MMOL/L (ref 22–29)
CREAT SERPL-MCNC: 0.78 MG/DL (ref 0.57–1)
CRP SERPL-MCNC: 4.23 MG/DL (ref 0–0.5)
D-LACTATE SERPL-SCNC: 2.2 MMOL/L (ref 0.5–2)
D-LACTATE SERPL-SCNC: 3.3 MMOL/L (ref 0.5–2)
DEPRECATED RDW RBC AUTO: 42.3 FL (ref 37–54)
EGFRCR SERPLBLD CKD-EPI 2021: 92.1 ML/MIN/1.73
EOSINOPHIL # BLD AUTO: 0.49 10*3/MM3 (ref 0–0.4)
EOSINOPHIL NFR BLD AUTO: 3.6 % (ref 0.3–6.2)
ERYTHROCYTE [DISTWIDTH] IN BLOOD BY AUTOMATED COUNT: 13.5 % (ref 12.3–15.4)
ERYTHROCYTE [SEDIMENTATION RATE] IN BLOOD: 35 MM/HR (ref 0–30)
GLOBULIN UR ELPH-MCNC: 3.7 GM/DL
GLUCOSE BLDC GLUCOMTR-MCNC: 201 MG/DL (ref 70–130)
GLUCOSE SERPL-MCNC: 228 MG/DL (ref 65–99)
HCT VFR BLD AUTO: 35.2 % (ref 34–46.6)
HGB BLD-MCNC: 11.2 G/DL (ref 12–15.9)
IMM GRANULOCYTES # BLD AUTO: 0.08 10*3/MM3 (ref 0–0.05)
IMM GRANULOCYTES NFR BLD AUTO: 0.6 % (ref 0–0.5)
LYMPHOCYTES # BLD AUTO: 3.65 10*3/MM3 (ref 0.7–3.1)
LYMPHOCYTES NFR BLD AUTO: 27.1 % (ref 19.6–45.3)
MCH RBC QN AUTO: 27.3 PG (ref 26.6–33)
MCHC RBC AUTO-ENTMCNC: 31.8 G/DL (ref 31.5–35.7)
MCV RBC AUTO: 85.6 FL (ref 79–97)
MONOCYTES # BLD AUTO: 0.61 10*3/MM3 (ref 0.1–0.9)
MONOCYTES NFR BLD AUTO: 4.5 % (ref 5–12)
NEUTROPHILS NFR BLD AUTO: 63.1 % (ref 42.7–76)
NEUTROPHILS NFR BLD AUTO: 8.47 10*3/MM3 (ref 1.7–7)
NRBC BLD AUTO-RTO: 0 /100 WBC (ref 0–0.2)
PLATELET # BLD AUTO: 477 10*3/MM3 (ref 140–450)
PMV BLD AUTO: 9.5 FL (ref 6–12)
POTASSIUM SERPL-SCNC: 3.8 MMOL/L (ref 3.5–5.2)
PROCALCITONIN SERPL-MCNC: 0.23 NG/ML (ref 0–0.25)
PROT SERPL-MCNC: 7 G/DL (ref 6–8.5)
RBC # BLD AUTO: 4.11 10*6/MM3 (ref 3.77–5.28)
SODIUM SERPL-SCNC: 139 MMOL/L (ref 136–145)
WBC NRBC COR # BLD: 13.45 10*3/MM3 (ref 3.4–10.8)

## 2023-11-08 PROCEDURE — 63710000001 INSULIN LISPRO (HUMAN) PER 5 UNITS: Performed by: NURSE PRACTITIONER

## 2023-11-08 PROCEDURE — 25010000002 PIPERACILLIN SOD-TAZOBACTAM PER 1 G: Performed by: EMERGENCY MEDICINE

## 2023-11-08 PROCEDURE — G0378 HOSPITAL OBSERVATION PER HR: HCPCS

## 2023-11-08 PROCEDURE — 87150 DNA/RNA AMPLIFIED PROBE: CPT | Performed by: EMERGENCY MEDICINE

## 2023-11-08 PROCEDURE — 96365 THER/PROPH/DIAG IV INF INIT: CPT

## 2023-11-08 PROCEDURE — 96361 HYDRATE IV INFUSION ADD-ON: CPT

## 2023-11-08 PROCEDURE — 25810000003 SODIUM CHLORIDE 0.9 % SOLUTION: Performed by: NURSE PRACTITIONER

## 2023-11-08 PROCEDURE — 82948 REAGENT STRIP/BLOOD GLUCOSE: CPT

## 2023-11-08 PROCEDURE — 86140 C-REACTIVE PROTEIN: CPT | Performed by: EMERGENCY MEDICINE

## 2023-11-08 PROCEDURE — 87040 BLOOD CULTURE FOR BACTERIA: CPT | Performed by: EMERGENCY MEDICINE

## 2023-11-08 PROCEDURE — 83605 ASSAY OF LACTIC ACID: CPT | Performed by: EMERGENCY MEDICINE

## 2023-11-08 PROCEDURE — 36415 COLL VENOUS BLD VENIPUNCTURE: CPT | Performed by: EMERGENCY MEDICINE

## 2023-11-08 PROCEDURE — 84145 PROCALCITONIN (PCT): CPT | Performed by: EMERGENCY MEDICINE

## 2023-11-08 PROCEDURE — 25010000002 VANCOMYCIN 10 G RECONSTITUTED SOLUTION: Performed by: EMERGENCY MEDICINE

## 2023-11-08 PROCEDURE — 87147 CULTURE TYPE IMMUNOLOGIC: CPT | Performed by: EMERGENCY MEDICINE

## 2023-11-08 PROCEDURE — 85652 RBC SED RATE AUTOMATED: CPT | Performed by: EMERGENCY MEDICINE

## 2023-11-08 PROCEDURE — 72192 CT PELVIS W/O DYE: CPT

## 2023-11-08 PROCEDURE — 63710000001 INSULIN GLARGINE PER 5 UNITS: Performed by: STUDENT IN AN ORGANIZED HEALTH CARE EDUCATION/TRAINING PROGRAM

## 2023-11-08 PROCEDURE — 25810000003 SODIUM CHLORIDE 0.9 % SOLUTION: Performed by: EMERGENCY MEDICINE

## 2023-11-08 PROCEDURE — 99284 EMERGENCY DEPT VISIT MOD MDM: CPT

## 2023-11-08 PROCEDURE — 85025 COMPLETE CBC W/AUTO DIFF WBC: CPT | Performed by: EMERGENCY MEDICINE

## 2023-11-08 PROCEDURE — 80053 COMPREHEN METABOLIC PANEL: CPT | Performed by: EMERGENCY MEDICINE

## 2023-11-08 RX ORDER — HYDROXYZINE HYDROCHLORIDE 25 MG/1
50 TABLET, FILM COATED ORAL 3 TIMES DAILY PRN
Status: DISCONTINUED | OUTPATIENT
Start: 2023-11-08 | End: 2023-11-13 | Stop reason: HOSPADM

## 2023-11-08 RX ORDER — ONDANSETRON 2 MG/ML
4 INJECTION INTRAMUSCULAR; INTRAVENOUS EVERY 6 HOURS PRN
Status: DISCONTINUED | OUTPATIENT
Start: 2023-11-08 | End: 2023-11-13 | Stop reason: HOSPADM

## 2023-11-08 RX ORDER — LEVOTHYROXINE SODIUM 0.1 MG/1
200 TABLET ORAL DAILY
Status: DISCONTINUED | OUTPATIENT
Start: 2023-11-09 | End: 2023-11-13 | Stop reason: HOSPADM

## 2023-11-08 RX ORDER — ALOSETRON HYDROCHLORIDE 0.5 MG/1
0.5 TABLET, FILM COATED ORAL NIGHTLY
COMMUNITY

## 2023-11-08 RX ORDER — CETIRIZINE HYDROCHLORIDE 10 MG/1
10 TABLET ORAL NIGHTLY
COMMUNITY

## 2023-11-08 RX ORDER — GABAPENTIN 300 MG/1
300 CAPSULE ORAL EVERY 12 HOURS SCHEDULED
Status: DISCONTINUED | OUTPATIENT
Start: 2023-11-08 | End: 2023-11-13 | Stop reason: HOSPADM

## 2023-11-08 RX ORDER — INSULIN LISPRO 100 [IU]/ML
2-9 INJECTION, SOLUTION INTRAVENOUS; SUBCUTANEOUS
Status: DISCONTINUED | OUTPATIENT
Start: 2023-11-08 | End: 2023-11-13 | Stop reason: HOSPADM

## 2023-11-08 RX ORDER — ACETAMINOPHEN 650 MG/1
650 SUPPOSITORY RECTAL EVERY 4 HOURS PRN
Status: DISCONTINUED | OUTPATIENT
Start: 2023-11-08 | End: 2023-11-13 | Stop reason: HOSPADM

## 2023-11-08 RX ORDER — DEXTROSE MONOHYDRATE 25 G/50ML
25 INJECTION, SOLUTION INTRAVENOUS
Status: DISCONTINUED | OUTPATIENT
Start: 2023-11-08 | End: 2023-11-13 | Stop reason: HOSPADM

## 2023-11-08 RX ORDER — CALCIUM CARBONATE 500 MG/1
2 TABLET, CHEWABLE ORAL 2 TIMES DAILY PRN
Status: DISCONTINUED | OUTPATIENT
Start: 2023-11-08 | End: 2023-11-13 | Stop reason: HOSPADM

## 2023-11-08 RX ORDER — ACETAMINOPHEN 160 MG/5ML
650 SOLUTION ORAL EVERY 4 HOURS PRN
Status: DISCONTINUED | OUTPATIENT
Start: 2023-11-08 | End: 2023-11-13 | Stop reason: HOSPADM

## 2023-11-08 RX ORDER — VANCOMYCIN HYDROCHLORIDE 1 G/200ML
1000 INJECTION, SOLUTION INTRAVENOUS EVERY 12 HOURS
Status: DISCONTINUED | OUTPATIENT
Start: 2023-11-09 | End: 2023-11-10

## 2023-11-08 RX ORDER — HYDROXYZINE 50 MG/1
50 TABLET, FILM COATED ORAL 3 TIMES DAILY PRN
COMMUNITY

## 2023-11-08 RX ORDER — ACETAMINOPHEN 325 MG/1
650 TABLET ORAL EVERY 4 HOURS PRN
Status: DISCONTINUED | OUTPATIENT
Start: 2023-11-08 | End: 2023-11-13 | Stop reason: HOSPADM

## 2023-11-08 RX ORDER — NICOTINE POLACRILEX 4 MG
15 LOZENGE BUCCAL
Status: DISCONTINUED | OUTPATIENT
Start: 2023-11-08 | End: 2023-11-13 | Stop reason: HOSPADM

## 2023-11-08 RX ORDER — SODIUM CHLORIDE 9 MG/ML
40 INJECTION, SOLUTION INTRAVENOUS AS NEEDED
Status: DISCONTINUED | OUTPATIENT
Start: 2023-11-08 | End: 2023-11-13 | Stop reason: HOSPADM

## 2023-11-08 RX ORDER — SODIUM CHLORIDE 9 MG/ML
100 INJECTION, SOLUTION INTRAVENOUS CONTINUOUS
Status: DISCONTINUED | OUTPATIENT
Start: 2023-11-08 | End: 2023-11-09

## 2023-11-08 RX ORDER — LEVOTHYROXINE SODIUM 0.07 MG/1
75 TABLET ORAL
Status: DISCONTINUED | OUTPATIENT
Start: 2023-11-09 | End: 2023-11-13 | Stop reason: HOSPADM

## 2023-11-08 RX ORDER — IBUPROFEN 600 MG/1
1 TABLET ORAL
Status: DISCONTINUED | OUTPATIENT
Start: 2023-11-08 | End: 2023-11-13 | Stop reason: HOSPADM

## 2023-11-08 RX ORDER — SODIUM CHLORIDE 0.9 % (FLUSH) 0.9 %
10 SYRINGE (ML) INJECTION EVERY 12 HOURS SCHEDULED
Status: DISCONTINUED | OUTPATIENT
Start: 2023-11-08 | End: 2023-11-13 | Stop reason: HOSPADM

## 2023-11-08 RX ORDER — SODIUM CHLORIDE 0.9 % (FLUSH) 0.9 %
10 SYRINGE (ML) INJECTION AS NEEDED
Status: DISCONTINUED | OUTPATIENT
Start: 2023-11-08 | End: 2023-11-13 | Stop reason: HOSPADM

## 2023-11-08 RX ORDER — ONDANSETRON 4 MG/1
4 TABLET, FILM COATED ORAL EVERY 6 HOURS PRN
Status: DISCONTINUED | OUTPATIENT
Start: 2023-11-08 | End: 2023-11-13 | Stop reason: HOSPADM

## 2023-11-08 RX ORDER — HYDROCODONE BITARTRATE AND ACETAMINOPHEN 7.5; 325 MG/1; MG/1
1 TABLET ORAL DAILY
Status: DISCONTINUED | OUTPATIENT
Start: 2023-11-09 | End: 2023-11-13 | Stop reason: HOSPADM

## 2023-11-08 RX ADMIN — VANCOMYCIN HYDROCHLORIDE 2250 MG: 10 INJECTION, POWDER, LYOPHILIZED, FOR SOLUTION INTRAVENOUS at 21:13

## 2023-11-08 RX ADMIN — PIPERACILLIN SODIUM AND TAZOBACTAM SODIUM 3.38 G: 3; .375 INJECTION, SOLUTION INTRAVENOUS at 20:13

## 2023-11-08 RX ADMIN — Medication 10 ML: at 22:19

## 2023-11-08 RX ADMIN — INSULIN LISPRO 4 UNITS: 100 INJECTION, SOLUTION INTRAVENOUS; SUBCUTANEOUS at 23:13

## 2023-11-08 RX ADMIN — HYDROXYZINE HYDROCHLORIDE 50 MG: 25 TABLET ORAL at 23:14

## 2023-11-08 RX ADMIN — SODIUM CHLORIDE 500 ML: 9 INJECTION, SOLUTION INTRAVENOUS at 19:03

## 2023-11-08 RX ADMIN — INSULIN GLARGINE 10 UNITS: 100 INJECTION, SOLUTION SUBCUTANEOUS at 23:13

## 2023-11-08 RX ADMIN — SODIUM CHLORIDE 100 ML/HR: 9 INJECTION, SOLUTION INTRAVENOUS at 22:18

## 2023-11-08 RX ADMIN — GABAPENTIN 300 MG: 300 CAPSULE ORAL at 23:14

## 2023-11-08 NOTE — ED TRIAGE NOTES
Patient to ED via EMS from home. Patient was being seen by wound RN today who thought the wound on her right hip is not getting any better. Patient had surgery done on right hip in May by Dr. Smart.

## 2023-11-08 NOTE — ED PROVIDER NOTES
EMERGENCY DEPARTMENT ENCOUNTER  Room Number:  29/29  Date of encounter:  11/8/2023  PCP: Provider, No Known  Patient Care Team:  Provider, No Known as PCP - Steve Farris MD as Resident (Physical Medicine and Rehabilitation)     HPI:  Context: Vanessa Root is a 53 y.o. female who presents to the ED c/o chief complaint of concern for wound infection.  Patient has a history of necrotizing fasciitis, status post debridement, reports finished with wound VAC approximately a week ago.  Patient reports home health nurse noticed an area of concern of the wound on Monday, reports that was worsened yesterday on repeat visit, spoke with Dr. Yang today who then referred to the emergency department.  Patient reports that she has had trace drainage from the wound.  Patient denies any fever shakes chills or night sweats.  Patient reports last antibiotics were with hospitalization in August.  Patient reports that home health has been doing wet-to-dry dressings changes with [facility].    MEDICAL HISTORY REVIEW  Reviewed in EPIC    PAST MEDICAL HISTORY  Active Ambulatory Problems     Diagnosis Date Noted    Hyperlipidemia     Type 2 diabetes mellitus with diabetic polyneuropathy     GERD (gastroesophageal reflux disease)     Bipolar 1 disorder     Depression     Arthritis     Spinal stenosis of lumbar region 01/10/2017    Sciatica of left side 02/27/2019    Restless legs 09/02/2015    Obstructive sleep apnea syndrome 09/09/2014    Lumbosacral spondylosis without myelopathy 02/24/2017    Hypothyroidism 04/23/2014    Facet arthritis of lumbar region 02/28/2014    Degeneration of intervertebral disc of lumbar region 02/28/2014    Diabetic peripheral neuropathy 12/09/2014    Cancer of endometrium 07/02/2013    Cerebrovascular accident 06/09/2015    Left foot drop 01/10/2017    Vitamin B12 deficiency     Vitamin D deficiency     Vitamin C deficiency     H/O hypokalemia     Iron deficiency anemia secondary to  inadequate dietary iron intake     Iron deficiency     Muscle weakness 2020    Hypocalcemia 2021    Malignant neoplasm of uterus 2022    Sepsis, due to unspecified organism, unspecified whether acute organ dysfunction present 2023    Necrotizing soft tissue infection 2023    Immobility syndrome 2023    Fasciitis 2023    Hypomagnesemia 2023    Spinal stenosis of lumbar region with neurogenic claudication 2023    Pressure ulcer of other site, stage 4 2023    Necrotizing fasciitis 2023    Immobility syndrome (paraplegic) 2023    Pulmonary embolism 2023    Obesity, Class III, BMI 40-49.9 (morbid obesity) 2023    History of ischemic stroke 2023     Resolved Ambulatory Problems     Diagnosis Date Noted    Hypokalemia 2023    Leukocytosis 2023    Urinary tract infection associated with indwelling urethral catheter 2023    Lactic acidosis 2023     Past Medical History:   Diagnosis Date    Cancer     COVID-19     Diabetes mellitus     Frequent UTI     Migraine     Murmur, heart     Ovarian cyst     Stroke        PAST SURGICAL HISTORY  Past Surgical History:   Procedure Laterality Date     SECTION  2003    HYSTERECTOMY      TONSILLECTOMY      WOUND DEBRIDEMENT N/A 2023    Procedure: Debridement necrotizing tissue  right buttock wound;  Surgeon: Elizabeth Adame MD;  Location: [facility];  Service: General;  Laterality: N/A;       FAMILY HISTORY  Family History   Problem Relation Age of Onset    Arthritis Mother     Diabetes Mother     Migraines Mother     Stroke Mother     Arthritis Father     Cancer Maternal Grandmother     Thyroid disease Maternal Grandmother     Cancer Maternal Grandfather     Cancer Paternal Grandmother     Thyroid disease Paternal Grandmother     Cancer Paternal Grandfather        SOCIAL HISTORY  Social History     Socioeconomic History    Marital status: Single    Tobacco Use    Smoking status: Former    Smokeless tobacco: Never   Vaping Use    Vaping Use: Never used   Substance and Sexual Activity    Alcohol use: Never    Drug use: Never    Sexual activity: Defer       ALLERGIES  Clemastine fumarate, Ziprasidone, Aripiprazole, Sulfa antibiotics, Green pepper, Latex, and Lisinopril    The patient's allergies have been reviewed    REVIEW OF SYSTEMS  All systems reviewed and negative except for those discussed in HPI.     PHYSICAL EXAM  I have reviewed the triage vital signs and nursing notes.  ED Triage Vitals [11/08/23 1749]   Temp Heart Rate Resp BP SpO2   98.5 °F (36.9 °C) 85 12 119/70 95 %      Temp src Heart Rate Source Patient Position BP Location FiO2 (%)   -- -- -- -- --       General: No acute distress.  HENT: NCAT, PERRL, Nares patent.  Eyes: no scleral icterus.  Neck: trachea midline, no ROM limitations.  CV: regular rhythm, regular rate.  Respiratory: normal effort, CTAB.  Abdomen: soft, nondistended, NTTP, no rebound tenderness, no guarding or rigidity.  Musculoskeletal: no deformity.  Right posterior lateral hip: Large ulcerated wound, trace surrounding erythema, patient does have 2 areas of brownish discoloration, no purulent drainage.  Neuro: alert, moves all extremities, follows commands.  Skin: warm, dry.    LAB RESULTS  Recent Results (from the past 24 hour(s))   Comprehensive Metabolic Panel    Collection Time: 11/08/23  7:01 PM    Specimen: Blood   Result Value Ref Range    Glucose 228 (H) 65 - 99 mg/dL    BUN 8 6 - 20 mg/dL    Creatinine 0.78 0.57 - 1.00 mg/dL    Sodium 139 136 - 145 mmol/L    Potassium 3.8 3.5 - 5.2 mmol/L    Chloride 104 98 - 107 mmol/L    CO2 23.9 22.0 - 29.0 mmol/L    Calcium 8.7 8.6 - 10.5 mg/dL    Total Protein 7.0 6.0 - 8.5 g/dL    Albumin 3.3 (L) 3.5 - 5.2 g/dL    ALT (SGPT) 11 1 - 33 U/L    AST (SGOT) 24 1 - 32 U/L    Alkaline Phosphatase 221 (H) 39 - 117 U/L    Total Bilirubin 0.3 0.0 - 1.2 mg/dL    Globulin 3.7 gm/dL    A/G  Ratio 0.9 g/dL    BUN/Creatinine Ratio 10.3 7.0 - 25.0    Anion Gap 11.1 5.0 - 15.0 mmol/L    eGFR 92.1 >60.0 mL/min/1.73   Lactic Acid, Plasma    Collection Time: 11/08/23  7:01 PM    Specimen: Blood   Result Value Ref Range    Lactate 3.3 (C) 0.5 - 2.0 mmol/L   Procalcitonin    Collection Time: 11/08/23  7:01 PM    Specimen: Blood   Result Value Ref Range    Procalcitonin 0.23 0.00 - 0.25 ng/mL   C-reactive Protein    Collection Time: 11/08/23  7:01 PM    Specimen: Blood   Result Value Ref Range    C-Reactive Protein 4.23 (H) 0.00 - 0.50 mg/dL   Sedimentation Rate    Collection Time: 11/08/23  7:01 PM    Specimen: Blood   Result Value Ref Range    Sed Rate 35 (H) 0 - 30 mm/hr   CBC Auto Differential    Collection Time: 11/08/23  7:01 PM    Specimen: Blood   Result Value Ref Range    WBC 13.45 (H) 3.40 - 10.80 10*3/mm3    RBC 4.11 3.77 - 5.28 10*6/mm3    Hemoglobin 11.2 (L) 12.0 - 15.9 g/dL    Hematocrit 35.2 34.0 - 46.6 %    MCV 85.6 79.0 - 97.0 fL    MCH 27.3 26.6 - 33.0 pg    MCHC 31.8 31.5 - 35.7 g/dL    RDW 13.5 12.3 - 15.4 %    RDW-SD 42.3 37.0 - 54.0 fl    MPV 9.5 6.0 - 12.0 fL    Platelets 477 (H) 140 - 450 10*3/mm3    Neutrophil % 63.1 42.7 - 76.0 %    Lymphocyte % 27.1 19.6 - 45.3 %    Monocyte % 4.5 (L) 5.0 - 12.0 %    Eosinophil % 3.6 0.3 - 6.2 %    Basophil % 1.1 0.0 - 1.5 %    Immature Grans % 0.6 (H) 0.0 - 0.5 %    Neutrophils, Absolute 8.47 (H) 1.70 - 7.00 10*3/mm3    Lymphocytes, Absolute 3.65 (H) 0.70 - 3.10 10*3/mm3    Monocytes, Absolute 0.61 0.10 - 0.90 10*3/mm3    Eosinophils, Absolute 0.49 (H) 0.00 - 0.40 10*3/mm3    Basophils, Absolute 0.15 0.00 - 0.20 10*3/mm3    Immature Grans, Absolute 0.08 (H) 0.00 - 0.05 10*3/mm3    nRBC 0.0 0.0 - 0.2 /100 WBC       I ordered the above labs and reviewed the results.    RADIOLOGY  CT Pelvis Without Contrast    Result Date: 11/8/2023  CT PELVIS WITHOUT IV CONTRAST  INDICATION: Worsening right posterior lateral hip wound  TECHNIQUE: CT scan of the pelvis  was performed without the administration of IV contrast. Coronal and sagittal reformatted images were obtained. Radiation dose reduction techniques were utilized, including automated exposure control and exposure modulation based on body size.  COMPARISON: 8/25/2023  FINDINGS: There is a wound in the soft tissues overlying the lateral right hip. There is some skin thickening and some subcutaneous inflammation involving the wound. There is no evidence of any underlying abscess. There is no evidence of fracture or acute bony abnormality. Degenerative changes are seen of the lower lumbar spine and of the SI joints. There is a Quiroz catheter in the bladder. The visualized intrapelvic structures are otherwise unremarkable.      There is a wound in the soft tissues overlying the lateral right hip without evidence of underlying abscess or any extension to the bone.   Radiation dose reduction techniques were utilized, including automated exposure control and exposure modulation based on body size.   This report was finalized on 11/8/2023 8:00 PM by Dr. Abhijit Rosado M.D on Workstation: [id number]       I ordered the above noted radiological studies. I reviewed the images and results. I agree with the radiologist interpretation.    PROCEDURES  Procedures    MEDICATIONS GIVEN IN ER  Medications   vancomycin 2250 mg/500 mL 0.9% NS IVPB ([facility]) (has no administration in time range)   piperacillin-tazobactam (ZOSYN) 3.375 g in iso-osmotic dextrose 50 ml (premix) (3.375 g Intravenous New Bag 11/8/23 2013)   sodium chloride 0.9 % bolus 500 mL (0 mL Intravenous Stopped 11/8/23 2012)       PROGRESS, DATA ANALYSIS, CONSULTS, AND MEDICAL DECISION MAKING  A complete history and physical exam have been performed.  All available laboratory and imaging results have been reviewed by myself prior to disposition.    MDM    After the initial H&P, I discussed pertinent information from history and physical exam with patient/family.  Discussed  differential diagnosis.  Discussed plan for ED evaluation/workup/treatment.  All questions answered.  Patient/family is agreeable with plan.  ED Course as of 11/08/23 2027 Wed Nov 08, 2023 1759 Medical history reviewed and significant for: Patient was admitted to hospital from 8/25/2023 to 8/29/2023.  Patient had a recent hospitalization for necrotizing fasciitis status postdebridement.  Patient was sent by home health nurse secondary to concern for infection.  Patient was started on IV antibiotics admitted to the hospital, general surgery evaluated wound and felt that it was not infected. [JG]   2006 Patient afebrile but has new leukocytosis with left shift, elevated inflammatory markers, lactic acid is elevated at 3.3.  Initiating broad-spectrum antibiotics with bank and Zosyn.  CT imaging shows no sign of abscess or deep space infection, no signs of necrotizing fasciitis at present.  Plan for admission for further evaluation. [JG]   2026 Phone call with Dr. Garcia JAH.  Discussed the patient, relevant history, exam, diagnostics, ED findings/progress, and concerns. They agree to admit the patient to observation [facility]. Care assumed by the admitting physician at this time.     [JG]      ED Course User Index  [JG] Henry Ortega MD       AS OF 20:27 EST VITALS:    BP - 119/70  HR - 85  TEMP - 98.5 °F (36.9 °C)  O2 SATS - 95%    DIAGNOSIS  Final diagnoses:   Infected wound   Leukocytosis, unspecified type   Lactic acidosis   Hyperglycemia   History of necrotizing fasciitis         DISPOSITION  ADMISSION    Discussed treatment plan and reason for admission with pt/family and admitting physician.  Pt/family voiced understanding of the plan for admission for further testing/treatment as needed.        Henry Ortega MD  11/08/23 2027

## 2023-11-08 NOTE — TELEPHONE ENCOUNTER
[facility] CONTACTED OUR OFFICE TO LET US KNOW THAT THE PATIENT WAS BEING SENT TO THE ER FOR NECROTIC TISSUE AROUND HER WOUND.

## 2023-11-09 ENCOUNTER — ANESTHESIA (OUTPATIENT)
Dept: PERIOP | Facility: HOSPITAL | Age: 53
End: 2023-11-09
Payer: MEDICARE

## 2023-11-09 ENCOUNTER — ANESTHESIA EVENT (OUTPATIENT)
Dept: PERIOP | Facility: HOSPITAL | Age: 53
End: 2023-11-09
Payer: MEDICARE

## 2023-11-09 LAB
ANION GAP SERPL CALCULATED.3IONS-SCNC: 8 MMOL/L (ref 5–15)
BACTERIA BLD CULT: ABNORMAL
BOTTLE TYPE: ABNORMAL
BUN SERPL-MCNC: 8 MG/DL (ref 6–20)
BUN/CREAT SERPL: 9.9 (ref 7–25)
CALCIUM SPEC-SCNC: 8.2 MG/DL (ref 8.6–10.5)
CHLORIDE SERPL-SCNC: 106 MMOL/L (ref 98–107)
CO2 SERPL-SCNC: 25 MMOL/L (ref 22–29)
CREAT SERPL-MCNC: 0.81 MG/DL (ref 0.57–1)
D-LACTATE SERPL-SCNC: 2.2 MMOL/L (ref 0.5–2)
D-LACTATE SERPL-SCNC: 2.3 MMOL/L (ref 0.5–2)
D-LACTATE SERPL-SCNC: 2.5 MMOL/L (ref 0.5–2)
D-LACTATE SERPL-SCNC: 3.2 MMOL/L (ref 0.5–2)
DEPRECATED RDW RBC AUTO: 43.4 FL (ref 37–54)
EGFRCR SERPLBLD CKD-EPI 2021: 88 ML/MIN/1.73
ERYTHROCYTE [DISTWIDTH] IN BLOOD BY AUTOMATED COUNT: 13.8 % (ref 12.3–15.4)
GLUCOSE BLDC GLUCOMTR-MCNC: 208 MG/DL (ref 70–130)
GLUCOSE BLDC GLUCOMTR-MCNC: 210 MG/DL (ref 70–130)
GLUCOSE BLDC GLUCOMTR-MCNC: 321 MG/DL (ref 70–130)
GLUCOSE BLDC GLUCOMTR-MCNC: 337 MG/DL (ref 70–130)
GLUCOSE BLDC GLUCOMTR-MCNC: 374 MG/DL (ref 70–130)
GLUCOSE SERPL-MCNC: 215 MG/DL (ref 65–99)
HBA1C MFR BLD: 8.9 % (ref 4.8–5.6)
HCT VFR BLD AUTO: 30.3 % (ref 34–46.6)
HGB BLD-MCNC: 9.6 G/DL (ref 12–15.9)
MCH RBC QN AUTO: 27.5 PG (ref 26.6–33)
MCHC RBC AUTO-ENTMCNC: 31.7 G/DL (ref 31.5–35.7)
MCV RBC AUTO: 86.8 FL (ref 79–97)
PLATELET # BLD AUTO: 403 10*3/MM3 (ref 140–450)
PMV BLD AUTO: 9.3 FL (ref 6–12)
POTASSIUM SERPL-SCNC: 3.5 MMOL/L (ref 3.5–5.2)
RBC # BLD AUTO: 3.49 10*6/MM3 (ref 3.77–5.28)
SODIUM SERPL-SCNC: 139 MMOL/L (ref 136–145)
TSH SERPL DL<=0.05 MIU/L-ACNC: 20.9 UIU/ML (ref 0.27–4.2)
WBC NRBC COR # BLD: 12.89 10*3/MM3 (ref 3.4–10.8)

## 2023-11-09 PROCEDURE — G0378 HOSPITAL OBSERVATION PER HR: HCPCS

## 2023-11-09 PROCEDURE — 25010000002 PIPERACILLIN SOD-TAZOBACTAM PER 1 G: Performed by: SURGERY

## 2023-11-09 PROCEDURE — 84443 ASSAY THYROID STIM HORMONE: CPT | Performed by: NURSE PRACTITIONER

## 2023-11-09 PROCEDURE — 87186 SC STD MICRODIL/AGAR DIL: CPT | Performed by: NURSE PRACTITIONER

## 2023-11-09 PROCEDURE — 63710000001 FLUCONAZOLE 150 MG TABLET: Performed by: HOSPITALIST

## 2023-11-09 PROCEDURE — 83605 ASSAY OF LACTIC ACID: CPT | Performed by: EMERGENCY MEDICINE

## 2023-11-09 PROCEDURE — A9270 NON-COVERED ITEM OR SERVICE: HCPCS | Performed by: SURGERY

## 2023-11-09 PROCEDURE — 63710000001 GABAPENTIN 300 MG CAPSULE: Performed by: SURGERY

## 2023-11-09 PROCEDURE — 25010000002 FENTANYL CITRATE (PF) 50 MCG/ML SOLUTION: Performed by: NURSE ANESTHETIST, CERTIFIED REGISTERED

## 2023-11-09 PROCEDURE — 83036 HEMOGLOBIN GLYCOSYLATED A1C: CPT | Performed by: NURSE PRACTITIONER

## 2023-11-09 PROCEDURE — 25010000002 SUGAMMADEX 200 MG/2ML SOLUTION: Performed by: NURSE ANESTHETIST, CERTIFIED REGISTERED

## 2023-11-09 PROCEDURE — 25010000002 PIPERACILLIN SOD-TAZOBACTAM PER 1 G: Performed by: NURSE PRACTITIONER

## 2023-11-09 PROCEDURE — 25010000002 VANCOMYCIN PER 500 MG: Performed by: SURGERY

## 2023-11-09 PROCEDURE — 96375 TX/PRO/DX INJ NEW DRUG ADDON: CPT

## 2023-11-09 PROCEDURE — 99215 OFFICE O/P EST HI 40 MIN: CPT | Performed by: STUDENT IN AN ORGANIZED HEALTH CARE EDUCATION/TRAINING PROGRAM

## 2023-11-09 PROCEDURE — A9270 NON-COVERED ITEM OR SERVICE: HCPCS | Performed by: HOSPITALIST

## 2023-11-09 PROCEDURE — 25010000002 PROPOFOL 200 MG/20ML EMULSION: Performed by: NURSE ANESTHETIST, CERTIFIED REGISTERED

## 2023-11-09 PROCEDURE — 80048 BASIC METABOLIC PNL TOTAL CA: CPT | Performed by: NURSE PRACTITIONER

## 2023-11-09 PROCEDURE — 85027 COMPLETE CBC AUTOMATED: CPT | Performed by: NURSE PRACTITIONER

## 2023-11-09 PROCEDURE — 87077 CULTURE AEROBIC IDENTIFY: CPT | Performed by: NURSE PRACTITIONER

## 2023-11-09 PROCEDURE — 25010000002 DEXAMETHASONE SODIUM PHOSPHATE 20 MG/5ML SOLUTION: Performed by: NURSE ANESTHETIST, CERTIFIED REGISTERED

## 2023-11-09 PROCEDURE — 87205 SMEAR GRAM STAIN: CPT | Performed by: NURSE PRACTITIONER

## 2023-11-09 PROCEDURE — 25810000003 SODIUM CHLORIDE 0.9 % SOLUTION: Performed by: SURGERY

## 2023-11-09 PROCEDURE — 63710000001 INSULIN GLARGINE PER 5 UNITS: Performed by: HOSPITALIST

## 2023-11-09 PROCEDURE — 82948 REAGENT STRIP/BLOOD GLUCOSE: CPT

## 2023-11-09 PROCEDURE — 25010000002 MIDAZOLAM PER 1 MG: Performed by: ANESTHESIOLOGY

## 2023-11-09 PROCEDURE — 11042 DBRDMT SUBQ TIS 1ST 20SQCM/<: CPT | Performed by: SURGERY

## 2023-11-09 PROCEDURE — 63710000001 INSULIN LISPRO (HUMAN) PER 5 UNITS: Performed by: SURGERY

## 2023-11-09 PROCEDURE — 87070 CULTURE OTHR SPECIMN AEROBIC: CPT | Performed by: NURSE PRACTITIONER

## 2023-11-09 PROCEDURE — 99214 OFFICE O/P EST MOD 30 MIN: CPT | Performed by: SURGERY

## 2023-11-09 PROCEDURE — 25810000003 LACTATED RINGERS PER 1000 ML: Performed by: ANESTHESIOLOGY

## 2023-11-09 PROCEDURE — 25010000002 ONDANSETRON PER 1 MG: Performed by: NURSE ANESTHETIST, CERTIFIED REGISTERED

## 2023-11-09 PROCEDURE — 25010000002 ONDANSETRON PER 1 MG: Performed by: NURSE PRACTITIONER

## 2023-11-09 PROCEDURE — 11045 DBRDMT SUBQ TISS EACH ADDL: CPT | Performed by: SURGERY

## 2023-11-09 RX ORDER — LIDOCAINE HYDROCHLORIDE 10 MG/ML
0.5 INJECTION, SOLUTION INFILTRATION; PERINEURAL ONCE AS NEEDED
Status: DISCONTINUED | OUTPATIENT
Start: 2023-11-09 | End: 2023-11-09 | Stop reason: HOSPADM

## 2023-11-09 RX ORDER — MIDAZOLAM HYDROCHLORIDE 1 MG/ML
1 INJECTION INTRAMUSCULAR; INTRAVENOUS
Status: DISCONTINUED | OUTPATIENT
Start: 2023-11-09 | End: 2023-11-09 | Stop reason: HOSPADM

## 2023-11-09 RX ORDER — HYDRALAZINE HYDROCHLORIDE 20 MG/ML
5 INJECTION INTRAMUSCULAR; INTRAVENOUS
Status: DISCONTINUED | OUTPATIENT
Start: 2023-11-09 | End: 2023-11-09 | Stop reason: HOSPADM

## 2023-11-09 RX ORDER — PROMETHAZINE HYDROCHLORIDE 25 MG/1
25 TABLET ORAL ONCE AS NEEDED
Status: DISCONTINUED | OUTPATIENT
Start: 2023-11-09 | End: 2023-11-09 | Stop reason: HOSPADM

## 2023-11-09 RX ORDER — IPRATROPIUM BROMIDE AND ALBUTEROL SULFATE 2.5; .5 MG/3ML; MG/3ML
3 SOLUTION RESPIRATORY (INHALATION) ONCE AS NEEDED
Status: DISCONTINUED | OUTPATIENT
Start: 2023-11-09 | End: 2023-11-09 | Stop reason: HOSPADM

## 2023-11-09 RX ORDER — PHENYLEPHRINE HCL IN 0.9% NACL 1 MG/10 ML
SYRINGE (ML) INTRAVENOUS AS NEEDED
Status: DISCONTINUED | OUTPATIENT
Start: 2023-11-09 | End: 2023-11-09 | Stop reason: SURG

## 2023-11-09 RX ORDER — FLUMAZENIL 0.1 MG/ML
0.2 INJECTION INTRAVENOUS AS NEEDED
Status: DISCONTINUED | OUTPATIENT
Start: 2023-11-09 | End: 2023-11-09 | Stop reason: HOSPADM

## 2023-11-09 RX ORDER — DEXAMETHASONE SODIUM PHOSPHATE 4 MG/ML
INJECTION, SOLUTION INTRA-ARTICULAR; INTRALESIONAL; INTRAMUSCULAR; INTRAVENOUS; SOFT TISSUE AS NEEDED
Status: DISCONTINUED | OUTPATIENT
Start: 2023-11-09 | End: 2023-11-09 | Stop reason: SURG

## 2023-11-09 RX ORDER — EPHEDRINE SULFATE 50 MG/ML
INJECTION INTRAVENOUS AS NEEDED
Status: DISCONTINUED | OUTPATIENT
Start: 2023-11-09 | End: 2023-11-09 | Stop reason: SURG

## 2023-11-09 RX ORDER — ONDANSETRON 2 MG/ML
4 INJECTION INTRAMUSCULAR; INTRAVENOUS ONCE AS NEEDED
Status: DISCONTINUED | OUTPATIENT
Start: 2023-11-09 | End: 2023-11-09 | Stop reason: HOSPADM

## 2023-11-09 RX ORDER — FENTANYL CITRATE 50 UG/ML
INJECTION, SOLUTION INTRAMUSCULAR; INTRAVENOUS AS NEEDED
Status: DISCONTINUED | OUTPATIENT
Start: 2023-11-09 | End: 2023-11-09 | Stop reason: SURG

## 2023-11-09 RX ORDER — DROPERIDOL 2.5 MG/ML
0.62 INJECTION, SOLUTION INTRAMUSCULAR; INTRAVENOUS
Status: DISCONTINUED | OUTPATIENT
Start: 2023-11-09 | End: 2023-11-09 | Stop reason: HOSPADM

## 2023-11-09 RX ORDER — MAGNESIUM HYDROXIDE 1200 MG/15ML
LIQUID ORAL AS NEEDED
Status: DISCONTINUED | OUTPATIENT
Start: 2023-11-09 | End: 2023-11-09 | Stop reason: HOSPADM

## 2023-11-09 RX ORDER — HYDROCODONE BITARTRATE AND ACETAMINOPHEN 7.5; 325 MG/1; MG/1
1 TABLET ORAL EVERY 4 HOURS PRN
Status: DISCONTINUED | OUTPATIENT
Start: 2023-11-09 | End: 2023-11-09 | Stop reason: HOSPADM

## 2023-11-09 RX ORDER — SODIUM CHLORIDE 0.9 % (FLUSH) 0.9 %
3 SYRINGE (ML) INJECTION EVERY 12 HOURS SCHEDULED
Status: DISCONTINUED | OUTPATIENT
Start: 2023-11-09 | End: 2023-11-09 | Stop reason: HOSPADM

## 2023-11-09 RX ORDER — HYDROCODONE BITARTRATE AND ACETAMINOPHEN 5; 325 MG/1; MG/1
1 TABLET ORAL ONCE AS NEEDED
Status: DISCONTINUED | OUTPATIENT
Start: 2023-11-09 | End: 2023-11-09 | Stop reason: HOSPADM

## 2023-11-09 RX ORDER — FENTANYL CITRATE 50 UG/ML
50 INJECTION, SOLUTION INTRAMUSCULAR; INTRAVENOUS ONCE AS NEEDED
Status: DISCONTINUED | OUTPATIENT
Start: 2023-11-09 | End: 2023-11-09 | Stop reason: HOSPADM

## 2023-11-09 RX ORDER — FAMOTIDINE 10 MG/ML
20 INJECTION, SOLUTION INTRAVENOUS ONCE
Status: COMPLETED | OUTPATIENT
Start: 2023-11-09 | End: 2023-11-09

## 2023-11-09 RX ORDER — LIDOCAINE HYDROCHLORIDE 20 MG/ML
INJECTION, SOLUTION INFILTRATION; PERINEURAL AS NEEDED
Status: DISCONTINUED | OUTPATIENT
Start: 2023-11-09 | End: 2023-11-09 | Stop reason: SURG

## 2023-11-09 RX ORDER — SODIUM CHLORIDE 0.9 % (FLUSH) 0.9 %
3-10 SYRINGE (ML) INJECTION AS NEEDED
Status: DISCONTINUED | OUTPATIENT
Start: 2023-11-09 | End: 2023-11-09 | Stop reason: HOSPADM

## 2023-11-09 RX ORDER — FLUCONAZOLE 150 MG/1
150 TABLET ORAL ONCE
Qty: 1 TABLET | Refills: 0 | Status: COMPLETED | OUTPATIENT
Start: 2023-11-09 | End: 2023-11-09

## 2023-11-09 RX ORDER — NALOXONE HCL 0.4 MG/ML
0.2 VIAL (ML) INJECTION AS NEEDED
Status: DISCONTINUED | OUTPATIENT
Start: 2023-11-09 | End: 2023-11-09 | Stop reason: HOSPADM

## 2023-11-09 RX ORDER — SODIUM CHLORIDE, SODIUM LACTATE, POTASSIUM CHLORIDE, CALCIUM CHLORIDE 600; 310; 30; 20 MG/100ML; MG/100ML; MG/100ML; MG/100ML
9 INJECTION, SOLUTION INTRAVENOUS CONTINUOUS
Status: DISCONTINUED | OUTPATIENT
Start: 2023-11-09 | End: 2023-11-09

## 2023-11-09 RX ORDER — LABETALOL HYDROCHLORIDE 5 MG/ML
5 INJECTION, SOLUTION INTRAVENOUS
Status: DISCONTINUED | OUTPATIENT
Start: 2023-11-09 | End: 2023-11-09 | Stop reason: HOSPADM

## 2023-11-09 RX ORDER — EPHEDRINE SULFATE 50 MG/ML
5 INJECTION, SOLUTION INTRAVENOUS ONCE AS NEEDED
Status: DISCONTINUED | OUTPATIENT
Start: 2023-11-09 | End: 2023-11-09 | Stop reason: HOSPADM

## 2023-11-09 RX ORDER — HYDROMORPHONE HYDROCHLORIDE 1 MG/ML
0.25 INJECTION, SOLUTION INTRAMUSCULAR; INTRAVENOUS; SUBCUTANEOUS
Status: DISCONTINUED | OUTPATIENT
Start: 2023-11-09 | End: 2023-11-09 | Stop reason: HOSPADM

## 2023-11-09 RX ORDER — PROMETHAZINE HYDROCHLORIDE 25 MG/1
25 SUPPOSITORY RECTAL ONCE AS NEEDED
Status: DISCONTINUED | OUTPATIENT
Start: 2023-11-09 | End: 2023-11-09 | Stop reason: HOSPADM

## 2023-11-09 RX ORDER — FENTANYL CITRATE 50 UG/ML
25 INJECTION, SOLUTION INTRAMUSCULAR; INTRAVENOUS
Status: DISCONTINUED | OUTPATIENT
Start: 2023-11-09 | End: 2023-11-09 | Stop reason: HOSPADM

## 2023-11-09 RX ORDER — ROCURONIUM BROMIDE 10 MG/ML
INJECTION, SOLUTION INTRAVENOUS AS NEEDED
Status: DISCONTINUED | OUTPATIENT
Start: 2023-11-09 | End: 2023-11-09 | Stop reason: SURG

## 2023-11-09 RX ORDER — PROPOFOL 10 MG/ML
INJECTION, EMULSION INTRAVENOUS AS NEEDED
Status: DISCONTINUED | OUTPATIENT
Start: 2023-11-09 | End: 2023-11-09 | Stop reason: SURG

## 2023-11-09 RX ORDER — ONDANSETRON 2 MG/ML
INJECTION INTRAMUSCULAR; INTRAVENOUS AS NEEDED
Status: DISCONTINUED | OUTPATIENT
Start: 2023-11-09 | End: 2023-11-09 | Stop reason: SURG

## 2023-11-09 RX ADMIN — ONDANSETRON 4 MG: 2 INJECTION INTRAMUSCULAR; INTRAVENOUS at 09:32

## 2023-11-09 RX ADMIN — FLUCONAZOLE 150 MG: 150 TABLET ORAL at 16:17

## 2023-11-09 RX ADMIN — GABAPENTIN 300 MG: 300 CAPSULE ORAL at 20:44

## 2023-11-09 RX ADMIN — LIDOCAINE HYDROCHLORIDE 100 MG: 20 INJECTION, SOLUTION INFILTRATION; PERINEURAL at 09:03

## 2023-11-09 RX ADMIN — EPHEDRINE SULFATE 10 MG: 50 INJECTION INTRAVENOUS at 09:34

## 2023-11-09 RX ADMIN — DEXAMETHASONE SODIUM PHOSPHATE 10 MG: 4 INJECTION, SOLUTION INTRAMUSCULAR; INTRAVENOUS at 09:22

## 2023-11-09 RX ADMIN — PIPERACILLIN SODIUM AND TAZOBACTAM SODIUM 4.5 G: 4; .5 INJECTION, SOLUTION INTRAVENOUS at 11:48

## 2023-11-09 RX ADMIN — Medication 200 MCG: at 09:44

## 2023-11-09 RX ADMIN — FENTANYL CITRATE 50 MCG: 50 INJECTION, SOLUTION INTRAMUSCULAR; INTRAVENOUS at 08:58

## 2023-11-09 RX ADMIN — GABAPENTIN 300 MG: 300 CAPSULE ORAL at 06:56

## 2023-11-09 RX ADMIN — INSULIN LISPRO 4 UNITS: 100 INJECTION, SOLUTION INTRAVENOUS; SUBCUTANEOUS at 12:42

## 2023-11-09 RX ADMIN — LEVOTHYROXINE SODIUM 200 MCG: 100 TABLET ORAL at 06:09

## 2023-11-09 RX ADMIN — MIDAZOLAM 1 MG: 1 INJECTION INTRAMUSCULAR; INTRAVENOUS at 08:44

## 2023-11-09 RX ADMIN — PIPERACILLIN SODIUM AND TAZOBACTAM SODIUM 4.5 G: 4; .5 INJECTION, SOLUTION INTRAVENOUS at 17:32

## 2023-11-09 RX ADMIN — PIPERACILLIN SODIUM AND TAZOBACTAM SODIUM 4.5 G: 4; .5 INJECTION, SOLUTION INTRAVENOUS at 01:33

## 2023-11-09 RX ADMIN — VANCOMYCIN HYDROCHLORIDE 1000 MG: 1 INJECTION, SOLUTION INTRAVENOUS at 11:47

## 2023-11-09 RX ADMIN — HYDROCODONE BITARTRATE AND ACETAMINOPHEN 1 TABLET: 7.5; 325 TABLET ORAL at 06:56

## 2023-11-09 RX ADMIN — VANCOMYCIN HYDROCHLORIDE 1000 MG: 1 INJECTION, SOLUTION INTRAVENOUS at 23:34

## 2023-11-09 RX ADMIN — SODIUM CHLORIDE 100 ML/HR: 9 INJECTION, SOLUTION INTRAVENOUS at 11:47

## 2023-11-09 RX ADMIN — ACETAMINOPHEN 650 MG: 325 TABLET ORAL at 01:36

## 2023-11-09 RX ADMIN — LEVOTHYROXINE SODIUM 75 MCG: 75 TABLET ORAL at 06:09

## 2023-11-09 RX ADMIN — INSULIN LISPRO 7 UNITS: 100 INJECTION, SOLUTION INTRAVENOUS; SUBCUTANEOUS at 17:32

## 2023-11-09 RX ADMIN — ONDANSETRON 4 MG: 2 INJECTION INTRAMUSCULAR; INTRAVENOUS at 01:36

## 2023-11-09 RX ADMIN — Medication 200 MCG: at 09:39

## 2023-11-09 RX ADMIN — SUGAMMADEX 200 MG: 100 INJECTION, SOLUTION INTRAVENOUS at 09:34

## 2023-11-09 RX ADMIN — ROCURONIUM BROMIDE 50 MG: 10 INJECTION, SOLUTION INTRAVENOUS at 09:04

## 2023-11-09 RX ADMIN — PROPOFOL 200 MG: 10 INJECTION, EMULSION INTRAVENOUS at 09:03

## 2023-11-09 RX ADMIN — FAMOTIDINE 20 MG: 10 INJECTION INTRAVENOUS at 08:44

## 2023-11-09 RX ADMIN — INSULIN LISPRO 8 UNITS: 100 INJECTION, SOLUTION INTRAVENOUS; SUBCUTANEOUS at 20:43

## 2023-11-09 RX ADMIN — Medication 10 ML: at 20:44

## 2023-11-09 RX ADMIN — Medication 200 MCG: at 09:15

## 2023-11-09 RX ADMIN — INSULIN GLARGINE 15 UNITS: 100 INJECTION, SOLUTION SUBCUTANEOUS at 20:43

## 2023-11-09 RX ADMIN — SODIUM CHLORIDE, POTASSIUM CHLORIDE, SODIUM LACTATE AND CALCIUM CHLORIDE: 600; 310; 30; 20 INJECTION, SOLUTION INTRAVENOUS at 08:51

## 2023-11-09 RX ADMIN — EPHEDRINE SULFATE 10 MG: 50 INJECTION INTRAVENOUS at 09:30

## 2023-11-09 NOTE — ED NOTES
"Nursing report ED to floor  Vanessa Root  53 y.o.  female    HPI :   Chief Complaint   Patient presents with    Wound Check       Admitting doctor:   Nevaeh Garcia MD    Admitting diagnosis:   The primary encounter diagnosis was Infected wound. Diagnoses of Leukocytosis, unspecified type, Lactic acidosis, Hyperglycemia, and History of necrotizing fasciitis were also pertinent to this visit.    Code status:   Current Code Status       Date Active Code Status Order ID Comments User Context       Prior            Allergies:   Clemastine fumarate, Ziprasidone, Aripiprazole, Sulfa antibiotics, Green pepper, Latex, and Lisinopril    Isolation:   No active isolations    Intake and Output  No intake or output data in the 24 hours ending 11/08/23 2028    Weight:       11/08/23 1750   Weight: 113 kg (250 lb)       Most recent vitals:   Vitals:    11/08/23 1749 11/08/23 1750   BP: 119/70    Pulse: 85    Resp: 12    Temp: 98.5 °F (36.9 °C)    SpO2: 95%    Weight:  113 kg (250 lb)   Height:  165.1 cm (65\")       Active LDAs/IV Access:   Lines, Drains & Airways       Active LDAs       Name Placement date Placement time Site Days    Peripheral IV 11/08/23 1903 Anterior;Distal;Left Forearm 11/08/23 1903  Forearm  less than 1    Urethral Catheter 16 Fr. 08/25/23  1142  -- 75                    Labs (abnormal labs have a star):   Labs Reviewed   COMPREHENSIVE METABOLIC PANEL - Abnormal; Notable for the following components:       Result Value    Glucose 228 (*)     Albumin 3.3 (*)     Alkaline Phosphatase 221 (*)     All other components within normal limits    Narrative:     GFR Normal >60  Chronic Kidney Disease <60  Kidney Failure <15     LACTIC ACID, PLASMA - Abnormal; Notable for the following components:    Lactate 3.3 (*)     All other components within normal limits   C-REACTIVE PROTEIN - Abnormal; Notable for the following components:    C-Reactive Protein 4.23 (*)     All other components within normal limits " "  SEDIMENTATION RATE - Abnormal; Notable for the following components:    Sed Rate 35 (*)     All other components within normal limits   CBC WITH AUTO DIFFERENTIAL - Abnormal; Notable for the following components:    WBC 13.45 (*)     Hemoglobin 11.2 (*)     Platelets 477 (*)     Monocyte % 4.5 (*)     Immature Grans % 0.6 (*)     Neutrophils, Absolute 8.47 (*)     Lymphocytes, Absolute 3.65 (*)     Eosinophils, Absolute 0.49 (*)     Immature Grans, Absolute 0.08 (*)     All other components within normal limits   PROCALCITONIN - Normal    Narrative:     As a Marker for Sepsis (Non-Neonates):    1. <0.5 ng/mL represents a low risk of severe sepsis and/or septic shock.  2. >2 ng/mL represents a high risk of severe sepsis and/or septic shock.    As a Marker for Lower Respiratory Tract Infections that require antibiotic therapy:    PCT on Admission    Antibiotic Therapy       6-12 Hrs later    >0.5                Strongly Recommended  >0.25 - <0.5        Recommended   0.1 - 0.25          Discouraged              Remeasure/reassess PCT  <0.1                Strongly Discouraged     Remeasure/reassess PCT    As 28 day mortality risk marker: \"Change in Procalcitonin Result\" (>80% or <=80%) if Day 0 (or Day 1) and Day 4 values are available. Refer to http://www.[facility]-pct-calculator.com    Change in PCT <=80%  A decrease of PCT levels below or equal to 80% defines a positive change in PCT test result representing a higher risk for 28-day all-cause mortality of patients diagnosed with severe sepsis for septic shock.    Change in PCT >80%  A decrease of PCT levels of more than 80% defines a negative change in PCT result representing a lower risk for 28-day all-cause mortality of patients diagnosed with severe sepsis or septic shock.      BLOOD CULTURE   BLOOD CULTURE   LACTIC ACID, REFLEX   CBC AND DIFFERENTIAL    Narrative:     The following orders were created for panel order CBC & Differential.  Procedure                  "              Abnormality         Status                     ---------                               -----------         ------                     CBC Auto Differential[[id number]]        Abnormal            Final result                 Please view results for these tests on the individual orders.       EKG:   No orders to display       Meds given in ED:   Medications   vancomycin 2250 mg/500 mL 0.9% NS IVPB ([facility]) (has no administration in time range)   piperacillin-tazobactam (ZOSYN) 3.375 g in iso-osmotic dextrose 50 ml (premix) (3.375 g Intravenous New Bag 11/8/23 2013)   sodium chloride 0.9 % bolus 500 mL (0 mL Intravenous Stopped 11/8/23 2012)       Imaging results:  CT Pelvis Without Contrast    Result Date: 11/8/2023  There is a wound in the soft tissues overlying the lateral right hip without evidence of underlying abscess or any extension to the bone.   Radiation dose reduction techniques were utilized, including automated exposure control and exposure modulation based on body size.   This report was finalized on 11/8/2023 8:00 PM by Dr. Abhijit Rosado M.D on Workstation: [id number]       Ambulatory status:   - non-ambulatory    Social issues:   Social History     Socioeconomic History    Marital status: Single   Tobacco Use    Smoking status: Former    Smokeless tobacco: Never   Vaping Use    Vaping Use: Never used   Substance and Sexual Activity    Alcohol use: Never    Drug use: Never    Sexual activity: Defer       NIH Stroke Scale:       Awilda Law RN  11/08/23 20:28 EST     Call [phone] with questions

## 2023-11-09 NOTE — ADDENDUM NOTE
Addendum  created 11/09/23 1434 by Geena Flores MD    Attestation recorded in Intraprocedure, Intraprocedure Attestations filed

## 2023-11-09 NOTE — PROGRESS NOTES
I was called to the bedside due to bleeding from her wound.  Upon further examination, there was a small arterial that must of spasmed during and after surgery that eventually started bleeding.  I was able to suture ligate both ends of this vessel.  Surgicel was packed in the wound, although it was already hemostatic.    Okay to continue dressing changes per nursing staff.    Recommend continuing to hold apixaban until at least tomorrow afternoon.    Louis Escamilla MD  Colorectal & General Surgery  [facility]    [address], [address]  [place], [place], [address]  P: [phone]  F: [phone]

## 2023-11-09 NOTE — SIGNIFICANT NOTE
11/09/23 0739   OTHER   Discipline occupational therapist   Rehab Time/Intention   Session Not Performed other (see comments)  (pt going to OR today for wound debridement today)   Recommendation   OT - Next Appointment 11/10/23

## 2023-11-09 NOTE — ANESTHESIA PROCEDURE NOTES
Airway  Urgency: elective    Date/Time: 11/9/2023 9:06 AM  Airway not difficult    General Information and Staff    Patient location during procedure: OR  Anesthesiologist: Geena Flores MD  CRNA/CAA: Billie Dudley CRNA    Indications and Patient Condition  Indications for airway management: airway protection    Preoxygenated: yes  Mask difficulty assessment: 2 - vent by mask + OA or adjuvant +/- NMBA    Final Airway Details  Final airway type: endotracheal airway      Successful airway: ETT  Cuffed: yes   Successful intubation technique: direct laryngoscopy  Facilitating devices/methods: intubating stylet and cricoid pressure  Endotracheal tube insertion site: oral  Blade: Renteria  Blade size: 2  ETT size (mm): 7.0  Cormack-Lehane Classification: grade I - full view of glottis  Placement verified by: chest auscultation   Cuff volume (mL): 5  Measured from: lips  Number of attempts at approach: 1  Assessment: lips, teeth, and gum same as pre-op    Additional Comments  EBBS: ETCO2+: Atraumatic intubation

## 2023-11-09 NOTE — PLAN OF CARE
Goal Outcome Evaluation:  Plan of Care Reviewed With: patient        Progress: no change  Outcome Evaluation: VSS. Patient is a/o x4. Admitted for wound check. Patient went to the OR and had a debridement of the right hip wound with Dr. Escamilla. 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 History of pulmonary embolism on eliquis at home, currently held today may consider restarting tomorrow. Patient had bleeding from the wound around 1600. Notified Dr. Escamilla and he came to patients bedside and had to place a few sutures to control bleeding. Has been stable since then. Infectious disease consulted for elevated lactate and WBC and wound, no new recommendations continue vancomycin and zosyn IV. Increase lantus tonight to 15 units. Blood glucose level elevated at dinner.

## 2023-11-09 NOTE — ANESTHESIA POSTPROCEDURE EVALUATION
"Patient: Vanessa Root    Procedure Summary       Date: 11/09/23 Room / Location: [facility] OR  / [facility] MAIN OR    Anesthesia Start: 0851 Anesthesia Stop: 1000    Procedure: Debridement of right thigh wound (Right) Diagnosis:       Infected wound      (Infected wound [T14.8XXA, L08.9])    Surgeons: Mohan Escamilla MD Provider: Geena Flores MD    Anesthesia Type: general ASA Status: 4            Anesthesia Type: general    Vitals  Vitals Value Taken Time   /61 11/09/23 1015   Temp 36.8 °C (98.3 °F) 11/09/23 0955   Pulse 87 11/09/23 1021   Resp 18 11/09/23 1005   SpO2 98 % 11/09/23 1021   Vitals shown include unfiled device data.        Post Anesthesia Care and Evaluation    Patient location during evaluation: bedside  Patient participation: complete - patient participated  Level of consciousness: awake and alert  Pain management: adequate    Airway patency: patent  Anesthetic complications: No anesthetic complications    Cardiovascular status: acceptable  Respiratory status: acceptable  Hydration status: acceptable    Comments: /67   Pulse 87   Temp 36.8 °C (98.3 °F) (Oral)   Resp 18   Ht 165.1 cm (65\")   Wt 113 kg (250 lb)   SpO2 97%   BMI 41.60 kg/m²     "

## 2023-11-09 NOTE — PROGRESS NOTES
"[facility] Clinical Pharmacy Services: Vancomycin Pharmacokinetic Initial Consult Note    Vanessa Root is a 53 y.o. female who is on day 1 of pharmacy to dose vancomycin.    Indication: Skin and Soft Tissue  Consulting Provider: JAVAD Wiggins  Planned Duration of Therapy: 5 days  Loading Dose Ordered or Given: 2250 mg on 11/8 at 2113  Culture/Source:   11/8 blood cultures in process  Target: -600 mg/L.hr   Other Antimicrobials: Zosyn 4.5 g iv every 8 hours    Vitals/Labs  Ht: 165.1 cm (65\"); Wt: 113 kg (250 lb)  Temp Readings from Last 1 Encounters:   11/08/23 98.5 °F (36.9 °C)    Estimated Creatinine Clearance: 107 mL/min (by C-G formula based on SCr of 0.78 mg/dL).     Results from last 7 days   Lab Units 11/08/23  1901   CREATININE mg/dL 0.78   WBC 10*3/mm3 13.45*     Assessment/Plan:    Vancomycin Dose:   1000 mg IV every  12  hours  Predictive AUC level for the dose ordered is 500 mg/L.hr, which is within the target of 400-600 mg/L.hr  Vanc Trough has been ordered for 11/10 at 1130     Pharmacy will follow patient's kidney function and will adjust doses and obtain levels as necessary. Thank you for involving pharmacy in this patient's care. Please contact pharmacy with any questions or concerns.                           Mario Fitch III, [facility]  Clinical Pharmacist    "

## 2023-11-09 NOTE — PROGRESS NOTES
[facility] Clinical Pharmacy Services: Piperacillin-Tazobactam Consult    Pt Name: Vanessa Root   : 1970    Indication: Skin and Soft Tissue    Relevant clinical data and objective history reviewed:    Past Medical History:   Diagnosis Date    Arthritis     Bipolar 1 disorder     Cancer     uterine    COVID-19     Depression     Diabetes mellitus     Frequent UTI     GERD (gastroesophageal reflux disease)     Hyperlipidemia     Migraine     Murmur, heart     Ovarian cyst     Stroke      Creatinine   Date Value Ref Range Status   2023 0.78 0.57 - 1.00 mg/dL Final   2023 0.75 0.57 - 1.00 mg/dL Final   2023 0.92 0.57 - 1.00 mg/dL Final     BUN   Date Value Ref Range Status   2023 8 6 - 20 mg/dL Final     Estimated Creatinine Clearance: 107 mL/min (by C-G formula based on SCr of 0.78 mg/dL).    Lab Results   Component Value Date    WBC 13.45 (H) 2023     Temp Readings from Last 3 Encounters:   23 98.5 °F (36.9 °C)   23 98.1 °F (36.7 °C) (Oral)   23 98.3 °F (36.8 °C) (Oral)      Assessment/Plan  Estimated CrCl >20 mL/min at this time; BMI 41.6 kg/m2  Will start piperacillin-tazobactam 4.5 g IV every 8 hours     Pharmacy will continue to follow daily while on piperacillin-tazobactam and adjust as needed. Thank you for this consult.    Mario Fitch III [facility]  Clinical Pharmacist

## 2023-11-09 NOTE — PROGRESS NOTES
[facility]    [phone]   LOS: 0 days     Name: Vanessa Root  Age/Sex: 53 y.o. female  :  1970        PCP: Provider, No Known  Chief Complaint   Patient presents with    Wound Check      Subjective   She is complaining of vaginal itching and discharge today.  She says she gets this when she has significant antibiotic usage.  She denies any new issues or complaints this morning.  She is currently happy that she is able to eat lunch.  No fevers or chills tolerated surgery  General: No Fever or Chills, Cardiac: No Chest Pain or Palpitations, Resp: No Cough or SOA, GI: No Nausea, Vomiting, or Diarrhea, and Other: No bleeding    [Held by provider] apixaban, 5 mg, Oral, BID  fluconazole, 150 mg, Oral, Once  gabapentin, 300 mg, Oral, Q12H  HYDROcodone-acetaminophen, 1 tablet, Oral, Daily  insulin glargine, 10 Units, Subcutaneous, Nightly  insulin lispro, 2-9 Units, Subcutaneous, 4x Daily AC & at Bedtime  levothyroxine, 200 mcg, Oral, Daily  levothyroxine, 75 mcg, Oral, Q AM  piperacillin-tazobactam, 4.5 g, Intravenous, Q8H  sodium chloride, 10 mL, Intravenous, Q12H  vancomycin, 1,000 mg, Intravenous, Q12H      lactated ringers, 9 mL/hr  Pharmacy to dose vancomycin,   sodium chloride, 100 mL/hr, Last Rate: 100 mL/hr (23 1147)        Objective   Vital Signs  Temp:  [97.7 °F (36.5 °C)-99 °F (37.2 °C)] 97.7 °F (36.5 °C)  Heart Rate:  [76-96] 96  Resp:  [12-20] 16  BP: ()/(44-88) 142/88  Body mass index is 41.6 kg/m².    Intake/Output Summary (Last 24 hours) at 2023 1423  Last data filed at 2023 0958  Gross per 24 hour   Intake 740 ml   Output --   Net 740 ml       Physical Exam  Constitutional:       General: She is not in acute distress.     Appearance: She is obese. She is ill-appearing.   Cardiovascular:      Rate and Rhythm: Normal rate and regular rhythm.   Pulmonary:      Effort: Pulmonary effort is normal. No respiratory distress.      Breath sounds: Normal breath  sounds.   Abdominal:      General: Bowel sounds are normal. There is no distension.      Palpations: Abdomen is soft.   Neurological:      General: No focal deficit present.      Mental Status: Mental status is at baseline.           Results Review:       I reviewed the patient's new clinical results.  Results from last 7 days   Lab Units 11/09/23  0205 11/08/23  1901   WBC 10*3/mm3 12.89* 13.45*   HEMOGLOBIN g/dL 9.6* 11.2*   PLATELETS 10*3/mm3 403 477*     Results from last 7 days   Lab Units 11/09/23  0205 11/08/23  1901   SODIUM mmol/L 139 139   POTASSIUM mmol/L 3.5 3.8   CHLORIDE mmol/L 106 104   CO2 mmol/L 25.0 23.9   BUN mg/dL 8 8   CREATININE mg/dL 0.81 0.78   CALCIUM mg/dL 8.2* 8.7   Estimated Creatinine Clearance: 103 mL/min (by C-G formula based on SCr of 0.81 mg/dL).    Lab Results   Component Value Date    HGBA1C 8.90 (H) 11/09/2023    HGBA1C 11.30 (H) 05/24/2023     Glucose   Date/Time Value Ref Range Status   11/09/2023 1142 210 (H) 70 - 130 mg/dL Final   11/09/2023 0758 208 (H) 70 - 130 mg/dL Final   11/08/2023 2224 201 (H) 70 - 130 mg/dL Final       Assessment & Plan   Active Hospital Problems    Diagnosis  POA    **Infected wound [T14.8XXA, L08.9]  Yes    History of ischemic stroke [Z86.73]  Not Applicable    Immobility syndrome (paraplegic) [M62.3]  Yes    Bipolar 1 disorder [F31.9]  Yes    GERD (gastroesophageal reflux disease) [K21.9]  Yes    Hyperlipidemia [E78.5]  Yes    Type 2 diabetes mellitus with diabetic polyneuropathy [E11.42]  Yes    Obstructive sleep apnea syndrome [G47.33]  Yes    Hypothyroidism [E03.9]  Yes      Resolved Hospital Problems   No resolved problems to display.       PLAN  This is a 53-year-old female with a history of prior stroke, immobility syndrome, bipolar disorder hyperlipidemia, obstructive sleep apnea, hypothyroidism, type 2 diabetes and prior complicated hip wound who presents to the hospital with necrosis in her wound and possible infection  -Appreciate general  surgery's assistance taken to the operating room for debridement today surgery tolerated well.  -Started on broad-spectrum antibiotics on admission.  Will consult infectious disease for de-escalation.  She is complaining of symptoms of a yeast infection we will give her 1 dose of fluconazole today.  -Her A1c shows lack of good tight outpatient control.  Her TSH also indicates need for possible adjustment or lack of compliance.  We will check a T4 in the morning and discuss further with her regarding medications adjustments for both her diabetes and her thyroid.  For now we will increase her basal insulin to 15 units nightly continue sliding scale coverage  -Hemoglobin down a little less than 2 g today.  Some of this is dilution.  She is eating and drinking we will discontinue IV fluids  -Plan to resume Eliquis in the morning with her recent history of pulmonary embolus  -Full code    Disposition  Expected Discharge Date: 11/13/2023; Expected Discharge Time:        Marv Aguiar MD  [facility]  11/09/23  14:23 EST

## 2023-11-09 NOTE — OP NOTE
Colorectal & General Surgery  Operative Report    Patient: Vanessa Root  YOB: 1970  MRN: [id number]  DATE OF PROCEDURE: 11/09/23     PREOPERATIVE DIAGNOSIS:  Necrotic right hip wound    POSTOPERATIVE DIAGNOSIS:  Same    PROCEDURE:  Excisional debridement of skin and subcutaneous tissue of necrotic right hip wound measuring 10 x 13 x 2 cm for total of 130 cm²    FINDINGS:  Necrotic right hip wound with necrotic skin and subcutaneous tissue measuring 10 x 13 x 2.  Hemostasis observed at conclusion of case.  No overt purulence.    SURGEON:  Louis Escamilla MD    ASSISTANT:  None    ANESTHESIA:  General endotracheal    EBL:  15 mL    SPECIMEN:  None    OPERATIVE DESCRIPTION:  The patient was brought to the operating room under the care of the nursing staff.  The patient was placed on the operating room table in the supine position where anesthesia was induced.  The patient was then prepped and positioned in the usual sterile fashion.  A standardized timeout was then performed.    The necrotic skin was excised with the Bovie electrocautery.  The underlying subcutaneous tissue was also excised with Bovie electrocautery back to healthy bleeding tissue.  Hemostasis was obtained with the assistance of the blade electrocautery as well.  The necrotic tissue was completely excised.  The remainder of the wound appeared healthy and granulating well.  The wound was packed with wet-to-dry Kerlix and an ABD pad.    All needle, sponge, and instrument counts were correct at the end of the case.    The patient tolerated the procedure well and was transferred to the postanesthesia care unit in stable condition.    Louis Escamilla M.D.  Colorectal & General Surgery  [facility]    [address] [place] [address], [address]  [place], [place], [address]  P: [phone]  F: [phone]

## 2023-11-09 NOTE — CONSULTS
Colorectal & General Surgery  Consultation    Patient: Vanessa Root  YOB: 1970  MRN: [id number]      Assessment  Vanessa Root is a 53 y.o. female with chronic necrotic right thigh wound in need of debridement.  Agree with current antibiotic regimen.  I discussed the risk, benefits, alternatives of debridement with her today, including further wound complications in the future.  She wishes to proceed.    Plan  Proceed to the operating room this morning for debridement of right thigh wound.      History of Present Illness   Vanessa Root is a 53 y.o. female who I am seeing in consultation regarding thigh wound at the request of Carlo Aguiar MD.  She has history of necrotizing fasciitis and was previously debrided by Dr. Adame, my partner.  She has been followed by Dr. Adame in the outpatient world and over the past week has developed some necrotic tissue overlying her wound.  She was subsequently brought to the emergency room last night after her home health nurse noticed worsening of the wound.  She denies fevers and chills.  She feels well otherwise.      Past Medical History   Past Medical History:   Diagnosis Date    Arthritis     Bipolar 1 disorder     Cancer     uterine    COVID-19     Depression     Diabetes mellitus     Frequent UTI     GERD (gastroesophageal reflux disease)     Hyperlipidemia     Migraine     Murmur, heart     Ovarian cyst     Stroke         Past Surgical History   Past Surgical History:   Procedure Laterality Date     SECTION  2003    HYSTERECTOMY      TONSILLECTOMY      WOUND DEBRIDEMENT N/A 2023    Procedure: Debridement necrotizing tissue  right buttock wound;  Surgeon: Elizabeth Adame MD;  Location: [facility];  Service: General;  Laterality: N/A;       Social History  Social History     Socioeconomic History    Marital status: Single   Tobacco Use    Smoking status: Former    Smokeless tobacco: Never   Vaping Use    Vaping  Use: Never used   Substance and Sexual Activity    Alcohol use: Never    Drug use: Never    Sexual activity: Defer       Family History  Family History   Problem Relation Age of Onset    Arthritis Mother     Diabetes Mother     Migraines Mother     Stroke Mother     Arthritis Father     Cancer Maternal Grandmother     Thyroid disease Maternal Grandmother     Cancer Maternal Grandfather     Cancer Paternal Grandmother     Thyroid disease Paternal Grandmother     Cancer Paternal Grandfather      Review of Systems  Negative except as documented in the HPI.     Allergies  Allergies   Allergen Reactions    Clemastine Fumarate Other (See Comments)     SEVERE ORBITAL SWELLING    Ziprasidone Other (See Comments)     EXTREME SLEEPINESS  EXTREME SLEEPINESS      Aripiprazole Other (See Comments)     MIGRAINES  MIGRAINES      Sulfa Antibiotics Nausea Only    Green Pepper Hives     unknown    Latex Rash    Lisinopril Cough       Medications    Current Facility-Administered Medications:     acetaminophen (TYLENOL) tablet 650 mg, 650 mg, Oral, Q4H PRN, 650 mg at 11/09/23 0136 **OR** acetaminophen (TYLENOL) 160 MG/5ML oral solution 650 mg, 650 mg, Oral, Q4H PRN **OR** acetaminophen (TYLENOL) suppository 650 mg, 650 mg, Rectal, Q4H PRN, Tia Amato APRN    [Held by provider] apixaban (ELIQUIS) tablet 5 mg, 5 mg, Oral, BID, Nevaeh Garcia MD    calcium carbonate (TUMS) chewable tablet 500 mg (200 mg elemental), 2 tablet, Oral, BID PRN, Tia Amato APRN    dextrose (D50W) (25 g/50 mL) IV injection 25 g, 25 g, Intravenous, Q15 Min PRNLima Jennifer Ann, APRN    dextrose (GLUTOSE) oral gel 15 g, 15 g, Oral, Q15 Min PRNLima Jennifer Ann, APRN    gabapentin (NEURONTIN) capsule 300 mg, 300 mg, Oral, Q12H, Nevaeh Garcia MD, 300 mg at 11/08/23 2314    glucagon (GLUCAGEN) injection 1 mg, 1 mg, Intramuscular, Q15 Min PRN, Tia Amato APRN    HYDROcodone-acetaminophen (NORCO) 7.5-325 MG per tablet 1  tablet, 1 tablet, Oral, Daily, Nevaeh Garcia MD    hydrOXYzine (ATARAX) tablet 50 mg, 50 mg, Oral, TID PRN, Nevaeh Garcia MD, 50 mg at 11/08/23 2314    insulin glargine (LANTUS, SEMGLEE) injection 10 Units, 10 Units, Subcutaneous, Nightly, Nevaeh Garcia MD, 10 Units at 11/08/23 2313    insulin lispro (HUMALOG/ADMELOG) injection 2-9 Units, 2-9 Units, Subcutaneous, 4x Daily AC & at Bedtime, Tia Amato APRN, 4 Units at 11/08/23 2313    levothyroxine (SYNTHROID, LEVOTHROID) tablet 200 mcg, 200 mcg, Oral, Daily, Nevaeh Garcia MD, 200 mcg at 11/09/23 0609    levothyroxine (SYNTHROID, LEVOTHROID) tablet 75 mcg, 75 mcg, Oral, Q AM, Nevaeh Garcia MD, 75 mcg at 11/09/23 0609    ondansetron (ZOFRAN) tablet 4 mg, 4 mg, Oral, Q6H PRN **OR** ondansetron (ZOFRAN) injection 4 mg, 4 mg, Intravenous, Q6H PRN, Tia Amato APRN, 4 mg at 11/09/23 0136    Pharmacy to dose vancomycin, , Does not apply, Continuous PRN, Tia Amato APRN    Pharmacy to Dose Zosyn, , Does not apply, Continuous PRN, Tia Amato APRN    piperacillin-tazobactam (ZOSYN) 4.5 g in iso-osmotic dextrose 100 mL IVPB (premix), 4.5 g, Intravenous, Q8H, Tia Amato APRN, 4.5 g at 11/09/23 0133    sodium chloride 0.9 % flush 10 mL, 10 mL, Intravenous, Q12H, Tia Amato APRN, 10 mL at 11/08/23 2219    sodium chloride 0.9 % flush 10 mL, 10 mL, Intravenous, PRN, Tia Amato APRN    sodium chloride 0.9 % infusion 40 mL, 40 mL, Intravenous, PRN, Tia Amato APRN    sodium chloride 0.9 % infusion, 100 mL/hr, Intravenous, Continuous, Tia Amato APRN, Last Rate: 100 mL/hr at 11/08/23 2218, 100 mL/hr at 11/08/23 2218    vancomycin (VANCOCIN) 1000 mg/200 mL dextrose 5% IVPB, 1,000 mg, Intravenous, Q12H, Tia Amato APRN    Vital Signs  Vitals:    11/09/23 0515   BP: 112/56   Pulse: 83   Resp: 18   Temp: 98.8 °F (37.1 °C)   SpO2: 94%          Physical Exam  Constitutional:  Resting comfortably, no acute distress  Neck: Supple, trachea midline  Respiratory: No increased work of breathing, Symmetric excursion  Cardiovascular: Well pefursed, no jugular venous distention evident   Abdominal:  Soft, non-tender, non-distended  Lymphatics: No cervical or suprascapular adenopathy  Skin: Warm, dry, no rash on visualized skin surfaces right thigh wound with some necrotic tissue.   Musculoskeletal: Symmetric strength, no obvious gross abnormalities  Psychiatric: Alert and oriented ×3, normal affect     Laboratory Results  I have personally reviewed CBC with WBC 12, hemoglobin 9, platelets 4 3.  Lactate 2.3.  Hemoglobin A1c 8.9.  BMP with creatinine 0.81 and glucose 215.    Radiology  I have personally reviewed CT scan of the pelvis performed on November 8, 2023 demonstrates no underlying abscess but wound apparent in the right thigh and hip region.         Louis Escamilla MD  Colorectal & General Surgery  [facility]    [address], [address]  [place], [place], [address]  P: [phone]  F: [phone]

## 2023-11-09 NOTE — CONSULTS
Referring Provider: Nevaeh Garcia MD  Reason for Consultation:    Wound infection      Chief Complaint   Patient presents with    Wound Check         Subjective   History of present illness: Is a 53-year-old female with past medical history of stroke with immobility syndrome, diabetes and bipolar disorder who presented with right necrotic hip wound.  ID consulted for wound infection.    Patient has been afebrile with most recent WBC of 12.  She has been on vancomycin and Zosyn.  Initial lactate has hovered around 2.2 with CRP of 4.2.  Procalcitonin has been negative.  Taken to the OR today by general surgery with I&D.  Cultures pending.    Patient reports she is doing well after her procedure.  Denies any pain currently.  Denies any fevers or chills.  States she has had some vaginal itching concerning for yeast infection and was prescribed fluconazole earlier today.  Denies any other difficulties tolerating antibiotics.    Past Medical History:   Diagnosis Date    Arthritis     Bipolar 1 disorder     Cancer     uterine    COVID-19     Depression     Diabetes mellitus     Frequent UTI     GERD (gastroesophageal reflux disease)     Hyperlipidemia     Migraine     Murmur, heart     Ovarian cyst     Stroke        Past Surgical History:   Procedure Laterality Date     SECTION  2003    HYSTERECTOMY      TONSILLECTOMY      WOUND DEBRIDEMENT N/A 2023    Procedure: Debridement necrotizing tissue  right buttock wound;  Surgeon: Elizabeth Adame MD;  Location: [facility];  Service: General;  Laterality: N/A;       family history includes Arthritis in her father and mother; Cancer in her maternal grandfather, maternal grandmother, paternal grandfather, and paternal grandmother; Diabetes in her mother; Migraines in her mother; Stroke in her mother; Thyroid disease in her maternal grandmother and paternal grandmother.     reports that she has quit smoking. She has never used smokeless tobacco. She  reports that she does not drink alcohol and does not use drugs.     Allergies   Allergen Reactions    Clemastine Fumarate Other (See Comments)     SEVERE ORBITAL SWELLING    Ziprasidone Other (See Comments)     EXTREME SLEEPINESS  EXTREME SLEEPINESS      Aripiprazole Other (See Comments)     MIGRAINES  MIGRAINES      Sulfa Antibiotics Nausea Only    Green Pepper Hives     unknown    Latex Rash    Lisinopril Cough       Medication:  Antibiotics:  Anti-Infectives (From admission, onward)      Ordered     Dose/Rate Route Frequency Start Stop    11/09/23 1340  fluconazole (DIFLUCAN) tablet 150 mg        Ordering Provider: Marv Aguiar MD    150 mg Oral Once 11/09/23 1500      11/08/23 2131  vancomycin (VANCOCIN) 1000 mg/200 mL dextrose 5% IVPB        Ordering Provider: Mohan Escamilla MD    1,000 mg  over 60 Minutes Intravenous Every 12 Hours 11/09/23 1200 11/16/23 1159    11/08/23 2125  piperacillin-tazobactam (ZOSYN) 4.5 g in iso-osmotic dextrose 100 mL IVPB (premix)        Ordering Provider: Mohan Escamilla MD    4.5 g  over 4 Hours Intravenous Every 8 Hours 11/09/23 0200 11/16/23 0159    11/08/23 2117  Pharmacy to dose vancomycin        Ordering Provider: Mohan Escamilla MD     Does not apply Continuous PRN 11/08/23 2115 11/13/23 2114 11/08/23 2007  vancomycin 2250 mg/500 mL 0.9% NS IVPB ([facility])        Ordering Provider: Henry Ortega MD    20 mg/kg × 113 kg  over 135 Minutes Intravenous Once 11/08/23 2023 11/08/23 2328    11/08/23 2007  piperacillin-tazobactam (ZOSYN) 3.375 g in iso-osmotic dextrose 50 ml (premix)        Ordering Provider: Henry Ortega MD    3.375 g  over 30 Minutes Intravenous Once 11/08/23 2023 11/08/23 2113              Objective     Physical Exam:   Vital Signs   Temp:  [97.7 °F (36.5 °C)-99 °F (37.2 °C)] 97.7 °F (36.5 °C)  Heart Rate:  [76-96] 96  Resp:  [12-20] 16  BP: ()/(44-88) 142/88    GENERAL: Awake and alert, in no acute distress.    HEENT: Oropharynx is clear. Hearing is grossly normal.   EYES:  No conjunctival injection.   LUNGS: Normal work of breathing  GI: Soft, nontender, nondistended.   SKIN: Right hip wound with large area from debridement packed and with dressing in place.  PSYCHIATRIC: Appropriate mood, affect, insight, and judgment.     Results Review:   I reviewed the patient's new clinical results.  I reviewed the patient's new imaging results and agree with the interpretation.  I reviewed the patient's other test results and agree with the interpretation    Lab Results   Component Value Date    WBC 12.89 (H) 11/09/2023    HGB 9.6 (L) 11/09/2023    HCT 30.3 (L) 11/09/2023    MCV 86.8 11/09/2023     11/09/2023       Lab Results   Component Value Date    VANCOTROUGH 21.70 (H) 05/28/2023       Lab Results   Component Value Date    GLUCOSE 215 (H) 11/09/2023    BUN 8 11/09/2023    CREATININE 0.81 11/09/2023    BCR 9.9 11/09/2023    CO2 25.0 11/09/2023    CALCIUM 8.2 (L) 11/09/2023    ALBUMIN 3.3 (L) 11/08/2023    AST 24 11/08/2023    ALT 11 11/08/2023         Estimated Creatinine Clearance: 103 mL/min (by C-G formula based on SCr of 0.81 mg/dL).      Microbiology:  11/9 wound culture in process  11/8 blood cultures in process    Radiology:  11/8 CT pelvis report reviewed with wound in the soft tissue overlying the lateral right hip without evidence of underlying abscess or extension to the bone.    Assessment     #Right hip necrotic wound  #Type 2 diabetes  #Immobility syndrome secondary to CVA    Status post debridement by general surgery with cultures pending.  Continue vancomycin goal -600 and Zosyn 3.375 every 8 hours.  Follow-up cultures and de-escalate based on culture results.  Follow vancomycin levels for therapeutic drug monitoring.      Thank you for this consult.  We will continue to follow along and tailor antibiotics as the patient's clinical course evolves.

## 2023-11-09 NOTE — H&P
Patient Name:  Vanessa Root  YOB: 1970  MRN:  [id number]  Admit Date:  11/8/2023  Patient Care Team:  Provider, No Known as PCP - Steve Farris MD as Resident (Physical Medicine and Rehabilitation)      Subjective   History Present Illness     Chief Complaint   Patient presents with    Wound Check       Ms. Root is a 53 y.o. former smoker with a history of necrotizing fasciitis of a right hip wound s/p debridement, hyperlipidemia, hypothyroidism, ischemic stroke, pulmonary embolism, obstructive sleep apnea, type 2 diabetes mellitus, spinal stenosis and immobility syndrome, chronic indwelling stokes catheter, Bipolar disorder, and GERD that presents to [facility] complaining of a wound infection. She states she had a wound vac removed from the wound on her right hip last week.  She states the home health nurse came and was concerned the wound was infected on Monday and then returned today and thought there were areas of necrosis.  She states the nurse called Dr. Adame and the patient was encouraged to come to the ED.  The patient denies fever and chills.  She reports a small amount of drainage and foul odor from the wound.  Work up in the ED revealed CRP 4.23, sed rate 35, WBC 13.45, and lactate 3.3.  A CT of the pelvis showed a wound in the soft tissues overlying the lateral right hip without evidence of underlying abscess or any extension to the bone.  She received Zosyn and Vancomycin and is being admitted for further evaluation.         History of Present Illness  Review of Systems   Constitutional:  Negative for chills and fever.   HENT:  Negative for congestion and sore throat.    Eyes:  Positive for visual disturbance. Negative for photophobia.   Respiratory:  Negative for cough and shortness of breath.    Cardiovascular:  Negative for chest pain, palpitations and leg swelling.   Gastrointestinal:  Negative for abdominal pain, nausea and vomiting.    Endocrine: Negative for polydipsia and polyphagia.   Genitourinary:  Negative for dysuria, flank pain, frequency and hematuria.   Musculoskeletal:  Positive for arthralgias and back pain.   Skin:  Positive for color change and wound.   Neurological:  Negative for dizziness, weakness, light-headedness, numbness and headaches.        Personal History     Past Medical History:   Diagnosis Date    Arthritis     Bipolar 1 disorder     Cancer     uterine    COVID-19     Depression     Diabetes mellitus     Frequent UTI     GERD (gastroesophageal reflux disease)     Hyperlipidemia     Migraine     Murmur, heart     Ovarian cyst     Stroke      Past Surgical History:   Procedure Laterality Date     SECTION  2003    HYSTERECTOMY      TONSILLECTOMY      WOUND DEBRIDEMENT N/A 2023    Procedure: Debridement necrotizing tissue  right buttock wound;  Surgeon: Elizabeth Adame MD;  Location: [facility];  Service: General;  Laterality: N/A;     Family History   Problem Relation Age of Onset    Arthritis Mother     Diabetes Mother     Migraines Mother     Stroke Mother     Arthritis Father     Cancer Maternal Grandmother     Thyroid disease Maternal Grandmother     Cancer Maternal Grandfather     Cancer Paternal Grandmother     Thyroid disease Paternal Grandmother     Cancer Paternal Grandfather      Social History     Tobacco Use    Smoking status: Former    Smokeless tobacco: Never   Vaping Use    Vaping Use: Never used   Substance Use Topics    Alcohol use: Never    Drug use: Never     (Not in a hospital admission)    Allergies:    Allergies   Allergen Reactions    Clemastine Fumarate Other (See Comments)     SEVERE ORBITAL SWELLING    Ziprasidone Other (See Comments)     EXTREME SLEEPINESS  EXTREME SLEEPINESS      Aripiprazole Other (See Comments)     MIGRAINES  MIGRAINES      Sulfa Antibiotics Nausea Only    Green Pepper Hives     unknown    Latex Rash    Lisinopril Cough       Objective    Objective      Vital Signs  Temp:  [98.5 °F (36.9 °C)] 98.5 °F (36.9 °C)  Heart Rate:  [85] 85  Resp:  [12] 12  BP: (119)/(70) 119/70  SpO2:  [95 %] 95 %  on   ;   Device (Oxygen Therapy): room air  Body mass index is 41.6 kg/m².    Physical Exam  Vitals and nursing note reviewed.   Constitutional:       Appearance: She is obese.   HENT:      Head: Normocephalic and atraumatic.      Nose: Nose normal.      Mouth/Throat:      Mouth: Mucous membranes are moist.      Pharynx: Oropharynx is clear.   Eyes:      Extraocular Movements: Extraocular movements intact.      Conjunctiva/sclera: Conjunctivae normal.   Cardiovascular:      Rate and Rhythm: Normal rate and regular rhythm.      Pulses: Normal pulses.      Heart sounds: Normal heart sounds.   Pulmonary:      Effort: Pulmonary effort is normal.      Breath sounds: Normal breath sounds.   Abdominal:      General: Bowel sounds are normal.      Palpations: Abdomen is soft.   Genitourinary:     Comments: Chronic indwelling F/C in place with tammi colored urine in collection bacg  Musculoskeletal:         General: No swelling or tenderness.      Cervical back: Normal range of motion and neck supple.   Skin:     General: Skin is warm and dry.      Comments: Photo of wound reviewed. Large wound to right lateral hip with possible area of necrotic tissue and surrounding erythema, see photo   Neurological:      General: No focal deficit present.      Mental Status: She is alert and oriented to person, place, and time.   Psychiatric:         Mood and Affect: Mood normal.         Behavior: Behavior normal.         Results Review:  I reviewed the patient's new clinical results.  I reviewed the patient's new imaging results and agree with the interpretation.  I reviewed the patient's other test results and agree with the interpretation  I personally viewed and interpreted the patient's EKG/Telemetry data  Discussed with ED provider.    Lab Results (last 24 hours)       Procedure Component  Value Units Date/Time    Blood Culture - Blood, Arm, Left [[id number]] Collected: 11/08/23 1837    Specimen: Blood from Arm, Left Updated: 11/08/23 1841    CBC & Differential [[id number]]  (Abnormal) Collected: 11/08/23 1901    Specimen: Blood Updated: 11/08/23 1925    Narrative:      The following orders were created for panel order CBC & Differential.  Procedure                               Abnormality         Status                     ---------                               -----------         ------                     CBC Auto Differential[[id number]]        Abnormal            Final result                 Please view results for these tests on the individual orders.    Comprehensive Metabolic Panel [[id number]]  (Abnormal) Collected: 11/08/23 1901    Specimen: Blood Updated: 11/08/23 1941     Glucose 228 mg/dL      BUN 8 mg/dL      Creatinine 0.78 mg/dL      Sodium 139 mmol/L      Potassium 3.8 mmol/L      Chloride 104 mmol/L      CO2 23.9 mmol/L      Calcium 8.7 mg/dL      Total Protein 7.0 g/dL      Albumin 3.3 g/dL      ALT (SGPT) 11 U/L      AST (SGOT) 24 U/L      Alkaline Phosphatase 221 U/L      Total Bilirubin 0.3 mg/dL      Globulin 3.7 gm/dL      A/G Ratio 0.9 g/dL      BUN/Creatinine Ratio 10.3     Anion Gap 11.1 mmol/L      eGFR 92.1 mL/min/1.73     Narrative:      GFR Normal >60  Chronic Kidney Disease <60  Kidney Failure <15      Lactic Acid, Plasma [[id number]]  (Abnormal) Collected: 11/08/23 1901    Specimen: Blood Updated: 11/08/23 2006     Lactate 3.3 mmol/L     Procalcitonin [[id number]]  (Normal) Collected: 11/08/23 1901    Specimen: Blood Updated: 11/08/23 1944     Procalcitonin 0.23 ng/mL     Narrative:      As a Marker for Sepsis (Non-Neonates):    1. <0.5 ng/mL represents a low risk of severe sepsis and/or septic shock.  2. >2 ng/mL represents a high risk of severe sepsis and/or septic shock.    As a Marker for Lower Respiratory Tract Infections that require antibiotic therapy:    PCT on  "Admission    Antibiotic Therapy       6-12 Hrs later    >0.5                Strongly Recommended  >0.25 - <0.5        Recommended   0.1 - 0.25          Discouraged              Remeasure/reassess PCT  <0.1                Strongly Discouraged     Remeasure/reassess PCT    As 28 day mortality risk marker: \"Change in Procalcitonin Result\" (>80% or <=80%) if Day 0 (or Day 1) and Day 4 values are available. Refer to http://[email or web address]-pct-calculator.com    Change in PCT <=80%  A decrease of PCT levels below or equal to 80% defines a positive change in PCT test result representing a higher risk for 28-day all-cause mortality of patients diagnosed with severe sepsis for septic shock.    Change in PCT >80%  A decrease of PCT levels of more than 80% defines a negative change in PCT result representing a lower risk for 28-day all-cause mortality of patients diagnosed with severe sepsis or septic shock.       C-reactive Protein [[id number]]  (Abnormal) Collected: 11/08/23 1901    Specimen: Blood Updated: 11/08/23 1941     C-Reactive Protein 4.23 mg/dL     Sedimentation Rate [[id number]]  (Abnormal) Collected: 11/08/23 1901    Specimen: Blood Updated: 11/08/23 1931     Sed Rate 35 mm/hr     CBC Auto Differential [[id number]]  (Abnormal) Collected: 11/08/23 1901    Specimen: Blood Updated: 11/08/23 1925     WBC 13.45 10*3/mm3      RBC 4.11 10*6/mm3      Hemoglobin 11.2 g/dL      Hematocrit 35.2 %      MCV 85.6 fL      MCH 27.3 pg      MCHC 31.8 g/dL      RDW 13.5 %      RDW-SD 42.3 fl      MPV 9.5 fL      Platelets 477 10*3/mm3      Neutrophil % 63.1 %      Lymphocyte % 27.1 %      Monocyte % 4.5 %      Eosinophil % 3.6 %      Basophil % 1.1 %      Immature Grans % 0.6 %      Neutrophils, Absolute 8.47 10*3/mm3      Lymphocytes, Absolute 3.65 10*3/mm3      Monocytes, Absolute 0.61 10*3/mm3      Eosinophils, Absolute 0.49 10*3/mm3      Basophils, Absolute 0.15 10*3/mm3      Immature Grans, Absolute 0.08 10*3/mm3      nRBC 0.0 /100 " WBC     Blood Culture - Blood, Arm, Left [[id number]] Collected: 11/08/23 1903    Specimen: Blood from Arm, Left Updated: 11/08/23 1912            Imaging Results (Last 24 Hours)       Procedure Component Value Units Date/Time    CT Pelvis Without Contrast [[id number]] Collected: 11/08/23 1956     Updated: 11/08/23 2003    Narrative:      CT PELVIS WITHOUT IV CONTRAST     INDICATION: Worsening right posterior lateral hip wound     TECHNIQUE: CT scan of the pelvis was performed without the  administration of IV contrast. Coronal and sagittal reformatted images  were obtained. Radiation dose reduction techniques were utilized,  including automated exposure control and exposure modulation based on  body size.     COMPARISON: 8/25/2023     FINDINGS:  There is a wound in the soft tissues overlying the lateral right hip.  There is some skin thickening and some subcutaneous inflammation  involving the wound. There is no evidence of any underlying abscess.  There is no evidence of fracture or acute bony abnormality. Degenerative  changes are seen of the lower lumbar spine and of the SI joints. There  is a Quiroz catheter in the bladder. The visualized intrapelvic  structures are otherwise unremarkable.       Impression:      There is a wound in the soft tissues overlying the lateral right hip  without evidence of underlying abscess or any extension to the bone.        Radiation dose reduction techniques were utilized, including automated  exposure control and exposure modulation based on body size.        This report was finalized on 11/8/2023 8:00 PM by Dr. Abhijit Rosado M.D on Workstation: [id number]                   No orders to display        Assessment/Plan     Active Hospital Problems    Diagnosis  POA    **Infected wound [T14.8XXA, L08.9]  Yes    History of ischemic stroke [Z86.73]  Not Applicable    Immobility syndrome (paraplegic) [M62.3]  Yes    Bipolar 1 disorder [F31.9]  Yes    GERD (gastroesophageal reflux  disease) [K21.9]  Yes    Hyperlipidemia [E78.5]  Yes    Type 2 diabetes mellitus with diabetic polyneuropathy [E11.42]  Yes    Obstructive sleep apnea syndrome [G47.33]  Yes    Hypothyroidism [E03.9]  Yes       Infected Wound  -Admit to a telemetry unit for monitoring  -Leukocytosis and elevated inflammatory markers  -Continue Zosyn and Vancomycin  -Check wound culture  -Blood cultures pending  -She has a history of necrotizing fasciitis  -General surgery consult  -WOCN consult and wound care    Hypothyroidism  -Continue Levothyroxine  -Check TSH    Hyperlipidemia/History of Stroke/History of PE  -She does not appear to be on a statin or a daily aspirin  -Will hold Eliquis tonight in case surgical intervention is required    Type 2 Diabetes Mellitus  -Hold oral diabetic medications  -Initiate correctional factor insulin  -Monitor blood sugars ACHS  -Check hgb A1C  -She has a history of diabetic polyneuropathy, continue home dose of gabapentin    GERD  -She takes Omeprazole at home, will start Protonix daily    Spinal Stenosis/Immobility Syndrome  -Continue home pain medications  -PT/OT consults    WILLIS  -She did not bring a home CPAP to the hospital. Supplemental oxygen as needed to keep sats greater than or equal to 92%  -Continuous pulse oximetry    -I discussed the patients findings and my recommendations with patient.    VTE Prophylaxis - SCDs.  Code Status - Full code.       JAVAD Connors  [facility]  11/08/23  21:42 EST

## 2023-11-09 NOTE — ANESTHESIA PREPROCEDURE EVALUATION
Anesthesia Evaluation     Patient summary reviewed and Nursing notes reviewed   NPO Solid Status: > 8 hours  NPO Liquid Status: > 4 hours           Airway   Mallampati: II  TM distance: >3 FB  Neck ROM: full  No difficulty expected  Comment: Grade IIa view with MAC 3  Dental    (+) poor dentition    Comment: Many missing teeth, none loose presently    Pulmonary - normal exam    breath sounds clear to auscultation  (+) pulmonary embolism, a smoker Former,sleep apnea on CPAP  Cardiovascular - normal exam    ECG reviewed  Rhythm: regular  Rate: normal    (+) valvular problems/murmurs murmur, hyperlipidemia    ROS comment: NSR by telemetry  PE comment: No murmurs heard    Neuro/Psych  (+) CVA residual symptoms, headaches, numbness, psychiatric history Bipolar and Depression    ROS Comment: Paraplegia/immobility syndrome/diabetic peripheral neuropathy/migraines/RLS/legally blind  GI/Hepatic/Renal/Endo    (+) obesity, morbid obesity, GERD, diabetes mellitus type 2, thyroid problem hypothyroidism    Musculoskeletal         ROS comment: Lumbar DDD/hx necrotizing fasciitis/immobility syndrome with pressure ulcer   Abdominal   (+) obese   Substance History      OB/GYN          Other   arthritis, blood dyscrasia anemia,   history of cancer      Other Comment: Hx uterine CA/Hgb 9.6                Anesthesia Plan    ASA 4     general     (Probable left lateral position/avoid sux due to paraplegia)  intravenous induction     Anesthetic plan, risks, benefits, and alternatives have been provided, discussed and informed consent has been obtained with: patient.    CODE STATUS:    Code Status (Patient has no pulse and is not breathing): CPR (Attempt to Resuscitate)  Medical Interventions (Patient has pulse or is breathing): Full Support

## 2023-11-10 LAB
BACTERIA SPEC AEROBE CULT: ABNORMAL
GLUCOSE BLDC GLUCOMTR-MCNC: 245 MG/DL (ref 70–130)
GLUCOSE BLDC GLUCOMTR-MCNC: 246 MG/DL (ref 70–130)
GLUCOSE BLDC GLUCOMTR-MCNC: 254 MG/DL (ref 70–130)
GLUCOSE BLDC GLUCOMTR-MCNC: 272 MG/DL (ref 70–130)
GRAM STN SPEC: ABNORMAL
ISOLATED FROM: ABNORMAL
T4 FREE SERPL-MCNC: 0.81 NG/DL (ref 0.93–1.7)
TSH SERPL DL<=0.05 MIU/L-ACNC: 13.1 UIU/ML (ref 0.27–4.2)
VANCOMYCIN TROUGH SERPL-MCNC: 18.8 MCG/ML (ref 5–20)

## 2023-11-10 PROCEDURE — 25010000002 PIPERACILLIN SOD-TAZOBACTAM PER 1 G: Performed by: SURGERY

## 2023-11-10 PROCEDURE — 82948 REAGENT STRIP/BLOOD GLUCOSE: CPT

## 2023-11-10 PROCEDURE — 25010000002 VANCOMYCIN 750 MG RECONSTITUTED SOLUTION 1 EACH VIAL: Performed by: INTERNAL MEDICINE

## 2023-11-10 PROCEDURE — A9270 NON-COVERED ITEM OR SERVICE: HCPCS | Performed by: HOSPITALIST

## 2023-11-10 PROCEDURE — 63710000001 LEVOTHYROXINE 100 MCG TABLET: Performed by: SURGERY

## 2023-11-10 PROCEDURE — 36415 COLL VENOUS BLD VENIPUNCTURE: CPT | Performed by: SURGERY

## 2023-11-10 PROCEDURE — 63710000001 INSULIN LISPRO (HUMAN) PER 5 UNITS: Performed by: SURGERY

## 2023-11-10 PROCEDURE — A9270 NON-COVERED ITEM OR SERVICE: HCPCS | Performed by: SURGERY

## 2023-11-10 PROCEDURE — 63710000001 GABAPENTIN 300 MG CAPSULE: Performed by: SURGERY

## 2023-11-10 PROCEDURE — 80202 ASSAY OF VANCOMYCIN: CPT | Performed by: SURGERY

## 2023-11-10 PROCEDURE — 63710000001 APIXABAN 5 MG TABLET: Performed by: HOSPITALIST

## 2023-11-10 PROCEDURE — 63710000001 LEVOTHYROXINE 75 MCG TABLET: Performed by: SURGERY

## 2023-11-10 PROCEDURE — 63710000001 INSULIN GLARGINE PER 5 UNITS: Performed by: HOSPITALIST

## 2023-11-10 PROCEDURE — 99214 OFFICE O/P EST MOD 30 MIN: CPT | Performed by: STUDENT IN AN ORGANIZED HEALTH CARE EDUCATION/TRAINING PROGRAM

## 2023-11-10 PROCEDURE — 25010000002 ONDANSETRON PER 1 MG: Performed by: SURGERY

## 2023-11-10 PROCEDURE — 84439 ASSAY OF FREE THYROXINE: CPT | Performed by: HOSPITALIST

## 2023-11-10 PROCEDURE — 25810000003 SODIUM CHLORIDE 0.9 % SOLUTION 250 ML FLEX CONT: Performed by: INTERNAL MEDICINE

## 2023-11-10 PROCEDURE — G0378 HOSPITAL OBSERVATION PER HR: HCPCS

## 2023-11-10 PROCEDURE — 84443 ASSAY THYROID STIM HORMONE: CPT | Performed by: HOSPITALIST

## 2023-11-10 PROCEDURE — 63710000001 ACETAMINOPHEN 325 MG TABLET: Performed by: SURGERY

## 2023-11-10 PROCEDURE — 25010000002 VANCOMYCIN PER 500 MG: Performed by: SURGERY

## 2023-11-10 PROCEDURE — 97602 WOUND(S) CARE NON-SELECTIVE: CPT

## 2023-11-10 PROCEDURE — 99212 OFFICE O/P EST SF 10 MIN: CPT | Performed by: SURGERY

## 2023-11-10 RX ADMIN — APIXABAN 5 MG: 5 TABLET, FILM COATED ORAL at 20:58

## 2023-11-10 RX ADMIN — INSULIN GLARGINE 15 UNITS: 100 INJECTION, SOLUTION SUBCUTANEOUS at 20:58

## 2023-11-10 RX ADMIN — LEVOTHYROXINE SODIUM 200 MCG: 100 TABLET ORAL at 06:07

## 2023-11-10 RX ADMIN — INSULIN LISPRO 6 UNITS: 100 INJECTION, SOLUTION INTRAVENOUS; SUBCUTANEOUS at 17:34

## 2023-11-10 RX ADMIN — ACETAMINOPHEN 650 MG: 325 TABLET ORAL at 21:17

## 2023-11-10 RX ADMIN — PIPERACILLIN SODIUM AND TAZOBACTAM SODIUM 4.5 G: 4; .5 INJECTION, SOLUTION INTRAVENOUS at 01:46

## 2023-11-10 RX ADMIN — GABAPENTIN 300 MG: 300 CAPSULE ORAL at 20:58

## 2023-11-10 RX ADMIN — Medication 10 ML: at 21:19

## 2023-11-10 RX ADMIN — PIPERACILLIN SODIUM AND TAZOBACTAM SODIUM 4.5 G: 4; .5 INJECTION, SOLUTION INTRAVENOUS at 17:34

## 2023-11-10 RX ADMIN — PIPERACILLIN SODIUM AND TAZOBACTAM SODIUM 4.5 G: 4; .5 INJECTION, SOLUTION INTRAVENOUS at 10:29

## 2023-11-10 RX ADMIN — VANCOMYCIN HYDROCHLORIDE 750 MG: 750 INJECTION, POWDER, LYOPHILIZED, FOR SOLUTION INTRAVENOUS at 23:21

## 2023-11-10 RX ADMIN — INSULIN LISPRO 4 UNITS: 100 INJECTION, SOLUTION INTRAVENOUS; SUBCUTANEOUS at 21:23

## 2023-11-10 RX ADMIN — GABAPENTIN 300 MG: 300 CAPSULE ORAL at 08:23

## 2023-11-10 RX ADMIN — Medication 10 ML: at 08:24

## 2023-11-10 RX ADMIN — LEVOTHYROXINE SODIUM 75 MCG: 75 TABLET ORAL at 06:08

## 2023-11-10 RX ADMIN — INSULIN LISPRO 4 UNITS: 100 INJECTION, SOLUTION INTRAVENOUS; SUBCUTANEOUS at 12:35

## 2023-11-10 RX ADMIN — INSULIN LISPRO 6 UNITS: 100 INJECTION, SOLUTION INTRAVENOUS; SUBCUTANEOUS at 08:23

## 2023-11-10 RX ADMIN — ONDANSETRON 4 MG: 2 INJECTION INTRAMUSCULAR; INTRAVENOUS at 23:31

## 2023-11-10 RX ADMIN — VANCOMYCIN HYDROCHLORIDE 1000 MG: 1 INJECTION, SOLUTION INTRAVENOUS at 12:35

## 2023-11-10 NOTE — PROGRESS NOTES
"[facility] Clinical Pharmacy Services: Vancomycin Monitoring Note    Vanessa Root is a 53 y.o. female who is on day 3 (stop date changed to 11/16 per Fatimah) of pharmacy to dose vancomycin for Skin and Soft Tissue.    Previous Vancomycin Dose:   1000 mg IV every  12  hours (showing slightly high AUC of 617 therefore will decrease dose)  Updated Cultures and Sensitivities:   lood Culture Staphylococcus, coagulase negative Critical      Results from last 7 days   Lab Units 11/10/23  1139   VANCOMYCIN TR mcg/mL 18.80     Vitals/Labs  Ht: 165.1 cm (65\"); Wt: 113 kg (250 lb)   Temp Readings from Last 1 Encounters:   11/10/23 98.1 °F (36.7 °C)     Estimated Creatinine Clearance: 103 mL/min (by C-G formula based on SCr of 0.81 mg/dL).       Results from last 7 days   Lab Units 11/09/23  0205 11/08/23  1901   CREATININE mg/dL 0.81 0.78   WBC 10*3/mm3 12.89* 13.45*     Assessment/Plan    Decrease Vancomycin Dose to: 750 mg IV every 12 hours; provides a predicted  mg/L.hr   Next Level Date and Time: Vanc Trough on 11/13/23 at 1130  We will continue to monitor patient changes and renal function     Thank you for involving pharmacy in this patient's care. Please contact pharmacy with any questions or concerns.       Charlie Marc, [facility]  Clinical Pharmacist         "

## 2023-11-10 NOTE — PROGRESS NOTES
LOS: 0 days     Chief Complaint: Wound infection    Interval History: Patient remains afebrile and tolerating antibiotics.  Blood culture 1 out of 2 positive which likely is contaminant.    Vital Signs  Temp:  [97.7 °F (36.5 °C)-99 °F (37.2 °C)] 98.4 °F (36.9 °C)  Heart Rate:  [76-96] 85  Resp:  [16-18] 16  BP: (110-142)/(65-88) 137/77    Physical Exam:  General: In no acute distress  HEENT: Oropharynx clear, moist mucous membranes  Respiratory: Normal work of breathing  Skin: Right hip area with surgical dressing.  Extremities: No edema, cyanosis  Access: Peripheral IV    Antibiotics:  Anti-Infectives (From admission, onward)      Ordered     Dose/Rate Route Frequency Start Stop    11/09/23 1340  fluconazole (DIFLUCAN) tablet 150 mg        Ordering Provider: Marv Aguiar MD    150 mg Oral Once 11/09/23 1500 11/09/23 1617    11/08/23 2131  vancomycin (VANCOCIN) 1000 mg/200 mL dextrose 5% IVPB        Ordering Provider: Mohan Escamilla MD    1,000 mg  over 60 Minutes Intravenous Every 12 Hours 11/09/23 1200 11/16/23 1159    11/08/23 2125  piperacillin-tazobactam (ZOSYN) 4.5 g in iso-osmotic dextrose 100 mL IVPB (premix)        Ordering Provider: Mohan Escamilla MD    4.5 g  over 4 Hours Intravenous Every 8 Hours 11/09/23 0200 11/16/23 0159    11/08/23 2117  Pharmacy to dose vancomycin        Ordering Provider: Mohan Escamilla MD     Does not apply Continuous PRN 11/08/23 2115 11/13/23 2114    11/08/23 2007  vancomycin 2250 mg/500 mL 0.9% NS IVPB ([facility])        Ordering Provider: Henry Ortega MD    20 mg/kg × 113 kg  over 135 Minutes Intravenous Once 11/08/23 2023 11/08/23 2328    11/08/23 2007  piperacillin-tazobactam (ZOSYN) 3.375 g in iso-osmotic dextrose 50 ml (premix)        Ordering Provider: Henry Ortega MD    3.375 g  over 30 Minutes Intravenous Once 11/08/23 2023 11/08/23 2113             Results Review:     I reviewed the patient's new clinical results.    Lab  Results   Component Value Date    WBC 12.89 (H) 11/09/2023    HGB 9.6 (L) 11/09/2023    HCT 30.3 (L) 11/09/2023    MCV 86.8 11/09/2023     11/09/2023     Lab Results   Component Value Date    GLUCOSE 215 (H) 11/09/2023    BUN 8 11/09/2023    CREATININE 0.81 11/09/2023    BCR 9.9 11/09/2023    CO2 25.0 11/09/2023    CALCIUM 8.2 (L) 11/09/2023    ALBUMIN 3.3 (L) 11/08/2023    AST 24 11/08/2023    ALT 11 11/08/2023       Microbiology:  11/8 blood cultures 1 out of 2 coag negative staph  11/9 OR wound culture in process    Assessment    #Right hip necrotic wound  #Positive blood culture, 1 out of 2 coag negative staph likely contaminant  #Type 2 diabetes  #Immobility syndrome secondary to CVA    Continue follow-up surgical culture.  Continue vancomycin goal -600 and Zosyn 3.375 every 8 hours for now we will following this up.  De-escalate based on results.  Continue following vancomycin levels for therapeutic drug monitoring.    ID will follow.

## 2023-11-10 NOTE — SIGNIFICANT NOTE
11/10/23 0801   OTHER   Discipline physical therapist   Rehab Time/Intention   Session Not Performed other (see comments)  (Per previous PT notes, pt is bedbound at [facility]. Transfers to a  using either a lift or trapeze bar and assist from her daughter. Has not walked in >3 years - not appropriate for acute PT, will sign-off.)   Therapy Assessment/Plan (PT)   Criteria for Skilled Interventions Met (PT) no;does not meet criteria for skilled intervention

## 2023-11-10 NOTE — PROGRESS NOTES
Dedicated to Hospital Care    [phone]   LOS: 0 days     Name: Vanessa Root  Age/Sex: 53 y.o. female  :  1970        PCP: No primary care provider on file.  Chief Complaint   Patient presents with    Wound Check      Subjective   Vaginal symptoms resolved.  Complaining of irritation at her IV site.  No wound bleeding today and no fevers  General: No Fever or Chills, Cardiac: No Chest Pain or Palpitations, Resp: No Cough or SOA, GI: No Nausea, Vomiting, or Diarrhea, and Other: No bleeding    apixaban, 5 mg, Oral, BID  gabapentin, 300 mg, Oral, Q12H  HYDROcodone-acetaminophen, 1 tablet, Oral, Daily  insulin glargine, 15 Units, Subcutaneous, Nightly  insulin lispro, 2-9 Units, Subcutaneous, 4x Daily AC & at Bedtime  levothyroxine, 200 mcg, Oral, Daily  levothyroxine, 75 mcg, Oral, Q AM  piperacillin-tazobactam, 4.5 g, Intravenous, Q8H  sodium chloride, 10 mL, Intravenous, Q12H  vancomycin, 1,000 mg, Intravenous, Q12H      Pharmacy to dose vancomycin,         Objective   Vital Signs  Temp:  [97.7 °F (36.5 °C)-98.8 °F (37.1 °C)] 98.1 °F (36.7 °C)  Heart Rate:  [80-96] 89  Resp:  [16-18] 18  BP: (124-149)/(74-88) 149/87  Body mass index is 41.6 kg/m².    Intake/Output Summary (Last 24 hours) at 11/10/2023 1201  Last data filed at 11/10/2023 0538  Gross per 24 hour   Intake 2038.33 ml   Output 3250 ml   Net -1211.67 ml       Physical Exam  Constitutional:       General: She is not in acute distress.     Appearance: She is obese. She is ill-appearing.   Cardiovascular:      Rate and Rhythm: Normal rate and regular rhythm.   Pulmonary:      Effort: Pulmonary effort is normal. No respiratory distress.      Breath sounds: Normal breath sounds.   Abdominal:      General: Bowel sounds are normal. There is no distension.      Palpations: Abdomen is soft.   Neurological:      General: No focal deficit present.      Mental Status: Mental status is at baseline.           Results Review:       I reviewed the  patient's new clinical results.  Results from last 7 days   Lab Units 11/09/23  0205 11/08/23  1901   WBC 10*3/mm3 12.89* 13.45*   HEMOGLOBIN g/dL 9.6* 11.2*   PLATELETS 10*3/mm3 403 477*     Results from last 7 days   Lab Units 11/09/23  0205 11/08/23  1901   SODIUM mmol/L 139 139   POTASSIUM mmol/L 3.5 3.8   CHLORIDE mmol/L 106 104   CO2 mmol/L 25.0 23.9   BUN mg/dL 8 8   CREATININE mg/dL 0.81 0.78   CALCIUM mg/dL 8.2* 8.7   Estimated Creatinine Clearance: 103 mL/min (by C-G formula based on SCr of 0.81 mg/dL).    Lab Results   Component Value Date    HGBA1C 8.90 (H) 11/09/2023    HGBA1C 11.30 (H) 05/24/2023     Glucose   Date/Time Value Ref Range Status   11/10/2023 0755 272 (H) 70 - 130 mg/dL Final   11/09/2023 2000 374 (H) 70 - 130 mg/dL Final   11/09/2023 1724 321 (H) 70 - 130 mg/dL Final   11/09/2023 1658 337 (H) 70 - 130 mg/dL Final   11/09/2023 1142 210 (H) 70 - 130 mg/dL Final   11/09/2023 0758 208 (H) 70 - 130 mg/dL Final   11/08/2023 2224 201 (H) 70 - 130 mg/dL Final       Assessment & Plan   Active Hospital Problems    Diagnosis  POA    **Infected wound [T14.8XXA, L08.9]  Yes    History of ischemic stroke [Z86.73]  Not Applicable    Immobility syndrome (paraplegic) [M62.3]  Yes    Bipolar 1 disorder [F31.9]  Yes    GERD (gastroesophageal reflux disease) [K21.9]  Yes    Hyperlipidemia [E78.5]  Yes    Type 2 diabetes mellitus with diabetic polyneuropathy [E11.42]  Yes    Obstructive sleep apnea syndrome [G47.33]  Yes    Hypothyroidism [E03.9]  Yes      Resolved Hospital Problems   No resolved problems to display.       PLAN  This is a 53-year-old female with a history of prior stroke, immobility syndrome, bipolar disorder hyperlipidemia, obstructive sleep apnea, hypothyroidism, type 2 diabetes and prior complicated hip wound who presents to the hospital with necrosis in her wound and possible infection  -Appreciate general surgery's assistance taken to the operating room for debridement 11/9; surgery  tolerated well.  -resume eliquis today  -Started on broad-spectrum antibiotics on admission.  Appreciate ID input and assistance  -vaginal candida infection treated  -Her A1c shows lack of good tight outpatient control.  Her TSH also indicates need for possible adjustment or lack of compliance.  We will check a T4 in the morning and discuss further with her regarding medications adjustments for both her diabetes and her thyroid.  For now we will increase her basal insulin to 15 units nightly continue sliding scale coverage  -Hemoglobin down a little less than 2 g today.  Some of this is dilution.  She is eating and drinking we will discontinue IV fluids  -Plan to resume Eliquis in the morning with her recent history of pulmonary embolus  -Full code    Disposition  Expected Discharge Date: 11/13/2023; Expected Discharge Time:        Marv Aguiar MD  [facility]  11/10/23  12:01 EST

## 2023-11-10 NOTE — PLAN OF CARE
Goal Outcome Evaluation:  Plan of Care Reviewed With: patient        Progress: no change  Outcome Evaluation: Pt alert and oriented x4. VSS. CPAP worn while sleeping. IV abx infused as ordered. Dressing to R hip remains intact, consult to wound care nurse placed for wound care reccomendations. Quiroz in place and patent. Pt complains of no pain. Pt voices no needs at this time. Call light and personal items within reach.

## 2023-11-10 NOTE — PROGRESS NOTES
"Nutrition Services    Patient Name:  Vanessa Root  YOB: 1970  MRN: [id number]  Admit Date:  2023    Assessment Date:  11/10/23    Summary:     Pt is a 53 y.o. female adm for infected wound. H/o necrotizing fascitis of right hip wound, T2DM, WILLIS, paraplegia, endometrium and uterus cancer. Nutrition assessment completed. Visited pt at bedside. Endorses good appetite. Eating 100% PO. POD #1 from debridement. Agreeable to Logan to help with wound healing. C/o being hungry at breakfast and asked for double proteins, will honor.   Labs: Glu 246  Skin: right lateral hip soft tissue necrosis     REC:  Double proteins at breakfast   Logan once daily at lunch to support wound healing   Will continue to encourage and monitor PO/ONS intake     RD to follow per protocol.     CLINICAL NUTRITION ASSESSMENT      Reason for Assessment Pressure Injury and/or Non-Healing Wound     Diagnosis/Problem   Infected wound    Medical/Surgical History Past Medical History:   Diagnosis Date    Arthritis     Bipolar 1 disorder     Cancer     uterine    COVID-19     Depression     Diabetes mellitus     Frequent UTI     GERD (gastroesophageal reflux disease)     Hyperlipidemia     Migraine     Murmur, heart     Ovarian cyst     Stroke        Past Surgical History:   Procedure Laterality Date     SECTION  2003    HYSTERECTOMY      INCISION AND DRAINAGE LEG Right 2023    Procedure: Debridement of right thigh wound;  Surgeon: Mohan Escamilla MD;  Location: [facility];  Service: General;  Laterality: Right;    TONSILLECTOMY      WOUND DEBRIDEMENT N/A 2023    Procedure: Debridement necrotizing tissue  right buttock wound;  Surgeon: Elizabeth Adame MD;  Location: [facility];  Service: General;  Laterality: N/A;        Anthropometrics        Current Height  Current Weight  BMI kg/m2 Height: 165.1 cm (65\")  Weight: 113 kg (250 lb) (23 1750)  Body mass index is 41.6 kg/m². " "  Adjusted BMI (if applicable)    BMI Category Obese, Class III (40 or higher)   Ideal Body Weight (IBW) 57 kg    Usual Body Weight (UBW) 280 lb    Weight Trend Loss, Amount/Timeframe: 30 lb (10.7%) wt loss x 6 mo    Weight History Wt Readings from Last 30 Encounters:   11/08/23 1750 113 kg (250 lb)   08/26/23 0505 113 kg (248 lb 3.8 oz)   08/25/23 1935 113 kg (250 lb 3.6 oz)   08/25/23 1031 118 kg (260 lb)   06/06/23 0439 129 kg (284 lb 2.8 oz)   06/05/23 0500 129 kg (284 lb 9.6 oz)   06/04/23 0607 125 kg (274 lb 11.2 oz)   06/03/23 0617 126 kg (278 lb 3.2 oz)   06/02/23 0516 132 kg (291 lb 14.4 oz)   06/01/23 0112 127 kg (279 lb 3.2 oz)   05/31/23 0529 127 kg (280 lb 3.3 oz)   05/30/23 0500 128 kg (281 lb 9.6 oz)   05/29/23 0510 (!) 139 kg (306 lb 6.4 oz)   05/28/23 2144 (!) 138 kg (305 lb)   05/28/23 0542 (!) 139 kg (305 lb 12.5 oz)   05/27/23 0530 132 kg (291 lb 0.1 oz)   05/25/23 0338 133 kg (292 lb 15.9 oz)   11/29/21 1405 113 kg (249 lb)        Estimated Requirements         Weight used  57 kg IBW     Calories  7079-0648 (25 kcal/kg, 30 kcal/kg)    Protein   (1.5 - 2.0 gm/kg)    Fluid  (1 mL/kcal)     Labs       Pertinent Labs    Results from last 7 days   Lab Units 11/09/23  0205 11/08/23  1901   SODIUM mmol/L 139 139   POTASSIUM mmol/L 3.5 3.8   CHLORIDE mmol/L 106 104   CO2 mmol/L 25.0 23.9   BUN mg/dL 8 8   CREATININE mg/dL 0.81 0.78   CALCIUM mg/dL 8.2* 8.7   BILIRUBIN mg/dL  --  0.3   ALK PHOS U/L  --  221*   ALT (SGPT) U/L  --  11   AST (SGOT) U/L  --  24   GLUCOSE mg/dL 215* 228*     Results from last 7 days   Lab Units 11/09/23  0205 11/08/23  1901   HEMOGLOBIN g/dL 9.6* 11.2*   HEMATOCRIT % 30.3* 35.2   WBC 10*3/mm3 12.89* 13.45*   ALBUMIN g/dL  --  3.3*     Results from last 7 days   Lab Units 11/09/23  0205 11/08/23  1901   PLATELETS 10*3/mm3 403 477*     No results found for: \"COVID19\"  Lab Results   Component Value Date    HGBA1C 8.90 (H) 11/09/2023          Medications           Scheduled " Medications apixaban, 5 mg, Oral, BID  gabapentin, 300 mg, Oral, Q12H  HYDROcodone-acetaminophen, 1 tablet, Oral, Daily  insulin glargine, 15 Units, Subcutaneous, Nightly  insulin lispro, 2-9 Units, Subcutaneous, 4x Daily AC & at Bedtime  levothyroxine, 200 mcg, Oral, Daily  levothyroxine, 75 mcg, Oral, Q AM  piperacillin-tazobactam, 4.5 g, Intravenous, Q8H  sodium chloride, 10 mL, Intravenous, Q12H  vancomycin, 1,000 mg, Intravenous, Q12H       Infusions Pharmacy to dose vancomycin,        PRN Medications   acetaminophen **OR** acetaminophen **OR** acetaminophen    calcium carbonate    dextrose    dextrose    glucagon (human recombinant)    hydrOXYzine    ondansetron **OR** ondansetron    Pharmacy to dose vancomycin    sodium chloride    sodium chloride     Physical Findings          General Findings alert, obese, oriented, paraplegia   Oral/Mouth Cavity tooth or teeth missing   Edema  no edema   Gastrointestinal non-distended , last bowel movement: 11/8   Skin  pale, location of wound: right lateral hip soft tissue necrosis    Tubes/Drains/Lines none   NFPE Not indicated at this time   --  Current Nutrition Orders & Evaluation of Intake       Oral Nutrition     Food Allergies Other: Green pepper    Current PO Diet Diet: Diabetic Diets; Consistent Carbohydrate; Texture: Regular Texture (IDDSI 7); Fluid Consistency: Thin (IDDSI 0)   Supplement n/a   PO Evaluation     % PO Intake 100%    Factors Affecting Intake: No factors at this time   --  PES STATEMENT / NUTRITION DIAGNOSIS      Nutrition Dx Problem  Problem: Increased Nutrient Needs  Etiology: Medical Diagnosis - right lateral hip soft tissue necrosis     Signs/Symptoms: Protein     NUTRITION INTERVENTION / PLAN OF CARE      Intervention Goal(s) Maintain nutrition status, Improved nutrition related labs, Establish goals of care, Meet estimated needs, Disease management/therapy, Maintain intake, Accepts oral nutrition supplement, and Appropriate weight loss          RD Intervention/Action Interview for preferences, Encourage intake, Continue to monitor, Care plan reviewed, and Recommend/order: ONS    --      Prescription/Orders:       PO Diet Double proteins at breakfast       Supplements Logan once daily at lunch      Enteral Nutrition       Parenteral Nutrition    New Prescription Ordered? Yes   --      Monitor/Evaluation Per protocol, PO intake, Supplement intake, Skin status, Symptoms   Discharge Plan/Needs Pending clinical course   --    RD to follow per protocol.      Electronically signed by:  Dianna Vallecillo Dietitian Intern   11/10/23 13:18 EST

## 2023-11-10 NOTE — NURSING NOTE
CWON note: pt consult received for necrotic wound to right hip. Pt underwent surgical debridement of hip yesterday and will defer care and management of wound to surgery. I have ordered a FEMI mattress from [employer]  #[id number] and bed frame from [employer]. Prevention standing orders placed into [facility].  I spoke with RN regarding these things. Please re-consult for any additional needs.

## 2023-11-10 NOTE — PROGRESS NOTES
Colorectal & General Surgery  Progress Note    Patient: Vanessa Root  YOB: 1970  MRN: [id number]      Assessment  Vanessa Root is a 53 y.o. female with left thigh and hip wound that is now postop day 1 from debridement.  Some bleeding from the wound yesterday, but that is controlled now.  We will plan for wet-to-dry dressings here and at home.  Okay to restart anticoagulation today.  Wet-to-dry dressings per nursing staff twice per day.  We will be ready for discharge from my standpoint tomorrow pending appearance of wound.      Subjective  No acute events overnight.  No more bleeding from wound.    Objective    Vitals:    11/10/23 0750   BP: 149/87   Pulse: 89   Resp:    Temp: 98.1 °F (36.7 °C)   SpO2: 94%       Physical Exam  Constitutional: Well-developed well-nourished, no acute distress  Neck: Supple, trachea midline  Respiratory: No increased work of breathing, Symmetric excursion  Cardiovascular: Well pefursed, no jugular venous distention evident   Abdominal: Soft, non-tender, non-distended  Skin: Warm, dry, no rash on visualized skin surfaces. Wound edges viable. No necrotic tissue.   Psychiatric: Alert and oriented ×3, normal affect     Laboratory Results  None to review    Radiology  None to review         Louis Escamilla MD  Colorectal & General Surgery  [facility]    [address] [place] [address], [address]  [place], [place], [address]  P: [phone]  F: [phone]

## 2023-11-10 NOTE — SIGNIFICANT NOTE
11/10/23 0852   OTHER   Discipline occupational therapist   Rehab Time/Intention   Session Not Performed other (see comments)  (Per previous PT notes, pt is bedbound at [facility]. Transfers to a  using either a lift or trapeze bar and assist from her daughter. Dependent assist with all mobility related ADLs)

## 2023-11-10 NOTE — DISCHARGE PLACEMENT REQUEST
"Vanessa Villanueva (53 y.o. Female)       Date of Birth   1970    Social Security Number       Address   [address] [id number]    Home Phone   [phone]    MRN   [id number]       [facility]    Marital Status   Single                            Admission Date   11/8/23    Admission Type   Emergency    Admitting Provider   Nevaeh Garcia MD    Attending Provider   Marv Aguiar MD    Department, Room/Bed   [address], P389/1       Discharge Date       Discharge Disposition       Discharge Destination                                 Attending Provider: Marv Aguiar MD    Allergies: Clemastine Fumarate, Ziprasidone, Aripiprazole, Sulfa Antibiotics, Green Pepper, Latex, Lisinopril    Isolation: None   Infection: None   Code Status: CPR    Ht: 165.1 cm (65\")   Wt: 113 kg (250 lb)    Admission Cmt: None   Principal Problem: Infected wound [T14.8XXA,L08.9]                   Active Insurance as of 11/8/2023       Primary Coverage       Payor Plan Insurance Group Employer/Plan Group    MEDICARE MEDICARE A & B        Payor Plan Address Payor Plan Phone Number Payor Plan Fax Number Effective Dates    [address] [phone]  4/1/2003 - None Entered    [facility] [id number]         Subscriber Name Subscriber Birth Date Member ID       VANESSA VILLANUEVA 1970 [id number]               Secondary Coverage       Payor Plan Insurance Group Employer/Plan Group    [place]        Payor Plan Address Payor Plan Phone Number Payor Plan Fax Number Effective Dates    [address] [phone]  11/8/2023 - None Entered    [place] [address]         Subscriber Name Subscriber Birth Date Member ID       VANESSA VILLANUEVA 1970 [id number]                     Emergency Contacts        (Rel.) Home Phone Work Phone Mobile Phone    WHITNEY VILLANUEVA (Daughter) -- -- [phone]    Pauline Villanueva (Mother) -- -- [phone] "

## 2023-11-10 NOTE — CASE MANAGEMENT/SOCIAL WORK
Discharge Planning Assessment  [facility]     Patient Name: Vanessa Root  MRN: [id number]  Today's Date: 11/10/2023    Admit Date: 11/8/2023        Discharge Needs Assessment       Row Name 11/10/23 1442       Living Environment    People in Home child(nicole), adult;parent(s)    Current Living Arrangements home    Potentially Unsafe Housing Conditions none    Primary Care Provided by child(nicole);parent(s)    Provides Primary Care For no one, unable/limited ability to care for self    Family Caregiver if Needed child(nicole), adult    Quality of Family Relationships unable to assess    Able to Return to Prior Arrangements yes       Resource/Environmental Concerns    Resource/Environmental Concerns none    Transportation Concerns none       Transition Planning    Patient/Family Anticipates Transition to home with family    Patient/Family Anticipated Services at Transition home health care    Transportation Anticipated health plan transportation       Discharge Needs Assessment    Equipment Currently Used at Home cpap;lift device;hospital bed;wheelchair, motorized    Concerns to be Addressed discharge planning    Equipment Needed After Discharge none                   Discharge Plan       Row Name 11/10/23 1439       Plan    Provided Post Acute Provider List? N/A    N/A Provider List Comment [facility]    Provided Post Acute Provider Quality & Resource List? N/A    N/A Quality & Resource List Comment [facility]    Plan Comments Spoke with patient at bedside. Introduces self and explained CCP role. Verified face sheet and local pharmacy is [facility]; patient [facility] in [facility]. [facility] placed to follow. Patient states she has been [facility] for SNF. Patient has hospital bed, lift, electric wheelchair, trapeze, etc. patient stated that she has all the equipment she needs. Patient states that she lives with her mother and adult daughter  in split level home with ramp entry and ramps to the different levels in the home. Patient states that her daughter is her primary caregiver, but her mother can help as well. Patient plans to return home with [facility] and stretcher transport. Patient stated that her daughter will not be available for her to return home tomorrow. Called [facility] to set up transport for Sunday; they don't transport on Sundays. Scheduled BEMS for Sunday 11/12/23 at 1930.                  Continued Care and Services - Admitted Since 11/8/2023       Home Medical Care       Service Provider Request Status Selected Services Address Phone Fax Patient Preferred    [facility] Pending - Request Sent N/A [id number] [id number] [phone] --                  Selected Continued Care - Prior Encounters Includes continued care and service providers with selected services from prior encounters from 8/10/2023 to 11/10/2023      Discharged on 8/29/2023 Admission date: 8/25/2023 - Discharge disposition: Home-Health Care [facility]      Home Medical Care       Service Provider Selected Services Address Phone Fax Patient Preferred    [facility] [id number], [place] [phone] --                          Expected Discharge Date and Time       Expected Discharge Date Expected Discharge Time    Nov 13, 2023            Demographic Summary       Row Name 11/10/23 1440       General Information    Admission Type observation    Required Notices Provided Observation Status Notice    Referral Source admission list    Reason for Consult discharge planning    Preferred Language English       Contact Information    Permission Granted to Share Info With                    Functional Status       Row Name 11/10/23 1441       Functional Status    Usual Activity Tolerance poor    Current Activity  Tolerance poor       Functional Status, IADL    Medications assistive person    Meal Preparation completely dependent    Housekeeping completely dependent    Laundry completely dependent    Shopping completely dependent       Mental Status    General Appearance WDL WDL       Mental Status Summary    Recent Changes in Mental Status/Cognitive Functioning no changes       Employment/    Employment Status disabled                   Psychosocial    No documentation.                  Abuse/Neglect    No documentation.                  Legal    No documentation.                  Substance Abuse    No documentation.                  Patient Forms    No documentation.                     Lauryn Myers RN

## 2023-11-10 NOTE — CASE MANAGEMENT/SOCIAL WORK
"Physicians Statement of Medical Necessity for  Ambulance Transportation    GENERAL INFORMATION     Name: Vanessa Root  YOB: 1970  Medicare #: [id number]  Transport Date: 23 (Valid for round trips this date, or for scheduled repetitive trips for 60 days from the date signed below.)  Origin: [facility]  Destination: [place] [address]  Is the Patient's stay covered under Medicare Part A (PPS/DRG?)Yes  Closest appropriate facility? Yes  If this a hosp-hosp transfer? No  Is this a hospice patient? No    MEDICAL NECESSITY QUESTIONAIRE    Ambulance Transportation is medically necessary only if other means of transportation are contraindicated or would be potentially harmful to the patient.  To meet this requirement, the patient must be either \"bed confined\" or suffer from a condition such that transport by means other than an ambulance is contraindicated by the patient's condition.  The following questions must be answered by the healthcare professional signing below for this form to be valid:     1) Describe the MEDICAL CONDITION (physical and/or mental) of this patient AT THE TIME OF AMBULANCE TRANSPORT that requires the patient to be transported in an ambulance, and why transport by other means is contraindicated by the patient's condition:   Past Medical History:   Diagnosis Date    Arthritis     Bipolar 1 disorder     Cancer     uterine    COVID-19     Depression     Diabetes mellitus     Frequent UTI     GERD (gastroesophageal reflux disease)     Hyperlipidemia     Migraine     Murmur, heart     Ovarian cyst     Stroke       Past Surgical History:   Procedure Laterality Date     SECTION  2003    HYSTERECTOMY      INCISION AND DRAINAGE LEG Right 2023    Procedure: Debridement of right thigh wound;  Surgeon: Mohan Escamilla MD;  Location: [facility];  Service: General;  Laterality: Right;    TONSILLECTOMY      WOUND DEBRIDEMENT N/A " "5/25/2023    Procedure: Debridement necrotizing tissue  right buttock wound;  Surgeon: Elizabeth Adame MD;  Location: [facility] MAIN OR;  Service: General;  Laterality: N/A;      2) Is this patient \"bed confined\" as defined below?Yes   To be \"bed confined\" the patient must satisfy all three of the following criteria:  (1) unable to get up from bed without assistance; AND (2) unable to ambulate;  AND (3) unable to sit in a chair or wheelchair.  3) Can this patient safely be transported by car or wheelchair van (I.e., may safely sit during transport, without an attendant or monitoring?)No   4. In addition to completing questions 1-3 above, please check any of the following conditions that apply*:          *Note: supporting documentation for any boxes checked must be maintained in the patient's medical records Moderate/severe pain on movement and Unable to tolerate seated position for time needed to transport      SIGNATURE OF PHYSICIAN OR OTHER AUTHORIZED HEALTHCARE PROFESSIONAL    I certify that the above information is true and correct based on my evaluation of this patient, and represent that the patient requires transport by ambulance and that other forms of transport are contraindicated.  I understand that this information will be used by the Centers for Medicare and Medicaid Services (CMS) to support the determiniation of medical necessity for ambulance services, and I represent that I have personal knowledge of the patient's condition at the time of transport.       If this box is checked, I also certify that the patient is physically or mentally incapable of signing the ambulance service's claim form and that the institution with which I am affiliated has furnished care, services or assistance to the patient.  My signature below is made on behalf of the patient pursuant to 42 .36(b)(4). In accordance with 42 .37, the specific reason(s) that the patient is physically or mentally incapable of " signing the claim for is as follows:     Signature of Physician or Healthcare Professional  Date/Time:        (For Scheduled repetitive transport, this form is not valid for transports performed more than 60 days after this date).                                                                                                                                            --------------------------------------------------------------------------------------------  Printed Name and Credentials of Physician or Authorized Healthcare Professional     *Form must be signed by patient's attending physician for scheduled, repetitive transports,.  For non-repetitive ambulance transports, if unable to obtain the signature of the attending physician, any of the following may sign (please select below):     Physician  Clinical Nurse Specialist  Registered Nurse     Physician Assistant  Discharge Planner  Licensed Practical Nurse     Nurse Practitioner

## 2023-11-11 LAB
ALBUMIN SERPL-MCNC: 3 G/DL (ref 3.5–5.2)
ANION GAP SERPL CALCULATED.3IONS-SCNC: 8 MMOL/L (ref 5–15)
BASOPHILS # BLD AUTO: 0.12 10*3/MM3 (ref 0–0.2)
BASOPHILS NFR BLD AUTO: 0.9 % (ref 0–1.5)
BUN SERPL-MCNC: 9 MG/DL (ref 6–20)
BUN/CREAT SERPL: 9.8 (ref 7–25)
CALCIUM SPEC-SCNC: 8.9 MG/DL (ref 8.6–10.5)
CHLORIDE SERPL-SCNC: 103 MMOL/L (ref 98–107)
CO2 SERPL-SCNC: 28 MMOL/L (ref 22–29)
CREAT SERPL-MCNC: 0.92 MG/DL (ref 0.57–1)
DEPRECATED RDW RBC AUTO: 43.9 FL (ref 37–54)
EGFRCR SERPLBLD CKD-EPI 2021: 75.5 ML/MIN/1.73
EOSINOPHIL # BLD AUTO: 0.5 10*3/MM3 (ref 0–0.4)
EOSINOPHIL NFR BLD AUTO: 3.9 % (ref 0.3–6.2)
ERYTHROCYTE [DISTWIDTH] IN BLOOD BY AUTOMATED COUNT: 13.8 % (ref 12.3–15.4)
GLUCOSE BLDC GLUCOMTR-MCNC: 179 MG/DL (ref 70–130)
GLUCOSE BLDC GLUCOMTR-MCNC: 231 MG/DL (ref 70–130)
GLUCOSE BLDC GLUCOMTR-MCNC: 263 MG/DL (ref 70–130)
GLUCOSE BLDC GLUCOMTR-MCNC: 266 MG/DL (ref 70–130)
GLUCOSE SERPL-MCNC: 209 MG/DL (ref 65–99)
HCT VFR BLD AUTO: 30.9 % (ref 34–46.6)
HGB BLD-MCNC: 9.6 G/DL (ref 12–15.9)
IMM GRANULOCYTES # BLD AUTO: 0.11 10*3/MM3 (ref 0–0.05)
IMM GRANULOCYTES NFR BLD AUTO: 0.9 % (ref 0–0.5)
LYMPHOCYTES # BLD AUTO: 3.2 10*3/MM3 (ref 0.7–3.1)
LYMPHOCYTES NFR BLD AUTO: 25.3 % (ref 19.6–45.3)
MAGNESIUM SERPL-MCNC: 1.7 MG/DL (ref 1.6–2.6)
MCH RBC QN AUTO: 27.2 PG (ref 26.6–33)
MCHC RBC AUTO-ENTMCNC: 31.1 G/DL (ref 31.5–35.7)
MCV RBC AUTO: 87.5 FL (ref 79–97)
MONOCYTES # BLD AUTO: 0.55 10*3/MM3 (ref 0.1–0.9)
MONOCYTES NFR BLD AUTO: 4.3 % (ref 5–12)
NEUTROPHILS NFR BLD AUTO: 64.7 % (ref 42.7–76)
NEUTROPHILS NFR BLD AUTO: 8.19 10*3/MM3 (ref 1.7–7)
NRBC BLD AUTO-RTO: 0.1 /100 WBC (ref 0–0.2)
PHOSPHATE SERPL-MCNC: 3.4 MG/DL (ref 2.5–4.5)
PLATELET # BLD AUTO: 401 10*3/MM3 (ref 140–450)
PMV BLD AUTO: 9.3 FL (ref 6–12)
POTASSIUM SERPL-SCNC: 3.8 MMOL/L (ref 3.5–5.2)
RBC # BLD AUTO: 3.53 10*6/MM3 (ref 3.77–5.28)
SODIUM SERPL-SCNC: 139 MMOL/L (ref 136–145)
URATE SERPL-MCNC: 4.2 MG/DL (ref 2.4–5.7)
WBC NRBC COR # BLD: 12.67 10*3/MM3 (ref 3.4–10.8)

## 2023-11-11 PROCEDURE — A9270 NON-COVERED ITEM OR SERVICE: HCPCS | Performed by: SURGERY

## 2023-11-11 PROCEDURE — A9270 NON-COVERED ITEM OR SERVICE: HCPCS | Performed by: HOSPITALIST

## 2023-11-11 PROCEDURE — 84550 ASSAY OF BLOOD/URIC ACID: CPT | Performed by: HOSPITALIST

## 2023-11-11 PROCEDURE — 25010000002 VANCOMYCIN 750 MG RECONSTITUTED SOLUTION 1 EACH VIAL: Performed by: INTERNAL MEDICINE

## 2023-11-11 PROCEDURE — 63710000001 ACETAMINOPHEN 325 MG TABLET: Performed by: SURGERY

## 2023-11-11 PROCEDURE — 63710000001 LEVOTHYROXINE 100 MCG TABLET: Performed by: SURGERY

## 2023-11-11 PROCEDURE — 63710000001 INSULIN LISPRO (HUMAN) PER 5 UNITS: Performed by: SURGERY

## 2023-11-11 PROCEDURE — 99214 OFFICE O/P EST MOD 30 MIN: CPT | Performed by: STUDENT IN AN ORGANIZED HEALTH CARE EDUCATION/TRAINING PROGRAM

## 2023-11-11 PROCEDURE — 63710000001 HYDROCODONE-ACETAMINOPHEN 5-325 MG TABLET: Performed by: NURSE PRACTITIONER

## 2023-11-11 PROCEDURE — 85025 COMPLETE CBC W/AUTO DIFF WBC: CPT | Performed by: HOSPITALIST

## 2023-11-11 PROCEDURE — 63710000001 APIXABAN 5 MG TABLET: Performed by: HOSPITALIST

## 2023-11-11 PROCEDURE — 63710000001 GABAPENTIN 300 MG CAPSULE: Performed by: SURGERY

## 2023-11-11 PROCEDURE — 63710000001 INSULIN GLARGINE PER 5 UNITS: Performed by: HOSPITALIST

## 2023-11-11 PROCEDURE — G0378 HOSPITAL OBSERVATION PER HR: HCPCS

## 2023-11-11 PROCEDURE — 25810000003 SODIUM CHLORIDE 0.9 % SOLUTION 250 ML FLEX CONT: Performed by: INTERNAL MEDICINE

## 2023-11-11 PROCEDURE — 63710000001 LEVOTHYROXINE 75 MCG TABLET: Performed by: SURGERY

## 2023-11-11 PROCEDURE — 80069 RENAL FUNCTION PANEL: CPT | Performed by: HOSPITALIST

## 2023-11-11 PROCEDURE — 83735 ASSAY OF MAGNESIUM: CPT | Performed by: HOSPITALIST

## 2023-11-11 PROCEDURE — 82948 REAGENT STRIP/BLOOD GLUCOSE: CPT

## 2023-11-11 PROCEDURE — 25010000002 PIPERACILLIN SOD-TAZOBACTAM PER 1 G: Performed by: SURGERY

## 2023-11-11 PROCEDURE — A9270 NON-COVERED ITEM OR SERVICE: HCPCS | Performed by: NURSE PRACTITIONER

## 2023-11-11 PROCEDURE — 63710000001 HYDROCODONE-ACETAMINOPHEN 7.5-325 MG TABLET: Performed by: SURGERY

## 2023-11-11 PROCEDURE — 63710000001 HYDROXYZINE 25 MG TABLET: Performed by: SURGERY

## 2023-11-11 PROCEDURE — 99212 OFFICE O/P EST SF 10 MIN: CPT | Performed by: SURGERY

## 2023-11-11 PROCEDURE — 97602 WOUND(S) CARE NON-SELECTIVE: CPT

## 2023-11-11 RX ORDER — HYDROCODONE BITARTRATE AND ACETAMINOPHEN 5; 325 MG/1; MG/1
1 TABLET ORAL ONCE
Status: COMPLETED | OUTPATIENT
Start: 2023-11-12 | End: 2023-11-11

## 2023-11-11 RX ADMIN — Medication 10 ML: at 08:22

## 2023-11-11 RX ADMIN — VANCOMYCIN HYDROCHLORIDE 750 MG: 750 INJECTION, POWDER, LYOPHILIZED, FOR SOLUTION INTRAVENOUS at 12:27

## 2023-11-11 RX ADMIN — ACETAMINOPHEN 650 MG: 325 TABLET ORAL at 20:34

## 2023-11-11 RX ADMIN — LEVOTHYROXINE SODIUM 75 MCG: 75 TABLET ORAL at 08:21

## 2023-11-11 RX ADMIN — VANCOMYCIN HYDROCHLORIDE 750 MG: 750 INJECTION, POWDER, LYOPHILIZED, FOR SOLUTION INTRAVENOUS at 23:32

## 2023-11-11 RX ADMIN — INSULIN LISPRO 4 UNITS: 100 INJECTION, SOLUTION INTRAVENOUS; SUBCUTANEOUS at 12:27

## 2023-11-11 RX ADMIN — INSULIN LISPRO 2 UNITS: 100 INJECTION, SOLUTION INTRAVENOUS; SUBCUTANEOUS at 08:22

## 2023-11-11 RX ADMIN — PIPERACILLIN SODIUM AND TAZOBACTAM SODIUM 4.5 G: 4; .5 INJECTION, SOLUTION INTRAVENOUS at 17:17

## 2023-11-11 RX ADMIN — PIPERACILLIN SODIUM AND TAZOBACTAM SODIUM 4.5 G: 4; .5 INJECTION, SOLUTION INTRAVENOUS at 10:10

## 2023-11-11 RX ADMIN — GABAPENTIN 300 MG: 300 CAPSULE ORAL at 20:34

## 2023-11-11 RX ADMIN — INSULIN LISPRO 6 UNITS: 100 INJECTION, SOLUTION INTRAVENOUS; SUBCUTANEOUS at 17:17

## 2023-11-11 RX ADMIN — HYDROXYZINE HYDROCHLORIDE 50 MG: 25 TABLET ORAL at 22:52

## 2023-11-11 RX ADMIN — INSULIN GLARGINE 15 UNITS: 100 INJECTION, SOLUTION SUBCUTANEOUS at 20:34

## 2023-11-11 RX ADMIN — Medication 10 ML: at 20:34

## 2023-11-11 RX ADMIN — APIXABAN 5 MG: 5 TABLET, FILM COATED ORAL at 08:21

## 2023-11-11 RX ADMIN — APIXABAN 5 MG: 5 TABLET, FILM COATED ORAL at 20:34

## 2023-11-11 RX ADMIN — GABAPENTIN 300 MG: 300 CAPSULE ORAL at 08:21

## 2023-11-11 RX ADMIN — HYDROCODONE BITARTRATE AND ACETAMINOPHEN 1 TABLET: 7.5; 325 TABLET ORAL at 08:23

## 2023-11-11 RX ADMIN — HYDROCODONE BITARTRATE AND ACETAMINOPHEN 1 TABLET: 5; 325 TABLET ORAL at 23:32

## 2023-11-11 RX ADMIN — INSULIN LISPRO 6 UNITS: 100 INJECTION, SOLUTION INTRAVENOUS; SUBCUTANEOUS at 21:24

## 2023-11-11 RX ADMIN — PIPERACILLIN SODIUM AND TAZOBACTAM SODIUM 4.5 G: 4; .5 INJECTION, SOLUTION INTRAVENOUS at 02:05

## 2023-11-11 RX ADMIN — LEVOTHYROXINE SODIUM 200 MCG: 100 TABLET ORAL at 08:21

## 2023-11-11 NOTE — PROGRESS NOTES
Dedicated to Hospital Care    [phone]   LOS: 0 days     Name: Vanessa Root  Age/Sex: 53 y.o. female  :  1970        PCP: No primary care provider on file.  Chief Complaint   Patient presents with    Wound Check      Subjective   Sleeping soundly no issues overnifght and pain is controlled  General: No Fever or Chills, Cardiac: No Chest Pain or Palpitations, Resp: No Cough or SOA, GI: No Nausea, Vomiting, or Diarrhea, and Other: No bleeding    apixaban, 5 mg, Oral, BID  gabapentin, 300 mg, Oral, Q12H  HYDROcodone-acetaminophen, 1 tablet, Oral, Daily  insulin glargine, 15 Units, Subcutaneous, Nightly  insulin lispro, 2-9 Units, Subcutaneous, 4x Daily AC & at Bedtime  levothyroxine, 200 mcg, Oral, Daily  levothyroxine, 75 mcg, Oral, Q AM  piperacillin-tazobactam, 4.5 g, Intravenous, Q8H  sodium chloride, 10 mL, Intravenous, Q12H  vancomycin, 750 mg, Intravenous, Q12H      Pharmacy to dose vancomycin,         Objective   Vital Signs  Temp:  [98.1 °F (36.7 °C)-98.4 °F (36.9 °C)] 98.2 °F (36.8 °C)  Heart Rate:  [75-80] 75  Resp:  [16-18] 16  BP: (119-147)/(79-88) 127/85  Body mass index is 41.6 kg/m².    Intake/Output Summary (Last 24 hours) at 2023 1223  Last data filed at 2023 0500  Gross per 24 hour   Intake 506 ml   Output 3950 ml   Net -3444 ml       Physical Exam  Constitutional:       General: She is not in acute distress.     Appearance: She is obese. She is ill-appearing.   Cardiovascular:      Rate and Rhythm: Normal rate and regular rhythm.   Pulmonary:      Effort: Pulmonary effort is normal. No respiratory distress.      Breath sounds: Normal breath sounds.   Abdominal:      General: Bowel sounds are normal. There is no distension.      Palpations: Abdomen is soft.   Neurological:      General: No focal deficit present.      Mental Status: Mental status is at baseline.           Results Review:       I reviewed the patient's new clinical results.  Results from last 7 days   Lab  Units 11/11/23  0739 11/09/23  0205 11/08/23  1901   WBC 10*3/mm3 12.67* 12.89* 13.45*   HEMOGLOBIN g/dL 9.6* 9.6* 11.2*   PLATELETS 10*3/mm3 401 403 477*     Results from last 7 days   Lab Units 11/11/23  0739 11/09/23  0205 11/08/23  1901   SODIUM mmol/L 139 139 139   POTASSIUM mmol/L 3.8 3.5 3.8   CHLORIDE mmol/L 103 106 104   CO2 mmol/L 28.0 25.0 23.9   BUN mg/dL 9 8 8   CREATININE mg/dL 0.92 0.81 0.78   CALCIUM mg/dL 8.9 8.2* 8.7   MAGNESIUM mg/dL 1.7  --   --    PHOSPHORUS mg/dL 3.4  --   --    Estimated Creatinine Clearance: 90.7 mL/min (by C-G formula based on SCr of 0.92 mg/dL).    Lab Results   Component Value Date    HGBA1C 8.90 (H) 11/09/2023    HGBA1C 11.30 (H) 05/24/2023     Glucose   Date/Time Value Ref Range Status   11/11/2023 1143 231 (H) 70 - 130 mg/dL Final   11/11/2023 0738 179 (H) 70 - 130 mg/dL Final   11/10/2023 2032 245 (H) 70 - 130 mg/dL Final   11/10/2023 1716 254 (H) 70 - 130 mg/dL Final   11/10/2023 1209 246 (H) 70 - 130 mg/dL Final   11/10/2023 0755 272 (H) 70 - 130 mg/dL Final   11/09/2023 2000 374 (H) 70 - 130 mg/dL Final   11/09/2023 1724 321 (H) 70 - 130 mg/dL Final       Assessment & Plan   Active Hospital Problems    Diagnosis  POA    **Infected wound [T14.8XXA, L08.9]  Yes    History of ischemic stroke [Z86.73]  Not Applicable    Immobility syndrome (paraplegic) [M62.3]  Yes    Bipolar 1 disorder [F31.9]  Yes    GERD (gastroesophageal reflux disease) [K21.9]  Yes    Hyperlipidemia [E78.5]  Yes    Type 2 diabetes mellitus with diabetic polyneuropathy [E11.42]  Yes    Obstructive sleep apnea syndrome [G47.33]  Yes    Hypothyroidism [E03.9]  Yes      Resolved Hospital Problems   No resolved problems to display.       PLAN  This is a 53-year-old female with a history of prior stroke, immobility syndrome, bipolar disorder hyperlipidemia, obstructive sleep apnea, hypothyroidism, type 2 diabetes and prior complicated hip wound who presents to the hospital with necrosis in her wound and  possible infection  -Appreciate general surgery's assistance taken to the operating room for debridement 11/9; surgery tolerated well.  -resume eliquis today  -Started on broad-spectrum antibiotics on admission.  Appreciate ID input and assistance  -vaginal candida infection treated  -Her A1c shows lack of good tight outpatient control.    -continue current synthroid   -Hemoglobin down a little less than 2 g today.  Some of this is dilution.  She is eating and drinking we will discontinue IV fluids  -Plan to resume Eliquis in the morning with her recent history of pulmonary embolus  -Full code    Disposition  Expected Discharge Date: 11/13/2023; Expected Discharge Time:    Home tomorrow via ambulance at 730    Marv Aguiar MD  [facility]  11/11/23  12:23 EST

## 2023-11-11 NOTE — PROGRESS NOTES
LOS: 0 days     Chief Complaint: Wound infection    Interval History: Patient remains afebrile with WBC of 12.  Eating breakfast this morning.  Tolerating antibiotics.    Vital Signs  Temp:  [98.1 °F (36.7 °C)-98.4 °F (36.9 °C)] 98.4 °F (36.9 °C)  Heart Rate:  [75-83] 80  Resp:  [16-18] 16  BP: (119-147)/(79-88) 119/82    Physical Exam:  General: In no acute distress  HEENT: Oropharynx clear, moist mucous membranes  Respiratory: Normal work of breathing  Skin: Right hip area with surgical dressing.  Extremities: No edema, cyanosis  Access: Peripheral IV    Antibiotics:  Anti-Infectives (From admission, onward)      Ordered     Dose/Rate Route Frequency Start Stop    11/10/23 1345  vancomycin 750 mg in sodium chloride 0.9 % 250 mL IVPB-VTB        Ordering Provider: Cody Sheridan MD    750 mg  333.3 mL/hr over 45 Minutes Intravenous Every 12 Hours 11/11/23 0000 11/16/23 1159    11/09/23 1340  fluconazole (DIFLUCAN) tablet 150 mg        Ordering Provider: Marv Aguiar MD    150 mg Oral Once 11/09/23 1500 11/09/23 1617    11/08/23 2125  piperacillin-tazobactam (ZOSYN) 4.5 g in iso-osmotic dextrose 100 mL IVPB (premix)        Ordering Provider: Mohan Escamilla MD    4.5 g  over 4 Hours Intravenous Every 8 Hours 11/09/23 0200 11/16/23 0159    11/08/23 2117  Pharmacy to dose vancomycin        Ordering Provider: Cody Sheridan MD     Does not apply Continuous PRN 11/08/23 2115 11/16/23 0001    11/08/23 2007  vancomycin 2250 mg/500 mL 0.9% NS IVPB ([facility])        Ordering Provider: Henry Ortega MD    20 mg/kg × 113 kg  over 135 Minutes Intravenous Once 11/08/23 2023 11/08/23 2328    11/08/23 2007  piperacillin-tazobactam (ZOSYN) 3.375 g in iso-osmotic dextrose 50 ml (premix)        Ordering Provider: Henry Ortega MD    3.375 g  over 30 Minutes Intravenous Once 11/08/23 2023 11/08/23 2113             Results Review:     I reviewed the patient's new clinical results.    Lab  Results   Component Value Date    WBC 12.67 (H) 11/11/2023    HGB 9.6 (L) 11/11/2023    HCT 30.9 (L) 11/11/2023    MCV 87.5 11/11/2023     11/11/2023     Lab Results   Component Value Date    GLUCOSE 209 (H) 11/11/2023    BUN 9 11/11/2023    CREATININE 0.92 11/11/2023    BCR 9.8 11/11/2023    CO2 28.0 11/11/2023    CALCIUM 8.9 11/11/2023    ALBUMIN 3.0 (L) 11/11/2023    AST 24 11/08/2023    ALT 11 11/08/2023       Microbiology:  11/8 blood cultures 1 out of 2 coag negative staph  11/9 OR wound culture Proteus mirabilis and gram-negative bacilli    Assessment    #Right hip necrotic wound  #Positive blood culture, 1 out of 2 coag negative staph likely contaminant  #Type 2 diabetes  #Immobility syndrome secondary to CVA    Surgical culture growing multiple organisms with susceptibilities and identification pending.  Continue vancomycin goal -600 and Zosyn 3.375 every 8 hours for now we will following this up.  De-escalate based on results.  Continue following vancomycin levels for therapeutic drug monitoring.    ID will follow.

## 2023-11-11 NOTE — PROGRESS NOTES
Colorectal & General Surgery  Progress Note    Patient: Vanessa Root  YOB: 1970  MRN: [id number]      Assessment  Vanessa Root is a 53 y.o. female with chronic wound of her right thigh and hip.  No signs of bleeding this morning after restarting anticoagulant yesterday.  Recommend continuing wet-to-dry dressings with saline.  Okay for discharge from my standpoint.    Subjective  No acute events overnight.  Resting comfortably this morning.    Objective    Vitals:    11/11/23 0408   BP: 130/88   Pulse: 77   Resp: 18   Temp: 98.2 °F (36.8 °C)   SpO2: 96%       Physical Exam  Constitutional: Well-developed well-nourished, no acute distress  Neck: Supple, trachea midline  Respiratory: No increased work of breathing, Symmetric excursion  Cardiovascular: Well pefursed, no jugular venous distention evident   Abdominal: Soft, non-tender, non-distended  Skin: Warm, dry, no rash on visualized skin surfaces, dressing in place over right hip wound, no erythema surrounding the wound, dressing not saturated with blood.  Psychiatric: Alert and oriented ×3, normal affect     Laboratory Results  None to review this morning    Radiology  None to review         Louis Escamilla MD  Colorectal & General Surgery  [facility]    [address], [address]  [place], [place], [address]  P: [phone]  F: [phone]

## 2023-11-12 LAB
GLUCOSE BLDC GLUCOMTR-MCNC: 173 MG/DL (ref 70–130)
GLUCOSE BLDC GLUCOMTR-MCNC: 211 MG/DL (ref 70–130)
GLUCOSE BLDC GLUCOMTR-MCNC: 246 MG/DL (ref 70–130)
GLUCOSE BLDC GLUCOMTR-MCNC: 264 MG/DL (ref 70–130)

## 2023-11-12 PROCEDURE — A9270 NON-COVERED ITEM OR SERVICE: HCPCS | Performed by: STUDENT IN AN ORGANIZED HEALTH CARE EDUCATION/TRAINING PROGRAM

## 2023-11-12 PROCEDURE — 63710000001 HYDROCODONE-ACETAMINOPHEN 7.5-325 MG TABLET: Performed by: SURGERY

## 2023-11-12 PROCEDURE — A9270 NON-COVERED ITEM OR SERVICE: HCPCS | Performed by: SURGERY

## 2023-11-12 PROCEDURE — 25010000002 PIPERACILLIN SOD-TAZOBACTAM PER 1 G: Performed by: SURGERY

## 2023-11-12 PROCEDURE — 82948 REAGENT STRIP/BLOOD GLUCOSE: CPT

## 2023-11-12 PROCEDURE — 63710000001 HYDROXYZINE 25 MG TABLET: Performed by: SURGERY

## 2023-11-12 PROCEDURE — 63710000001 INSULIN LISPRO (HUMAN) PER 5 UNITS: Performed by: SURGERY

## 2023-11-12 PROCEDURE — G0378 HOSPITAL OBSERVATION PER HR: HCPCS

## 2023-11-12 PROCEDURE — 97602 WOUND(S) CARE NON-SELECTIVE: CPT

## 2023-11-12 PROCEDURE — A9270 NON-COVERED ITEM OR SERVICE: HCPCS | Performed by: HOSPITALIST

## 2023-11-12 PROCEDURE — 99214 OFFICE O/P EST MOD 30 MIN: CPT | Performed by: STUDENT IN AN ORGANIZED HEALTH CARE EDUCATION/TRAINING PROGRAM

## 2023-11-12 PROCEDURE — 63710000001 LEVOFLOXACIN 750 MG TABLET: Performed by: STUDENT IN AN ORGANIZED HEALTH CARE EDUCATION/TRAINING PROGRAM

## 2023-11-12 PROCEDURE — 63710000001 LEVOTHYROXINE 75 MCG TABLET: Performed by: SURGERY

## 2023-11-12 PROCEDURE — 63710000001 GABAPENTIN 300 MG CAPSULE: Performed by: SURGERY

## 2023-11-12 PROCEDURE — 63710000001 LEVOTHYROXINE 100 MCG TABLET: Performed by: SURGERY

## 2023-11-12 PROCEDURE — 63710000001 INSULIN GLARGINE PER 5 UNITS: Performed by: HOSPITALIST

## 2023-11-12 PROCEDURE — 63710000001 APIXABAN 5 MG TABLET: Performed by: HOSPITALIST

## 2023-11-12 RX ORDER — LEVOFLOXACIN 750 MG/1
750 TABLET ORAL EVERY 24 HOURS
Qty: 7 TABLET | Refills: 0 | Status: SHIPPED | OUTPATIENT
Start: 2023-11-12 | End: 2023-11-19

## 2023-11-12 RX ORDER — GABAPENTIN 300 MG/1
300 CAPSULE ORAL EVERY 12 HOURS SCHEDULED
Qty: 6 CAPSULE | Refills: 0 | Status: SHIPPED | OUTPATIENT
Start: 2023-11-12

## 2023-11-12 RX ORDER — HYDROCODONE BITARTRATE AND ACETAMINOPHEN 7.5; 325 MG/1; MG/1
1 TABLET ORAL EVERY 8 HOURS PRN
Qty: 9 TABLET | Refills: 0 | Status: SHIPPED | OUTPATIENT
Start: 2023-11-12 | End: 2023-11-15

## 2023-11-12 RX ORDER — LEVOFLOXACIN 750 MG/1
750 TABLET ORAL EVERY 24 HOURS
Status: DISCONTINUED | OUTPATIENT
Start: 2023-11-12 | End: 2023-11-13 | Stop reason: HOSPADM

## 2023-11-12 RX ADMIN — INSULIN GLARGINE 15 UNITS: 100 INJECTION, SOLUTION SUBCUTANEOUS at 20:39

## 2023-11-12 RX ADMIN — HYDROCODONE BITARTRATE AND ACETAMINOPHEN 1 TABLET: 7.5; 325 TABLET ORAL at 08:01

## 2023-11-12 RX ADMIN — PIPERACILLIN SODIUM AND TAZOBACTAM SODIUM 4.5 G: 4; .5 INJECTION, SOLUTION INTRAVENOUS at 01:53

## 2023-11-12 RX ADMIN — LEVOTHYROXINE SODIUM 200 MCG: 100 TABLET ORAL at 05:15

## 2023-11-12 RX ADMIN — HYDROXYZINE HYDROCHLORIDE 50 MG: 25 TABLET ORAL at 20:32

## 2023-11-12 RX ADMIN — APIXABAN 5 MG: 5 TABLET, FILM COATED ORAL at 20:32

## 2023-11-12 RX ADMIN — INSULIN LISPRO 4 UNITS: 100 INJECTION, SOLUTION INTRAVENOUS; SUBCUTANEOUS at 17:34

## 2023-11-12 RX ADMIN — LEVOFLOXACIN 750 MG: 750 TABLET, FILM COATED ORAL at 11:27

## 2023-11-12 RX ADMIN — INSULIN LISPRO 6 UNITS: 100 INJECTION, SOLUTION INTRAVENOUS; SUBCUTANEOUS at 12:24

## 2023-11-12 RX ADMIN — Medication 10 ML: at 08:02

## 2023-11-12 RX ADMIN — Medication 10 ML: at 20:32

## 2023-11-12 RX ADMIN — LEVOTHYROXINE SODIUM 75 MCG: 75 TABLET ORAL at 05:15

## 2023-11-12 RX ADMIN — GABAPENTIN 300 MG: 300 CAPSULE ORAL at 20:32

## 2023-11-12 RX ADMIN — GABAPENTIN 300 MG: 300 CAPSULE ORAL at 08:01

## 2023-11-12 RX ADMIN — INSULIN LISPRO 4 UNITS: 100 INJECTION, SOLUTION INTRAVENOUS; SUBCUTANEOUS at 08:02

## 2023-11-12 RX ADMIN — APIXABAN 5 MG: 5 TABLET, FILM COATED ORAL at 08:01

## 2023-11-12 RX ADMIN — INSULIN LISPRO 2 UNITS: 100 INJECTION, SOLUTION INTRAVENOUS; SUBCUTANEOUS at 20:38

## 2023-11-12 RX ADMIN — PIPERACILLIN SODIUM AND TAZOBACTAM SODIUM 4.5 G: 4; .5 INJECTION, SOLUTION INTRAVENOUS at 09:05

## 2023-11-12 NOTE — PLAN OF CARE
Goal Outcome Evaluation:  Assumed care for patient at approximately 1500.  Reviewed charting and in agreement with previous RN assessment from this a.m.  Patient is due to be transported home via [facility] at 1930 this evening.  Dressing change completed, per order.      Addendum at 1930:  [facility] called unit to report they cannot pick patient up until 10:30 in the a.m.  [facility] JAVAD Lea notified.

## 2023-11-12 NOTE — PROGRESS NOTES
LOS: 0 days     Chief Complaint: Wound infection    Interval History: Patient remains afebrile with WBC of 12.  Remains on room air.  Tolerating antibiotics.    Vital Signs  Temp:  [97.9 °F (36.6 °C)-98.4 °F (36.9 °C)] 97.9 °F (36.6 °C)  Heart Rate:  [75-86] 86  Resp:  [16-18] 18  BP: (103-132)/(69-87) 103/69    Physical Exam:  General: In no acute distress  HEENT: Oropharynx clear, moist mucous membranes  Respiratory: Normal work of breathing  Skin: Right hip area with surgical dressing.  Extremities: No edema, cyanosis  Access: Peripheral IV    Antibiotics:  Anti-Infectives (From admission, onward)      Ordered     Dose/Rate Route Frequency Start Stop    11/10/23 1345  vancomycin 750 mg in sodium chloride 0.9 % 250 mL IVPB-VTB        Ordering Provider: Cody Sheridan MD    750 mg  333.3 mL/hr over 45 Minutes Intravenous Every 12 Hours 11/11/23 0000 11/16/23 1159    11/09/23 1340  fluconazole (DIFLUCAN) tablet 150 mg        Ordering Provider: Marv Aguiar MD    150 mg Oral Once 11/09/23 1500 11/09/23 1617    11/08/23 2125  piperacillin-tazobactam (ZOSYN) 4.5 g in iso-osmotic dextrose 100 mL IVPB (premix)        Ordering Provider: Mohan Escamilla MD    4.5 g  over 4 Hours Intravenous Every 8 Hours 11/09/23 0200 11/16/23 0159    11/08/23 2117  Pharmacy to dose vancomycin        Ordering Provider: Cody Sheridan MD     Does not apply Continuous PRN 11/08/23 2115 11/16/23 0001    11/08/23 2007  vancomycin 2250 mg/500 mL 0.9% NS IVPB ([facility])        Ordering Provider: Henry Ortega MD    20 mg/kg × 113 kg  over 135 Minutes Intravenous Once 11/08/23 2023 11/08/23 2328    11/08/23 2007  piperacillin-tazobactam (ZOSYN) 3.375 g in iso-osmotic dextrose 50 ml (premix)        Ordering Provider: Henry Ortega MD    3.375 g  over 30 Minutes Intravenous Once 11/08/23 2023 11/08/23 2113             Results Review:     I reviewed the patient's new clinical results.    Lab Results    Component Value Date    WBC 12.67 (H) 11/11/2023    HGB 9.6 (L) 11/11/2023    HCT 30.9 (L) 11/11/2023    MCV 87.5 11/11/2023     11/11/2023     Lab Results   Component Value Date    GLUCOSE 209 (H) 11/11/2023    BUN 9 11/11/2023    CREATININE 0.92 11/11/2023    BCR 9.8 11/11/2023    CO2 28.0 11/11/2023    CALCIUM 8.9 11/11/2023    ALBUMIN 3.0 (L) 11/11/2023    AST 24 11/08/2023    ALT 11 11/08/2023       Microbiology:  11/8 blood cultures 1 out of 2 coag negative staph  11/9 OR wound culture Proteus mirabilis, E. coli and Klebsiella    Assessment    #Right hip necrotic wound  #Positive blood culture, 1 out of 2 coag negative staph likely contaminant  #Type 2 diabetes  #Immobility syndrome secondary to CVA    Operative wound cultures with Proteus mirabilis, E. coli and Klebsiella.  Unfortunately Proteus appears to be ESBL.  Case was discussed with microbiology and likely all are susceptible to levofloxacin.  Plan to stop vancomycin and Zosyn.  Transition to levofloxacin 750 mg daily for 7-day course.  QTc reviewed on telemetry and within normal limits.    Thank you for allowing me to be involved in the care of this patient. Infectious diseases will sign off at this time with antibiotics plan in place, but please call me at [phone] if any further ID questions or new ID concerns.

## 2023-11-13 ENCOUNTER — READMISSION MANAGEMENT (OUTPATIENT)
Dept: CALL CENTER | Facility: HOSPITAL | Age: 53
End: 2023-11-13
Payer: MEDICARE

## 2023-11-13 VITALS
HEART RATE: 88 BPM | TEMPERATURE: 98.1 F | RESPIRATION RATE: 18 BRPM | WEIGHT: 250 LBS | DIASTOLIC BLOOD PRESSURE: 82 MMHG | SYSTOLIC BLOOD PRESSURE: 141 MMHG | BODY MASS INDEX: 41.65 KG/M2 | OXYGEN SATURATION: 92 % | HEIGHT: 65 IN

## 2023-11-13 LAB
ANION GAP SERPL CALCULATED.3IONS-SCNC: 8.3 MMOL/L (ref 5–15)
BACTERIA SPEC AEROBE CULT: ABNORMAL
BACTERIA SPEC AEROBE CULT: NORMAL
BUN SERPL-MCNC: 9 MG/DL (ref 6–20)
BUN/CREAT SERPL: 10.7 (ref 7–25)
CALCIUM SPEC-SCNC: 8.9 MG/DL (ref 8.6–10.5)
CHLORIDE SERPL-SCNC: 103 MMOL/L (ref 98–107)
CO2 SERPL-SCNC: 27.7 MMOL/L (ref 22–29)
CREAT SERPL-MCNC: 0.84 MG/DL (ref 0.57–1)
EGFRCR SERPLBLD CKD-EPI 2021: 84.3 ML/MIN/1.73
GLUCOSE BLDC GLUCOMTR-MCNC: 203 MG/DL (ref 70–130)
GLUCOSE SERPL-MCNC: 193 MG/DL (ref 65–99)
GRAM STN SPEC: ABNORMAL
POTASSIUM SERPL-SCNC: 3.9 MMOL/L (ref 3.5–5.2)
SODIUM SERPL-SCNC: 139 MMOL/L (ref 136–145)

## 2023-11-13 PROCEDURE — 63710000001 HYDROCODONE-ACETAMINOPHEN 7.5-325 MG TABLET: Performed by: SURGERY

## 2023-11-13 PROCEDURE — A9270 NON-COVERED ITEM OR SERVICE: HCPCS | Performed by: SURGERY

## 2023-11-13 PROCEDURE — 82948 REAGENT STRIP/BLOOD GLUCOSE: CPT

## 2023-11-13 PROCEDURE — G0378 HOSPITAL OBSERVATION PER HR: HCPCS

## 2023-11-13 PROCEDURE — A9270 NON-COVERED ITEM OR SERVICE: HCPCS | Performed by: HOSPITALIST

## 2023-11-13 PROCEDURE — 63710000001 LEVOFLOXACIN 750 MG TABLET: Performed by: STUDENT IN AN ORGANIZED HEALTH CARE EDUCATION/TRAINING PROGRAM

## 2023-11-13 PROCEDURE — 63710000001 INSULIN LISPRO (HUMAN) PER 5 UNITS: Performed by: SURGERY

## 2023-11-13 PROCEDURE — 63710000001 APIXABAN 5 MG TABLET: Performed by: HOSPITALIST

## 2023-11-13 PROCEDURE — 80048 BASIC METABOLIC PNL TOTAL CA: CPT | Performed by: HOSPITALIST

## 2023-11-13 PROCEDURE — A9270 NON-COVERED ITEM OR SERVICE: HCPCS | Performed by: STUDENT IN AN ORGANIZED HEALTH CARE EDUCATION/TRAINING PROGRAM

## 2023-11-13 PROCEDURE — 63710000001 LEVOTHYROXINE 100 MCG TABLET: Performed by: SURGERY

## 2023-11-13 PROCEDURE — 63710000001 LEVOTHYROXINE 75 MCG TABLET: Performed by: SURGERY

## 2023-11-13 PROCEDURE — 63710000001 GABAPENTIN 300 MG CAPSULE: Performed by: SURGERY

## 2023-11-13 RX ADMIN — INSULIN LISPRO 4 UNITS: 100 INJECTION, SOLUTION INTRAVENOUS; SUBCUTANEOUS at 08:55

## 2023-11-13 RX ADMIN — LEVOFLOXACIN 750 MG: 750 TABLET, FILM COATED ORAL at 08:55

## 2023-11-13 RX ADMIN — APIXABAN 5 MG: 5 TABLET, FILM COATED ORAL at 08:55

## 2023-11-13 RX ADMIN — LEVOTHYROXINE SODIUM 200 MCG: 100 TABLET ORAL at 06:49

## 2023-11-13 RX ADMIN — Medication 10 ML: at 08:55

## 2023-11-13 RX ADMIN — LEVOTHYROXINE SODIUM 75 MCG: 75 TABLET ORAL at 06:49

## 2023-11-13 RX ADMIN — GABAPENTIN 300 MG: 300 CAPSULE ORAL at 08:55

## 2023-11-13 RX ADMIN — HYDROCODONE BITARTRATE AND ACETAMINOPHEN 1 TABLET: 7.5; 325 TABLET ORAL at 08:55

## 2023-11-13 NOTE — PLAN OF CARE
Pt presenting with no new acute issues this shift, pt denies any reports or complaints of pain and discomfort.  Pt's blood pressure running low at start of shift, pt asymptomtic, APRN notified, no new orders at this time, MAP greater than 65, APRN advising to monitor.  Pt due to be discharged via [facility] at around 10:30a.m. this morning, will continue to monitor and observe.     Goal Outcome Evaluation:  Plan of Care Reviewed With: patient        Progress: improving      Problem: Adult Inpatient Plan of Care  Goal: Plan of Care Review  Outcome: Ongoing, Progressing  Flowsheets (Taken 11/13/2023 0515)  Progress: improving  Plan of Care Reviewed With: patient     Problem: Adult Inpatient Plan of Care  Goal: Patient-Specific Goal (Individualized)  Outcome: Ongoing, Progressing     Problem: Skin Injury Risk Increased  Goal: Skin Health and Integrity  Outcome: Ongoing, Progressing     Problem: Impaired Wound Healing  Goal: Optimal Wound Healing  Outcome: Ongoing, Progressing

## 2023-11-13 NOTE — SIGNIFICANT NOTE
Discharge delayed. Patient originally  discharged today with transport set up via [facility],  Leonora BOCANEGRA called to advise patient transport is now not available until 1030 tomorrow Monday November 13.     Electronically signed by JAVAD Spring, 11/12/23, 7:36 PM EST.

## 2023-11-14 NOTE — OUTREACH NOTE
Prep Survey      Flowsheet Row Responses   [facility] patient discharged from? [place]   Is LACE score < 7 ? No   Eligibility Readm Mgmt   Discharge diagnosis Infected wound- Debridement of right thigh wound   Does the patient have one of the following disease processes/diagnoses(primary or secondary)? General Surgery   Does the patient have Home health ordered? Yes   What is the Home health agency?  [facility] - [facility]   Prep survey completed? Yes            Lela ELLIOTT - Registered Nurse

## 2023-11-15 ENCOUNTER — READMISSION MANAGEMENT (OUTPATIENT)
Dept: CALL CENTER | Facility: HOSPITAL | Age: 53
End: 2023-11-15
Payer: MEDICARE

## 2023-11-15 NOTE — OUTREACH NOTE
General Surgery Week 1 Survey      Flowsheet Row Responses   [facility] patient discharged from? [place]   Does the patient have one of the following disease processes/diagnoses(primary or secondary)? General Surgery   Week 1 attempt successful? Yes   Call start time 0923   Call end time 0931   Meds reviewed with patient/caregiver? Yes   Is the patient having any side effects they believe may be caused by any medication additions or changes? No   Does the patient have all medications related to this admission filled (includes all antibiotics, pain medications, etc.) Yes   Is the patient taking all medications as directed (includes completed medication regime)? No   What is preventing the patient from taking all medications as directed? Other   Nursing Interventions Nurse provided patient education, Advised patient to call provider   Medication comments Patient states does not take Januvia due to it dropping her BG too low. States will discuss with PCP.   Does the patient have a follow up appointment scheduled with their surgeon? Yes   Has the patient kept scheduled appointments due by today? N/A   Comments PCP appt 11/29/23-home visit. Patient is bedbound.   Has home health visited the patient within 72 hours of discharge? Yes   Psychosocial issues? No   Psychosocial comments Patient's mother and sister are available to assist her.   Did the patient receive a copy of their discharge instructions? Yes   Nursing interventions Reviewed instructions with patient   What is the patient's perception of their health status since discharge? Same   Nursing interventions Nurse provided patient education   Is the patient /caregiver able to teach back basic post-op care? Take showers only when approved by MD-sponge bathe until then, No tub bath, swimming, or hot tub until instructed by MD, Keep incision areas clean,dry and protected, Do not remove steri-strips, Lifting as instructed by MD in discharge instructions   Is  the patient/caregiver able to teach back signs and symptoms of incisional infection? Increased redness, swelling or pain at the incisonal site, Increased drainage or bleeding, Incisional warmth, Pus or odor from incision, Fever   Is the patient/caregiver able to teach back steps to recovery at home? Set small, achievable goals for return to baseline health, Rest and rebuild strength, gradually increase activity, Practice good oral hygiene, Eat a well-balance diet, Make a list of questions for surgeon's appointment   If the patient is a current smoker, are they able to teach back resources for cessation? Not a smoker   Is the patient/caregiver able to teach back the hierarchy of who to call/visit for symptoms/problems? PCP, Specialist, Home health nurse, Urgent Care, ED, 911 Yes   Week 1 call completed? Yes   Revoked No further contact(revokes)-requires comment   Is the patient interested in additional calls from an ambulatory ? No   Would this patient benefit from a Referral to [facility] Social Work? No   Graduated/Revoked comments Patient declined any further f/u calls.   Wrap up additional comments Patient states is about the same. Denies any s/s of worsening infection. States doing twice daily wet to dry dressing changes. Reports  this morning. States will discuss use of Januvia with PCP. Denies any needs or concerns today.   Call end time 0931            Padmini VALDIVIA - Registered Nurse

## 2023-11-15 NOTE — CASE MANAGEMENT/SOCIAL WORK
Case Management Discharge Note      Final Note: Patient discharged home via [facility]. Orders reviewed no further needs noted.    Provided Post Acute Provider List?: N/A  N/A Provider List Comment: Patient current with [facility]  Provided Post Acute Provider Quality & Resource List?: N/A  N/A Quality & Resource List Comment: Patient current with [facility]    Selected Continued Care - Discharged on 11/13/2023 Admission date: 11/8/2023 - Discharge disposition: Home or Self Care      Destination    No services have been selected for the patient.                Durable Medical Equipment    No services have been selected for the patient.                Dialysis/Infusion    No services have been selected for the patient.                Home Medical Care    No services have been selected for the patient.                Therapy    No services have been selected for the patient.                Community Resources    No services have been selected for the patient.                Community & DME    No services have been selected for the patient.                    Selected Continued Care - Prior Encounters Includes continued care and service providers with selected services from prior encounters from 8/10/2023 to 11/13/2023      Discharged on 8/29/2023 Admission date: 8/25/2023 - Discharge disposition: Home-Health Care [facility]      Home Medical Care       Service Provider Selected Services Address Phone Fax Patient Preferred    [facility] - [facility] [address] [facility] [place] [address] [place] [address] [phone] --                               Final Discharge Disposition Code: 01 - home or self-care

## 2023-11-19 DIAGNOSIS — E11.42 TYPE 2 DIABETES MELLITUS WITH DIABETIC POLYNEUROPATHY, WITH LONG-TERM CURRENT USE OF INSULIN: ICD-10-CM

## 2023-11-19 DIAGNOSIS — Z79.4 TYPE 2 DIABETES MELLITUS WITH DIABETIC POLYNEUROPATHY, WITH LONG-TERM CURRENT USE OF INSULIN: ICD-10-CM

## 2023-11-20 ENCOUNTER — TELEPHONE (OUTPATIENT)
Dept: SURGERY | Facility: CLINIC | Age: 53
End: 2023-11-20
Payer: MEDICARE

## 2023-11-20 RX ORDER — INSULIN DEGLUDEC 200 U/ML
INJECTION, SOLUTION SUBCUTANEOUS
Qty: 9 ML | Refills: 3 | Status: SHIPPED | OUTPATIENT
Start: 2023-11-20

## 2023-11-20 NOTE — TELEPHONE ENCOUNTER
Patient called in regards of a potential follow-up visit. Patient and her home health nurse would like to know if  would be able to do a video visit, as the patient is bed-bound. Patients nurse is available to do this with the patient Monday, Wednesday, or Friday if at all possible.

## 2023-11-22 ENCOUNTER — TELEMEDICINE (OUTPATIENT)
Dept: SURGERY | Facility: CLINIC | Age: 53
End: 2023-11-22
Payer: MEDICARE

## 2023-11-22 DIAGNOSIS — L89.894 PRESSURE ULCER OF OTHER SITE, STAGE 4: Primary | ICD-10-CM

## 2023-11-22 RX ORDER — COLLAGENASE SANTYL 250 [ARB'U]/G
1 OINTMENT TOPICAL DAILY
Qty: 90 G | Refills: 3 | Status: SHIPPED | OUTPATIENT
Start: 2023-11-22

## 2023-11-22 NOTE — PROGRESS NOTES
CHIEF COMPLAINT:   Chief Complaint   Patient presents with    Wound Check     Right gluteal wound       HISTORY OF PRESENT ILLNESS:  This is a 53 y.o. female who was seen today via a MyCt video visit for recheck of a right gluteal pressure ulcer wound that I previously debrided in May of this year.  It has been healing by secondary intention with normal saline wet-to-dry dressing changes twice daily.  She is largely bedbound and blind.  She was readmitted to the hospital less than 2 weeks ago with recurrent necrosis of the right gluteal wound requiring debridement by my partner, Dr. Escamilla.  She was discharged home on oral Levaquin which she has taken to completion for a polymicrobial wound infection containing Proteus, E. coli, and Klebsiella.  She has had no rash or diarrhea from the antibiotics.  Her home health nurse was with her today during the video visit and showed me the wound which demonstrates a few areas of fibrinous slough but no overlying necrosis of the skin or soft tissues.  The wound tunnels anteriorly about 4 cm.    PHYSICAL EXAM:  Physical exam was largely deferred due to this being a video visit, but I was able to see the wound through the video camera which shows a few areas of fibrinous slough, but no evidence for black tissue necrosis or skin necrosis    A/P:  This is a 53 y.o. female patient who is S/P debridement of recurrent necrotic wound of the right buttock on 11/9/2023    On examining the wound through the video visit today, there is some fibrinous slough that would benefit from topical Santyl twice daily.  I have sent a prescription for Santyl to her pharmacy in [place] and discussed the plan to apply Santyl liberally across the wound twice a day.  Once the fibrinous slough debrides off, we can switch back to normal saline wet-to-dry dressing changes twice daily.  I would like to see her back in about 2 weeks for recheck of the wound I will have my office arrange for another  [facility] video visit.    Elizabeth Adame MD  General, Robotic, and Endoscopic Surgery  [facility]    [address], [address]  [place] [address]  P: [phone]  F: [phone]

## 2023-11-29 ENCOUNTER — TELEPHONE (OUTPATIENT)
Dept: SURGERY | Facility: CLINIC | Age: 53
End: 2023-11-29
Payer: MEDICARE

## 2023-11-29 RX ORDER — LEVOFLOXACIN 750 MG/1
750 TABLET, FILM COATED ORAL DAILY
Qty: 14 TABLET | Refills: 0 | Status: SHIPPED | OUTPATIENT
Start: 2023-11-29 | End: 2023-12-13

## 2023-11-29 NOTE — TELEPHONE ENCOUNTER
Voice mail from Stephanie with [facility], [phone], stating patient is having increased firmness and discomfort at the top of her wound. Santyl is currently being used for wound care, wound is starting to debride. There is also some new undermining toward the bottom of the wound. Stephanie is requesting an oral antibiotic being called in because patient reports the same symptoms prior to her last hospital admission.  Stephanie is also requesting orders for 1/4 or 1/2 dakin's wet to dry dressing change twice daily.

## 2023-11-30 NOTE — TELEPHONE ENCOUNTER
Returned call to Stephanie.  Informed her Dr. Adame has call in a prescription for Levaquin and gave verbal order for 1/4 strength Dakins wet to dry dressing changes twice daily and Santyl.  Stephanie voiced understanding.

## 2023-11-30 NOTE — TELEPHONE ENCOUNTER
I have sent a prescription for Levaquin (14-day supply) to her [facility] pharmacy in [place].  I am also happy to give a verbal order for them to use 1/4 strength Dakin's with the wet-to-dry dressing changes coupled with Santyl.

## 2023-12-04 ENCOUNTER — TELEMEDICINE (OUTPATIENT)
Dept: SURGERY | Facility: CLINIC | Age: 53
End: 2023-12-04
Payer: MEDICARE

## 2023-12-04 DIAGNOSIS — L89.894 PRESSURE ULCER OF OTHER SITE, STAGE 4: Primary | ICD-10-CM

## 2023-12-04 PROCEDURE — 99212 OFFICE O/P EST SF 10 MIN: CPT | Performed by: SURGERY

## 2023-12-04 RX ORDER — ONDANSETRON 4 MG/1
4 TABLET, ORALLY DISINTEGRATING ORAL EVERY 8 HOURS PRN
Qty: 30 TABLET | Refills: 0 | Status: ON HOLD | OUTPATIENT
Start: 2023-12-04

## 2023-12-04 NOTE — PROGRESS NOTES
CHIEF COMPLAINT:   Chief Complaint   Patient presents with    Wound Check       HISTORY OF PRESENT ILLNESS:  This is a 53 y.o. female who was seen today via a MyChrt video visit for recheck of a right gluteal pressure ulcer wound that I previously debrided in May of this year.  When I did a telemedicine visit with her a couple of weeks ago, the wound appeared to have a fibrinous exudate overlying the medial aspect with some tunneling noted by her home health team.  We switched her to Santyl topical application followed by Dakin's wet-to-dry dressing changes twice a day.  Her wound culture from the last debridement grew Klebsiella, E. coli, and Proteus which I explained are all common gastrointestinal bacteria.  Her home health team contacted shortly after the visit reporting more of an odor to the drainage from the wound so I prescribed Levaquin yet again given the bacterial sensitivities from her wound culture on 11/9/2023.  Her home health nurse told her yesterday the wound looked to be about 50% better within the last week.    PHYSICAL EXAM:  Physical exam was deferred, as her home health nurse was not present and there was no one at home to help undress and show me the wound  General: Awake, alert, conversant in no acute distress  HEENT: Poor dentition, extraocular movements intact bilaterally  Psychiatric: Normal mood/affect, oriented x 3  Otherwise physical exam was unable to be completed as I was unable to view the remainder of her body below head/neck region    A/P:  This is a 53 y.o. female patient who is S/P debridement of recurrent necrotic wound of the right buttock on 11/9/2023    Her report is favorable, suggesting the wound has been healing.  I will try to reschedule another telemedicine visit in the next couple of weeks when her home health nurse can be available so that I can see the wound over video conference.  Continue Santyl application followed by Dakin's wet-to-dry dressing changes twice a  day.    Elizabeth Adame MD  General, Robotic, and Endoscopic Surgery  [facility]    [address], [address]  [place] [address]  P: [phone]  F: [phone]

## 2023-12-11 ENCOUNTER — APPOINTMENT (OUTPATIENT)
Dept: CT IMAGING | Facility: HOSPITAL | Age: 53
End: 2023-12-11
Payer: MEDICARE

## 2023-12-11 ENCOUNTER — APPOINTMENT (OUTPATIENT)
Dept: GENERAL RADIOLOGY | Facility: HOSPITAL | Age: 53
End: 2023-12-11
Payer: MEDICARE

## 2023-12-11 ENCOUNTER — HOSPITAL ENCOUNTER (INPATIENT)
Facility: HOSPITAL | Age: 53
LOS: 10 days | Discharge: NURSING, REHAB OR LONG-TERM CARE | End: 2023-12-21
Attending: EMERGENCY MEDICINE | Admitting: INTERNAL MEDICINE
Payer: MEDICARE

## 2023-12-11 ENCOUNTER — TELEPHONE (OUTPATIENT)
Dept: SURGERY | Facility: CLINIC | Age: 53
End: 2023-12-11
Payer: MEDICARE

## 2023-12-11 DIAGNOSIS — T14.8XXA INFECTED WOUND: ICD-10-CM

## 2023-12-11 DIAGNOSIS — M62.3 IMMOBILITY SYNDROME: ICD-10-CM

## 2023-12-11 DIAGNOSIS — M62.3 IMMOBILITY SYNDROME (PARAPLEGIC): ICD-10-CM

## 2023-12-11 DIAGNOSIS — A49.02 MRSA (METHICILLIN RESISTANT STAPHYLOCOCCUS AUREUS) INFECTION: ICD-10-CM

## 2023-12-11 DIAGNOSIS — Z79.4 TYPE 2 DIABETES MELLITUS WITHOUT COMPLICATION, WITH LONG-TERM CURRENT USE OF INSULIN: ICD-10-CM

## 2023-12-11 DIAGNOSIS — M86.9 OSTEOMYELITIS OF RIGHT HIP: Primary | ICD-10-CM

## 2023-12-11 DIAGNOSIS — S71.001A OPEN WOUND OF RIGHT HIP, INITIAL ENCOUNTER: ICD-10-CM

## 2023-12-11 DIAGNOSIS — L08.9 INFECTED WOUND: ICD-10-CM

## 2023-12-11 DIAGNOSIS — E11.9 TYPE 2 DIABETES MELLITUS WITHOUT COMPLICATION, WITH LONG-TERM CURRENT USE OF INSULIN: ICD-10-CM

## 2023-12-11 LAB
ALBUMIN SERPL-MCNC: 2.2 G/DL (ref 3.5–5.2)
ALBUMIN/GLOB SERPL: 0.6 G/DL
ALP SERPL-CCNC: 272 U/L (ref 39–117)
ALT SERPL W P-5'-P-CCNC: 9 U/L (ref 1–33)
ANION GAP SERPL CALCULATED.3IONS-SCNC: 11 MMOL/L (ref 5–15)
AST SERPL-CCNC: 37 U/L (ref 1–32)
BASOPHILS # BLD AUTO: 0.04 10*3/MM3 (ref 0–0.2)
BASOPHILS NFR BLD AUTO: 0.2 % (ref 0–1.5)
BILIRUB SERPL-MCNC: 0.4 MG/DL (ref 0–1.2)
BUN SERPL-MCNC: 11 MG/DL (ref 6–20)
BUN/CREAT SERPL: 14.3 (ref 7–25)
CALCIUM SPEC-SCNC: 8.5 MG/DL (ref 8.6–10.5)
CHLORIDE SERPL-SCNC: 99 MMOL/L (ref 98–107)
CK SERPL-CCNC: 17 U/L (ref 20–180)
CO2 SERPL-SCNC: 24 MMOL/L (ref 22–29)
CREAT SERPL-MCNC: 0.77 MG/DL (ref 0.57–1)
CRP SERPL-MCNC: 32.5 MG/DL (ref 0–0.5)
D-LACTATE SERPL-SCNC: 1.1 MMOL/L (ref 0.5–2)
DEPRECATED RDW RBC AUTO: 37.5 FL (ref 37–54)
EGFRCR SERPLBLD CKD-EPI 2021: 93.5 ML/MIN/1.73
EOSINOPHIL # BLD AUTO: 0.23 10*3/MM3 (ref 0–0.4)
EOSINOPHIL NFR BLD AUTO: 1.3 % (ref 0.3–6.2)
ERYTHROCYTE [DISTWIDTH] IN BLOOD BY AUTOMATED COUNT: 12.9 % (ref 12.3–15.4)
ERYTHROCYTE [SEDIMENTATION RATE] IN BLOOD: 90 MM/HR (ref 0–30)
GLOBULIN UR ELPH-MCNC: 4 GM/DL
GLUCOSE SERPL-MCNC: 181 MG/DL (ref 65–99)
HCT VFR BLD AUTO: 25.7 % (ref 34–46.6)
HGB BLD-MCNC: 7.9 G/DL (ref 12–15.9)
IMM GRANULOCYTES # BLD AUTO: 0.27 10*3/MM3 (ref 0–0.05)
IMM GRANULOCYTES NFR BLD AUTO: 1.5 % (ref 0–0.5)
LYMPHOCYTES # BLD AUTO: 2.4 10*3/MM3 (ref 0.7–3.1)
LYMPHOCYTES NFR BLD AUTO: 13.2 % (ref 19.6–45.3)
MCH RBC QN AUTO: 24.8 PG (ref 26.6–33)
MCHC RBC AUTO-ENTMCNC: 30.7 G/DL (ref 31.5–35.7)
MCV RBC AUTO: 80.8 FL (ref 79–97)
MONOCYTES # BLD AUTO: 1.08 10*3/MM3 (ref 0.1–0.9)
MONOCYTES NFR BLD AUTO: 5.9 % (ref 5–12)
NEUTROPHILS NFR BLD AUTO: 14.23 10*3/MM3 (ref 1.7–7)
NEUTROPHILS NFR BLD AUTO: 77.9 % (ref 42.7–76)
NRBC BLD AUTO-RTO: 0 /100 WBC (ref 0–0.2)
PLATELET # BLD AUTO: 548 10*3/MM3 (ref 140–450)
PMV BLD AUTO: 9.4 FL (ref 6–12)
POTASSIUM SERPL-SCNC: 3.7 MMOL/L (ref 3.5–5.2)
PROT SERPL-MCNC: 6.2 G/DL (ref 6–8.5)
RBC # BLD AUTO: 3.18 10*6/MM3 (ref 3.77–5.28)
SODIUM SERPL-SCNC: 134 MMOL/L (ref 136–145)
WBC NRBC COR # BLD AUTO: 18.25 10*3/MM3 (ref 3.4–10.8)

## 2023-12-11 PROCEDURE — 87205 SMEAR GRAM STAIN: CPT | Performed by: EMERGENCY MEDICINE

## 2023-12-11 PROCEDURE — 25010000002 ONDANSETRON PER 1 MG: Performed by: INTERNAL MEDICINE

## 2023-12-11 PROCEDURE — 82550 ASSAY OF CK (CPK): CPT | Performed by: EMERGENCY MEDICINE

## 2023-12-11 PROCEDURE — 25810000003 SODIUM CHLORIDE 0.9 % SOLUTION: Performed by: NURSE PRACTITIONER

## 2023-12-11 PROCEDURE — 83605 ASSAY OF LACTIC ACID: CPT | Performed by: EMERGENCY MEDICINE

## 2023-12-11 PROCEDURE — 85652 RBC SED RATE AUTOMATED: CPT | Performed by: EMERGENCY MEDICINE

## 2023-12-11 PROCEDURE — 80053 COMPREHEN METABOLIC PANEL: CPT | Performed by: EMERGENCY MEDICINE

## 2023-12-11 PROCEDURE — 25010000002 VANCOMYCIN 10 G RECONSTITUTED SOLUTION: Performed by: EMERGENCY MEDICINE

## 2023-12-11 PROCEDURE — 25810000003 SODIUM CHLORIDE 0.9 % SOLUTION: Performed by: EMERGENCY MEDICINE

## 2023-12-11 PROCEDURE — 99285 EMERGENCY DEPT VISIT HI MDM: CPT

## 2023-12-11 PROCEDURE — 25010000002 PIPERACILLIN SOD-TAZOBACTAM PER 1 G: Performed by: EMERGENCY MEDICINE

## 2023-12-11 PROCEDURE — 87040 BLOOD CULTURE FOR BACTERIA: CPT | Performed by: EMERGENCY MEDICINE

## 2023-12-11 PROCEDURE — 73552 X-RAY EXAM OF FEMUR 2/>: CPT

## 2023-12-11 PROCEDURE — 85025 COMPLETE CBC W/AUTO DIFF WBC: CPT | Performed by: EMERGENCY MEDICINE

## 2023-12-11 PROCEDURE — 87186 SC STD MICRODIL/AGAR DIL: CPT | Performed by: EMERGENCY MEDICINE

## 2023-12-11 PROCEDURE — 72192 CT PELVIS W/O DYE: CPT

## 2023-12-11 PROCEDURE — 25010000002 PIPERACILLIN SOD-TAZOBACTAM PER 1 G: Performed by: INTERNAL MEDICINE

## 2023-12-11 PROCEDURE — 86140 C-REACTIVE PROTEIN: CPT | Performed by: EMERGENCY MEDICINE

## 2023-12-11 PROCEDURE — 36415 COLL VENOUS BLD VENIPUNCTURE: CPT

## 2023-12-11 PROCEDURE — 87147 CULTURE TYPE IMMUNOLOGIC: CPT | Performed by: EMERGENCY MEDICINE

## 2023-12-11 PROCEDURE — 87070 CULTURE OTHR SPECIMN AEROBIC: CPT | Performed by: EMERGENCY MEDICINE

## 2023-12-11 RX ORDER — ONDANSETRON 2 MG/ML
4 INJECTION INTRAMUSCULAR; INTRAVENOUS EVERY 6 HOURS PRN
Status: DISCONTINUED | OUTPATIENT
Start: 2023-12-11 | End: 2023-12-21 | Stop reason: HOSPADM

## 2023-12-11 RX ORDER — HYDROXYZINE HYDROCHLORIDE 25 MG/1
50 TABLET, FILM COATED ORAL 3 TIMES DAILY PRN
Status: DISCONTINUED | OUTPATIENT
Start: 2023-12-11 | End: 2023-12-21 | Stop reason: HOSPADM

## 2023-12-11 RX ORDER — BISACODYL 10 MG
10 SUPPOSITORY, RECTAL RECTAL DAILY PRN
Status: DISCONTINUED | OUTPATIENT
Start: 2023-12-11 | End: 2023-12-21 | Stop reason: HOSPADM

## 2023-12-11 RX ORDER — CYCLOBENZAPRINE HCL 5 MG
5 TABLET ORAL 3 TIMES DAILY PRN
COMMUNITY

## 2023-12-11 RX ORDER — HYDROCODONE BITARTRATE AND ACETAMINOPHEN 5; 325 MG/1; MG/1
1 TABLET ORAL DAILY
Status: DISCONTINUED | OUTPATIENT
Start: 2023-12-12 | End: 2023-12-21 | Stop reason: HOSPADM

## 2023-12-11 RX ORDER — GABAPENTIN 300 MG/1
300 CAPSULE ORAL EVERY 12 HOURS SCHEDULED
Status: DISCONTINUED | OUTPATIENT
Start: 2023-12-11 | End: 2023-12-21 | Stop reason: HOSPADM

## 2023-12-11 RX ORDER — INSULIN LISPRO 100 [IU]/ML
8 INJECTION, SOLUTION INTRAVENOUS; SUBCUTANEOUS
Status: DISCONTINUED | OUTPATIENT
Start: 2023-12-12 | End: 2023-12-21 | Stop reason: HOSPADM

## 2023-12-11 RX ORDER — POLYETHYLENE GLYCOL 3350 17 G/17G
17 POWDER, FOR SOLUTION ORAL DAILY PRN
Status: DISCONTINUED | OUTPATIENT
Start: 2023-12-11 | End: 2023-12-21 | Stop reason: HOSPADM

## 2023-12-11 RX ORDER — CETIRIZINE HYDROCHLORIDE 10 MG/1
10 TABLET ORAL NIGHTLY
Status: DISCONTINUED | OUTPATIENT
Start: 2023-12-11 | End: 2023-12-21 | Stop reason: HOSPADM

## 2023-12-11 RX ORDER — UREA 10 %
3 LOTION (ML) TOPICAL NIGHTLY PRN
Status: DISCONTINUED | OUTPATIENT
Start: 2023-12-11 | End: 2023-12-21 | Stop reason: HOSPADM

## 2023-12-11 RX ORDER — HYDROCODONE BITARTRATE AND ACETAMINOPHEN 5; 325 MG/1; MG/1
1 TABLET ORAL DAILY
COMMUNITY

## 2023-12-11 RX ORDER — ONDANSETRON 4 MG/1
4 TABLET, FILM COATED ORAL EVERY 6 HOURS PRN
Status: DISCONTINUED | OUTPATIENT
Start: 2023-12-11 | End: 2023-12-21 | Stop reason: HOSPADM

## 2023-12-11 RX ORDER — SODIUM CHLORIDE 9 MG/ML
100 INJECTION, SOLUTION INTRAVENOUS CONTINUOUS
Status: DISCONTINUED | OUTPATIENT
Start: 2023-12-11 | End: 2023-12-11

## 2023-12-11 RX ORDER — LEVOTHYROXINE SODIUM 0.1 MG/1
200 TABLET ORAL DAILY
Status: DISCONTINUED | OUTPATIENT
Start: 2023-12-12 | End: 2023-12-21 | Stop reason: HOSPADM

## 2023-12-11 RX ORDER — BISACODYL 5 MG/1
5 TABLET, DELAYED RELEASE ORAL DAILY PRN
Status: DISCONTINUED | OUTPATIENT
Start: 2023-12-11 | End: 2023-12-21 | Stop reason: HOSPADM

## 2023-12-11 RX ORDER — NICOTINE POLACRILEX 4 MG
15 LOZENGE BUCCAL
Status: DISCONTINUED | OUTPATIENT
Start: 2023-12-11 | End: 2023-12-15

## 2023-12-11 RX ORDER — DEXTROSE MONOHYDRATE 25 G/50ML
25 INJECTION, SOLUTION INTRAVENOUS
Status: DISCONTINUED | OUTPATIENT
Start: 2023-12-11 | End: 2023-12-15

## 2023-12-11 RX ORDER — CYCLOBENZAPRINE HCL 10 MG
5 TABLET ORAL 3 TIMES DAILY PRN
Status: DISCONTINUED | OUTPATIENT
Start: 2023-12-11 | End: 2023-12-21 | Stop reason: HOSPADM

## 2023-12-11 RX ORDER — IBUPROFEN 600 MG/1
1 TABLET ORAL
Status: DISCONTINUED | OUTPATIENT
Start: 2023-12-11 | End: 2023-12-15

## 2023-12-11 RX ORDER — INSULIN LISPRO 100 [IU]/ML
2-7 INJECTION, SOLUTION INTRAVENOUS; SUBCUTANEOUS
Status: DISCONTINUED | OUTPATIENT
Start: 2023-12-12 | End: 2023-12-15

## 2023-12-11 RX ORDER — SODIUM CHLORIDE 0.9 % (FLUSH) 0.9 %
10 SYRINGE (ML) INJECTION AS NEEDED
Status: DISCONTINUED | OUTPATIENT
Start: 2023-12-11 | End: 2023-12-21 | Stop reason: HOSPADM

## 2023-12-11 RX ORDER — SODIUM CHLORIDE AND POTASSIUM CHLORIDE 150; 900 MG/100ML; MG/100ML
100 INJECTION, SOLUTION INTRAVENOUS CONTINUOUS
Status: DISCONTINUED | OUTPATIENT
Start: 2023-12-11 | End: 2023-12-15

## 2023-12-11 RX ORDER — AMOXICILLIN 250 MG
2 CAPSULE ORAL 2 TIMES DAILY
Status: DISCONTINUED | OUTPATIENT
Start: 2023-12-11 | End: 2023-12-16

## 2023-12-11 RX ORDER — VANCOMYCIN HYDROCHLORIDE 1 G/200ML
1000 INJECTION, SOLUTION INTRAVENOUS EVERY 12 HOURS
Status: DISCONTINUED | OUTPATIENT
Start: 2023-12-12 | End: 2023-12-13

## 2023-12-11 RX ORDER — ACETAMINOPHEN 500 MG
1000 TABLET ORAL ONCE
Status: COMPLETED | OUTPATIENT
Start: 2023-12-11 | End: 2023-12-11

## 2023-12-11 RX ORDER — ACETAMINOPHEN 325 MG/1
650 TABLET ORAL EVERY 4 HOURS PRN
Status: DISCONTINUED | OUTPATIENT
Start: 2023-12-11 | End: 2023-12-21 | Stop reason: HOSPADM

## 2023-12-11 RX ADMIN — PIPERACILLIN SODIUM AND TAZOBACTAM SODIUM 4.5 G: 4; .5 INJECTION, SOLUTION INTRAVENOUS at 23:07

## 2023-12-11 RX ADMIN — ACETAMINOPHEN 1000 MG: 500 TABLET ORAL at 17:01

## 2023-12-11 RX ADMIN — SODIUM CHLORIDE 100 ML/HR: 9 INJECTION, SOLUTION INTRAVENOUS at 22:20

## 2023-12-11 RX ADMIN — SODIUM CHLORIDE 500 ML: 9 INJECTION, SOLUTION INTRAVENOUS at 21:05

## 2023-12-11 RX ADMIN — ONDANSETRON 4 MG: 2 INJECTION INTRAMUSCULAR; INTRAVENOUS at 22:20

## 2023-12-11 RX ADMIN — SODIUM CHLORIDE 2250 MG: 9 INJECTION, SOLUTION INTRAVENOUS at 19:09

## 2023-12-11 NOTE — TELEPHONE ENCOUNTER
Patients home health nurse called just wanting to inform  know that the pt has not been able to keep anything down, she had a high heart rate, low bp, and a low grade fever. Home health nurse has sent the patient to the [facility] ER.

## 2023-12-11 NOTE — ED NOTES
Pt has home health RN that does dressing changes MWF for pt's R hip area. Pt stated increased exhaustion, nausea, lack of appetite. Pt states last week she would be able to roll independently or with minimum effort however the past week pt states daughter has had to roll pt with max assist.     Pt states increased pain in R hip that radiates down to her R knee.

## 2023-12-11 NOTE — ED TRIAGE NOTES
Sent by Home Health RN for worsening RT hip wound. Currently receiving Levaquin at home. Is bedbound

## 2023-12-11 NOTE — ED PROVIDER NOTES
EMERGENCY DEPARTMENT ENCOUNTER    Room Number:  18/18  PCP: Grace Baumann APRN  Historian: Patient      HPI:  Chief Complaint: Right hip and knee pain  A complete HPI/ROS/PMH/PSH/SH/FH are unobtainable due to: Nothing  Context: Vanessa Root is a 53 y.o. female who presents to the ED c/o right hip and knee pain.  Patient states that she has a chronic wound on her right hip that was debrided at 1 time by Dr. Adame for a necrotizing infection.  She denies any other wounds.  She states that recently her right hip has become a lot more painful when she tries to roll around in the bed.  She is bedbound.  She also reports having pain that radiates to her right knee.  She states that she had a low-grade fever today at nursing facility of 99.6.  She has a chronic indwelling Quiroz catheter.  She denies cough, chest pain, shortness of breath.            PAST MEDICAL HISTORY  Active Ambulatory Problems     Diagnosis Date Noted    Hyperlipidemia     Type 2 diabetes mellitus with diabetic polyneuropathy     GERD (gastroesophageal reflux disease)     Bipolar 1 disorder     Depression     Arthritis     Spinal stenosis of lumbar region 01/10/2017    Sciatica of left side 02/27/2019    Restless legs 09/02/2015    Obstructive sleep apnea syndrome 09/09/2014    Lumbosacral spondylosis without myelopathy 02/24/2017    Hypothyroidism 04/23/2014    Facet arthritis of lumbar region 02/28/2014    Degeneration of intervertebral disc of lumbar region 02/28/2014    Diabetic peripheral neuropathy 12/09/2014    Cancer of endometrium 07/02/2013    Cerebrovascular accident 06/09/2015    Left foot drop 01/10/2017    Vitamin B12 deficiency     Vitamin D deficiency     Vitamin C deficiency     H/O hypokalemia     Iron deficiency anemia secondary to inadequate dietary iron intake     Iron deficiency     Muscle weakness 07/06/2020    Hypocalcemia 12/28/2021    Malignant neoplasm of uterus 03/29/2022    Sepsis, due to unspecified organism,  unspecified whether acute organ dysfunction present 2023    Necrotizing soft tissue infection 2023    Immobility syndrome 2023    Fasciitis 2023    Hypomagnesemia 2023    Spinal stenosis of lumbar region with neurogenic claudication 2023    Pressure ulcer of other site, stage 4 2023    Necrotizing fasciitis 2023    Immobility syndrome (paraplegic) 2023    Pulmonary embolism 2023    Obesity, Class III, BMI 40-49.9 (morbid obesity) 2023    History of ischemic stroke 2023    Infected wound 2023     Resolved Ambulatory Problems     Diagnosis Date Noted    Hypokalemia 2023    Leukocytosis 2023    Urinary tract infection associated with indwelling urethral catheter 2023    Lactic acidosis 2023     Past Medical History:   Diagnosis Date    Cancer     COVID-19     Diabetes mellitus     Frequent UTI     Migraine     Murmur, heart     Ovarian cyst     Stroke          PAST SURGICAL HISTORY  Past Surgical History:   Procedure Laterality Date     SECTION  2003    HYSTERECTOMY      INCISION AND DRAINAGE LEG Right 2023    Procedure: Debridement of right thigh wound;  Surgeon: Mohan Escamilla MD;  Location: [facility] OR;  Service: General;  Laterality: Right;    TONSILLECTOMY      WOUND DEBRIDEMENT N/A 2023    Procedure: Debridement necrotizing tissue  right buttock wound;  Surgeon: Elizabeth Adame MD;  Location: [facility] OR;  Service: General;  Laterality: N/A;         FAMILY HISTORY  Family History   Problem Relation Age of Onset    Arthritis Mother     Diabetes Mother     Migraines Mother     Stroke Mother     Arthritis Father     Cancer Maternal Grandmother     Thyroid disease Maternal Grandmother     Cancer Maternal Grandfather     Cancer Paternal Grandmother     Thyroid disease Paternal Grandmother     Cancer Paternal Grandfather     Malig Hyperthermia Neg Hx          SOCIAL  HISTORY  Social History     Socioeconomic History    Marital status: Single   Tobacco Use    Smoking status: Former    Smokeless tobacco: Never   Vaping Use    Vaping Use: Never used   Substance and Sexual Activity    Alcohol use: Never    Drug use: Never    Sexual activity: Defer         ALLERGIES  Clemastine fumarate, Ziprasidone, Aripiprazole, Sulfa antibiotics, Green pepper, Latex, and Lisinopril        REVIEW OF SYSTEMS  Review of Systems   Review of all 14 systems is negative other than stated in the HPI above.      PHYSICAL EXAM  ED Triage Vitals [12/11/23 1328]   Temp Heart Rate Resp BP SpO2   99.1 °F (37.3 °C) 82 17 108/71 94 %      Temp src Heart Rate Source Patient Position BP Location FiO2 (%)   Tympanic -- -- -- --       Physical Exam      GENERAL: Awake and alert, no acute distress  HENT: nares patent  EYES: no scleral icterus, pupils 3 mm reactive bilaterally  CV: regular rhythm, normal rate  RESPIRATORY: normal effort  ABDOMEN: soft, nondistended, nontender throughout  MUSCULOSKELETAL: no deformity, 2+ DP pulses bilateral lower extremities.  There is a large soft tissue wound present over the right hip with Kerlix packing in place, no surrounding erythema.  NEURO: alert, moves all extremities, follows commands, cranial nerves II through XII intact  PSYCH:  calm, cooperative  SKIN: warm, dry    Vital signs and nursing notes reviewed.          LAB RESULTS  Recent Results (from the past 24 hour(s))   Comprehensive Metabolic Panel    Collection Time: 12/11/23  3:52 PM    Specimen: Blood   Result Value Ref Range    Glucose 181 (H) 65 - 99 mg/dL    BUN 11 6 - 20 mg/dL    Creatinine 0.77 0.57 - 1.00 mg/dL    Sodium 134 (L) 136 - 145 mmol/L    Potassium 3.7 3.5 - 5.2 mmol/L    Chloride 99 98 - 107 mmol/L    CO2 24.0 22.0 - 29.0 mmol/L    Calcium 8.5 (L) 8.6 - 10.5 mg/dL    Total Protein 6.2 6.0 - 8.5 g/dL    Albumin 2.2 (L) 3.5 - 5.2 g/dL    ALT (SGPT) 9 1 - 33 U/L    AST (SGOT) 37 (H) 1 - 32 U/L    Alkaline  Phosphatase 272 (H) 39 - 117 U/L    Total Bilirubin 0.4 0.0 - 1.2 mg/dL    Globulin 4.0 gm/dL    A/G Ratio 0.6 g/dL    BUN/Creatinine Ratio 14.3 7.0 - 25.0    Anion Gap 11.0 5.0 - 15.0 mmol/L    eGFR 93.5 >60.0 mL/min/1.73   Sedimentation Rate    Collection Time: 12/11/23  3:52 PM    Specimen: Blood   Result Value Ref Range    Sed Rate 90 (H) 0 - 30 mm/hr   C-reactive Protein    Collection Time: 12/11/23  3:52 PM    Specimen: Blood   Result Value Ref Range    C-Reactive Protein 32.50 (H) 0.00 - 0.50 mg/dL   Lactic Acid, Plasma    Collection Time: 12/11/23  3:52 PM    Specimen: Blood   Result Value Ref Range    Lactate 1.1 0.5 - 2.0 mmol/L   CK    Collection Time: 12/11/23  3:52 PM    Specimen: Blood   Result Value Ref Range    Creatine Kinase 17 (L) 20 - 180 U/L   CBC Auto Differential    Collection Time: 12/11/23  3:52 PM    Specimen: Blood   Result Value Ref Range    WBC 18.25 (H) 3.40 - 10.80 10*3/mm3    RBC 3.18 (L) 3.77 - 5.28 10*6/mm3    Hemoglobin 7.9 (L) 12.0 - 15.9 g/dL    Hematocrit 25.7 (L) 34.0 - 46.6 %    MCV 80.8 79.0 - 97.0 fL    MCH 24.8 (L) 26.6 - 33.0 pg    MCHC 30.7 (L) 31.5 - 35.7 g/dL    RDW 12.9 12.3 - 15.4 %    RDW-SD 37.5 37.0 - 54.0 fl    MPV 9.4 6.0 - 12.0 fL    Platelets 548 (H) 140 - 450 10*3/mm3    Neutrophil % 77.9 (H) 42.7 - 76.0 %    Lymphocyte % 13.2 (L) 19.6 - 45.3 %    Monocyte % 5.9 5.0 - 12.0 %    Eosinophil % 1.3 0.3 - 6.2 %    Basophil % 0.2 0.0 - 1.5 %    Immature Grans % 1.5 (H) 0.0 - 0.5 %    Neutrophils, Absolute 14.23 (H) 1.70 - 7.00 10*3/mm3    Lymphocytes, Absolute 2.40 0.70 - 3.10 10*3/mm3    Monocytes, Absolute 1.08 (H) 0.10 - 0.90 10*3/mm3    Eosinophils, Absolute 0.23 0.00 - 0.40 10*3/mm3    Basophils, Absolute 0.04 0.00 - 0.20 10*3/mm3    Immature Grans, Absolute 0.27 (H) 0.00 - 0.05 10*3/mm3    nRBC 0.0 0.0 - 0.2 /100 WBC       Ordered the above labs and reviewed the results.        RADIOLOGY  CT Pelvis Without Contrast    Result Date: 12/11/2023  CT PELVIS WO  CONTRAST-  Radiation dose reduction techniques were utilized, including automated exposure control and exposure modulation based on body size.  Clinical: Chronic right hip soft tissue wound now with worsening right hip pain  COMPARISON 11/8/2023  FINDINGS: The amount of subcutaneous fat edema and skin thickening along the lateral aspect of the right hip and upper thigh has increased within the interim. This extends at least to the distal thigh and upper abdomen. Progressive cellulitis.  The open wound has extended deeper into the soft tissues, and there is a collection of gas and fluid adjacent to the greater trochanter measuring 5.5 x 3 cm. The amount of gas is greater than fluid at this location. This extends to the bone and there is now a possible small focus of cortical breakthrough. Potential osteomyelitis. The collection is contiguous with the right gluteus yash which is now thickened with several gas bubbles noted within. This process does not extend cephalad or inferiorly into the mid thigh. It is in very close proximity to the lateral hip bursa. There is small hip effusion. Potential septic arthropathy.  There is a Quiroz catheter within the bladder lumen. No acute abnormality within the pelvis seen. The remainder is unremarkable.     This report was finalized on 12/11/2023 5:54 PM by Dr. Rom Reyes M.D on Workstation: [id number]      XR Femur 2 View Right    Result Date: 12/11/2023  XR FEMUR 2 VW RIGHT-  Clinical: Right hip and knee pain, right hip wound, no trauma  FINDINGS: No femur fracture demonstrated. There is narrowing of the medial and lateral compartments of the knee. Also degenerative change about the patellofemoral articulation. Hip joint alignment is satisfactory. There appears to be some narrowing and subchondral sclerosis. There is clothing artifact superimposing the entire field-of-view. No radiopaque foreign body seen. The remainder is unremarkable.  This report was finalized on  12/11/2023 4:47 PM by Dr. Rom Reyes M.D on Workstation: [id number]       Ordered the above noted radiological studies. Reviewed by me in PACS.            PROCEDURES  Procedures              MEDICATIONS GIVEN IN ER  Medications   sodium chloride 0.9 % flush 10 mL (has no administration in time range)   vancomycin 2250 mg/500 mL 0.9% NS IVPB ([facility]) (2,250 mg Intravenous New Bag 12/11/23 1909)   piperacillin-tazobactam (ZOSYN) 4.5 g in iso-osmotic dextrose 100 mL IVPB (premix) (has no administration in time range)   acetaminophen (TYLENOL) tablet 650 mg (has no administration in time range)   sennosides-docusate (PERICOLACE) 8.6-50 MG per tablet 2 tablet (has no administration in time range)     And   polyethylene glycol (MIRALAX) packet 17 g (has no administration in time range)     And   bisacodyl (DULCOLAX) EC tablet 5 mg (has no administration in time range)     And   bisacodyl (DULCOLAX) suppository 10 mg (has no administration in time range)   ondansetron (ZOFRAN) tablet 4 mg (has no administration in time range)     Or   ondansetron (ZOFRAN) injection 4 mg (has no administration in time range)   melatonin tablet 3 mg (has no administration in time range)   acetaminophen (TYLENOL) tablet 1,000 mg (1,000 mg Oral Given 12/11/23 1701)                   MEDICAL DECISION MAKING, PROGRESS, and CONSULTS    All labs have been independently reviewed by me.  All radiology studies have been reviewed by me and I have also reviewed the radiology report.   EKG's independently viewed and interpreted by me.  Discussion below represents my analysis of pertinent findings related to patient's condition, differential diagnosis, treatment plan and final disposition.      Additional sources:  - Discussed/ obtained information from independent historians: N/A    - External (non-ED) record review: Prior discharge summary reviewed and summarized below    - Chronic or social conditions impacting care: Immobility    - Shared decision  making: Patient informed of concern for osteomyelitis of right hip and need for admission, IV antibiotics      Orders placed during this visit:  Orders Placed This Encounter   Procedures    Blood Culture - Blood,    Blood Culture - Blood,    Wound Culture - Wound, Hip, Right    XR Femur 2 View Right    CT Pelvis Without Contrast    Comprehensive Metabolic Panel    Sedimentation Rate    C-reactive Protein    Lactic Acid, Plasma    CK    CBC Auto Differential    Basic Metabolic Panel    CBC (No Diff)    Diet: Cardiac Diets; Healthy Heart (2-3 Na+); Texture: Regular Texture (IDDSI 7); Fluid Consistency: Thin (IDDSI 0)    Pulse Oximetry, Continuous    Monitor Blood Pressure    Vital Signs    Up with assistance    Daily Weights    Strict Intake & Output    Oral Care    Place Sequential Compression Device    Maintain Sequential Compression Device    Code Status and Medical Interventions:    [facility] (on-call MD unless specified) Details    Ortho (on-call MD unless specified)    Insert Peripheral IV    Inpatient Admission    CBC & Differential           Differential diagnosis includes but is not limited to:    Osteomyelitis  Septic arthritis  Myositis        Independent interpretation of labs, radiology studies, and discussions with consultants:  ED Course as of 12/11/23 1932   Mon Dec 11, 2023   1651 C-Reactive Protein(!): 32.50 [JR]   1651 WBC(!): 18.25 [JR]   1651 Hemoglobin(!): 7.9 [JR]   1833 Prior discharge summary reviewed from 11/12/2023.  Patient had intraoperative cultures growing ESBL Proteus and was recommended to be on Levaquin therapy at that time. [JR]   1834 Discussed with Dr. Alvarado, [facility], who agrees to admit. [JR]   1846 Discussed with Dr. Fernandez, orthopedic surgery.  He denies any objection to starting empiric antibiotics. [JR]      ED Course User Index  [JR] Khang Humphrey MD             DIAGNOSIS  Final diagnoses:   Osteomyelitis of right hip   Open wound of right hip, initial encounter   Immobility  syndrome   Type 2 diabetes mellitus without complication, with long-term current use of insulin         DISPOSITION  Admit            Latest Documented Vital Signs:  As of 19:32 EST  BP- 108/62 HR- 82 Temp- 99.1 °F (37.3 °C) (Tympanic) O2 sat- 94%              --    Please note that portions of this were completed with a voice recognition program.       Note Disclaimer: At [facility], we believe that sharing information builds trust and better relationships. You are receiving this note because you are receiving care at [facility] or recently visited. It is possible you will see health information before a provider has talked with you about it. This kind of information can be easy to misunderstand. To help you fully understand what it means for your health, we urge you to discuss this note with your provider.             Khang Humphrey MD  12/11/23 1932

## 2023-12-12 PROBLEM — Z86.711 HISTORY OF PULMONARY EMBOLISM: Status: ACTIVE | Noted: 2023-12-12

## 2023-12-12 PROBLEM — D63.8 ANEMIA, CHRONIC DISEASE: Status: ACTIVE | Noted: 2023-12-12

## 2023-12-12 PROBLEM — E43 SEVERE MALNUTRITION: Status: ACTIVE | Noted: 2023-12-12

## 2023-12-12 PROBLEM — I95.9 HYPOTENSION: Status: ACTIVE | Noted: 2023-12-12

## 2023-12-12 PROBLEM — M00.9 PYOGENIC ARTHRITIS OF RIGHT HIP: Status: ACTIVE | Noted: 2023-12-12

## 2023-12-12 LAB
ANION GAP SERPL CALCULATED.3IONS-SCNC: 11 MMOL/L (ref 5–15)
BUN SERPL-MCNC: 11 MG/DL (ref 6–20)
BUN/CREAT SERPL: 10.9 (ref 7–25)
CALCIUM SPEC-SCNC: 8.1 MG/DL (ref 8.6–10.5)
CHLORIDE SERPL-SCNC: 101 MMOL/L (ref 98–107)
CO2 SERPL-SCNC: 24 MMOL/L (ref 22–29)
CREAT SERPL-MCNC: 1.01 MG/DL (ref 0.57–1)
DEPRECATED RDW RBC AUTO: 37 FL (ref 37–54)
EGFRCR SERPLBLD CKD-EPI 2021: 67.5 ML/MIN/1.73
ERYTHROCYTE [DISTWIDTH] IN BLOOD BY AUTOMATED COUNT: 12.8 % (ref 12.3–15.4)
GLUCOSE BLDC GLUCOMTR-MCNC: 144 MG/DL (ref 70–130)
GLUCOSE BLDC GLUCOMTR-MCNC: 162 MG/DL (ref 70–130)
GLUCOSE BLDC GLUCOMTR-MCNC: 182 MG/DL (ref 70–130)
GLUCOSE BLDC GLUCOMTR-MCNC: 214 MG/DL (ref 70–130)
GLUCOSE SERPL-MCNC: 174 MG/DL (ref 65–99)
HCT VFR BLD AUTO: 25.3 % (ref 34–46.6)
HGB BLD-MCNC: 7.7 G/DL (ref 12–15.9)
MCH RBC QN AUTO: 24.4 PG (ref 26.6–33)
MCHC RBC AUTO-ENTMCNC: 30.4 G/DL (ref 31.5–35.7)
MCV RBC AUTO: 80.3 FL (ref 79–97)
PLATELET # BLD AUTO: 635 10*3/MM3 (ref 140–450)
PMV BLD AUTO: 9.4 FL (ref 6–12)
POTASSIUM SERPL-SCNC: 3.3 MMOL/L (ref 3.5–5.2)
RBC # BLD AUTO: 3.15 10*6/MM3 (ref 3.77–5.28)
SODIUM SERPL-SCNC: 136 MMOL/L (ref 136–145)
WBC NRBC COR # BLD AUTO: 18.01 10*3/MM3 (ref 3.4–10.8)

## 2023-12-12 PROCEDURE — 11045 DBRDMT SUBQ TISS EACH ADDL: CPT | Performed by: SURGERY

## 2023-12-12 PROCEDURE — 36415 COLL VENOUS BLD VENIPUNCTURE: CPT | Performed by: INTERNAL MEDICINE

## 2023-12-12 PROCEDURE — 99233 SBSQ HOSP IP/OBS HIGH 50: CPT | Performed by: STUDENT IN AN ORGANIZED HEALTH CARE EDUCATION/TRAINING PROGRAM

## 2023-12-12 PROCEDURE — 63710000001 INSULIN LISPRO (HUMAN) PER 5 UNITS: Performed by: INTERNAL MEDICINE

## 2023-12-12 PROCEDURE — 85027 COMPLETE CBC AUTOMATED: CPT | Performed by: INTERNAL MEDICINE

## 2023-12-12 PROCEDURE — 25010000002 PIPERACILLIN SOD-TAZOBACTAM PER 1 G: Performed by: INTERNAL MEDICINE

## 2023-12-12 PROCEDURE — 25010000002 CEFEPIME PER 500 MG: Performed by: STUDENT IN AN ORGANIZED HEALTH CARE EDUCATION/TRAINING PROGRAM

## 2023-12-12 PROCEDURE — 25010000002 VANCOMYCIN PER 500 MG: Performed by: INTERNAL MEDICINE

## 2023-12-12 PROCEDURE — 25010000002 SODIUM CHLORIDE 0.9 % WITH KCL 20 MEQ 20-0.9 MEQ/L-% SOLUTION: Performed by: INTERNAL MEDICINE

## 2023-12-12 PROCEDURE — 99222 1ST HOSP IP/OBS MODERATE 55: CPT | Performed by: SURGERY

## 2023-12-12 PROCEDURE — 25010000002 ONDANSETRON PER 1 MG: Performed by: INTERNAL MEDICINE

## 2023-12-12 PROCEDURE — 80048 BASIC METABOLIC PNL TOTAL CA: CPT | Performed by: INTERNAL MEDICINE

## 2023-12-12 PROCEDURE — 82948 REAGENT STRIP/BLOOD GLUCOSE: CPT

## 2023-12-12 RX ORDER — ECHINACEA PURPUREA EXTRACT 125 MG
2 TABLET ORAL AS NEEDED
Status: DISCONTINUED | OUTPATIENT
Start: 2023-12-12 | End: 2023-12-21 | Stop reason: HOSPADM

## 2023-12-12 RX ADMIN — LEVOTHYROXINE SODIUM 200 MCG: 100 TABLET ORAL at 08:59

## 2023-12-12 RX ADMIN — VANCOMYCIN HYDROCHLORIDE 1000 MG: 1 INJECTION, SOLUTION INTRAVENOUS at 22:15

## 2023-12-12 RX ADMIN — POTASSIUM CHLORIDE AND SODIUM CHLORIDE 100 ML/HR: 900; 150 INJECTION, SOLUTION INTRAVENOUS at 13:25

## 2023-12-12 RX ADMIN — CETIRIZINE HYDROCHLORIDE 10 MG: 10 TABLET ORAL at 21:15

## 2023-12-12 RX ADMIN — INSULIN LISPRO 3 UNITS: 100 INJECTION, SOLUTION INTRAVENOUS; SUBCUTANEOUS at 13:24

## 2023-12-12 RX ADMIN — INSULIN LISPRO 8 UNITS: 100 INJECTION, SOLUTION INTRAVENOUS; SUBCUTANEOUS at 09:00

## 2023-12-12 RX ADMIN — HYDROXYZINE HYDROCHLORIDE 50 MG: 25 TABLET ORAL at 22:30

## 2023-12-12 RX ADMIN — ACETAMINOPHEN 650 MG: 325 TABLET, FILM COATED ORAL at 14:56

## 2023-12-12 RX ADMIN — INSULIN LISPRO 8 UNITS: 100 INJECTION, SOLUTION INTRAVENOUS; SUBCUTANEOUS at 18:21

## 2023-12-12 RX ADMIN — ONDANSETRON 4 MG: 2 INJECTION INTRAMUSCULAR; INTRAVENOUS at 05:01

## 2023-12-12 RX ADMIN — HYDROCODONE BITARTRATE AND ACETAMINOPHEN 1 TABLET: 5; 325 TABLET ORAL at 08:59

## 2023-12-12 RX ADMIN — INSULIN LISPRO 2 UNITS: 100 INJECTION, SOLUTION INTRAVENOUS; SUBCUTANEOUS at 18:20

## 2023-12-12 RX ADMIN — POTASSIUM CHLORIDE AND SODIUM CHLORIDE 100 ML/HR: 900; 150 INJECTION, SOLUTION INTRAVENOUS at 01:42

## 2023-12-12 RX ADMIN — SALINE NASAL SPRAY 2 SPRAY: 1.5 SOLUTION NASAL at 18:20

## 2023-12-12 RX ADMIN — CEFEPIME 2000 MG: 2 INJECTION, POWDER, FOR SOLUTION INTRAVENOUS at 13:24

## 2023-12-12 RX ADMIN — VANCOMYCIN HYDROCHLORIDE 1000 MG: 1 INJECTION, SOLUTION INTRAVENOUS at 09:44

## 2023-12-12 RX ADMIN — INSULIN LISPRO 8 UNITS: 100 INJECTION, SOLUTION INTRAVENOUS; SUBCUTANEOUS at 13:24

## 2023-12-12 RX ADMIN — GABAPENTIN 300 MG: 300 CAPSULE ORAL at 21:15

## 2023-12-12 RX ADMIN — GABAPENTIN 300 MG: 300 CAPSULE ORAL at 08:59

## 2023-12-12 RX ADMIN — GABAPENTIN 300 MG: 300 CAPSULE ORAL at 05:01

## 2023-12-12 RX ADMIN — PIPERACILLIN SODIUM AND TAZOBACTAM SODIUM 4.5 G: 4; .5 INJECTION, SOLUTION INTRAVENOUS at 06:15

## 2023-12-12 RX ADMIN — CEFEPIME 2000 MG: 2 INJECTION, POWDER, FOR SOLUTION INTRAVENOUS at 21:15

## 2023-12-12 RX ADMIN — INSULIN LISPRO 2 UNITS: 100 INJECTION, SOLUTION INTRAVENOUS; SUBCUTANEOUS at 08:59

## 2023-12-12 NOTE — PROGRESS NOTES
Nutrition Services    Patient Name:  Vanessa Root  YOB: 1970  MRN: [id number]  Admit Date:  2023    Assessment Date:  23    Summary: LOS    53yoF who is bed bound c/o right hip and knee pain. Dx w/ osteomyelitis, open wound of right hip and T2DM. Pt to undergo wound debridement of the left lateral thigh wound in the OR on . Noted wt loss of 29# (11%) x 6 mo. Pt reports declining PO intake since she started taking abx for her wounds but appetite continues to get better. Per chart review, pt eats 100% of her meals. Offered [place] to help w/ wound healing but pt declined d/t to the taste. Pt's BMI is 41.5. No sig fat loss and muscle per NFPE.     Patient meets ASPEN/AND criteria for nutrition diagnosis of Severe malnutrition of acute illness based on: Wt loss of 29# (11%) x 5 mo, <50% PO intake x 5 mo    REC:  Monitor wt and PO intake    RD to follow per protocol    CLINICAL NUTRITION ASSESSMENT      Reason for Assessment MST score 2+, Pressure Injury and/or Non-Healing Wound     Diagnosis/Problem   Osteomyelitis, open wound of right hip, T2DM.    Medical/Surgical History Past Medical History:   Diagnosis Date    Arthritis     Bipolar 1 disorder     Cancer     uterine    COVID-19     Depression     Diabetes mellitus     Frequent UTI     GERD (gastroesophageal reflux disease)     Hyperlipidemia     Migraine     Murmur, heart     Ovarian cyst     Stroke        Past Surgical History:   Procedure Laterality Date     SECTION  2003    HYSTERECTOMY      INCISION AND DRAINAGE LEG Right 2023    Procedure: Debridement of right thigh wound;  Surgeon: Mohan Escamilla MD;  Location: [facility] OR;  Service: General;  Laterality: Right;    TONSILLECTOMY      WOUND DEBRIDEMENT N/A 2023    Procedure: Debridement necrotizing tissue  right buttock wound;  Surgeon: Elizabeth Adame MD;  Location: [facility] OR;  Service: General;  Laterality: N/A;     "    Anthropometrics        Current Height  Current Weight  BMI kg/m2 Height: 165.1 cm (65\")  Weight: 113 kg (249 lb 12.5 oz) (12/12/23 0338)  Body mass index is 41.57 kg/m².   Adjusted BMI (if applicable)    BMI Category Obese, Class III (40 or higher)   Ideal Body Weight (IBW) 125#   Usual Body Weight (UBW) 274#- - 284#   Weight Trend Loss   Weight History Wt Readings from Last 30 Encounters:   12/12/23 0338 113 kg (249 lb 12.5 oz)   12/11/23 1555 113 kg (250 lb)   11/08/23 1750 113 kg (250 lb)   08/26/23 0505 113 kg (248 lb 3.8 oz)   08/25/23 1935 113 kg (250 lb 3.6 oz)   08/25/23 1031 118 kg (260 lb)   06/06/23 0439 129 kg (284 lb 2.8 oz)   06/05/23 0500 129 kg (284 lb 9.6 oz)   06/04/23 0607 125 kg (274 lb 11.2 oz)   06/03/23 0617 126 kg (278 lb 3.2 oz)   06/02/23 0516 132 kg (291 lb 14.4 oz)   06/01/23 0112 127 kg (279 lb 3.2 oz)   05/31/23 0529 127 kg (280 lb 3.3 oz)   05/30/23 0500 128 kg (281 lb 9.6 oz)   05/29/23 0510 (!) 139 kg (306 lb 6.4 oz)   05/28/23 2144 (!) 138 kg (305 lb)   05/28/23 0542 (!) 139 kg (305 lb 12.5 oz)   05/27/23 0530 132 kg (291 lb 0.1 oz)   05/25/23 0338 133 kg (292 lb 15.9 oz)   11/29/21 1405 113 kg (249 lb)      --  Labs       Pertinent Labs    Results from last 7 days   Lab Units 12/12/23  0353 12/11/23  1552   SODIUM mmol/L 136 134*   POTASSIUM mmol/L 3.3* 3.7   CHLORIDE mmol/L 101 99   CO2 mmol/L 24.0 24.0   BUN mg/dL 11 11   CREATININE mg/dL 1.01* 0.77   CALCIUM mg/dL 8.1* 8.5*   BILIRUBIN mg/dL  --  0.4   ALK PHOS U/L  --  272*   ALT (SGPT) U/L  --  9   AST (SGOT) U/L  --  37*   GLUCOSE mg/dL 174* 181*     Results from last 7 days   Lab Units 12/12/23  0353 12/11/23  1552   HEMOGLOBIN g/dL 7.7* 7.9*   HEMATOCRIT % 25.3* 25.7*   WBC 10*3/mm3 18.01* 18.25*   ALBUMIN g/dL  --  2.2*     Results from last 7 days   Lab Units 12/12/23  0353 12/11/23  1552   PLATELETS 10*3/mm3 635* 548*     No results found for: \"COVID19\"  Lab Results   Component Value Date    HGBA1C 8.90 (H) " 11/09/2023          Medications           Scheduled Medications cefepime, 2,000 mg, Intravenous, Once  cefepime, 2,000 mg, Intravenous, Q8H  cetirizine, 10 mg, Oral, Nightly  gabapentin, 300 mg, Oral, Q12H  HYDROcodone-acetaminophen, 1 tablet, Oral, Daily  insulin lispro, 2-7 Units, Subcutaneous, 4x Daily AC & at Bedtime  insulin lispro, 8 Units, Subcutaneous, TID With Meals  levothyroxine, 200 mcg, Oral, Daily  senna-docusate sodium, 2 tablet, Oral, BID  vancomycin, 1,000 mg, Intravenous, Q12H       Infusions Pharmacy to dose vancomycin,   sodium chloride 0.9 % with KCl 20 mEq, 100 mL/hr, Last Rate: 100 mL/hr (12/12/23 0142)       PRN Medications   acetaminophen    senna-docusate sodium **AND** polyethylene glycol **AND** bisacodyl **AND** bisacodyl    cyclobenzaprine    dextrose    dextrose    glucagon (human recombinant)    hydrOXYzine    influenza vaccine    melatonin    ondansetron **OR** ondansetron    Pharmacy to dose vancomycin    [COMPLETED] Insert Peripheral IV **AND** sodium chloride    sodium chloride     Physical Findings          General Findings alert, obese, oriented   Oral/Mouth Cavity WDL, tooth or teeth missing   Edema  no edema   Gastrointestinal nausea, non-distended , last bowel movement: 12/11   Skin  pressure injury: 2 right posterior thigh, 3 right lateral hip soft tissues necrosis   Tubes/Drains/Lines none   NFPE See Malnutrition Severity Assessment     Malnutrition Severity Assessment      Patient meets criteria for : (P) Severe Malnutrition  Malnutrition Type (last 8 hours)       Malnutrition Severity Assessment       Row Name 12/12/23 1133       Malnutrition Severity Assessment    Malnutrition Type Acute Disease or Injury - Related Malnutrition (P)       Row Name 12/12/23 1133       Insufficient Energy Intake     Insufficient Energy Intake Findings Severe (P)     Insufficient Energy Intake  < or equal to 50% of est. energy requirement for > or equal to 5d) (P)       Row Name 12/12/23  1133       Unintentional Weight Loss     Unintentional Weight Loss Findings Severe (P)     Unintentional Weight Loss  Weight loss greater than 7.5% in three months (P)       Row Name 12/12/23 1133       Muscle Loss    Loss of Muscle Mass Findings None (P)     Byars Region None (P)     Clavicle Bone Region None (P)     Acromion Bone Region None (P)     Scapular Bone Region None (P)     Dorsal Hand Region None (P)     Patellar Region None (P)     Anterior Thigh Region Moderate - mild depression on inner thigh (P)     Posterior Calf Region Moderate - some roundness, slight firmness (P)       Row Name 12/12/23 1133       Fat Loss    Subcutaneous Fat Loss Findings None (P)     Orbital Region  None (P)     Upper Arm Region None (P)     Thoracic & Lumbar Region None (P)       Row Name 12/12/23 1133       Criteria Met (Must meet criteria for severity in at least 2 of these categories: M Wasting, Fat Loss, Fluid, Secondary Signs, Wt. Status, Intake)    Patient meets criteria for  Severe Malnutrition (P)                        Current Nutrition Orders & Evaluation of Intake       Oral Nutrition     Food Allergies Other: green pepper - hives   Current PO Diet NPO Diet NPO Type: Strict NPO  Diet: Regular/House Diet; Texture: Regular Texture (IDDSI 7); Fluid Consistency: Thin (IDDSI 0)   Supplement n/a   PO Evaluation     % PO Intake 100% per chart review    Factors Affecting Intake: No factors at this time   --  PES STATEMENT / NUTRITION DIAGNOSIS      Nutrition Dx Problem  Problem: Malnutrition (severe)  Etiology: Factors Affecting Nutrition - pain and meds (abx)    Signs/Symptoms: Report of Minimal PO Intake and Unintended Weight Change     NUTRITION INTERVENTION / PLAN OF CARE      Intervention Goal(s) Maintain nutrition status, Reduce/improve symptoms, Disease management/therapy, Maintain intake, and Appropriate weight loss         RD Intervention/Action Supplement offered/declined, Encourage intake, Continue to monitor,  and Care plan reviewed   --      Prescription/Orders:       PO Diet Regular, thin      Supplements Pt declined      Enteral Nutrition N/a      Parenteral Nutrition N/a   New Prescription Ordered? No changes at this time   --      Monitor/Evaluation Per protocol, PO intake, Pertinent labs, Weight, Skin status, Symptoms   Discharge Plan/Needs Pending clinical course   --    RD to follow per protocol.      Electronically signed by:  Jael Farrar  12/12/23 10:17 EST

## 2023-12-12 NOTE — CONSULTS
Colorectal & General Surgery  Consultation    Patient: Vanessa Root  YOB: 1970  MRN: [id number]      Assessment  Vanessa Root is a 53 y.o. female with left lateral thigh wound.  There is a small area of necrotic tissue that will require debridement.  Given her chronic anticoagulation, I think this will be better in the operating room even though it is a small area, for the purposes of obtaining excellent hemostasis.  I discussed the role of excisional debridement with her.  Will send tissue for culture.  Plan to proceed to the operating room tomorrow for excisional debridement of left lateral thigh wound.  N.p.o. after midnight.  Continue to hold anticoagulation.    History of Present Illness   Vanessa Root is a 53 y.o. female who I am seeing in consultation regarding left hip wound at the request of Marv Saucedo MD.  She was brought to the emergency room secondary to worsening appearance of her wound.  She said that it was malodorous.  She was found to have leukocytosis in the emergency department of subsequent minute for intravenous antibiotics.    Past Medical History   Past Medical History:   Diagnosis Date    Arthritis     Bipolar 1 disorder     Cancer     uterine    COVID-19     Depression     Diabetes mellitus     Frequent UTI     GERD (gastroesophageal reflux disease)     Hyperlipidemia     Migraine     Murmur, heart     Ovarian cyst     Stroke         Past Surgical History   Past Surgical History:   Procedure Laterality Date     SECTION  2003    HYSTERECTOMY      INCISION AND DRAINAGE LEG Right 2023    Procedure: Debridement of right thigh wound;  Surgeon: Mohan Escamilla MD;  Location: [facility];  Service: General;  Laterality: Right;    TONSILLECTOMY      WOUND DEBRIDEMENT N/A 2023    Procedure: Debridement necrotizing tissue  right buttock wound;  Surgeon: Elizabeth Adame MD;  Location: [facility] OR;  Service: General;  Laterality:  N/A;       Social History  Social History     Socioeconomic History    Marital status: Single   Tobacco Use    Smoking status: Former    Smokeless tobacco: Never   Vaping Use    Vaping Use: Never used   Substance and Sexual Activity    Alcohol use: Never    Drug use: Never    Sexual activity: Defer       Family History  Family History   Problem Relation Age of Onset    Arthritis Mother     Diabetes Mother     Migraines Mother     Stroke Mother     Arthritis Father     Cancer Maternal Grandmother     Thyroid disease Maternal Grandmother     Cancer Maternal Grandfather     Cancer Paternal Grandmother     Thyroid disease Paternal Grandmother     Cancer Paternal Grandfather     Malig Hyperthermia Neg Hx      Review of Systems  Negative except as documented in the HPI.     Allergies  Allergies   Allergen Reactions    Clemastine Fumarate Other (See Comments)     SEVERE ORBITAL SWELLING    Ziprasidone Other (See Comments)     EXTREME SLEEPINESS  EXTREME SLEEPINESS      Aripiprazole Other (See Comments)     MIGRAINES  MIGRAINES      Sulfa Antibiotics Nausea Only    Green Pepper Hives     unknown    Latex Rash    Lisinopril Cough       Medications    Current Facility-Administered Medications:     acetaminophen (TYLENOL) tablet 650 mg, 650 mg, Oral, Q4H PRN, Rosa Alvarado MD    sennosides-docusate (PERICOLACE) 8.6-50 MG per tablet 2 tablet, 2 tablet, Oral, BID **AND** polyethylene glycol (MIRALAX) packet 17 g, 17 g, Oral, Daily PRN **AND** bisacodyl (DULCOLAX) EC tablet 5 mg, 5 mg, Oral, Daily PRN **AND** bisacodyl (DULCOLAX) suppository 10 mg, 10 mg, Rectal, Daily PRN, Rosa Alvarado MD    cetirizine (zyrTEC) tablet 10 mg, 10 mg, Oral, Nightly, StingRosa sterling MD    cyclobenzaprine (FLEXERIL) tablet 5 mg, 5 mg, Oral, TID PRN, Rosa Alvarado MD    dextrose (D50W) (25 g/50 mL) IV injection 25 g, 25 g, Intravenous, Q15 Min PRN, Rosa Alvarado MD    dextrose (GLUTOSE) oral gel 15 g, 15 g,  Oral, Q15 Min PRN, Rosa Alvarado MD    gabapentin (NEURONTIN) capsule 300 mg, 300 mg, Oral, Q12H, Rosa Alvarado MD, 300 mg at 12/12/23 0501    glucagon (GLUCAGEN) injection 1 mg, 1 mg, Intramuscular, Q15 Min PRN, Rosa Alvarado MD    HYDROcodone-acetaminophen (NORCO) 5-325 MG per tablet 1 tablet, 1 tablet, Oral, Daily, Rosa Alvraado MD    hydrOXYzine (ATARAX) tablet 50 mg, 50 mg, Oral, TID PRN, Rosa Alvarado MD    influenza vac split quad (FLUZONE,FLUARIX,AFLURIA,FLULAVAL) injection 0.5 mL, 0.5 mL, Intramuscular, During Hospitalization, Rosa Alvarado MD    insulin lispro (HUMALOG/ADMELOG) injection 2-7 Units, 2-7 Units, Subcutaneous, 4x Daily AC & at Bedtime, Rosa Alvarado MD    insulin lispro (HUMALOG/ADMELOG) injection 8 Units, 8 Units, Subcutaneous, TID With Meals, Rosa Alvarado MD    levothyroxine (SYNTHROID, LEVOTHROID) tablet 200 mcg, 200 mcg, Oral, Daily, Rosa Alvarado MD    melatonin tablet 3 mg, 3 mg, Oral, Nightly PRN, Rosa Alvarado MD    ondansetron (ZOFRAN) tablet 4 mg, 4 mg, Oral, Q6H PRN **OR** ondansetron (ZOFRAN) injection 4 mg, 4 mg, Intravenous, Q6H PRN, Rosa Alvarado MD, 4 mg at 12/12/23 0501    Pharmacy to dose vancomycin, , Does not apply, Continuous PRN, Rosa Alvarado MD    piperacillin-tazobactam (ZOSYN) 4.5 g in iso-osmotic dextrose 100 mL IVPB (premix), 4.5 g, Intravenous, Q8H, Rosa Alvarado MD, 4.5 g at 12/12/23 0615    [COMPLETED] Insert Peripheral IV, , , Once **AND** sodium chloride 0.9 % flush 10 mL, 10 mL, Intravenous, PRN, Khang Humphrey MD    sodium chloride 0.9 % with KCl 20 mEq/L infusion, 100 mL/hr, Intravenous, Continuous, Rosa Alvarado MD, Last Rate: 100 mL/hr at 12/12/23 0142, 100 mL/hr at 12/12/23 0142    vancomycin (VANCOCIN) 1000 mg/200 mL dextrose 5% IVPB, 1,000 mg, Intravenous, Q12H, Rosa Alvarado MD    Vital Signs  Vitals:     12/12/23 0144   BP: 101/59   Pulse: 84   Resp:    Temp:    SpO2: 94%          Physical Exam  Constitutional: Resting comfortably, no acute distress  Neck: Supple, trachea midline  Respiratory: No increased work of breathing, Symmetric excursion  Cardiovascular: Well pefursed, no jugular venous distention evident   Abdominal: Soft, non-tender, non-distended  Lymphatics: No cervical or suprascapular adenopathy  Skin: Warm, dry, no rash on visualized skin surfaces  Musculoskeletal: Symmetric strength, no obvious gross abnormalities.  Left hip and thigh lateral wound with small area of necrotic skin and necrotic underlying tissue.  Psychiatric: Alert and oriented ×3, normal affect     Laboratory Results  I have personally reviewed CBC with WBC 18, hemoglobin 7.7, platelets 635.  BMP with creatinine 1.01.  Lactate 1.1.  CRP 32.    Radiology  I have personally reviewed CT scan of the lower extremity demonstrates wound that is quite deep into the deep tissues.    Endoscopy  None to review         Louis Escamilla MD  Colorectal & General Surgery  [facility]    [address], [address]  [place], [place], [address]  P: [phone]  F: [phone]

## 2023-12-12 NOTE — CONSULTS
Referring Provider: No ref. provider found  Reason for Consultation:     hip decubitus ulcer     Chief Complaint   Patient presents with    Wound Check         Subjective   History of present illness:  Patient is a 53-year-old female with past medical history of stroke with immobility syndrome, diabetes, right decubitus hip wound and bipolar disorder who presents with right hip pain.  ID consulted for hip decubitus ulcer.    On admission patient has been afebrile with initial leukocytosis of 18.  Inflammatory markers are elevated and lactate within normal limits.  She has been started on vancomycin and Zosyn.  Imaging performed with CT of the right hip that shows extension of the deep wound into the soft tissues with collection of gas and fluid adjacent to the greater trochanter measuring 5.5 x 3 cm.  Orthopedics has been consulted who recommended medical management with antibiotics.  General surgery also consulted who is going to perform superficial I&D of necrotic tissue.    Today patient reports she continues to have pain at the right hip.  States it is worse with any movement.  Denies any fevers or chills.  Tolerating antibiotics.    Past Medical History:   Diagnosis Date    Arthritis     Bipolar 1 disorder     Cancer     uterine    COVID-19     Depression     Diabetes mellitus     Frequent UTI     GERD (gastroesophageal reflux disease)     Hyperlipidemia     Migraine     Murmur, heart     Ovarian cyst     Stroke        Past Surgical History:   Procedure Laterality Date     SECTION  2003    HYSTERECTOMY      INCISION AND DRAINAGE LEG Right 2023    Procedure: Debridement of right thigh wound;  Surgeon: Mohan Escamilla MD;  Location: [facility];  Service: General;  Laterality: Right;    TONSILLECTOMY      WOUND DEBRIDEMENT N/A 2023    Procedure: Debridement necrotizing tissue  right buttock wound;  Surgeon: Elizabeth Adame MD;  Location: [facility];  Service: General;   Laterality: N/A;       family history includes Arthritis in her father and mother; Cancer in her maternal grandfather, maternal grandmother, paternal grandfather, and paternal grandmother; Diabetes in her mother; Migraines in her mother; Stroke in her mother; Thyroid disease in her maternal grandmother and paternal grandmother.     reports that she has quit smoking. She has never used smokeless tobacco. She reports that she does not drink alcohol and does not use drugs.     Allergies   Allergen Reactions    Clemastine Fumarate Other (See Comments)     SEVERE ORBITAL SWELLING    Ziprasidone Other (See Comments)     EXTREME SLEEPINESS  EXTREME SLEEPINESS      Aripiprazole Other (See Comments)     MIGRAINES  MIGRAINES      Sulfa Antibiotics Nausea Only    Green Pepper Hives     unknown    Latex Rash    Lisinopril Cough       Medication:  Antibiotics:  Anti-Infectives (From admission, onward)      Ordered     Dose/Rate Route Frequency Start Stop    12/11/23 2245  vancomycin (VANCOCIN) 1000 mg/200 mL dextrose 5% IVPB        Ordering Provider: Rosa Alvarado MD    1,000 mg  over 60 Minutes Intravenous Every 12 Hours 12/12/23 0700 12/17/23 0959    12/11/23 2237  piperacillin-tazobactam (ZOSYN) 4.5 g in iso-osmotic dextrose 100 mL IVPB (premix)        Ordering Provider: Rosa Alvarado MD    4.5 g  over 4 Hours Intravenous Every 8 Hours 12/12/23 0530 12/17/23 0529    12/11/23 2237  piperacillin-tazobactam (ZOSYN) 4.5 g in iso-osmotic dextrose 100 mL IVPB (premix)        Ordering Provider: Rosa Alvarado MD    4.5 g  over 30 Minutes Intravenous Once 12/11/23 2330 12/11/23 2337    12/11/23 2237  Pharmacy to dose vancomycin        Ordering Provider: Rosa Alvarado MD     Does not apply Continuous PRN 12/11/23 2237 12/16/23 2236    12/11/23 1847  vancomycin 2250 mg/500 mL 0.9% NS IVPB ([facility])        Ordering Provider: Khang Humphrey MD    20 mg/kg × 113 kg  over 135 Minutes Intravenous Once  12/11/23 1903 12/11/23 2124              Objective     Physical Exam:   Vital Signs   Temp:  [97.7 °F (36.5 °C)-99.9 °F (37.7 °C)] 99.9 °F (37.7 °C)  Heart Rate:  [78-88] 81  Resp:  [16-17] 16  BP: ()/(36-71) 92/58    GENERAL: Awake and alert, in no acute distress.   HEENT: Oropharynx is clear. Hearing is grossly normal.   EYES: PERRL. No conjunctival injection. No lid lag.   LUNGS: Normal work of breathing  GI: Soft, nontender, nondistended.   SKIN: Large open right hip decubitus wound  PSYCHIATRIC: Appropriate mood, affect, insight, and judgment.     Results Review:   I reviewed the patient's new clinical results.  I reviewed the patient's new imaging results and agree with the interpretation.  I reviewed the patient's other test results and agree with the interpretation    Lab Results   Component Value Date    WBC 18.01 (H) 12/12/2023    HGB 7.7 (L) 12/12/2023    HCT 25.3 (L) 12/12/2023    MCV 80.3 12/12/2023     (H) 12/12/2023       Lab Results   Component Value Date    [facility].80 11/10/2023       Lab Results   Component Value Date    GLUCOSE 174 (H) 12/12/2023    BUN 11 12/12/2023    CREATININE 1.01 (H) 12/12/2023    BCR 10.9 12/12/2023    CO2 24.0 12/12/2023    CALCIUM 8.1 (L) 12/12/2023    ALBUMIN 2.2 (L) 12/11/2023    AST 37 (H) 12/11/2023    ALT 9 12/11/2023         Estimated Creatinine Clearance: 82.6 mL/min (A) (by C-G formula based on SCr of 1.01 mg/dL (H)).      Microbiology:  12/11 blood cultures in process  12/11 wound culture right hip in process    Radiology:  12/11 CT pelvis report reviewed with deep extension of soft tissue wound with collection of gas and fluid measuring 5.5 x 3 cm adjacent to the greater trochanter.  Probable osteomyelitis.  Collection within the right gluteus yash with thickening and several gas bubbles contained within.  Small hip effusion with potential septic arthropathy.    Assessment     #Probable right hip osteomyelitis with abscess  #Suspected  right hip septic arthritis  #Chronic right hip decubitus ulcer  #Immobility syndrome  #Diabetes complicating the above  #Obesity, BMI 41    CT reviewed with multiple areas of gas and fluid collection concerning for abscesses.  General surgery planning superficial I&D however there is extensive extension with possible bone and joint involvement. Given extensive findings and need for source control, recommend transfer to [facility] for definitive orthopedic evaluation.     In the interim, continue vancomycin goal -600.  Stop Zosyn and transition to cefepime 2 g every 8 hours for less kidney toxicity.  Outstanding wound culture and any operative cultures would help guide further therapy.  Continue to follow vancomycin levels for therapeutic drug monitoring.      Thank you for this consult.  We will continue to follow along and tailor antibiotics as the patient's clinical course evolves.

## 2023-12-12 NOTE — DISCHARGE PLACEMENT REQUEST
"Vanessa Villanueva (53 y.o. Female)       Date of Birth   1970    Social Security Number       Address   [address] [id number]    Home Phone   [phone]    MRN   [id number]       Hinduism   [facility]    Marital Status   Single                            Admission Date   12/11/23    Admission Type   Emergency    Admitting Provider   Rosa Alvarado MD    Attending Provider   Marv Saucedo MD    Department, Room/Bed   [address], [address]       Discharge Date       Discharge Disposition       Discharge Destination                                 Attending Provider: Marv Saucedo MD    Allergies: Clemastine Fumarate, Ziprasidone, Aripiprazole, Sulfa Antibiotics, Green Pepper, Latex, Lisinopril    Isolation: None   Infection: None   Code Status: CPR    Ht: 165.1 cm (65\")   Wt: 113 kg (249 lb 12.5 oz)    Admission Cmt: None   Principal Problem: Osteomyelitis of right hip [M86.9]                   Active Insurance as of 12/11/2023       Primary Coverage       Payor Plan Insurance Group Employer/Plan Group    MEDICARE MEDICARE A & B        Payor Plan Address Payor Plan Phone Number Payor Plan Fax Number Effective Dates    [address] [phone]  4/1/2003 - None Entered    [facility] [id number]         Subscriber Name Subscriber Birth Date Member ID       VANESSA VILLANUEVA 1970 [id number]               Secondary Coverage       Payor Plan Insurance Group Employer/Plan Group    [place]        Payor Plan Address Payor Plan Phone Number Payor Plan Fax Number Effective Dates    [address] [phone]  11/8/2023 - None Entered    [place] [address]         Subscriber Name Subscriber Birth Date Member ID       VANESSA VILLANUEVA 1970 [id number]                     Emergency Contacts        (Rel.) Home Phone Work Phone Mobile Phone    WHITNEY VILLANUEVA (Daughter) -- -- [phone]    Pauline Villanueva " (Mother) -- -- [phone]

## 2023-12-12 NOTE — CONSULTS
Orthopaedic Surgery  Consult Note  Dr. SABIHA Fernandez II  [phone]    HPI:  Patient is a 53 y.o. Not  or  female who presents with a large wound to the right hip.  Patient has undergone multiple debridement procedures of the wound.  Due to worsening appearance of the wound and smell she was admitted to the hospital.  White count was elevated.  Orthopedics was consulted due to a CT scan that showed the wound tracked all the way down to the greater trochanter.    MEDICAL HISTORY  Past Medical History:   Diagnosis Date    Arthritis     Bipolar 1 disorder     Cancer     uterine    COVID-19     Depression     Diabetes mellitus     Frequent UTI     GERD (gastroesophageal reflux disease)     Hyperlipidemia     Migraine     Murmur, heart     Ovarian cyst     Stroke      Past Surgical History:   Procedure Laterality Date     SECTION  2003    HYSTERECTOMY      INCISION AND DRAINAGE LEG Right 2023    Procedure: Debridement of right thigh wound;  Surgeon: Mohan Escamilla MD;  Location: [facility];  Service: General;  Laterality: Right;    TONSILLECTOMY      WOUND DEBRIDEMENT N/A 2023    Procedure: Debridement necrotizing tissue  right buttock wound;  Surgeon: Elizabeth Adame MD;  Location: [facility];  Service: General;  Laterality: N/A;     Prior to Admission medications    Medication Sig Start Date End Date Taking? Authorizing Provider   alosetron (LOTRONEX) 0.5 MG tablet Take 1 tablet by mouth Daily.   Yes Joanna Hallman MD   apixaban (ELIQUIS) 5 MG tablet tablet Take 1 tablet by mouth 2 (Two) Times a Day.   Yes Joanna Hallman MD   cetirizine (zyrTEC) 10 MG tablet Take 1 tablet by mouth Every Night.   Yes Joanna Hallman MD   cyclobenzaprine (FLEXERIL) 5 MG tablet Take 1 tablet by mouth 3 (Three) Times a Day As Needed for Muscle Spasms.   Yes Joanna Hallman MD   gabapentin (NEURONTIN) 300 MG capsule Take 1 capsule by mouth Every 12  (Twelve) Hours. 11/12/23  Yes Marv Aguiar MD   HYDROcodone-acetaminophen (NORCO) 5-325 MG per tablet Take 1 tablet by mouth Daily.   Yes ProviderJoanna MD   hydrOXYzine (ATARAX) 50 MG tablet Take 1 tablet by mouth 3 (Three) Times a Day As Needed for Itching.   Yes ProviderJoanna MD   insulin aspart (NovoLOG FlexPen) 100 UNIT/ML solution pen-injector sc pen Use as directed up to 16 U with each meal  Patient taking differently: Inject 8-10 Units under the skin into the appropriate area as directed 3 (Three) Times a Day With Meals. Sliding Scale 6/14/23  Yes Reese Batista MD   levoFLOXacin (Levaquin) 750 MG tablet Take 1 tablet by mouth Daily for 14 days.  Patient taking differently: Take 1 tablet by mouth Daily. 11/29/23-12/13/23 11/29/23 12/13/23 Yes Elizabeth Adame MD   levothyroxine (SYNTHROID, LEVOTHROID) 200 MCG tablet Take 1 tablet by mouth Daily.  Patient taking differently: Take 1 tablet by mouth Daily. Take with 75mcg for total dose 275mcg daily 6/15/23  Yes Reese Batista MD   levothyroxine (SYNTHROID, LEVOTHROID) 75 MCG tablet Take one a day.  Patient taking differently: Take 1 tablet by mouth Daily. Take with 200mcg for total dose 275mcg daily 8/29/23  Yes Reese Vyas MD   ondansetron ODT (ZOFRAN-ODT) 4 MG disintegrating tablet Place 1 tablet on the tongue Every 8 (Eight) Hours As Needed for Nausea or Vomiting. 12/4/23  Yes Elizabeth Adame MD   Tresiba FlexTouch 200 UNIT/ML solution pen-injector pen injection ADMINISTER 60 UNITS UNDER THE SKIN DAILY AS DIRECTED  Patient taking differently: Inject 60 Units under the skin into the appropriate area as directed Daily. 11/20/23  Yes Richard Moseley MD   collagenase (Santyl) 250 UNIT/GM ointment Apply 1 application  topically to the appropriate area as directed Daily. 11/22/23   Elizabeth Adame MD   glucose blood test strip 1 each by Other route 3 (Three) Times a Day. Test blood sugar once daily  "before a meal and as needed. 6/14/23   Reese Batista MD   glucose monitor monitoring kit 1 each As Needed (Check blood sugar once daily before a meal and record results.). 6/14/23   LesterReese henriquez MD   Insulin Pen Needle 32G X 4 MM misc Use as directed with insulin with meals and at night 11/29/21   LesterReese henriquez MD   Insulin Syringe 31G X 5/16\" 1 ML misc  12/28/21   Provider, MD Joanna     Allergies   Allergen Reactions    Clemastine Fumarate Other (See Comments)     SEVERE ORBITAL SWELLING    Ziprasidone Other (See Comments)     EXTREME SLEEPINESS  EXTREME SLEEPINESS      Aripiprazole Other (See Comments)     MIGRAINES  MIGRAINES      Sulfa Antibiotics Nausea Only    Green Pepper Hives     unknown    Latex Rash    Lisinopril Cough     Most Recent Immunizations   Administered Date(s) Administered    COVID-19 (MODERNA) 1st,2nd,3rd Dose Monovalent 10/20/2021    COVID-19 (MODERNA) Monovalent Original Booster 05/19/2022    Flu Vaccine Intradermal Quad 18-64YR 10/20/2021    Flu Vaccine Quad PF >36MO 10/07/2015    Fluzone High Dose =>65 Years ([employer] ONLY) 12/07/2016    Hepatitis A 12/27/2018    Influenza Quad Vaccine (Inpatient) 09/24/2013    Pneumococcal Polysaccharide (PPSV23) 05/31/2015    Td, Unspecified 03/09/2009    Tdap 12/13/2013     Social History     Tobacco Use    Smoking status: Former    Smokeless tobacco: Never   Substance Use Topics    Alcohol use: Never      Social History     Substance and Sexual Activity   Drug Use Never       VITALS: /59   Pulse 84   Temp 97.7 °F (36.5 °C) (Oral)   Resp 16   Ht 165.1 cm (65\")   Wt 113 kg (249 lb 12.5 oz)   SpO2 94%   BMI 41.57 kg/m²  Body mass index is 41.57 kg/m².    PHYSICAL EXAM:   CONSTITUTIONAL: No acute distress  LUNGS: Equal chest rise, no shortness of air  CARDIOVASCULAR: palpable peripheral pulses  SKIN: no skin lesions in the area examined  LYMPH: no lymphadenopathy in the area examined  EXTREMITY: Right Lower Extremity  She " has a large wound over her lateral hip in the area of the greater trochanter which is clearly very deep.  There is foul smell and surrounding erythema.    RADIOLOGY REVIEW:   No radiology results for the last 7 days    LABS:   Results for the past 24 hours:   Recent Results (from the past 24 hour(s))   Comprehensive Metabolic Panel    Collection Time: 12/11/23  3:52 PM    Specimen: Blood   Result Value Ref Range    Glucose 181 (H) 65 - 99 mg/dL    BUN 11 6 - 20 mg/dL    Creatinine 0.77 0.57 - 1.00 mg/dL    Sodium 134 (L) 136 - 145 mmol/L    Potassium 3.7 3.5 - 5.2 mmol/L    Chloride 99 98 - 107 mmol/L    CO2 24.0 22.0 - 29.0 mmol/L    Calcium 8.5 (L) 8.6 - 10.5 mg/dL    Total Protein 6.2 6.0 - 8.5 g/dL    Albumin 2.2 (L) 3.5 - 5.2 g/dL    ALT (SGPT) 9 1 - 33 U/L    AST (SGOT) 37 (H) 1 - 32 U/L    Alkaline Phosphatase 272 (H) 39 - 117 U/L    Total Bilirubin 0.4 0.0 - 1.2 mg/dL    Globulin 4.0 gm/dL    A/G Ratio 0.6 g/dL    BUN/Creatinine Ratio 14.3 7.0 - 25.0    Anion Gap 11.0 5.0 - 15.0 mmol/L    eGFR 93.5 >60.0 mL/min/1.73   Sedimentation Rate    Collection Time: 12/11/23  3:52 PM    Specimen: Blood   Result Value Ref Range    Sed Rate 90 (H) 0 - 30 mm/hr   C-reactive Protein    Collection Time: 12/11/23  3:52 PM    Specimen: Blood   Result Value Ref Range    C-Reactive Protein 32.50 (H) 0.00 - 0.50 mg/dL   Lactic Acid, Plasma    Collection Time: 12/11/23  3:52 PM    Specimen: Blood   Result Value Ref Range    Lactate 1.1 0.5 - 2.0 mmol/L   CK    Collection Time: 12/11/23  3:52 PM    Specimen: Blood   Result Value Ref Range    Creatine Kinase 17 (L) 20 - 180 U/L   CBC Auto Differential    Collection Time: 12/11/23  3:52 PM    Specimen: Blood   Result Value Ref Range    WBC 18.25 (H) 3.40 - 10.80 10*3/mm3    RBC 3.18 (L) 3.77 - 5.28 10*6/mm3    Hemoglobin 7.9 (L) 12.0 - 15.9 g/dL    Hematocrit 25.7 (L) 34.0 - 46.6 %    MCV 80.8 79.0 - 97.0 fL    MCH 24.8 (L) 26.6 - 33.0 pg    MCHC 30.7 (L) 31.5 - 35.7 g/dL    RDW  12.9 12.3 - 15.4 %    RDW-SD 37.5 37.0 - 54.0 fl    MPV 9.4 6.0 - 12.0 fL    Platelets 548 (H) 140 - 450 10*3/mm3    Neutrophil % 77.9 (H) 42.7 - 76.0 %    Lymphocyte % 13.2 (L) 19.6 - 45.3 %    Monocyte % 5.9 5.0 - 12.0 %    Eosinophil % 1.3 0.3 - 6.2 %    Basophil % 0.2 0.0 - 1.5 %    Immature Grans % 1.5 (H) 0.0 - 0.5 %    Neutrophils, Absolute 14.23 (H) 1.70 - 7.00 10*3/mm3    Lymphocytes, Absolute 2.40 0.70 - 3.10 10*3/mm3    Monocytes, Absolute 1.08 (H) 0.10 - 0.90 10*3/mm3    Eosinophils, Absolute 0.23 0.00 - 0.40 10*3/mm3    Basophils, Absolute 0.04 0.00 - 0.20 10*3/mm3    Immature Grans, Absolute 0.27 (H) 0.00 - 0.05 10*3/mm3    nRBC 0.0 0.0 - 0.2 /100 WBC   Wound Culture - Wound, Hip, Right    Collection Time: 12/11/23 11:10 PM    Specimen: Hip, Right; Wound   Result Value Ref Range    Gram Stain Rare (1+) WBCs per low power field     Gram Stain Rare (1+) Gram positive bacilli     Gram Stain Rare (1+) Gram positive cocci    Basic Metabolic Panel    Collection Time: 12/12/23  3:53 AM    Specimen: Blood   Result Value Ref Range    Glucose 174 (H) 65 - 99 mg/dL    BUN 11 6 - 20 mg/dL    Creatinine 1.01 (H) 0.57 - 1.00 mg/dL    Sodium 136 136 - 145 mmol/L    Potassium 3.3 (L) 3.5 - 5.2 mmol/L    Chloride 101 98 - 107 mmol/L    CO2 24.0 22.0 - 29.0 mmol/L    Calcium 8.1 (L) 8.6 - 10.5 mg/dL    BUN/Creatinine Ratio 10.9 7.0 - 25.0    Anion Gap 11.0 5.0 - 15.0 mmol/L    eGFR 67.5 >60.0 mL/min/1.73   CBC (No Diff)    Collection Time: 12/12/23  3:53 AM    Specimen: Blood   Result Value Ref Range    WBC 18.01 (H) 3.40 - 10.80 10*3/mm3    RBC 3.15 (L) 3.77 - 5.28 10*6/mm3    Hemoglobin 7.7 (L) 12.0 - 15.9 g/dL    Hematocrit 25.3 (L) 34.0 - 46.6 %    MCV 80.3 79.0 - 97.0 fL    MCH 24.4 (L) 26.6 - 33.0 pg    MCHC 30.4 (L) 31.5 - 35.7 g/dL    RDW 12.8 12.3 - 15.4 %    RDW-SD 37.0 37.0 - 54.0 fl    MPV 9.4 6.0 - 12.0 fL    Platelets 635 (H) 140 - 450 10*3/mm3       IMPRESSION:  Patient is a 53 y.o. Not  or   "female with full-thickness wound that is chronic over the right hip with infection and possibly involving bone    PLAN:   Admited to: Rosa Alvarado MD  Disposition: Unfortunately this is a rather difficult situation.  There really is no role for irrigation and debridement of the hip or bone.  If there is felt to be infection in the bone, resection of a portion of the femoral bone is not a beneficial procedure as the bone continues to shorten and then she requires further debridement procedures of the femur.  Instead, if this does become olivier osteomyelitis with deep infection of the hip joint and bone, she will require a hip disarticulation.  This will likely necessitate transfer to [facility] as this is not something that is performed at [facility].  Hopefully that will not be necessary on this visit and this can be treated with supportive care including local wound debridement and IV antibiotics.    R \"Darryn\" Rebecca LE MD  Orthopaedic Surgery  [facility]  [phone] - [place] Office  [phone] - [place] Office            "

## 2023-12-12 NOTE — PLAN OF CARE
Goal Outcome Evaluation:  Plan of Care Reviewed With: patient        Progress: no change  Outcome Evaluation: Pt AOx4. Pt has many needs and is bedbound. Pt is from home with home health. BP little soft to start however after IVF running BP are better. Consults called to ID and surgery. Dr Fernandez came to see patient this am already. One large BM at the very end of the shift liquid. IVF running zosyn and vanc given. Patient c/o pain and nausea at times. Patient was able to rest well. Pictures of wounds. Wound redressed d/t being wet with drainage. Home CPAP and RT helped put it on. gill WRIGHT.

## 2023-12-12 NOTE — H&P
HISTORY AND PHYSICAL   [facility]        Date of Admission: 2023  Patient Identification:  Name: Vanessa Root  Age: 53 y.o.  Sex: female  :  1970  MRN: [id number]                     Primary Care Physician: Grace Baumann APRN    Chief Complaint:  53 year old female who presented to the emergency room with pain of her right hip and knee; she is bedbound after a stroke and has a hip wound; she has had a prior debridement done by dr dash; she has had a temp of 99.6 and has had drainage from the wound; she has also been weaker than usual    History of Present Illness:   As above    Past Medical History:  Past Medical History:   Diagnosis Date    Arthritis     Bipolar 1 disorder     Cancer     uterine    COVID-19     Depression     Diabetes mellitus     Frequent UTI     GERD (gastroesophageal reflux disease)     Hyperlipidemia     Migraine     Murmur, heart     Ovarian cyst     Stroke      Past Surgical History:  Past Surgical History:   Procedure Laterality Date     SECTION  2003    HYSTERECTOMY      INCISION AND DRAINAGE LEG Right 2023    Procedure: Debridement of right thigh wound;  Surgeon: Mohan Escamilla MD;  Location: [facility];  Service: General;  Laterality: Right;    TONSILLECTOMY      WOUND DEBRIDEMENT N/A 2023    Procedure: Debridement necrotizing tissue  right buttock wound;  Surgeon: Elizabeth Dash MD;  Location: [facility];  Service: General;  Laterality: N/A;      Home Meds:  Medications Prior to Admission   Medication Sig Dispense Refill Last Dose    alosetron (LOTRONEX) 0.5 MG tablet Take 1 tablet by mouth Daily.       apixaban (ELIQUIS) 5 MG tablet tablet Take 1 tablet by mouth 2 (Two) Times a Day.       cetirizine (zyrTEC) 10 MG tablet Take 1 tablet by mouth Every Night.       cyclobenzaprine (FLEXERIL) 5 MG tablet Take 1 tablet by mouth 3 (Three) Times a Day As Needed for Muscle Spasms.       gabapentin  (NEURONTIN) 300 MG capsule Take 1 capsule by mouth Every 12 (Twelve) Hours. 6 capsule 0     HYDROcodone-acetaminophen (NORCO) 5-325 MG per tablet Take 1 tablet by mouth Daily.       hydrOXYzine (ATARAX) 50 MG tablet Take 1 tablet by mouth 3 (Three) Times a Day As Needed for Itching.       insulin aspart (NovoLOG FlexPen) 100 UNIT/ML solution pen-injector sc pen Use as directed up to 16 U with each meal (Patient taking differently: Inject 8-10 Units under the skin into the appropriate area as directed 3 (Three) Times a Day With Meals. Sliding Scale) 9 mL 3     levoFLOXacin (Levaquin) 750 MG tablet Take 1 tablet by mouth Daily for 14 days. (Patient taking differently: Take 1 tablet by mouth Daily. 11/29/23-12/13/23) 14 tablet 0     levothyroxine (SYNTHROID, LEVOTHROID) 200 MCG tablet Take 1 tablet by mouth Daily. (Patient taking differently: Take 1 tablet by mouth Daily. Take with 75mcg for total dose 275mcg daily) 30 tablet 4     levothyroxine (SYNTHROID, LEVOTHROID) 75 MCG tablet Take one a day. (Patient taking differently: Take 1 tablet by mouth Daily. Take with 200mcg for total dose 275mcg daily) 30 tablet 0     ondansetron ODT (ZOFRAN-ODT) 4 MG disintegrating tablet Place 1 tablet on the tongue Every 8 (Eight) Hours As Needed for Nausea or Vomiting. 30 tablet 0     Tresiba FlexTouch 200 UNIT/ML solution pen-injector pen injection ADMINISTER 60 UNITS UNDER THE SKIN DAILY AS DIRECTED (Patient taking differently: Inject 60 Units under the skin into the appropriate area as directed Daily.) 9 mL 3     collagenase (Santyl) 250 UNIT/GM ointment Apply 1 application  topically to the appropriate area as directed Daily. 90 g 3     glucose blood test strip 1 each by Other route 3 (Three) Times a Day. Test blood sugar once daily before a meal and as needed. 100 each 5     glucose monitor monitoring kit 1 each As Needed (Check blood sugar once daily before a meal and record results.). 1 each 0     Insulin Pen Needle 32G X 4  "MM misc Use as directed with insulin with meals and at night 200 each 4     Insulin Syringe 31G X 5/16\" 1 ML misc           Allergies:  Allergies   Allergen Reactions    Clemastine Fumarate Other (See Comments)     SEVERE ORBITAL SWELLING    Ziprasidone Other (See Comments)     EXTREME SLEEPINESS  EXTREME SLEEPINESS      Aripiprazole Other (See Comments)     MIGRAINES  MIGRAINES      Sulfa Antibiotics Nausea Only    Green Pepper Hives     unknown    Latex Rash    Lisinopril Cough     Immunizations:  Immunization History   Administered Date(s) Administered    COVID-19 (MODERNA) 1st,2nd,3rd Dose Monovalent 09/21/2021, 10/20/2021    COVID-19 (MODERNA) Monovalent Original Booster 05/19/2022    Flu Vaccine Intradermal Quad 18-64YR 10/20/2021    Flu Vaccine Quad PF >36MO 10/18/2014, 10/07/2015    Fluzone High Dose =>65 Years ([employer] ONLY) 12/07/2016    Hepatitis A 06/27/2018, 12/27/2018    Influenza Quad Vaccine (Inpatient) 09/24/2013    Pneumococcal Polysaccharide (PPSV23) 05/30/2015, 05/31/2015    Td, Unspecified 03/09/2009    Tdap 12/13/2013     Social History:   Social History     Social History Narrative    Not on file     Social History     Socioeconomic History    Marital status: Single   Tobacco Use    Smoking status: Former    Smokeless tobacco: Never   Vaping Use    Vaping Use: Never used   Substance and Sexual Activity    Alcohol use: Never    Drug use: Never    Sexual activity: Defer       Family History:  Family History   Problem Relation Age of Onset    Arthritis Mother     Diabetes Mother     Migraines Mother     Stroke Mother     Arthritis Father     Cancer Maternal Grandmother     Thyroid disease Maternal Grandmother     Cancer Maternal Grandfather     Cancer Paternal Grandmother     Thyroid disease Paternal Grandmother     Cancer Paternal Grandfather     Malig Hyperthermia Neg Hx         Review of Systems  See history of present illness and past medical history.  Patient denies headache, dizziness, " "syncope, falls, trauma, change in vision, change in hearing, change in taste, changes in weight, changes in appetite, focal weakness, numbness, or paresthesia.  Patient denies chest pain, palpitations, dyspnea, orthopnea, PND, cough, sinus pressure, rhinorrhea, epistaxis, hemoptysis, nausea, vomiting,hematemesis, diarrhea, constipation or hematochezia.  Denies cold or heat intolerance, polydipsia, polyuria, polyphagia. Denies hematuria, pyuria, dysuria, hesitancy, frequency or urgency. Denies consumption of raw and under cooked meats foods or change in water source.  Denies fever, chills, sweats, night sweats. .    Objective:  T Max 24 hrs: Temp (24hrs), Av.1 °F (37.3 °C), Min:99.1 °F (37.3 °C), Max:99.1 °F (37.3 °C)    Vitals Ranges:   Temp:  [99.1 °F (37.3 °C)] 99.1 °F (37.3 °C)  Heart Rate:  [82-88] 88  Resp:  [16-17] 16  BP: ()/(42-71) 88/42      Exam:  BP (!) 88/42   Pulse 88   Temp 99.1 °F (37.3 °C) (Tympanic)   Resp 16   Ht 165.1 cm (65\")   Wt 113 kg (250 lb)   SpO2 92%   BMI 41.60 kg/m²     General Appearance:    Alert, cooperative, no distress, appears stated age   Head:    Normocephalic, without obvious abnormality, atraumatic   Eyes:    PERRL, conjunctivae/corneas clear, EOM's intact, both eyes   Ears:    Normal external ear canals, both ears   Nose:   Nares normal, septum midline, mucosa normal, no drainage    or sinus tenderness   Throat:   Lips, mucosa, and tongue normal   Neck:   Supple, symmetrical, trachea midline, no adenopathy;     thyroid:  no enlargement/tenderness/nodules; no carotid    bruit or JVD   Back:     Symmetric, no curvature, ROM normal, no CVA tenderness   Lungs:     Decreased breath sounds bilaterally, respirations unlabored   Chest Wall:    No tenderness or deformity    Heart:    Regular rate and rhythm, S1 and S2 normal, no murmur, rub   or gallop   Abdomen:     Soft, nontender, bowel sounds active all four quadrants,     no masses, no hepatomegaly, no " splenomegaly   Extremities:   Extremities normal, atraumatic, right hip decubitus                       .    Data Review:  Labs in chart were reviewed.  WBC   Date Value Ref Range Status   12/11/2023 18.25 (H) 3.40 - 10.80 10*3/mm3 Final     Hemoglobin   Date Value Ref Range Status   12/11/2023 7.9 (L) 12.0 - 15.9 g/dL Final     Hematocrit   Date Value Ref Range Status   12/11/2023 25.7 (L) 34.0 - 46.6 % Final     Platelets   Date Value Ref Range Status   12/11/2023 548 (H) 140 - 450 10*3/mm3 Final     Sodium   Date Value Ref Range Status   12/11/2023 134 (L) 136 - 145 mmol/L Final     Potassium   Date Value Ref Range Status   12/11/2023 3.7 3.5 - 5.2 mmol/L Final     Comment:     Slight hemolysis detected by analyzer. Result may be falsely elevated.     Chloride   Date Value Ref Range Status   12/11/2023 99 98 - 107 mmol/L Final     CO2   Date Value Ref Range Status   12/11/2023 24.0 22.0 - 29.0 mmol/L Final     BUN   Date Value Ref Range Status   12/11/2023 11 6 - 20 mg/dL Final     Creatinine   Date Value Ref Range Status   12/11/2023 0.77 0.57 - 1.00 mg/dL Final     Glucose   Date Value Ref Range Status   12/11/2023 181 (H) 65 - 99 mg/dL Final     Calcium   Date Value Ref Range Status   12/11/2023 8.5 (L) 8.6 - 10.5 mg/dL Final     AST (SGOT)   Date Value Ref Range Status   12/11/2023 37 (H) 1 - 32 U/L Final     Comment:     Slight hemolysis detected by analyzer. Result may be falsely elevated.     ALT (SGPT)   Date Value Ref Range Status   12/11/2023 9 1 - 33 U/L Final     Alkaline Phosphatase   Date Value Ref Range Status   12/11/2023 272 (H) 39 - 117 U/L Final                Imaging Results (All)       Procedure Component Value Units Date/Time    CT Pelvis Without Contrast [[id number]] Collected: 12/11/23 1742     Updated: 12/11/23 1757    Narrative:      CT PELVIS WO CONTRAST-     Radiation dose reduction techniques were utilized, including automated  exposure control and exposure modulation based on body  size.     Clinical: Chronic right hip soft tissue wound now with worsening right  hip pain     COMPARISON 11/8/2023     FINDINGS: The amount of subcutaneous fat edema and skin thickening along  the lateral aspect of the right hip and upper thigh has increased within  the interim. This extends at least to the distal thigh and upper  abdomen. Progressive cellulitis.     The open wound has extended deeper into the soft tissues, and there is a  collection of gas and fluid adjacent to the greater trochanter measuring  5.5 x 3 cm. The amount of gas is greater than fluid at this location.  This extends to the bone and there is now a possible small focus of   cortical breakthrough. Potential osteomyelitis. The collection is  contiguous with the right gluteus yash which is now thickened with  several gas bubbles noted within. This process does not extend cephalad  or inferiorly into the mid thigh. It is in very close proximity to the  lateral hip bursa. There is small hip effusion. Potential septic  arthropathy.     There is a Quiroz catheter within the bladder lumen. No acute abnormality  within the pelvis seen. The remainder is unremarkable.              This report was finalized on 12/11/2023 5:54 PM by Dr. Rom Reyes M.D on Workstation: [id number]       XR Femur 2 View Right [[id number]] Collected: 12/11/23 1645     Updated: 12/11/23 1651    Narrative:      XR FEMUR 2 VW RIGHT-     Clinical: Right hip and knee pain, right hip wound, no trauma     FINDINGS: No femur fracture demonstrated. There is narrowing of the  medial and lateral compartments of the knee. Also degenerative change  about the patellofemoral articulation. Hip joint alignment is  satisfactory. There appears to be some narrowing and subchondral  sclerosis. There is clothing artifact superimposing the entire  field-of-view. No radiopaque foreign body seen. The remainder is  unremarkable.     This report was finalized on 12/11/2023 4:47 PM by Dr. Cueto  NGUYEN Reyes on Workstation: [id number]                 Assessment:  Active Hospital Problems    Diagnosis  POA    **Osteomyelitis of right hip [M86.9]  Yes      Resolved Hospital Problems   No resolved problems to display.   Bedbound  Diabetes  Anemia  Right hip decubitus ulcer  Obesity   weakness    Plan:  Will continue antibiotics  Ask dr dash to see her  Ortho to see  Id to see  Hold eliquis  Trend labs  Fluids  Monitor on telemetry  Dw patient and ed provider    Rosa Alvarado MD  12/11/2023  22:30 EST

## 2023-12-12 NOTE — PROGRESS NOTES
"[facility] Clinical Pharmacy Services: Vancomycin Pharmacokinetic Initial Consult Note    Vanessa Root is a 53 y.o. female who is on day 1 of pharmacy to dose vancomycin.    Indication: Bone and/or Joint Infection and Skin and Soft Tissue  Consulting Provider: Rosa Alvarado MD   Planned Duration of Therapy: 5d  Loading Dose Ordered or Given: 2250 mg on 12/11 at 1909  MRSA PCR performed: not listed  Culture/Source: Wound, Blood  Target: -600 mg/L.hr   Pertinent Vanc Dosing History:   Other Antimicrobials: Zosyn    Vitals/Labs  Ht: 165.1 cm (65\"); Wt: 113 kg (250 lb)  Temp Readings from Last 1 Encounters:   12/11/23 99.1 °F (37.3 °C) (Tympanic)    Estimated Creatinine Clearance: 108.3 mL/min (by C-G formula based on SCr of 0.77 mg/dL).       Results from last 7 days   Lab Units 12/11/23  1552   CREATININE mg/dL 0.77   WBC 10*3/mm3 18.25*     Assessment/Plan:    Vancomycin Dose:   1000 mg IV every  12  hours  Predictive AUC level for the dose ordered is 495 mg/L.hr, which is within the target of 400-600 mg/L.hr  Vanc Trough has been ordered for 12/13 at 0630     Pharmacy will follow patient's kidney function and will adjust doses and obtain levels as necessary. Thank you for involving pharmacy in this patient's care. Please contact pharmacy with any questions or concerns.                           Shane Pinzon [facility]  Clinical Pharmacist    "

## 2023-12-12 NOTE — NURSING NOTE
CWOCN- noted nursing consult for right hip and thigh. Reviewed chart- Dr Frenandez and Dr Escamilla have been consulted related to the right hip.   RNs note MASD to perineum and also a pressure injury from patient's stokes catheter. Staff have taken a photo showing a linear injury to the right thigh. Recommend to follow order set related to skin care and stokes catheter care, including the cath secure device. Will add the pressure injury prevention order set and order a low air loss mattress for patient at this time. Barrier ointment can be applied to protect the pressure injury area on the right thigh.   Please reconsult for additional skin/wound needs.   Agiliti confirmation- [id number] and bed frame requested from [facility].

## 2023-12-12 NOTE — ED NOTES
"Nursing report ED to floor  Vanessa Root  53 y.o.  female    HPI :   Chief Complaint   Patient presents with    Wound Check       Admitting doctor:   Rosa Alvarado MD    Admitting diagnosis:   The primary encounter diagnosis was Osteomyelitis of right hip. Diagnoses of Open wound of right hip, initial encounter, Immobility syndrome, and Type 2 diabetes mellitus without complication, with long-term current use of insulin were also pertinent to this visit.    Code status:   Current Code Status       Date Active Code Status Order ID Comments User Context       12/11/2023 1907 CPR (Attempt to Resuscitate) [id number]  Rosa Alvarado MD ED        Question Answer    Code Status (Patient has no pulse and is not breathing) CPR (Attempt to Resuscitate)    Medical Interventions (Patient has pulse or is breathing) Full                    Allergies:   Clemastine fumarate, Ziprasidone, Aripiprazole, Sulfa antibiotics, Green pepper, Latex, and Lisinopril    Isolation:   No active isolations    Intake and Output  No intake or output data in the 24 hours ending 12/11/23 1911    Weight:       12/11/23  1555   Weight: 113 kg (250 lb)       Most recent vitals:   Vitals:    12/11/23 1328 12/11/23 1555 12/11/23 1701   BP: 108/71  108/62   Pulse: 82  82   Resp: 17  16   Temp: 99.1 °F (37.3 °C)     TempSrc: Tympanic     SpO2: 94%  94%   Weight:  113 kg (250 lb)    Height:  165.1 cm (65\")        Active LDAs/IV Access:   Lines, Drains & Airways       Active LDAs       Name Placement date Placement time Site Days    Peripheral IV 12/11/23 1330 Right Hand 12/11/23  1330  Hand  less than 1    Peripheral IV 12/11/23 1330 Left Wrist 12/11/23  1330  Wrist  less than 1    Urethral Catheter 11/07/23  --  -- 34                    Labs (abnormal labs have a star):   Labs Reviewed   COMPREHENSIVE METABOLIC PANEL - Abnormal; Notable for the following components:       Result Value    Glucose 181 (*)     Sodium 134 (*)     Calcium 8.5 " (*)     Albumin 2.2 (*)     AST (SGOT) 37 (*)     Alkaline Phosphatase 272 (*)     All other components within normal limits    Narrative:     GFR Normal >60  Chronic Kidney Disease <60  Kidney Failure <15     SEDIMENTATION RATE - Abnormal; Notable for the following components:    Sed Rate 90 (*)     All other components within normal limits   C-REACTIVE PROTEIN - Abnormal; Notable for the following components:    C-Reactive Protein 32.50 (*)     All other components within normal limits   CK - Abnormal; Notable for the following components:    Creatine Kinase 17 (*)     All other components within normal limits   CBC WITH AUTO DIFFERENTIAL - Abnormal; Notable for the following components:    WBC 18.25 (*)     RBC 3.18 (*)     Hemoglobin 7.9 (*)     Hematocrit 25.7 (*)     MCH 24.8 (*)     MCHC 30.7 (*)     Platelets 548 (*)     Neutrophil % 77.9 (*)     Lymphocyte % 13.2 (*)     Immature Grans % 1.5 (*)     Neutrophils, Absolute 14.23 (*)     Monocytes, Absolute 1.08 (*)     Immature Grans, Absolute 0.27 (*)     All other components within normal limits   LACTIC ACID, PLASMA - Normal   BLOOD CULTURE   BLOOD CULTURE   WOUND CULTURE   CBC AND DIFFERENTIAL    Narrative:     The following orders were created for panel order CBC & Differential.  Procedure                               Abnormality         Status                     ---------                               -----------         ------                     CBC Auto Differential[[id number]]        Abnormal            Final result                 Please view results for these tests on the individual orders.       EKG:   No orders to display       Meds given in ED:   Medications   sodium chloride 0.9 % flush 10 mL (has no administration in time range)   vancomycin 2250 mg/500 mL 0.9% NS IVPB ([facility]) (2,250 mg Intravenous New Bag 12/11/23 6086)   piperacillin-tazobactam (ZOSYN) 4.5 g in iso-osmotic dextrose 100 mL IVPB (premix) (has no administration in time range)    acetaminophen (TYLENOL) tablet 650 mg (has no administration in time range)   sennosides-docusate (PERICOLACE) 8.6-50 MG per tablet 2 tablet (has no administration in time range)     And   polyethylene glycol (MIRALAX) packet 17 g (has no administration in time range)     And   bisacodyl (DULCOLAX) EC tablet 5 mg (has no administration in time range)     And   bisacodyl (DULCOLAX) suppository 10 mg (has no administration in time range)   ondansetron (ZOFRAN) tablet 4 mg (has no administration in time range)     Or   ondansetron (ZOFRAN) injection 4 mg (has no administration in time range)   melatonin tablet 3 mg (has no administration in time range)   acetaminophen (TYLENOL) tablet 1,000 mg (1,000 mg Oral Given 12/11/23 1701)       Imaging results:  No radiology results for the last day    Ambulatory status:   - bed rest    Social issues:   Social History     Socioeconomic History    Marital status: Single   Tobacco Use    Smoking status: Former    Smokeless tobacco: Never   Vaping Use    Vaping Use: Never used   Substance and Sexual Activity    Alcohol use: Never    Drug use: Never    Sexual activity: Defer       NIH Stroke Scale:       Adri Chávez RN  12/11/23 19:11 EST

## 2023-12-12 NOTE — PROGRESS NOTES
Clinical Pharmacy Services: Medication History    Vanessa Root is a 53 y.o. female presenting to [facility] for   Chief Complaint   Patient presents with    Wound Check       She  has a past medical history of Arthritis, Bipolar 1 disorder, Cancer, COVID-19, Depression, Diabetes mellitus, Frequent UTI, GERD (gastroesophageal reflux disease), Hyperlipidemia, Migraine, Murmur, heart, Ovarian cyst, and Stroke.    Allergies as of 12/11/2023 - Reviewed 12/11/2023   Allergen Reaction Noted    Clemastine fumarate Other (See Comments) 06/11/2013    Ziprasidone Other (See Comments) 06/11/2013    Aripiprazole Other (See Comments) 06/11/2013    Sulfa antibiotics Nausea Only 06/11/2013    Green pepper Hives 06/01/2023    Latex Rash 06/11/2013    Lisinopril Cough 02/27/2019       Medication information was obtained from: Patient   Pharmacy and Phone Number:     Prior to Admission Medications       Prescriptions Last Dose Informant Patient Reported? Taking?    alosetron (LOTRONEX) 0.5 MG tablet  Self Yes Yes    Take 1 tablet by mouth Daily.    apixaban (ELIQUIS) 5 MG tablet tablet  Self Yes Yes    Take 1 tablet by mouth 2 (Two) Times a Day.    cetirizine (zyrTEC) 10 MG tablet  Self Yes Yes    Take 1 tablet by mouth Every Night.    cyclobenzaprine (FLEXERIL) 5 MG tablet  Self Yes Yes    Take 1 tablet by mouth 3 (Three) Times a Day As Needed for Muscle Spasms.    gabapentin (NEURONTIN) 300 MG capsule  Self No Yes    Take 1 capsule by mouth Every 12 (Twelve) Hours.    HYDROcodone-acetaminophen (NORCO) 5-325 MG per tablet  Self Yes Yes    Take 1 tablet by mouth Daily.    hydrOXYzine (ATARAX) 50 MG tablet  Self Yes Yes    Take 1 tablet by mouth 3 (Three) Times a Day As Needed for Itching.    insulin aspart (NovoLOG FlexPen) 100 UNIT/ML solution pen-injector sc pen  Self No Yes    Use as directed up to 16 U with each meal    Patient taking differently:  Inject 8-10 Units under the skin into the appropriate area as  "directed 3 (Three) Times a Day With Meals. Sliding Scale    levoFLOXacin (Levaquin) 750 MG tablet  Self No Yes    Take 1 tablet by mouth Daily for 14 days.    Patient taking differently:  Take 1 tablet by mouth Daily. 11/29/23-12/13/23    levothyroxine (SYNTHROID, LEVOTHROID) 200 MCG tablet  Self No Yes    Take 1 tablet by mouth Daily.    Patient taking differently:  Take 1 tablet by mouth Daily. Take with 75mcg for total dose 275mcg daily    levothyroxine (SYNTHROID, LEVOTHROID) 75 MCG tablet  Self No Yes    Take one a day.    Patient taking differently:  Take 1 tablet by mouth Daily. Take with 200mcg for total dose 275mcg daily    ondansetron ODT (ZOFRAN-ODT) 4 MG disintegrating tablet  Self No Yes    Place 1 tablet on the tongue Every 8 (Eight) Hours As Needed for Nausea or Vomiting.    Tresiba FlexTouch 200 UNIT/ML solution pen-injector pen injection  Self No Yes    ADMINISTER 60 UNITS UNDER THE SKIN DAILY AS DIRECTED    Patient taking differently:  Inject 60 Units under the skin into the appropriate area as directed Daily.    collagenase (Santyl) 250 UNIT/GM ointment   No No    Apply 1 application  topically to the appropriate area as directed Daily.    glucose blood test strip   No No    1 each by Other route 3 (Three) Times a Day. Test blood sugar once daily before a meal and as needed.    glucose monitor monitoring kit   No No    1 each As Needed (Check blood sugar once daily before a meal and record results.).    Insulin Pen Needle 32G X 4 MM misc   No No    Use as directed with insulin with meals and at night    Insulin Syringe 31G X 5/16\" 1 ML misc   Yes No              Medication notes: Verified medications with pt. Pt is taking levothyroxine 275mcg daily.     This medication list is complete to the best of my knowledge as of 12/11/2023    Please call if questions.    Fariba Yap  Medication History Technician   [phone]    12/11/2023 19:43 EST      "

## 2023-12-13 ENCOUNTER — ANESTHESIA EVENT (OUTPATIENT)
Dept: PERIOP | Facility: HOSPITAL | Age: 53
End: 2023-12-13
Payer: MEDICARE

## 2023-12-13 ENCOUNTER — APPOINTMENT (OUTPATIENT)
Dept: MRI IMAGING | Facility: HOSPITAL | Age: 53
End: 2023-12-13
Payer: MEDICARE

## 2023-12-13 ENCOUNTER — ANESTHESIA (OUTPATIENT)
Dept: PERIOP | Facility: HOSPITAL | Age: 53
End: 2023-12-13
Payer: MEDICARE

## 2023-12-13 PROBLEM — Z79.01 CHRONIC ANTICOAGULATION: Status: ACTIVE | Noted: 2023-12-13

## 2023-12-13 LAB
ABO GROUP BLD: NORMAL
ANION GAP SERPL CALCULATED.3IONS-SCNC: 9.6 MMOL/L (ref 5–15)
BLD GP AB SCN SERPL QL: NEGATIVE
BUN SERPL-MCNC: 11 MG/DL (ref 6–20)
BUN/CREAT SERPL: 10.4 (ref 7–25)
CALCIUM SPEC-SCNC: 7.8 MG/DL (ref 8.6–10.5)
CHLORIDE SERPL-SCNC: 106 MMOL/L (ref 98–107)
CO2 SERPL-SCNC: 22.4 MMOL/L (ref 22–29)
CREAT SERPL-MCNC: 1.06 MG/DL (ref 0.57–1)
DEPRECATED RDW RBC AUTO: 39.7 FL (ref 37–54)
EGFRCR SERPLBLD CKD-EPI 2021: 63.7 ML/MIN/1.73
ERYTHROCYTE [DISTWIDTH] IN BLOOD BY AUTOMATED COUNT: 13.1 % (ref 12.3–15.4)
GLUCOSE BLDC GLUCOMTR-MCNC: 163 MG/DL (ref 70–130)
GLUCOSE BLDC GLUCOMTR-MCNC: 165 MG/DL (ref 70–130)
GLUCOSE BLDC GLUCOMTR-MCNC: 204 MG/DL (ref 70–130)
GLUCOSE BLDC GLUCOMTR-MCNC: 237 MG/DL (ref 70–130)
GLUCOSE SERPL-MCNC: 165 MG/DL (ref 65–99)
HCT VFR BLD AUTO: 25.3 % (ref 34–46.6)
HGB BLD-MCNC: 7.8 G/DL (ref 12–15.9)
MCH RBC QN AUTO: 25.9 PG (ref 26.6–33)
MCHC RBC AUTO-ENTMCNC: 30.8 G/DL (ref 31.5–35.7)
MCV RBC AUTO: 84.1 FL (ref 79–97)
PLATELET # BLD AUTO: 639 10*3/MM3 (ref 140–450)
PMV BLD AUTO: 9.7 FL (ref 6–12)
POTASSIUM SERPL-SCNC: 3.7 MMOL/L (ref 3.5–5.2)
RBC # BLD AUTO: 3.01 10*6/MM3 (ref 3.77–5.28)
RH BLD: POSITIVE
SODIUM SERPL-SCNC: 138 MMOL/L (ref 136–145)
T&S EXPIRATION DATE: NORMAL
VANCOMYCIN TROUGH SERPL-MCNC: 20.8 MCG/ML (ref 5–20)
WBC NRBC COR # BLD AUTO: 18.54 10*3/MM3 (ref 3.4–10.8)

## 2023-12-13 PROCEDURE — 99231 SBSQ HOSP IP/OBS SF/LOW 25: CPT | Performed by: SURGERY

## 2023-12-13 PROCEDURE — 25010000002 ONDANSETRON PER 1 MG: Performed by: INTERNAL MEDICINE

## 2023-12-13 PROCEDURE — 25010000002 PROPOFOL 10 MG/ML EMULSION: Performed by: NURSE ANESTHETIST, CERTIFIED REGISTERED

## 2023-12-13 PROCEDURE — 25810000003 LACTATED RINGERS PER 1000 ML: Performed by: STUDENT IN AN ORGANIZED HEALTH CARE EDUCATION/TRAINING PROGRAM

## 2023-12-13 PROCEDURE — 87147 CULTURE TYPE IMMUNOLOGIC: CPT | Performed by: SURGERY

## 2023-12-13 PROCEDURE — 25010000002 SODIUM CHLORIDE 0.9 % WITH KCL 20 MEQ 20-0.9 MEQ/L-% SOLUTION: Performed by: SURGERY

## 2023-12-13 PROCEDURE — 86850 RBC ANTIBODY SCREEN: CPT | Performed by: SURGERY

## 2023-12-13 PROCEDURE — 25010000002 SODIUM CHLORIDE 0.9 % WITH KCL 20 MEQ 20-0.9 MEQ/L-% SOLUTION: Performed by: INTERNAL MEDICINE

## 2023-12-13 PROCEDURE — 25010000002 CEFEPIME PER 500 MG: Performed by: SURGERY

## 2023-12-13 PROCEDURE — 87186 SC STD MICRODIL/AGAR DIL: CPT | Performed by: SURGERY

## 2023-12-13 PROCEDURE — 25010000002 ENOXAPARIN PER 10 MG: Performed by: INTERNAL MEDICINE

## 2023-12-13 PROCEDURE — 63710000001 INSULIN GLARGINE PER 5 UNITS: Performed by: INTERNAL MEDICINE

## 2023-12-13 PROCEDURE — 86900 BLOOD TYPING SEROLOGIC ABO: CPT | Performed by: SURGERY

## 2023-12-13 PROCEDURE — 87075 CULTR BACTERIA EXCEPT BLOOD: CPT | Performed by: SURGERY

## 2023-12-13 PROCEDURE — 87070 CULTURE OTHR SPECIMN AEROBIC: CPT | Performed by: SURGERY

## 2023-12-13 PROCEDURE — 0 GADOBENATE DIMEGLUMINE 529 MG/ML SOLUTION: Performed by: INTERNAL MEDICINE

## 2023-12-13 PROCEDURE — 25010000002 MIDAZOLAM PER 1 MG: Performed by: STUDENT IN AN ORGANIZED HEALTH CARE EDUCATION/TRAINING PROGRAM

## 2023-12-13 PROCEDURE — 85027 COMPLETE CBC AUTOMATED: CPT | Performed by: INTERNAL MEDICINE

## 2023-12-13 PROCEDURE — 82948 REAGENT STRIP/BLOOD GLUCOSE: CPT

## 2023-12-13 PROCEDURE — 25010000002 CEFEPIME PER 500 MG: Performed by: STUDENT IN AN ORGANIZED HEALTH CARE EDUCATION/TRAINING PROGRAM

## 2023-12-13 PROCEDURE — 73723 MRI JOINT LWR EXTR W/O&W/DYE: CPT

## 2023-12-13 PROCEDURE — 0JBL0ZZ EXCISION OF RIGHT UPPER LEG SUBCUTANEOUS TISSUE AND FASCIA, OPEN APPROACH: ICD-10-PCS | Performed by: SURGERY

## 2023-12-13 PROCEDURE — 99232 SBSQ HOSP IP/OBS MODERATE 35: CPT | Performed by: STUDENT IN AN ORGANIZED HEALTH CARE EDUCATION/TRAINING PROGRAM

## 2023-12-13 PROCEDURE — 86901 BLOOD TYPING SEROLOGIC RH(D): CPT | Performed by: SURGERY

## 2023-12-13 PROCEDURE — 80048 BASIC METABOLIC PNL TOTAL CA: CPT | Performed by: INTERNAL MEDICINE

## 2023-12-13 PROCEDURE — A9577 INJ MULTIHANCE: HCPCS | Performed by: INTERNAL MEDICINE

## 2023-12-13 PROCEDURE — 63710000001 INSULIN LISPRO (HUMAN) PER 5 UNITS: Performed by: SURGERY

## 2023-12-13 PROCEDURE — 25010000002 FENTANYL CITRATE (PF) 50 MCG/ML SOLUTION: Performed by: STUDENT IN AN ORGANIZED HEALTH CARE EDUCATION/TRAINING PROGRAM

## 2023-12-13 PROCEDURE — 25010000002 VANCOMYCIN PER 500 MG: Performed by: SURGERY

## 2023-12-13 PROCEDURE — 87205 SMEAR GRAM STAIN: CPT | Performed by: SURGERY

## 2023-12-13 PROCEDURE — 11042 DBRDMT SUBQ TIS 1ST 20SQCM/<: CPT | Performed by: SURGERY

## 2023-12-13 PROCEDURE — 80202 ASSAY OF VANCOMYCIN: CPT | Performed by: SURGERY

## 2023-12-13 PROCEDURE — 87176 TISSUE HOMOGENIZATION CULTR: CPT | Performed by: SURGERY

## 2023-12-13 DEVICE — ABSORBABLE HEMOSTAT (OXIDIZED REGENERATED CELLULOSE, U.S.P.)
Type: IMPLANTABLE DEVICE | Status: FUNCTIONAL
Brand: SURGICEL

## 2023-12-13 RX ORDER — LABETALOL HYDROCHLORIDE 5 MG/ML
5 INJECTION, SOLUTION INTRAVENOUS
Status: DISCONTINUED | OUTPATIENT
Start: 2023-12-13 | End: 2023-12-13 | Stop reason: HOSPADM

## 2023-12-13 RX ORDER — DIPHENHYDRAMINE HYDROCHLORIDE 50 MG/ML
12.5 INJECTION INTRAMUSCULAR; INTRAVENOUS
Status: DISCONTINUED | OUTPATIENT
Start: 2023-12-13 | End: 2023-12-13 | Stop reason: HOSPADM

## 2023-12-13 RX ORDER — TIZANIDINE 4 MG/1
2 TABLET ORAL EVERY 12 HOURS SCHEDULED
Status: DISPENSED | OUTPATIENT
Start: 2023-12-13 | End: 2023-12-16

## 2023-12-13 RX ORDER — MULTIPLE VITAMINS W/ MINERALS TAB 9MG-400MCG
1 TAB ORAL DAILY
Status: DISCONTINUED | OUTPATIENT
Start: 2023-12-14 | End: 2023-12-21 | Stop reason: HOSPADM

## 2023-12-13 RX ORDER — DROPERIDOL 2.5 MG/ML
0.62 INJECTION, SOLUTION INTRAMUSCULAR; INTRAVENOUS
Status: DISCONTINUED | OUTPATIENT
Start: 2023-12-13 | End: 2023-12-13 | Stop reason: HOSPADM

## 2023-12-13 RX ORDER — LIDOCAINE HYDROCHLORIDE 20 MG/ML
INJECTION, SOLUTION INFILTRATION; PERINEURAL AS NEEDED
Status: DISCONTINUED | OUTPATIENT
Start: 2023-12-13 | End: 2023-12-13 | Stop reason: SURG

## 2023-12-13 RX ORDER — SODIUM CHLORIDE 0.9 % (FLUSH) 0.9 %
3-10 SYRINGE (ML) INJECTION AS NEEDED
Status: DISCONTINUED | OUTPATIENT
Start: 2023-12-13 | End: 2023-12-13 | Stop reason: HOSPADM

## 2023-12-13 RX ORDER — SODIUM CHLORIDE 0.9 % (FLUSH) 0.9 %
3 SYRINGE (ML) INJECTION EVERY 12 HOURS SCHEDULED
Status: DISCONTINUED | OUTPATIENT
Start: 2023-12-13 | End: 2023-12-13 | Stop reason: HOSPADM

## 2023-12-13 RX ORDER — LORAZEPAM 2 MG/ML
1 CONCENTRATE ORAL ONCE AS NEEDED
Status: DISCONTINUED | OUTPATIENT
Start: 2023-12-13 | End: 2023-12-13

## 2023-12-13 RX ORDER — HYDROMORPHONE HYDROCHLORIDE 1 MG/ML
0.5 INJECTION, SOLUTION INTRAMUSCULAR; INTRAVENOUS; SUBCUTANEOUS
Status: DISCONTINUED | OUTPATIENT
Start: 2023-12-13 | End: 2023-12-13 | Stop reason: HOSPADM

## 2023-12-13 RX ORDER — FENTANYL CITRATE 50 UG/ML
50 INJECTION, SOLUTION INTRAMUSCULAR; INTRAVENOUS
Status: DISCONTINUED | OUTPATIENT
Start: 2023-12-13 | End: 2023-12-13 | Stop reason: HOSPADM

## 2023-12-13 RX ORDER — ENOXAPARIN SODIUM 100 MG/ML
40 INJECTION SUBCUTANEOUS EVERY 12 HOURS
Status: DISCONTINUED | OUTPATIENT
Start: 2023-12-13 | End: 2023-12-17

## 2023-12-13 RX ORDER — OXYCODONE AND ACETAMINOPHEN 7.5; 325 MG/1; MG/1
1 TABLET ORAL EVERY 4 HOURS PRN
Status: DISCONTINUED | OUTPATIENT
Start: 2023-12-13 | End: 2023-12-13 | Stop reason: HOSPADM

## 2023-12-13 RX ORDER — DIAZEPAM 5 MG/1
5 TABLET ORAL ONCE AS NEEDED
Status: COMPLETED | OUTPATIENT
Start: 2023-12-13 | End: 2023-12-13

## 2023-12-13 RX ORDER — HYDROCODONE BITARTRATE AND ACETAMINOPHEN 5; 325 MG/1; MG/1
1 TABLET ORAL ONCE AS NEEDED
Status: DISCONTINUED | OUTPATIENT
Start: 2023-12-13 | End: 2023-12-13 | Stop reason: HOSPADM

## 2023-12-13 RX ORDER — LIDOCAINE HYDROCHLORIDE 10 MG/ML
0.5 INJECTION, SOLUTION INFILTRATION; PERINEURAL ONCE AS NEEDED
Status: DISCONTINUED | OUTPATIENT
Start: 2023-12-13 | End: 2023-12-13 | Stop reason: HOSPADM

## 2023-12-13 RX ORDER — PROMETHAZINE HYDROCHLORIDE 25 MG/1
25 TABLET ORAL ONCE AS NEEDED
Status: DISCONTINUED | OUTPATIENT
Start: 2023-12-13 | End: 2023-12-13 | Stop reason: HOSPADM

## 2023-12-13 RX ORDER — ACETAMINOPHEN 325 MG/1
650 TABLET ORAL 3 TIMES DAILY
Status: DISCONTINUED | OUTPATIENT
Start: 2023-12-13 | End: 2023-12-21 | Stop reason: HOSPADM

## 2023-12-13 RX ORDER — IPRATROPIUM BROMIDE AND ALBUTEROL SULFATE 2.5; .5 MG/3ML; MG/3ML
3 SOLUTION RESPIRATORY (INHALATION) ONCE AS NEEDED
Status: DISCONTINUED | OUTPATIENT
Start: 2023-12-13 | End: 2023-12-13 | Stop reason: HOSPADM

## 2023-12-13 RX ORDER — NALOXONE HCL 0.4 MG/ML
0.2 VIAL (ML) INJECTION AS NEEDED
Status: DISCONTINUED | OUTPATIENT
Start: 2023-12-13 | End: 2023-12-13 | Stop reason: HOSPADM

## 2023-12-13 RX ORDER — FLUMAZENIL 0.1 MG/ML
0.2 INJECTION INTRAVENOUS AS NEEDED
Status: DISCONTINUED | OUTPATIENT
Start: 2023-12-13 | End: 2023-12-13 | Stop reason: HOSPADM

## 2023-12-13 RX ORDER — HYDRALAZINE HYDROCHLORIDE 20 MG/ML
5 INJECTION INTRAMUSCULAR; INTRAVENOUS
Status: DISCONTINUED | OUTPATIENT
Start: 2023-12-13 | End: 2023-12-13 | Stop reason: HOSPADM

## 2023-12-13 RX ORDER — FAMOTIDINE 10 MG/ML
20 INJECTION, SOLUTION INTRAVENOUS ONCE
Status: COMPLETED | OUTPATIENT
Start: 2023-12-13 | End: 2023-12-13

## 2023-12-13 RX ORDER — MIDAZOLAM HYDROCHLORIDE 1 MG/ML
1 INJECTION INTRAMUSCULAR; INTRAVENOUS
Status: DISCONTINUED | OUTPATIENT
Start: 2023-12-13 | End: 2023-12-13 | Stop reason: HOSPADM

## 2023-12-13 RX ORDER — PROMETHAZINE HYDROCHLORIDE 25 MG/1
25 SUPPOSITORY RECTAL ONCE AS NEEDED
Status: DISCONTINUED | OUTPATIENT
Start: 2023-12-13 | End: 2023-12-13 | Stop reason: HOSPADM

## 2023-12-13 RX ORDER — MAGNESIUM HYDROXIDE 1200 MG/15ML
LIQUID ORAL AS NEEDED
Status: DISCONTINUED | OUTPATIENT
Start: 2023-12-13 | End: 2023-12-13 | Stop reason: HOSPADM

## 2023-12-13 RX ORDER — EPHEDRINE SULFATE 50 MG/ML
5 INJECTION, SOLUTION INTRAVENOUS ONCE AS NEEDED
Status: DISCONTINUED | OUTPATIENT
Start: 2023-12-13 | End: 2023-12-13 | Stop reason: HOSPADM

## 2023-12-13 RX ORDER — EPHEDRINE SULFATE 50 MG/ML
INJECTION INTRAVENOUS AS NEEDED
Status: DISCONTINUED | OUTPATIENT
Start: 2023-12-13 | End: 2023-12-13 | Stop reason: SURG

## 2023-12-13 RX ORDER — PROPOFOL 10 MG/ML
VIAL (ML) INTRAVENOUS AS NEEDED
Status: DISCONTINUED | OUTPATIENT
Start: 2023-12-13 | End: 2023-12-13 | Stop reason: SURG

## 2023-12-13 RX ORDER — SODIUM CHLORIDE, SODIUM LACTATE, POTASSIUM CHLORIDE, CALCIUM CHLORIDE 600; 310; 30; 20 MG/100ML; MG/100ML; MG/100ML; MG/100ML
9 INJECTION, SOLUTION INTRAVENOUS CONTINUOUS
Status: DISCONTINUED | OUTPATIENT
Start: 2023-12-13 | End: 2023-12-20

## 2023-12-13 RX ORDER — ONDANSETRON 2 MG/ML
4 INJECTION INTRAMUSCULAR; INTRAVENOUS ONCE AS NEEDED
Status: DISCONTINUED | OUTPATIENT
Start: 2023-12-13 | End: 2023-12-13 | Stop reason: HOSPADM

## 2023-12-13 RX ORDER — ALPRAZOLAM 1 MG/1
1 TABLET ORAL ONCE AS NEEDED
Status: DISCONTINUED | OUTPATIENT
Start: 2023-12-13 | End: 2023-12-13

## 2023-12-13 RX ORDER — FENTANYL CITRATE 50 UG/ML
50 INJECTION, SOLUTION INTRAMUSCULAR; INTRAVENOUS ONCE AS NEEDED
Status: COMPLETED | OUTPATIENT
Start: 2023-12-13 | End: 2023-12-13

## 2023-12-13 RX ADMIN — GADOBENATE DIMEGLUMINE 20 ML: 529 INJECTION, SOLUTION INTRAVENOUS at 23:21

## 2023-12-13 RX ADMIN — ONDANSETRON 4 MG: 2 INJECTION INTRAMUSCULAR; INTRAVENOUS at 02:29

## 2023-12-13 RX ADMIN — MIDAZOLAM HYDROCHLORIDE 1 MG: 2 INJECTION, SOLUTION INTRAMUSCULAR; INTRAVENOUS at 07:02

## 2023-12-13 RX ADMIN — FENTANYL CITRATE 25 MCG: 50 INJECTION, SOLUTION INTRAMUSCULAR; INTRAVENOUS at 07:39

## 2023-12-13 RX ADMIN — INSULIN LISPRO 8 UNITS: 100 INJECTION, SOLUTION INTRAVENOUS; SUBCUTANEOUS at 17:39

## 2023-12-13 RX ADMIN — INSULIN LISPRO 3 UNITS: 100 INJECTION, SOLUTION INTRAVENOUS; SUBCUTANEOUS at 22:22

## 2023-12-13 RX ADMIN — SODIUM CHLORIDE, POTASSIUM CHLORIDE, SODIUM LACTATE AND CALCIUM CHLORIDE: 600; 310; 30; 20 INJECTION, SOLUTION INTRAVENOUS at 07:16

## 2023-12-13 RX ADMIN — LIDOCAINE HYDROCHLORIDE 60 MG: 20 INJECTION, SOLUTION INFILTRATION; PERINEURAL at 07:26

## 2023-12-13 RX ADMIN — ENOXAPARIN SODIUM 40 MG: 100 INJECTION SUBCUTANEOUS at 20:35

## 2023-12-13 RX ADMIN — TIZANIDINE 2 MG: 4 TABLET ORAL at 13:37

## 2023-12-13 RX ADMIN — INSULIN GLARGINE 8 UNITS: 100 INJECTION, SOLUTION SUBCUTANEOUS at 13:45

## 2023-12-13 RX ADMIN — DIAZEPAM 5 MG: 5 TABLET ORAL at 22:35

## 2023-12-13 RX ADMIN — CEFEPIME 2000 MG: 2 INJECTION, POWDER, FOR SOLUTION INTRAVENOUS at 18:16

## 2023-12-13 RX ADMIN — VANCOMYCIN HYDROCHLORIDE 1000 MG: 1 INJECTION, SOLUTION INTRAVENOUS at 10:09

## 2023-12-13 RX ADMIN — FAMOTIDINE 20 MG: 10 INJECTION INTRAVENOUS at 06:55

## 2023-12-13 RX ADMIN — ACETAMINOPHEN 650 MG: 325 TABLET, FILM COATED ORAL at 13:37

## 2023-12-13 RX ADMIN — EPHEDRINE SULFATE 10 MG: 50 INJECTION INTRAVENOUS at 07:31

## 2023-12-13 RX ADMIN — CEFEPIME 2000 MG: 2 INJECTION, POWDER, FOR SOLUTION INTRAVENOUS at 01:47

## 2023-12-13 RX ADMIN — ONDANSETRON 4 MG: 2 INJECTION INTRAMUSCULAR; INTRAVENOUS at 07:28

## 2023-12-13 RX ADMIN — INSULIN LISPRO 3 UNITS: 100 INJECTION, SOLUTION INTRAVENOUS; SUBCUTANEOUS at 17:39

## 2023-12-13 RX ADMIN — PROPOFOL 160 MG: 10 INJECTION, EMULSION INTRAVENOUS at 07:26

## 2023-12-13 RX ADMIN — TIZANIDINE 2 MG: 4 TABLET ORAL at 21:26

## 2023-12-13 RX ADMIN — Medication 10 ML: at 09:04

## 2023-12-13 RX ADMIN — POTASSIUM CHLORIDE AND SODIUM CHLORIDE 100 ML/HR: 900; 150 INJECTION, SOLUTION INTRAVENOUS at 18:14

## 2023-12-13 RX ADMIN — CEFEPIME 2000 MG: 2 INJECTION, POWDER, FOR SOLUTION INTRAVENOUS at 13:38

## 2023-12-13 RX ADMIN — CETIRIZINE HYDROCHLORIDE 10 MG: 10 TABLET ORAL at 20:34

## 2023-12-13 RX ADMIN — FENTANYL CITRATE 25 MCG: 50 INJECTION, SOLUTION INTRAMUSCULAR; INTRAVENOUS at 07:34

## 2023-12-13 RX ADMIN — Medication 3 MG: at 20:34

## 2023-12-13 RX ADMIN — POTASSIUM CHLORIDE AND SODIUM CHLORIDE 100 ML/HR: 900; 150 INJECTION, SOLUTION INTRAVENOUS at 01:46

## 2023-12-13 RX ADMIN — ACETAMINOPHEN 650 MG: 325 TABLET, FILM COATED ORAL at 21:26

## 2023-12-13 RX ADMIN — INSULIN GLARGINE 8 UNITS: 100 INJECTION, SOLUTION SUBCUTANEOUS at 22:22

## 2023-12-13 RX ADMIN — GABAPENTIN 300 MG: 300 CAPSULE ORAL at 13:38

## 2023-12-13 RX ADMIN — GABAPENTIN 300 MG: 300 CAPSULE ORAL at 20:35

## 2023-12-13 NOTE — ANESTHESIA PROCEDURE NOTES
Airway  Urgency: elective    Date/Time: 12/13/2023 7:31 AM    General Information and Staff    Patient location during procedure: OR    Indications and Patient Condition  Indications for airway management: airway protection    Preoxygenated: yes  Mask difficulty assessment: 0 - not attempted    Final Airway Details  Final airway type: supraglottic airway      Successful airway: LMA  Size 4     Number of attempts at approach: 1  Assessment: lips, teeth, and gum same as pre-op and atraumatic intubation

## 2023-12-13 NOTE — PROGRESS NOTES
[facility] Medicine Services  PROGRESS NOTE    Patient Name: Vanessa Root  : 1970  MRN: [id number]    Date of Admission: 2023  Primary Care Physician: Grace Baumann APRN    Subjective   Subjective     CC:  Follow-up hip infection    Subjective:   Patient seen after surgery.  Hip pain is better.  I explained to patient the factious disease and orthopedic surgery recommendations to transfer to  [facility] for orthopedic evaluation.  At this point patient is refusing transfer but agrees to get an MRI for further information if this is recommended by ID.  Patient reports she is having some muscle spasm on her left shoulder.    Review of Systems  No current headache or lightheadedness  No current nausea, vomiting, or diarrhea  No current chest pain or palpitations      Objective   Objective     Vital Signs:   Temp:  [97.8 °F (36.6 °C)-100 °F (37.8 °C)] 98.2 °F (36.8 °C)  Heart Rate:  [60-86] 84  Resp:  [16-19] 19  BP: ()/(55-73) 103/73        Physical Exam:  Constitutional:Awake, alert chronically ill-appearing but nontoxic  HENT: NCAT, mucous membranes moist, neck supple  Respiratory: No cough or wheezing, decreased inspiration, nonlabored breathing  Cardiovascular: Pulse rate is normal, normal radial pulses  Gastrointestinal: Obese, soft, nontender, nondistended  Musculoskeletal: Some muscular tenderness in the area of the left trapezius, substantially debilitated, substantial bilateral lower extremity weakness, diffuse muscle wasting noted, left hip wound currently with dressing  Psychiatric: Anxious affect, cooperative, conversational  Neurologic: Oriented, no slurred speech or facial droop, follows commands  Skin: Wound as per above, no rashes or jaundice, warm      Results Reviewed:  Results from last 7 days   Lab Units 23  0348 23  0353 23  1552   WBC 10*3/mm3 18.54* 18.01* 18.25*   HEMOGLOBIN g/dL 7.8* 7.7* 7.9*   HEMATOCRIT % 25.3* 25.3* 25.7*    PLATELETS 10*3/mm3 639* 635* 548*     Results from last 7 days   Lab Units 12/13/23  0348 12/12/23  0353 12/11/23  1552   SODIUM mmol/L 138 136 134*   POTASSIUM mmol/L 3.7 3.3* 3.7   CHLORIDE mmol/L 106 101 99   CO2 mmol/L 22.4 24.0 24.0   BUN mg/dL 11 11 11   CREATININE mg/dL 1.06* 1.01* 0.77   GLUCOSE mg/dL 165* 174* 181*   CALCIUM mg/dL 7.8* 8.1* 8.5*   ALK PHOS U/L  --   --  272*   ALT (SGPT) U/L  --   --  9   AST (SGOT) U/L  --   --  37*     Estimated Creatinine Clearance: 80.7 mL/min (A) (by C-G formula based on SCr of 1.06 mg/dL (H)).    Microbiology Results Abnormal       Procedure Component Value - Date/Time    Tissue / Bone Culture - Surgical Site, Thigh, Right [[id number]] Collected: 12/13/23 0738    Lab Status: Preliminary result Specimen: Surgical Site from Thigh, Right Updated: 12/13/23 1328     Gram Stain Rare (1+) WBCs per low power field      No organisms seen    Wound Culture - Wound, Hip, Right [[id number]] Collected: 12/11/23 2310    Lab Status: Preliminary result Specimen: Wound from Hip, Right Updated: 12/13/23 0856     Wound Culture Culture in progress     Gram Stain Rare (1+) WBCs per low power field      Rare (1+) Gram positive bacilli      Rare (1+) Gram positive cocci    Blood Culture - Blood, Hand, Left [[id number]]  (Normal) Collected: 12/11/23 1644    Lab Status: Preliminary result Specimen: Blood from Hand, Left Updated: 12/12/23 1715     Blood Culture No growth at 24 hours    Blood Culture - Blood, Arm, Right [[id number]]  (Normal) Collected: 12/11/23 1644    Lab Status: Preliminary result Specimen: Blood from Arm, Right Updated: 12/12/23 1715     Blood Culture No growth at 24 hours    Narrative:      Less than seven (7) mL's of blood was collected.  Insufficient quantity may yield false negative results.            Imaging Results (Last 24 Hours)       ** No results found for the last 24 hours. **                I have reviewed the medications:  Scheduled Meds:acetaminophen, 650 mg,  Oral, TID  cefepime, 2,000 mg, Intravenous, Q8H  cetirizine, 10 mg, Oral, Nightly  enoxaparin, 40 mg, Subcutaneous, Q12H  gabapentin, 300 mg, Oral, Q12H  HYDROcodone-acetaminophen, 1 tablet, Oral, Daily  insulin glargine, 8 Units, Subcutaneous, Q12H  insulin lispro, 2-7 Units, Subcutaneous, 4x Daily AC & at Bedtime  insulin lispro, 8 Units, Subcutaneous, TID With Meals  levothyroxine, 200 mcg, Oral, Daily  senna-docusate sodium, 2 tablet, Oral, BID  tiZANidine, 2 mg, Oral, Q12H  [START ON 12/14/2023] vancomycin, 750 mg, Intravenous, Q12H      Continuous Infusions:lactated ringers, 9 mL/hr, Last Rate: Stopped (12/13/23 0904)  Pharmacy to dose vancomycin,   sodium chloride 0.9 % with KCl 20 mEq, 100 mL/hr, Last Rate: 100 mL/hr (12/13/23 0146)      PRN Meds:.  acetaminophen    senna-docusate sodium **AND** polyethylene glycol **AND** bisacodyl **AND** bisacodyl    cyclobenzaprine    dextrose    dextrose    glucagon (human recombinant)    hydrOXYzine    influenza vaccine    melatonin    ondansetron **OR** ondansetron    Pharmacy to dose vancomycin    [COMPLETED] Insert Peripheral IV **AND** sodium chloride    sodium chloride    Assessment & Plan   Assessment & Plan     Active Hospital Problems    Diagnosis  POA    **Osteomyelitis of right hip [M86.9]  Yes    Chronic anticoagulation for history of PE [Z79.01]  Not Applicable    Pyogenic arthritis of right hip [M00.9]  Yes    History of pulmonary embolism [Z86.711]  Yes    Anemia, chronic disease [D63.8]  Yes    Hypotension [I95.9]  Yes    Severe malnutrition [E43]  Yes    Infected wound [T14.8XXA, L08.9]  Yes    Obesity, Class III, BMI 40-49.9 (morbid obesity) [E66.01]  Yes    History of ischemic stroke [Z86.73]  Not Applicable    Pressure ulcer of other site, stage 4 [L89.894]  Yes    Spinal stenosis of lumbar region with neurogenic claudication [M48.062]  Yes    Necrotizing soft tissue infection [M79.89]  Yes    Immobility syndrome [M62.3]  Yes    Hyperlipidemia  [E78.5]  Yes    Type 2 diabetes mellitus with diabetic polyneuropathy [E11.42]  Yes    GERD (gastroesophageal reflux disease) [K21.9]  Yes    Bipolar 1 disorder [F31.9]  Yes    Depression [F32.A]  Yes    Muscle weakness [M62.81]  Yes    Obstructive sleep apnea syndrome [G47.33]  Yes    Hypothyroidism [E03.9]  Yes      Resolved Hospital Problems   No resolved problems to display.        Brief Hospital Course to date:  Vanessa Root is a 53 y.o. female     Plan today:  Discussed case with infectious disease today, they plan to evaluate today but do feel [facility]  I am reaching out to orthopedic surgery as well as they felt this would be the best option for her also.  At this point patient is refusing recommended transfer today but states she will discuss and consider possibly tomorrow.  I have explained to her that refusing recommended treatments could lead to worsening morbidity or mortality and she is understanding of this.    Case discussed with general surgery today.  They were able to debride wound and necrotic tissue.  General surgery felt there was healthy tissue between the bone.    Continue broad-spectrum antibiotics.  Likely polymicrobial infection.  Following wound culture results.  Intraoperative cultures reportedly sent.    CT images reviewed and extensive wound noted with findings of osteomyelitis    Glucose reviewed and insulin adjusted today.  Monitor glucose and adjust insulin as needed    Continue home medications for mood as appropriate  Continue gabapentin for chronic pain and neuropathy  Continue thyroid medication.  Bowel regimen ordered.    Anticoagulation transition to prophylactic Lovenox dose tonight.  Hold Eliquis until further determination on surgical plans.  History of PE noted.  Plan to dose prophylactic Lovenox for morbid obesity    Encourage nutrition.  Patient feels very strong she would prefer a regular diet of her heart healthy  diet    Scheduled Tylenol and low-dose muscle relaxant for trapezius muscle spasm.    PT OT for debility.  Physically patient is chronically very low functioning.    Supportive care for chronic visual impairment    Anemia noted which is significant.  No active bleeding noted.  Previous anemia studies this year showed anemia of chronic disease.    Treatment plan discussed with the patient who is in agreement.  Very complex case.        DVT Prophylaxis: Mechanical      Disposition: I expect the patient to be discharged pending    CODE STATUS:   Code Status and Medical Interventions:   Ordered at: 12/11/23 9381     Code Status (Patient has no pulse and is not breathing):    CPR (Attempt to Resuscitate)     Medical Interventions (Patient has pulse or is breathing):    Full       Marv Saucedo MD  12/13/23

## 2023-12-13 NOTE — DISCHARGE PLACEMENT REQUEST
"Vanessa Villanueva (53 y.o. Female)       Date of Birth   1970    Social Security Number       Address   [address] [id number]    Home Phone   [phone]    MRN   [id number]       [employer]   [facility]    Marital Status   Single                            Admission Date   12/11/23    Admission Type   Emergency    Admitting Provider   Rosa Alvarado MD    Attending Provider   Marv Saucedo MD    Department, Room/Bed   [address], [address]       Discharge Date       Discharge Disposition       Discharge Destination                                 Attending Provider: Marv Saucedo MD    Allergies: Clemastine Fumarate, Ziprasidone, Aripiprazole, Sulfa Antibiotics, Green Pepper, Latex, Lisinopril    Isolation: None   Infection: None   Code Status: CPR    Ht: 165.1 cm (65\")   Wt: 118 kg (259 lb 8 oz)    Admission Cmt: None   Principal Problem: Osteomyelitis of right hip [M86.9]                   Active Insurance as of 12/11/2023       Primary Coverage       Payor Plan Insurance Group Employer/Plan Group    MEDICARE MEDICARE A & B        Payor Plan Address Payor Plan Phone Number Payor Plan Fax Number Effective Dates    [address] [phone]  4/1/2003 - None Entered    [facility] [address]         Subscriber Name Subscriber Birth Date Member ID       VANESSA VILLANUEVA 1970 [id number]               Secondary Coverage       Payor Plan Insurance Group Employer/Plan Group    [place]        Payor Plan Address Payor Plan Phone Number Payor Plan Fax Number Effective Dates    [address] [phone]  11/8/2023 - None Entered    [place] [address]         Subscriber Name Subscriber Birth Date Member ID       VANESSA VILLANUEVA 1970 [id number]                     Emergency Contacts        (Rel.) Home Phone Work Phone Mobile Phone    WHITNEY VILLANUEVA (Daughter) -- -- [phone]    Pauline Villanueva " (Mother) -- -- [phone]

## 2023-12-13 NOTE — CASE MANAGEMENT/SOCIAL WORK
Discharge Planning Assessment  [facility]     Patient Name: Vanessa Root  MRN: [id number]  Today's Date: 12/13/2023    Admit Date: 12/11/2023    Plan: Home with [facility], family support, will need ambulance/stretcher transport   Discharge Needs Assessment       Row Name 12/13/23 1521       Living Environment    People in Home child(nicole), adult;parent(s)    Name(s) of People in Home Daughter and mother    Current Living Arrangements home    Potentially Unsafe Housing Conditions none    In the past 12 months has the electric, gas, oil, or water company threatened to shut off services in your home? No    Primary Care Provided by self    Provides Primary Care For no one, unable/limited ability to care for self    Family Caregiver if Needed child(nicole), adult;parent(s)    Family Caregiver Names Daughter  and mother    Quality of Family Relationships helpful;involved    Able to Return to Prior Arrangements yes       Resource/Environmental Concerns    Resource/Environmental Concerns none    Transportation Concerns none       Food Insecurity    Within the past 12 months, you worried that your food would run out before you got the money to buy more. Never true    Within the past 12 months, the food you bought just didn't last and you didn't have money to get more. Never true       Transition Planning    Patient/Family Anticipates Transition to home;home with family;home with help/services  current with [facility]    Patient/Family Anticipated Services at Transition none    Transportation Anticipated health plan transportation       Discharge Needs Assessment    Current Outpatient/Agency/Support Group homecare agency    Equipment Currently Used at Home cpap;wheelchair;lift device;wound care supplies    Concerns to be Addressed no discharge needs identified;denies needs/concerns at this time    Anticipated Changes Related to Illness none    Equipment Needed After Discharge none    Provided Post Acute Provider  List? N/A    Provided Post Acute Provider Quality & Resource List? N/A                   Discharge Plan       Row Name 12/13/23 1524       Plan    Plan Home with [facility], family support, will need ambulance/stretcher transport    Plan Comments Met with pt at bedside. Introduced self and explained role of . Face sheet verified, PCP is Grace Baumann. Pt denies any difficulty paying for medications, and she obtains her medications from [facility]. Pt lives with her daughter and mother, who assist with her care needs. Pt can participate some in her ADL's, but is dependent in mobility, uses a lift and a wc. Pt is current with [facility], referral placed in [facility]. Pt has been to [facility] in the past. Pt wants to continue with home health at [facility], but is refusing any type of rehab stay. Upon discharge, pt will need ambulance/stretcher transport. Explained that CCP would follow to assess for discharge needs.                  Continued Care and Services - Admitted Since 12/11/2023       Home Medical Care       Service Provider Request Status Selected Services Address Phone Fax Patient Preferred    [facility] [id number], [place] [id number] [phone] --       Internal Comment last updated by Yomaira Reyes CSW 12/12/2023 0834    Current with? If not, please decline                             Expected Discharge Date and Time       Expected Discharge Date Expected Discharge Time    Dec 15, 2023            Demographic Summary    No documentation.                  Functional Status    No documentation.                  Psychosocial    No documentation.                  Abuse/Neglect    No documentation.                  Legal    No documentation.                  Substance Abuse    No documentation.                  Patient Forms    No documentation.                     Amanda Randle,  RN

## 2023-12-13 NOTE — ANESTHESIA PREPROCEDURE EVALUATION
Anesthesia Evaluation     Patient summary reviewed and Nursing notes reviewed   history of anesthetic complications:  PONV  NPO Solid Status: > 8 hours  NPO Liquid Status: > 4 hours           Airway   Mallampati: II  TM distance: >3 FB  Neck ROM: full  Comment: Grade IIa view with MAC 3  Grade I view with Renteria 2  Dental    (+) poor dentition    Comment: Many missing teeth, none loose presently    Pulmonary    (+) pulmonary embolism, a smoker Former,sleep apnea on CPAP  Cardiovascular     ECG reviewed    (+) valvular problems/murmurs murmur, hyperlipidemia    PE comment: No murmurs heard    Neuro/Psych  (+) CVA residual symptoms, headaches, numbness, psychiatric history Bipolar and Depression    ROS Comment: Paraplegia/immobility syndrome/diabetic peripheral neuropathy/migraines/RLS/legally blind  GI/Hepatic/Renal/Endo    (+) obesity, morbid obesity, GERD, diabetes mellitus type 2, thyroid problem hypothyroidism    Musculoskeletal         ROS comment: Lumbar DDD/hx necrotizing fasciitis/immobility syndrome with pressure ulcer   Abdominal    Substance History      OB/GYN          Other   arthritis, blood dyscrasia anemia,   history of cancer      Other Comment: Hx uterine CA/Hgb 9.6                    Anesthesia Plan    ASA 3     general     (I have reviewed the patient's history with the patient and the chart, including all pertinent laboratory results and imaging. I have explained the risks of anesthesia including but not limited to dental damage, corneal abrasion, nerve injury, MI, stroke, and death. Questions asked and answered. Anesthetic plan discussed with patient and team as indicated. Patient expressed understanding of the above.    Avoid sux)  intravenous induction     Anesthetic plan, risks, benefits, and alternatives have been provided, discussed and informed consent has been obtained with: patient.      CODE STATUS:    Code Status (Patient has no pulse and is not breathing): CPR (Attempt to  Resuscitate)  Medical Interventions (Patient has pulse or is breathing): Full

## 2023-12-13 NOTE — PLAN OF CARE
Goal Outcome Evaluation:           Progress: no change  Outcome Evaluation: Pt alert, oriented and cooperative throughout night. Vitals WNL. Pt on home Cpap with 2L. NPO since 0000 for procedure this am. Dressing to right hip intact.

## 2023-12-13 NOTE — ANESTHESIA POSTPROCEDURE EVALUATION
"Patient: Vanessa Root    Procedure Summary       Date: 12/13/23 Room / Location: [facility] OR [address] [place] [address] MAIN OR    Anesthesia Start: 0716 Anesthesia Stop: 0806    Procedure: Debridement of right lateral thigh wound (Right: Thigh) Diagnosis:       Infected wound      (Infected wound [T14.8XXA, L08.9])    Surgeons: Mohan Escamilla MD Provider: Zelalem Atkinson MD    Anesthesia Type: general ASA Status: 3            Anesthesia Type: general    Vitals  Vitals Value Taken Time   BP 99/66 12/13/23 0835   Temp 37.8 °C (100 °F) 12/13/23 0835   Pulse 83 12/13/23 0839   Resp 16 12/13/23 0835   SpO2 94 % 12/13/23 0839   Vitals shown include unfiled device data.        Post Anesthesia Care and Evaluation    Patient location during evaluation: bedside  Patient participation: complete - patient participated  Level of consciousness: awake  Pain management: adequate    Airway patency: patent  Anesthetic complications: No anesthetic complications    Cardiovascular status: acceptable  Respiratory status: acceptable  Hydration status: acceptable    Comments: /73   Pulse 84   Temp 36.8 °C (98.2 °F) (Oral)   Resp 19   Ht 165.1 cm (65\")   Wt 118 kg (259 lb 8 oz)   SpO2 96%   BMI 43.18 kg/m²     "

## 2023-12-13 NOTE — PROGRESS NOTES
LOS: 2 days     Chief Complaint: Right hip decubitus ulcer    Interval History: Patient seen in PACU this morning and is sleepy from anesthesia.  Tmax of 100.  WBC remains stable at 18.  Tolerating antibiotics.    Vital Signs  Temp:  [97.8 °F (36.6 °C)-100 °F (37.8 °C)] 98.2 °F (36.8 °C)  Heart Rate:  [60-86] 84  Resp:  [16-19] 19  BP: ()/(55-73) 103/73    Physical Exam:  General: In no acute distress  HEENT: Oropharynx clear, moist mucous membranes  Respiratory: Normal work of breathing  Skin: Right hip decubitus wound packed with surgical dressing  Access: Peripheral IV    Antibiotics:  Anti-Infectives (From admission, onward)      Ordered     Dose/Rate Route Frequency Start Stop    12/12/23 0959  cefepime 2000 mg IVPB in 100 ml NS (VTB)        Ordering Provider: Mohan Escamilla MD    2,000 mg  over 4 Hours Intravenous Every 8 Hours 12/12/23 1845 12/19/23 1844    12/12/23 0959  cefepime 2000 mg IVPB in 100 ml NS (VTB)        Ordering Provider: Angel Fernando DO    2,000 mg  over 30 Minutes Intravenous Once 12/12/23 1045 12/12/23 1354    12/11/23 2245  vancomycin (VANCOCIN) 1000 mg/200 mL dextrose 5% IVPB        Ordering Provider: Mohan Escamilla MD    1,000 mg  over 60 Minutes Intravenous Every 12 Hours 12/12/23 0700 12/17/23 0959    12/11/23 2237  piperacillin-tazobactam (ZOSYN) 4.5 g in iso-osmotic dextrose 100 mL IVPB (premix)        Ordering Provider: Rosa Alvarado MD    4.5 g  over 30 Minutes Intravenous Once 12/11/23 2330 12/11/23 2337    12/11/23 2237  Pharmacy to dose vancomycin        Ordering Provider: Mohan Escamilla MD     Does not apply Continuous PRN 12/11/23 2237 12/16/23 2236    12/11/23 1847  vancomycin 2250 mg/500 mL 0.9% NS IVPB ([facility])        Ordering Provider: Khang Humphrey MD    20 mg/kg × 113 kg  over 135 Minutes Intravenous Once 12/11/23 1903 12/11/23 2127             Results Review:     I reviewed the patient's new clinical  results.    Lab Results   Component Value Date    WBC 18.54 (H) 12/13/2023    HGB 7.8 (L) 12/13/2023    HCT 25.3 (L) 12/13/2023    MCV 84.1 12/13/2023     (H) 12/13/2023     Lab Results   Component Value Date    GLUCOSE 165 (H) 12/13/2023    BUN 11 12/13/2023    CREATININE 1.06 (H) 12/13/2023    BCR 10.4 12/13/2023    CO2 22.4 12/13/2023    CALCIUM 7.8 (L) 12/13/2023    ALBUMIN 2.2 (L) 12/11/2023    AST 37 (H) 12/11/2023    ALT 9 12/11/2023       Microbiology:  12/11 blood cultures no growth to date  12/11 right hip wound culture in process  12/13 right hip OR cultures in process    Assessment    #Probable right hip osteomyelitis with abscess  #Suspected right hip septic arthritis  #Chronic right hip decubitus ulcer  #Immobility syndrome  #Diabetes complicating the above  #Obesity, BMI 41    Remains a difficult situation.  General surgery performing superficial I&D of necrotic tissue.  Due to the extent of the imaging findings I would continue to recommend further definitive orthopedic evaluation at [facility] for source control.  I remain skeptical that antibiotics alone will be adequate for her degree of infection.    For now continue vancomycin goal -600 and cefepime 2 g every 8 hours.  Plan to follow-up outstanding cultures to guide therapy.    ID will follow.

## 2023-12-13 NOTE — PROGRESS NOTES
Colorectal & General Surgery  Progress Note    Patient: Vanessa Root  YOB: 1970  MRN: [id number]      Assessment  Vanessa Root is a 53 y.o. female with right thigh wound requiring debridement.  Will proceed the operating room for excisional debridement.    Subjective  No acute events overnight.  Pain well-controlled.    Objective    Vitals:    12/13/23 0615   BP: 98/60   Pulse: 84   Resp: 18   Temp: 98.7 °F (37.1 °C)   SpO2: 97%       Physical Exam  Constitutional: Well-developed well-nourished, no acute distress  Neck: Supple, trachea midline  Respiratory: No increased work of breathing, Symmetric excursion  Cardiovascular: Well pefursed, no jugular venous distention evident   Abdominal: Soft, non-tender, non-distended  Skin: Warm, dry, no rash on visualized skin surfaces, right thigh wound covered with dressing  Psychiatric: Alert and oriented ×3, normal affect     Laboratory Results  I have personally reviewed WBC 18, hemoglobin 7, platelet 639.  BMP with creatinine 1.06.    Radiology  None to review         Louis Escamilla MD  Colorectal & General Surgery  [facility]    [address], [address]  [place], [place], [address]  P: [phone]  F: [phone]

## 2023-12-13 NOTE — PROGRESS NOTES
[facility] Medicine Services  PROGRESS NOTE    Patient Name: Vanessa Root  : 1970  MRN: [id number]    Date of Admission: 2023  Primary Care Physician: Grace Baumann APRN    Subjective   Subjective     CC:  Follow-up hip infection    Subjective: Patient complains of hip pain    Review of Systems  No current fevers or chills  No current nausea, vomiting, or diarrhea  No current chest pain or palpitations      Objective   Objective     Vital Signs:   Temp:  [97.7 °F (36.5 °C)-99.9 °F (37.7 °C)] 99.9 °F (37.7 °C)  Heart Rate:  [78-88] 84  Resp:  [16] 16  BP: ()/(36-59) 104/55        Physical Exam:  Constitutional:Awake, alert chronically ill-appearing but nontoxic  HENT: NCAT, mucous membranes moist, neck supple  Respiratory: No cough or wheezing, decreased inspiration, nonlabored breathing  Cardiovascular: Pulse rate is normal, normal radial pulses  Gastrointestinal: Obese, soft, nontender, nondistended  Musculoskeletal: Substantially debilitated, substantial bilateral lower extremity weakness, diffuse muscle wasting noted, left hip wound with necrotic tissue  Psychiatric: Anxious affect, cooperative, conversational  Neurologic: Oriented, no slurred speech or facial droop, follows commands  Skin: Wound as per above, no rashes or jaundice, warm      Results Reviewed:  Results from last 7 days   Lab Units 23  0353 23  1552   WBC 10*3/mm3 18.01* 18.25*   HEMOGLOBIN g/dL 7.7* 7.9*   HEMATOCRIT % 25.3* 25.7*   PLATELETS 10*3/mm3 635* 548*     Results from last 7 days   Lab Units 23  0353 23  1552   SODIUM mmol/L 136 134*   POTASSIUM mmol/L 3.3* 3.7   CHLORIDE mmol/L 101 99   CO2 mmol/L 24.0 24.0   BUN mg/dL 11 11   CREATININE mg/dL 1.01* 0.77   GLUCOSE mg/dL 174* 181*   CALCIUM mg/dL 8.1* 8.5*   ALK PHOS U/L  --  272*   ALT (SGPT) U/L  --  9   AST (SGOT) U/L  --  37*     Estimated Creatinine Clearance: 82.6 mL/min (A) (by C-G formula based on SCr of 1.01 mg/dL  (H)).    Microbiology Results Abnormal       Procedure Component Value - Date/Time    Blood Culture - Blood, Hand, Left [[id number]]  (Normal) Collected: 12/11/23 1644    Lab Status: Preliminary result Specimen: Blood from Hand, Left Updated: 12/12/23 1715     Blood Culture No growth at 24 hours    Blood Culture - Blood, Arm, Right [[id number]]  (Normal) Collected: 12/11/23 1644    Lab Status: Preliminary result Specimen: Blood from Arm, Right Updated: 12/12/23 1715     Blood Culture No growth at 24 hours    Narrative:      Less than seven (7) mL's of blood was collected.  Insufficient quantity may yield false negative results.    Wound Culture - Wound, Hip, Right [[id number]] Collected: 12/11/23 2310    Lab Status: Preliminary result Specimen: Wound from Hip, Right Updated: 12/12/23 0410     Gram Stain Rare (1+) WBCs per low power field      Rare (1+) Gram positive bacilli      Rare (1+) Gram positive cocci            Imaging Results (Last 24 Hours)       ** No results found for the last 24 hours. **                I have reviewed the medications:  Scheduled Meds:cefepime, 2,000 mg, Intravenous, Q8H  cetirizine, 10 mg, Oral, Nightly  gabapentin, 300 mg, Oral, Q12H  HYDROcodone-acetaminophen, 1 tablet, Oral, Daily  insulin lispro, 2-7 Units, Subcutaneous, 4x Daily AC & at Bedtime  insulin lispro, 8 Units, Subcutaneous, TID With Meals  levothyroxine, 200 mcg, Oral, Daily  senna-docusate sodium, 2 tablet, Oral, BID  vancomycin, 1,000 mg, Intravenous, Q12H      Continuous Infusions:Pharmacy to dose vancomycin,   sodium chloride 0.9 % with KCl 20 mEq, 100 mL/hr, Last Rate: 100 mL/hr (12/12/23 1325)      PRN Meds:.  acetaminophen    senna-docusate sodium **AND** polyethylene glycol **AND** bisacodyl **AND** bisacodyl    cyclobenzaprine    dextrose    dextrose    glucagon (human recombinant)    hydrOXYzine    influenza vaccine    melatonin    ondansetron **OR** ondansetron    Pharmacy to dose vancomycin    [COMPLETED] Insert  Peripheral IV **AND** sodium chloride    sodium chloride    Assessment & Plan   Assessment & Plan     Active Hospital Problems    Diagnosis  POA    **Osteomyelitis of right hip [M86.9]  Yes    Pyogenic arthritis of right hip [M00.9]  Yes    History of pulmonary embolism [Z86.711]  Yes    Anemia, chronic disease [D63.8]  Yes    Hypotension [I95.9]  Yes    Infected wound [T14.8XXA, L08.9]  Unknown    Obesity, Class III, BMI 40-49.9 (morbid obesity) [E66.01]  Yes    History of ischemic stroke [Z86.73]  Not Applicable    Pressure ulcer of other site, stage 4 [L89.894]  Yes    Necrotizing soft tissue infection [M79.89]  Yes    Immobility syndrome [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        Brief Hospital Course to date:  Vanessa Root is a 53 y.o. female     Plan today:  Orthopedic surgery consulted  General surgery consulted  Infectious disease consulted  Broad-spectrum antibiotics  CT images reviewed and extensive wound noted with findings of osteomyelitis  Follow cultures  Supportive care and symptom treatment  Monitor glucose and adjust insulin as needed  Continue home medications for mood as appropriate  Continue gabapentin for chronic pain.  Continue thyroid medication.  Bowel regimen ordered.  Plan will be for surgical intervention for debridement, n.p.o. after midnight  All blood thinners currently held for surgery.  Encourage nutrition.  Patient feels very strong she would prefer a regular diet of her heart healthy diet  Anemia noted which is significant.  No active bleeding noted.  Treatment plan discussed with the patient who is in  agreement.  Very complex case.  Plan discussed with consultants      DVT Prophylaxis: Mechanical      Disposition: I expect the patient to be discharged pending    CODE STATUS:   Code Status and Medical Interventions:   Ordered at: 12/11/23 0888     Code Status (Patient has no pulse and is not breathing):    CPR (Attempt to Resuscitate)     Medical Interventions (Patient has pulse or is breathing):    Full       Marv Saucedo MD  12/12/23

## 2023-12-13 NOTE — PROGRESS NOTES
"[facility]: Vancomycin Monitoring Note    Vanessa Root is a 53 y.o. female who is on day 3/5 of pharmacy to dose vancomycin for Bone and/or Joint Infection and Skin and Soft Tissue.    Vancomycin Dose: 1000 mg IV every 12 hours  Updated Cultures and Sensitivities:   12/11 WCx (prelim): GPB and GPC  Results from last 7 days   Lab Units 12/13/23  1002   VANCOMYCIN TR mcg/mL 20.80*     Vitals/Labs  Ht: 165.1 cm (65\"); Wt: 118 kg (259 lb 8 oz)   Temp Readings from Last 1 Encounters:   12/13/23 98.2 °F (36.8 °C) (Oral)     Estimated Creatinine Clearance: 80.7 mL/min (A) (by C-G formula based on SCr of 1.06 mg/dL (H)).   Results from last 7 days   Lab Units 12/13/23  0348 12/12/23  0353 12/11/23  1552   CREATININE mg/dL 1.06* 1.01* 0.77   WBC 10*3/mm3 18.54* 18.01* 18.25*     Assessment/Plan    Vancomycin Dose: 1000 mg IV every 12 hours; provides a predicted  mg/L.hr ; changing to 750 mg IV every 12 hours; provides a predicted  mg/L.hr; Starting 1st dose of new regimen 18-24 hours after the last 1000 mg dose given.   Next Level Date and Time: Vanc Trough on 12/15 at 0600  We will continue to monitor patient changes and renal function     Thank you for involving pharmacy in this patient's care. Please contact pharmacy with any questions or concerns.       Steve Fitch, Ruby, KRISTIE, BCPS, BCCCP  12/13/23 12:16 EST      "

## 2023-12-13 NOTE — OP NOTE
Colorectal & General Surgery  Operative Report    Patient: Vanessa Root  YOB: 1970  MRN: [id number]  DATE OF PROCEDURE: 12/13/23     PREOPERATIVE DIAGNOSIS:  Right lateral thigh and hip wound with necrotic tissue    POSTOPERATIVE DIAGNOSIS:  Same    PROCEDURE:  Excisional debridement of skin and subcutaneous tissue of the right lateral thigh and hip wound measuring 270 cm²    FINDINGS:  Necrotic skin and subcutaneous tissue.  Debrided back to healthy appearing tissue.  Seems that there is healthy tissue between the atmosphere and bone.    SURGEON:  Louis Escamilla MD    ASSISTANT:  None    ANESTHESIA:  General-LMA    EBL:  5 mL    SPECIMEN:  Tissue culture    OPERATIVE DESCRIPTION:  The patient was brought to the operating room under the care of the nursing staff.  The patient was placed on the operating room table in the supine position where anesthesia was induced.  The patient was then prepped and positioned in the usual sterile fashion.  A standardized timeout was then performed.    Necrotic appearing skin and subcutaneous tissue was excised sharply with the Bovie electrocautery.  Wound dimensions measured 15 x 18 cm for total 270 cm².  Superficial layer of necrotic tissue was present and a large part of the wound.  There were a few areas of deep necrosis that were taken down to healthy subcutaneous tissue.  Bleeding tissue was encountered.  A piece of Surgicel was left in the deepest aspect of the wound and the anterior portion.  The wound was tightly packed with Kerlix.    All needle, sponge, and instrument counts were correct at the end of the case.    The patient tolerated the procedure well and was transferred to the postanesthesia care unit in stable condition.    Louis Escamilla M.D.  Colorectal & General Surgery  [facility]    [address] [place] [address], [address]  [place], [place], [facility]  P: [phone]  F: [phone]

## 2023-12-14 PROBLEM — A49.02 MRSA (METHICILLIN RESISTANT STAPHYLOCOCCUS AUREUS) INFECTION: Status: ACTIVE | Noted: 2023-12-14

## 2023-12-14 LAB
ANION GAP SERPL CALCULATED.3IONS-SCNC: 9 MMOL/L (ref 5–15)
BUN SERPL-MCNC: 11 MG/DL (ref 6–20)
BUN/CREAT SERPL: 10.4 (ref 7–25)
CALCIUM SPEC-SCNC: 7.8 MG/DL (ref 8.6–10.5)
CHLORIDE SERPL-SCNC: 107 MMOL/L (ref 98–107)
CO2 SERPL-SCNC: 21 MMOL/L (ref 22–29)
CREAT SERPL-MCNC: 1.06 MG/DL (ref 0.57–1)
DEPRECATED RDW RBC AUTO: 39.7 FL (ref 37–54)
EGFRCR SERPLBLD CKD-EPI 2021: 63.7 ML/MIN/1.73
ERYTHROCYTE [DISTWIDTH] IN BLOOD BY AUTOMATED COUNT: 13 % (ref 12.3–15.4)
GLUCOSE BLDC GLUCOMTR-MCNC: 114 MG/DL (ref 70–130)
GLUCOSE BLDC GLUCOMTR-MCNC: 178 MG/DL (ref 70–130)
GLUCOSE BLDC GLUCOMTR-MCNC: 179 MG/DL (ref 70–130)
GLUCOSE BLDC GLUCOMTR-MCNC: 232 MG/DL (ref 70–130)
GLUCOSE SERPL-MCNC: 241 MG/DL (ref 65–99)
HCT VFR BLD AUTO: 23.9 % (ref 34–46.6)
HGB BLD-MCNC: 7.2 G/DL (ref 12–15.9)
MCH RBC QN AUTO: 25.4 PG (ref 26.6–33)
MCHC RBC AUTO-ENTMCNC: 30.1 G/DL (ref 31.5–35.7)
MCV RBC AUTO: 84.2 FL (ref 79–97)
PLATELET # BLD AUTO: 633 10*3/MM3 (ref 140–450)
PMV BLD AUTO: 9.4 FL (ref 6–12)
POTASSIUM SERPL-SCNC: 4.1 MMOL/L (ref 3.5–5.2)
RBC # BLD AUTO: 2.84 10*6/MM3 (ref 3.77–5.28)
SODIUM SERPL-SCNC: 137 MMOL/L (ref 136–145)
WBC NRBC COR # BLD AUTO: 16.47 10*3/MM3 (ref 3.4–10.8)

## 2023-12-14 PROCEDURE — 63710000001 INSULIN LISPRO (HUMAN) PER 5 UNITS: Performed by: SURGERY

## 2023-12-14 PROCEDURE — 99231 SBSQ HOSP IP/OBS SF/LOW 25: CPT | Performed by: SURGERY

## 2023-12-14 PROCEDURE — 80048 BASIC METABOLIC PNL TOTAL CA: CPT | Performed by: SURGERY

## 2023-12-14 PROCEDURE — 25810000003 SODIUM CHLORIDE 0.9 % SOLUTION: Performed by: INTERNAL MEDICINE

## 2023-12-14 PROCEDURE — 25010000002 ENOXAPARIN PER 10 MG: Performed by: INTERNAL MEDICINE

## 2023-12-14 PROCEDURE — 25010000002 SODIUM CHLORIDE 0.9 % WITH KCL 20 MEQ 20-0.9 MEQ/L-% SOLUTION: Performed by: SURGERY

## 2023-12-14 PROCEDURE — 99232 SBSQ HOSP IP/OBS MODERATE 35: CPT | Performed by: STUDENT IN AN ORGANIZED HEALTH CARE EDUCATION/TRAINING PROGRAM

## 2023-12-14 PROCEDURE — 25010000002 ONDANSETRON PER 1 MG: Performed by: SURGERY

## 2023-12-14 PROCEDURE — 82948 REAGENT STRIP/BLOOD GLUCOSE: CPT

## 2023-12-14 PROCEDURE — 25810000003 SODIUM CHLORIDE 0.9 % SOLUTION 250 ML FLEX CONT: Performed by: SURGERY

## 2023-12-14 PROCEDURE — 85027 COMPLETE CBC AUTOMATED: CPT | Performed by: SURGERY

## 2023-12-14 PROCEDURE — 25010000002 CEFEPIME PER 500 MG: Performed by: SURGERY

## 2023-12-14 PROCEDURE — 25010000002 VANCOMYCIN 750 MG RECONSTITUTED SOLUTION 1 EACH VIAL: Performed by: SURGERY

## 2023-12-14 PROCEDURE — 63710000001 INSULIN GLARGINE PER 5 UNITS: Performed by: INTERNAL MEDICINE

## 2023-12-14 RX ORDER — MIDODRINE HYDROCHLORIDE 5 MG/1
5 TABLET ORAL
Status: DISCONTINUED | OUTPATIENT
Start: 2023-12-14 | End: 2023-12-17

## 2023-12-14 RX ORDER — MIDODRINE HYDROCHLORIDE 5 MG/1
5 TABLET ORAL
Status: DISCONTINUED | OUTPATIENT
Start: 2023-12-14 | End: 2023-12-14

## 2023-12-14 RX ADMIN — CEFEPIME 2000 MG: 2 INJECTION, POWDER, FOR SOLUTION INTRAVENOUS at 10:30

## 2023-12-14 RX ADMIN — INSULIN LISPRO 2 UNITS: 100 INJECTION, SOLUTION INTRAVENOUS; SUBCUTANEOUS at 13:00

## 2023-12-14 RX ADMIN — CEFEPIME 2000 MG: 2 INJECTION, POWDER, FOR SOLUTION INTRAVENOUS at 03:00

## 2023-12-14 RX ADMIN — CETIRIZINE HYDROCHLORIDE 10 MG: 10 TABLET ORAL at 20:20

## 2023-12-14 RX ADMIN — INSULIN GLARGINE 8 UNITS: 100 INJECTION, SOLUTION SUBCUTANEOUS at 10:26

## 2023-12-14 RX ADMIN — MIDODRINE HYDROCHLORIDE 5 MG: 5 TABLET ORAL at 12:59

## 2023-12-14 RX ADMIN — Medication 1 TABLET: at 10:27

## 2023-12-14 RX ADMIN — TIZANIDINE 2 MG: 4 TABLET ORAL at 20:20

## 2023-12-14 RX ADMIN — LEVOTHYROXINE SODIUM 200 MCG: 100 TABLET ORAL at 10:27

## 2023-12-14 RX ADMIN — INSULIN LISPRO 3 UNITS: 100 INJECTION, SOLUTION INTRAVENOUS; SUBCUTANEOUS at 10:29

## 2023-12-14 RX ADMIN — ACETAMINOPHEN 650 MG: 325 TABLET, FILM COATED ORAL at 10:28

## 2023-12-14 RX ADMIN — INSULIN LISPRO 8 UNITS: 100 INJECTION, SOLUTION INTRAVENOUS; SUBCUTANEOUS at 10:29

## 2023-12-14 RX ADMIN — VANCOMYCIN HYDROCHLORIDE 750 MG: 750 INJECTION, POWDER, LYOPHILIZED, FOR SOLUTION INTRAVENOUS at 06:07

## 2023-12-14 RX ADMIN — SODIUM CHLORIDE 250 ML: 900 INJECTION, SOLUTION INTRAVENOUS at 10:24

## 2023-12-14 RX ADMIN — ENOXAPARIN SODIUM 40 MG: 100 INJECTION SUBCUTANEOUS at 10:26

## 2023-12-14 RX ADMIN — ACETAMINOPHEN 650 MG: 325 TABLET, FILM COATED ORAL at 16:20

## 2023-12-14 RX ADMIN — ONDANSETRON 4 MG: 2 INJECTION INTRAMUSCULAR; INTRAVENOUS at 20:20

## 2023-12-14 RX ADMIN — INSULIN LISPRO 8 UNITS: 100 INJECTION, SOLUTION INTRAVENOUS; SUBCUTANEOUS at 18:25

## 2023-12-14 RX ADMIN — ACETAMINOPHEN 650 MG: 325 TABLET, FILM COATED ORAL at 20:20

## 2023-12-14 RX ADMIN — CEFEPIME 2000 MG: 2 INJECTION, POWDER, FOR SOLUTION INTRAVENOUS at 18:26

## 2023-12-14 RX ADMIN — GABAPENTIN 300 MG: 300 CAPSULE ORAL at 20:20

## 2023-12-14 RX ADMIN — MIDODRINE HYDROCHLORIDE 5 MG: 5 TABLET ORAL at 18:25

## 2023-12-14 RX ADMIN — POTASSIUM CHLORIDE AND SODIUM CHLORIDE 100 ML/HR: 900; 150 INJECTION, SOLUTION INTRAVENOUS at 18:25

## 2023-12-14 RX ADMIN — VANCOMYCIN HYDROCHLORIDE 750 MG: 750 INJECTION, POWDER, LYOPHILIZED, FOR SOLUTION INTRAVENOUS at 18:26

## 2023-12-14 RX ADMIN — INSULIN LISPRO 8 UNITS: 100 INJECTION, SOLUTION INTRAVENOUS; SUBCUTANEOUS at 13:00

## 2023-12-14 RX ADMIN — INSULIN LISPRO 2 UNITS: 100 INJECTION, SOLUTION INTRAVENOUS; SUBCUTANEOUS at 18:26

## 2023-12-14 RX ADMIN — ENOXAPARIN SODIUM 40 MG: 100 INJECTION SUBCUTANEOUS at 20:20

## 2023-12-14 NOTE — PROGRESS NOTES
Colorectal & General Surgery  Progress Note    Patient: Vanessa Root  YOB: 1970  MRN: [id number]      Assessment  Vanessa Root is a 53 y.o. female with right thigh and hip wound now postop day 1 from debridement.  Dressing changed by nursing staff this morning.  Reports that tissue appears healthy, no significant bleeding.    Plan  Continue twice daily wet-to-dry dressing changes per nursing staff  Okay to resume anticoagulation tomorrow  MR read pending      Subjective  No acute events overnight.  Resting comfortably this morning.    Objective    Vitals:    12/13/23 2352   BP: 99/70   Pulse: 82   Resp: 18   Temp:    SpO2: 99%       Physical Exam  Constitutional: Well-developed well-nourished, no acute distress  Neck: Supple, trachea midline  Respiratory: No increased work of breathing, Symmetric excursion  Cardiovascular: Well pefursed, no jugular venous distention evident   Abdominal: Soft, non-tender, non-distended  Skin: Warm, dry, no rash on visualized skin surfaces, dressing overlying right hip wound  Psychiatric: Alert and oriented ×3, normal affect     Laboratory Results  I have personally reviewed CBC with WBC 16, hemoglobin 7, platelet 633.  BMP with creatinine 1.0, bicarb 21.    Radiology  MRI read pending         Louis Escamilla MD  Colorectal & General Surgery  [facility]    [address], [address]  [place], [place], [facility]  P: [phone]  F: [phone]

## 2023-12-14 NOTE — PLAN OF CARE
Goal Outcome Evaluation:  Plan of Care Reviewed With: patient        Progress: improving  Outcome Evaluation: pt tolerated surgery and dressing dry and intact to right hip. Pt stated she had relief of right shoulder pain after tylenol and muscle relaxer. Pt wants valium before MRI and hopes to tolerate it.

## 2023-12-14 NOTE — PROGRESS NOTES
[facility] Medicine Services  PROGRESS NOTE    Patient Name: Vanessa Root  : 1970  MRN: [id number]    Date of Admission: 2023  Primary Care Physician: Grace Baumann APRN    Subjective   Subjective     CC:  Follow-up hip infection    Subjective:   Shoulder pain better with muscle spasm medication.  Patient did have some lightheadedness this morning.  It improved with a bolus.     Review of Systems  No current headache or lightheadedness  No current nausea, vomiting, or diarrhea  No current chest pain or palpitations      Objective   Objective     Vital Signs:   Temp:  [97.7 °F (36.5 °C)-98.6 °F (37 °C)] 97.7 °F (36.5 °C)  Heart Rate:  [70-82] 75  Resp:  [18-24] 24  BP: ()/(39-73) 81/63        Physical Exam:  Constitutional:Awake, alert chronically ill-appearing but nontoxic  HENT: NCAT, mucous membranes moist, neck supple  Respiratory: No cough or wheezing, decreased inspiration, nonlabored breathing  Cardiovascular: Pulse rate is normal, normal radial pulses  Gastrointestinal: Obese, soft, nontender, nondistended  Musculoskeletal: Profound chronic lower extremity weakness with substantial debility, substantial bilateral lower extremity weakness, diffuse muscle wasting noted, left hip wound currently with dressing  Psychiatric: Anxious affect, cooperative, conversational  Neurologic: Oriented, no slurred speech or facial droop, follows commands  Skin: Wound as per above, no rashes or jaundice, warm      Results Reviewed:  Results from last 7 days   Lab Units 23   WBC 10*3/mm3 16.47* 18.54* 18.01*   HEMOGLOBIN g/dL 7.2* 7.8* 7.7*   HEMATOCRIT % 23.9* 25.3* 25.3*   PLATELETS 10*3/mm3 633* 639* 635*     Results from last 7 days   Lab Units 23  040233 23  1552   SODIUM mmol/L 137 138 136 134*   POTASSIUM mmol/L 4.1 3.7 3.3* 3.7   CHLORIDE mmol/L 107 106 101 99   CO2 mmol/L 21.0* 22.4 24.0 24.0   BUN mg/dL  11 11 11 11   CREATININE mg/dL 1.06* 1.06* 1.01* 0.77   GLUCOSE mg/dL 241* 165* 174* 181*   CALCIUM mg/dL 7.8* 7.8* 8.1* 8.5*   ALK PHOS U/L  --   --   --  272*   ALT (SGPT) U/L  --   --   --  9   AST (SGOT) U/L  --   --   --  37*     Estimated Creatinine Clearance: 80.3 mL/min (A) (by C-G formula based on SCr of 1.06 mg/dL (H)).    Microbiology Results Abnormal       Procedure Component Value - Date/Time    Tissue / Bone Culture - Surgical Site, Thigh, Right [[id number]] Collected: 12/13/23 0738    Lab Status: Preliminary result Specimen: Surgical Site from Thigh, Right Updated: 12/14/23 0735     Tissue Culture Culture in progress     Gram Stain Rare (1+) WBCs per low power field      No organisms seen    Blood Culture - Blood, Hand, Left [[id number]]  (Normal) Collected: 12/11/23 1644    Lab Status: Preliminary result Specimen: Blood from Hand, Left Updated: 12/13/23 1715     Blood Culture No growth at 2 days    Blood Culture - Blood, Arm, Right [[id number]]  (Normal) Collected: 12/11/23 1644    Lab Status: Preliminary result Specimen: Blood from Arm, Right Updated: 12/13/23 1715     Blood Culture No growth at 2 days    Narrative:      Less than seven (7) mL's of blood was collected.  Insufficient quantity may yield false negative results.            Imaging Results (Last 24 Hours)       Procedure Component Value Units Date/Time    MRI Hip Right With & Without Contrast [[id number]] Collected: 12/14/23 0711     Updated: 12/14/23 0740    Narrative:      MRI RIGHT HIP WITH AND WITHOUT CONTRAST     HISTORY: Chronic open wound; evaluate for abscess and osteomyelitis.  Pelvis CT performed 2 days ago showed an open wound and collection of  air and fluid contiguous with the wound superficial to the greater  trochanter with some gas bubbles extending into the gluteus yash  muscle but no loculated, undrained fluid collection.     TECHNIQUE: MRI of the right hip was performed before and after the  administration of 20 mL of  MultiHance contrast and is correlated with  the CT from a few days ago.     FINDINGS: Marrow signal in the proximal femurs and throughout the pelvis  as well as in the visualized lower lumbar spine is normal with the  exception of degenerative endplate signal change around the L5-S1 disc.  There is no abnormal joint fluid at either hip. No abnormal synovial  enhancement. The SI joints and symphysis pubis are normal as well.     The poorly circumscribed collection of fluid and air bubbles superficial  to the greater trochanter which was seen to communicate with the lateral  right hip open soft tissue wound on CT a few days ago is again observed.  The cavity measures about 6 cm craniocaudad, 4 cm AP and 3 cm  transverse. The air bubble seen in the right gluteus yash muscle on  CT are present again although, as expected, significantly less  conspicuous than on the CT exam. The gluteus yash muscle is edematous  and demonstrates some enhancement but there is no focal fluid  collection.     No intrapelvic lesion is present. There is diffuse third spacing.       Impression:      Open wound over the lateral right hip communicates with an  air and fluid-filled cavity in the soft tissue superficial to the  greater trochanter. Some air bubbles are again observed in the right  gluteus yash muscle posteriorly but are not associated with any focal  fluid collection. There is no evidence of osteomyelitis or septic  arthritis.     This report was finalized on 12/14/2023 7:37 AM by Dr. Crescencio Sepulveda M.D on Workstation: [id number]                   I have reviewed the medications:  Scheduled Meds:acetaminophen, 650 mg, Oral, TID  cefepime, 2,000 mg, Intravenous, Q8H  cetirizine, 10 mg, Oral, Nightly  enoxaparin, 40 mg, Subcutaneous, Q12H  gabapentin, 300 mg, Oral, Q12H  HYDROcodone-acetaminophen, 1 tablet, Oral, Daily  insulin glargine, 8 Units, Subcutaneous, Q12H  insulin lispro, 2-7 Units, Subcutaneous, 4x Daily AC &  at Bedtime  insulin lispro, 8 Units, Subcutaneous, TID With Meals  levothyroxine, 200 mcg, Oral, Daily  midodrine, 5 mg, Oral, TID AC  multivitamin with minerals, 1 tablet, Oral, Daily  senna-docusate sodium, 2 tablet, Oral, BID  tiZANidine, 2 mg, Oral, Q12H  vancomycin, 750 mg, Intravenous, Q12H      Continuous Infusions:lactated ringers, 9 mL/hr, Last Rate: Stopped (12/13/23 0904)  Pharmacy to dose vancomycin,   sodium chloride 0.9 % with KCl 20 mEq, 100 mL/hr, Last Rate: 100 mL/hr (12/13/23 1814)      PRN Meds:.  acetaminophen    senna-docusate sodium **AND** polyethylene glycol **AND** bisacodyl **AND** bisacodyl    cyclobenzaprine    dextrose    dextrose    glucagon (human recombinant)    hydrOXYzine    influenza vaccine    melatonin    ondansetron **OR** ondansetron    Pharmacy to dose vancomycin    [COMPLETED] Insert Peripheral IV **AND** sodium chloride    sodium chloride    Assessment & Plan   Assessment & Plan     Active Hospital Problems    Diagnosis  POA    **Osteomyelitis of right hip [M86.9]  Yes    MRSA (methicillin resistant Staphylococcus aureus) infection [A49.02]  Yes    Chronic anticoagulation for history of PE [Z79.01]  Not Applicable    Pyogenic arthritis of right hip [M00.9]  Yes    History of pulmonary embolism [Z86.711]  Yes    Anemia, chronic disease [D63.8]  Yes    Hypotension [I95.9]  Yes    Severe malnutrition [E43]  Yes    Infected wound [T14.8XXA, L08.9]  Yes    Obesity, Class III, BMI 40-49.9 (morbid obesity) [E66.01]  Yes    History of ischemic stroke [Z86.73]  Not Applicable    Pressure ulcer of other site, stage 4 [L89.894]  Yes    Spinal stenosis of lumbar region with neurogenic claudication [M48.062]  Yes    Necrotizing soft tissue infection [M79.89]  Yes    Immobility syndrome [M62.3]  Yes    Hyperlipidemia [E78.5]  Yes    Type 2 diabetes mellitus with diabetic polyneuropathy [E11.42]  Yes    GERD (gastroesophageal reflux disease) [K21.9]  Yes    Bipolar 1 disorder [F31.9]  Yes     Depression [F32.A]  Yes    Muscle weakness [M62.81]  Yes    Obstructive sleep apnea syndrome [G47.33]  Yes    Hypothyroidism [E03.9]  Yes      Resolved Hospital Problems   No resolved problems to display.        Brief Hospital Course to date:  Vanessa Roto is a 53 y.o. female     Plan today:  Blood pressure soft this morning.  Bolus given.  Start midodrine for blood pressure support with hold parameters added.  Patient is still not receptive to transferring to [facility] for further orthopedic evaluation.  MRI reviewed and there is no clear osteomyelitis.  Findings discussed with the patient.  Infectious disease has discussed recommendations to transfer the patient as well.  We will continue to have these discussions daily.  I spoke to orthopedic surgery over the phone regarding her case.    Continue postoperative wound care.  Supportive care and symptom treatment.    Continue broad-spectrum antibiotics.  Culture growing MRSA.  Following wound culture results.  Intraoperative cultures reportedly sent.    Glucose reviewed and insulin further adjusted today.  Monitor glucose and adjust insulin as needed    Continue home medications for mood as appropriate  Continue gabapentin for chronic pain and neuropathy  Continue thyroid medication.  Bowel regimen ordered.    Anticoagulation transition to prophylactic Lovenox dose tonight.  Hold Eliquis until further determination on surgical plans.  History of PE noted.  Plan to dose prophylactic Lovenox for morbid obesity    Encourage nutrition.  Patient feels very strong she would prefer a regular diet of her heart healthy diet    Scheduled Tylenol and low-dose muscle relaxant for trapezius muscle spasm.  Improved    PT OT for debility.  Physically patient is chronically very low functioning.    Supportive care for chronic visual impairment    Anemia noted which is significant.  No active bleeding noted.  Previous anemia studies this year showed anemia of  chronic disease.    Treatment plan discussed with the patient who is in agreement.  Very complex case.        DVT Prophylaxis: Prophylactic Lovenox      Disposition: Pending clinical course    CODE STATUS:   Code Status and Medical Interventions:   Ordered at: 12/11/23 9858     Code Status (Patient has no pulse and is not breathing):    CPR (Attempt to Resuscitate)     Medical Interventions (Patient has pulse or is breathing):    Full       Marv Saucedo MD  12/14/23

## 2023-12-14 NOTE — PROGRESS NOTES
LOS: 3 days     Chief Complaint: Right hip decubitus ulcer    Interval History: Patient was able to complete MRI.  Remains afebrile.  WBC down to 16.  Tolerating antibiotics.    Vital Signs  Temp:  [98.1 °F (36.7 °C)-98.6 °F (37 °C)] 98.1 °F (36.7 °C)  Heart Rate:  [76-88] 77  Resp:  [18-24] 24  BP: ()/(48-90) 85/60    Physical Exam:  General: In no acute distress  HEENT: Oropharynx clear, moist mucous membranes  Respiratory: Normal work of breathing  Skin: Right hip decubitus wound packed with surgical dressing  Access: Peripheral IV    Antibiotics:  Anti-Infectives (From admission, onward)      Ordered     Dose/Rate Route Frequency Start Stop    12/13/23 1214  vancomycin 750 mg in sodium chloride 0.9 % 250 mL IVPB-VTB        Ordering Provider: Mohan Escamilla MD    750 mg  333.3 mL/hr over 45 Minutes Intravenous Every 12 Hours 12/14/23 0630 12/17/23 1829    12/12/23 0959  cefepime 2000 mg IVPB in 100 ml NS (VTB)        Ordering Provider: Mohan Escamilla MD    2,000 mg  over 4 Hours Intravenous Every 8 Hours 12/12/23 1845 12/19/23 1844    12/12/23 0959  cefepime 2000 mg IVPB in 100 ml NS (VTB)        Ordering Provider: Angel Fernando DO    2,000 mg  over 30 Minutes Intravenous Once 12/12/23 1045 12/12/23 1354    12/11/23 2237  piperacillin-tazobactam (ZOSYN) 4.5 g in iso-osmotic dextrose 100 mL IVPB (premix)        Ordering Provider: Rosa Alvarado MD    4.5 g  over 30 Minutes Intravenous Once 12/11/23 2330 12/11/23 2337    12/11/23 2237  Pharmacy to dose vancomycin        Ordering Provider: Mohan Escamilla MD     Does not apply Continuous PRN 12/11/23 2237 12/16/23 2236    12/11/23 1847  vancomycin 2250 mg/500 mL 0.9% NS IVPB ([facility])        Ordering Provider: Khang Humphrey MD    20 mg/kg × 113 kg  over 135 Minutes Intravenous Once 12/11/23 1903 12/11/23 212             Results Review:     I reviewed the patient's new clinical results.    Lab Results    Component Value Date    WBC 16.47 (H) 12/14/2023    HGB 7.2 (L) 12/14/2023    HCT 23.9 (L) 12/14/2023    MCV 84.2 12/14/2023     (H) 12/14/2023     Lab Results   Component Value Date    GLUCOSE 241 (H) 12/14/2023    BUN 11 12/14/2023    CREATININE 1.06 (H) 12/14/2023    BCR 10.4 12/14/2023    CO2 21.0 (L) 12/14/2023    CALCIUM 7.8 (L) 12/14/2023    ALBUMIN 2.2 (L) 12/11/2023    AST 37 (H) 12/11/2023    ALT 9 12/11/2023       Microbiology:  12/11 blood cultures no growth to date  12/11 right hip wound culture MRSA  12/13 right hip OR cultures in process    Assessment    #Probable right hip osteomyelitis with abscess  #Suspected right hip septic arthritis  #Chronic right hip decubitus ulcer  #Immobility syndrome  #Diabetes complicating the above  #Obesity, BMI 41    Urine cultures remain in process.  Superficial wound cultures growing MRSA.  I discussed the difficult situation with the patient today and reviewed her MRI with her.  MRI without any evidence of osteomyelitis however her decubitus wound tracks to the greater trochanter.  At this point it is only a matter of time before she eventually develops osteomyelitis or worsening infection.     I discussed source control with the patient and that this is always desired when treating infections and that at this point we can treat for skin and soft tissue infection however without more definitive surgical procedures it would only be a matter of time before she has a deeper infection.  I discussed transfer with her for evaluation at [facility] and she is slightly receptive but would like to exhaust her options here first.    For now continue vancomycin goal -600 and cefepime 2 g every 8 hours.  Plan to follow-up outstanding cultures to further guide therapy.    ID will follow.

## 2023-12-14 NOTE — SIGNIFICANT NOTE
"   12/14/23 8222   OTHER   Discipline physical therapist   Rehab Time/Intention   Session Not Performed other (see comments)  (PT eval order received, discussed with patient who reports she is dep for all mobility at baseline, has not stood \"in years\", uses lift and w/c. HHPT to resume \"once everything resolved with my hip\". Acute PT will sign off)       "

## 2023-12-14 NOTE — PLAN OF CARE
Goal Outcome Evaluation:  Plan of Care Reviewed With: patient         Right hip dressing change done, tissue of wound looks healthy, No acute distress noted , safety maintained, continue plan of care

## 2023-12-14 NOTE — SIGNIFICANT NOTE
12/14/23 1518   OTHER   Discipline occupational therapist   Rehab Time/Intention   Session Not Performed other (see comments);patient/family declined evaluation  (Spoke with pt who stated she does not feel she needs OT at this time, that she has appropriate assist for most ADLs at home already. Pt at functional baseline and OT to sign off.)

## 2023-12-15 ENCOUNTER — APPOINTMENT (OUTPATIENT)
Dept: GENERAL RADIOLOGY | Facility: HOSPITAL | Age: 53
End: 2023-12-15
Payer: MEDICARE

## 2023-12-15 LAB
ANION GAP SERPL CALCULATED.3IONS-SCNC: 8.2 MMOL/L (ref 5–15)
BACTERIA SPEC AEROBE CULT: ABNORMAL
BACTERIA SPEC AEROBE CULT: ABNORMAL
BUN SERPL-MCNC: 9 MG/DL (ref 6–20)
BUN/CREAT SERPL: 10.7 (ref 7–25)
CALCIUM SPEC-SCNC: 8.3 MG/DL (ref 8.6–10.5)
CHLORIDE SERPL-SCNC: 108 MMOL/L (ref 98–107)
CO2 SERPL-SCNC: 21.8 MMOL/L (ref 22–29)
CREAT SERPL-MCNC: 0.84 MG/DL (ref 0.57–1)
DEPRECATED RDW RBC AUTO: 39.9 FL (ref 37–54)
EGFRCR SERPLBLD CKD-EPI 2021: 84.3 ML/MIN/1.73
ERYTHROCYTE [DISTWIDTH] IN BLOOD BY AUTOMATED COUNT: 13.1 % (ref 12.3–15.4)
GLUCOSE BLDC GLUCOMTR-MCNC: 131 MG/DL (ref 70–130)
GLUCOSE BLDC GLUCOMTR-MCNC: 136 MG/DL (ref 70–130)
GLUCOSE BLDC GLUCOMTR-MCNC: 169 MG/DL (ref 70–130)
GLUCOSE BLDC GLUCOMTR-MCNC: 95 MG/DL (ref 70–130)
GLUCOSE SERPL-MCNC: 104 MG/DL (ref 65–99)
GRAM STN SPEC: ABNORMAL
HCT VFR BLD AUTO: 25.9 % (ref 34–46.6)
HGB BLD-MCNC: 8.1 G/DL (ref 12–15.9)
MCH RBC QN AUTO: 26.2 PG (ref 26.6–33)
MCHC RBC AUTO-ENTMCNC: 31.3 G/DL (ref 31.5–35.7)
MCV RBC AUTO: 83.8 FL (ref 79–97)
PLATELET # BLD AUTO: 749 10*3/MM3 (ref 140–450)
PMV BLD AUTO: 9.2 FL (ref 6–12)
POTASSIUM SERPL-SCNC: 4 MMOL/L (ref 3.5–5.2)
RBC # BLD AUTO: 3.09 10*6/MM3 (ref 3.77–5.28)
SODIUM SERPL-SCNC: 138 MMOL/L (ref 136–145)
VANCOMYCIN TROUGH SERPL-MCNC: 19.4 MCG/ML (ref 5–20)
WBC NRBC COR # BLD AUTO: 16.68 10*3/MM3 (ref 3.4–10.8)

## 2023-12-15 PROCEDURE — 80048 BASIC METABOLIC PNL TOTAL CA: CPT | Performed by: SURGERY

## 2023-12-15 PROCEDURE — 25810000003 SODIUM CHLORIDE 0.9 % SOLUTION 250 ML FLEX CONT: Performed by: STUDENT IN AN ORGANIZED HEALTH CARE EDUCATION/TRAINING PROGRAM

## 2023-12-15 PROCEDURE — 63710000001 INSULIN GLARGINE PER 5 UNITS: Performed by: INTERNAL MEDICINE

## 2023-12-15 PROCEDURE — 25010000002 VANCOMYCIN 750 MG RECONSTITUTED SOLUTION 1 EACH VIAL: Performed by: STUDENT IN AN ORGANIZED HEALTH CARE EDUCATION/TRAINING PROGRAM

## 2023-12-15 PROCEDURE — 25010000002 ENOXAPARIN PER 10 MG: Performed by: INTERNAL MEDICINE

## 2023-12-15 PROCEDURE — 82948 REAGENT STRIP/BLOOD GLUCOSE: CPT

## 2023-12-15 PROCEDURE — 63710000001 INSULIN LISPRO (HUMAN) PER 5 UNITS: Performed by: SURGERY

## 2023-12-15 PROCEDURE — 25010000002 CEFEPIME PER 500 MG: Performed by: SURGERY

## 2023-12-15 PROCEDURE — 99232 SBSQ HOSP IP/OBS MODERATE 35: CPT | Performed by: STUDENT IN AN ORGANIZED HEALTH CARE EDUCATION/TRAINING PROGRAM

## 2023-12-15 PROCEDURE — 80202 ASSAY OF VANCOMYCIN: CPT | Performed by: SURGERY

## 2023-12-15 PROCEDURE — 73030 X-RAY EXAM OF SHOULDER: CPT

## 2023-12-15 PROCEDURE — 85027 COMPLETE CBC AUTOMATED: CPT | Performed by: SURGERY

## 2023-12-15 RX ORDER — DEXTROSE MONOHYDRATE 25 G/50ML
25 INJECTION, SOLUTION INTRAVENOUS
Status: DISCONTINUED | OUTPATIENT
Start: 2023-12-15 | End: 2023-12-21 | Stop reason: HOSPADM

## 2023-12-15 RX ORDER — VANCOMYCIN/0.9 % SOD CHLORIDE 1.5G/250ML
1500 PLASTIC BAG, INJECTION (ML) INTRAVENOUS EVERY 24 HOURS
Status: DISCONTINUED | OUTPATIENT
Start: 2023-12-15 | End: 2023-12-15

## 2023-12-15 RX ORDER — INSULIN LISPRO 100 [IU]/ML
2-9 INJECTION, SOLUTION INTRAVENOUS; SUBCUTANEOUS
Status: DISCONTINUED | OUTPATIENT
Start: 2023-12-15 | End: 2023-12-21 | Stop reason: HOSPADM

## 2023-12-15 RX ORDER — IBUPROFEN 600 MG/1
1 TABLET ORAL
Status: DISCONTINUED | OUTPATIENT
Start: 2023-12-15 | End: 2023-12-21 | Stop reason: HOSPADM

## 2023-12-15 RX ORDER — NICOTINE POLACRILEX 4 MG
15 LOZENGE BUCCAL
Status: DISCONTINUED | OUTPATIENT
Start: 2023-12-15 | End: 2023-12-21 | Stop reason: HOSPADM

## 2023-12-15 RX ADMIN — MIDODRINE HYDROCHLORIDE 5 MG: 5 TABLET ORAL at 20:53

## 2023-12-15 RX ADMIN — ENOXAPARIN SODIUM 40 MG: 100 INJECTION SUBCUTANEOUS at 20:52

## 2023-12-15 RX ADMIN — VANCOMYCIN HYDROCHLORIDE 750 MG: 750 INJECTION, POWDER, LYOPHILIZED, FOR SOLUTION INTRAVENOUS at 12:54

## 2023-12-15 RX ADMIN — ACETAMINOPHEN 650 MG: 325 TABLET, FILM COATED ORAL at 20:53

## 2023-12-15 RX ADMIN — INSULIN GLARGINE 10 UNITS: 100 INJECTION, SOLUTION SUBCUTANEOUS at 10:26

## 2023-12-15 RX ADMIN — CEFEPIME 2000 MG: 2 INJECTION, POWDER, FOR SOLUTION INTRAVENOUS at 02:16

## 2023-12-15 RX ADMIN — INSULIN LISPRO 8 UNITS: 100 INJECTION, SOLUTION INTRAVENOUS; SUBCUTANEOUS at 12:48

## 2023-12-15 RX ADMIN — CEFEPIME 2000 MG: 2 INJECTION, POWDER, FOR SOLUTION INTRAVENOUS at 10:32

## 2023-12-15 RX ADMIN — GABAPENTIN 300 MG: 300 CAPSULE ORAL at 10:28

## 2023-12-15 RX ADMIN — HYDROXYZINE HYDROCHLORIDE 50 MG: 25 TABLET ORAL at 22:47

## 2023-12-15 RX ADMIN — INSULIN LISPRO 8 UNITS: 100 INJECTION, SOLUTION INTRAVENOUS; SUBCUTANEOUS at 10:31

## 2023-12-15 RX ADMIN — GABAPENTIN 300 MG: 300 CAPSULE ORAL at 20:54

## 2023-12-15 RX ADMIN — TIZANIDINE 2 MG: 4 TABLET ORAL at 10:27

## 2023-12-15 RX ADMIN — CYCLOBENZAPRINE 5 MG: 10 TABLET, FILM COATED ORAL at 22:47

## 2023-12-15 RX ADMIN — INSULIN LISPRO 8 UNITS: 100 INJECTION, SOLUTION INTRAVENOUS; SUBCUTANEOUS at 17:52

## 2023-12-15 RX ADMIN — MIDODRINE HYDROCHLORIDE 5 MG: 5 TABLET ORAL at 10:28

## 2023-12-15 RX ADMIN — INSULIN LISPRO 2 UNITS: 100 INJECTION, SOLUTION INTRAVENOUS; SUBCUTANEOUS at 12:48

## 2023-12-15 RX ADMIN — MIDODRINE HYDROCHLORIDE 5 MG: 5 TABLET ORAL at 12:41

## 2023-12-15 RX ADMIN — TIZANIDINE 2 MG: 4 TABLET ORAL at 20:53

## 2023-12-15 RX ADMIN — INSULIN GLARGINE 10 UNITS: 100 INJECTION, SOLUTION SUBCUTANEOUS at 20:53

## 2023-12-15 RX ADMIN — ACETAMINOPHEN 650 MG: 325 TABLET, FILM COATED ORAL at 17:52

## 2023-12-15 RX ADMIN — ENOXAPARIN SODIUM 40 MG: 100 INJECTION SUBCUTANEOUS at 10:26

## 2023-12-15 RX ADMIN — CETIRIZINE HYDROCHLORIDE 10 MG: 10 TABLET ORAL at 20:53

## 2023-12-15 RX ADMIN — Medication 1 TABLET: at 10:27

## 2023-12-15 RX ADMIN — ACETAMINOPHEN 650 MG: 325 TABLET, FILM COATED ORAL at 10:26

## 2023-12-15 RX ADMIN — HYDROCODONE BITARTRATE AND ACETAMINOPHEN 1 TABLET: 5; 325 TABLET ORAL at 10:27

## 2023-12-15 RX ADMIN — LEVOTHYROXINE SODIUM 200 MCG: 100 TABLET ORAL at 10:27

## 2023-12-15 NOTE — PLAN OF CARE
Goal Outcome Evaluation:  Plan of Care Reviewed With: patient        Progress: no change  Outcome Evaluation: Patient admitted on 12/11 for osteomyelitis of right hip. Low blood pressure at the start of shift (see flowsheets), 250cc bolus given and PO midodrine added. Pain medication and muscle relaxer  held. IV fluids and antibotics contineud. +sepsis, MD aware. Blood sugars monitored. Maintaining oxygen saturation on 2L-NC. Wound positive  for MRSA, placed in contact isolation. Hip dressing changed. Patient expresses no other needs at this time. Call light within reach.

## 2023-12-15 NOTE — PLAN OF CARE
Goal Outcome Evaluation:  Plan of Care Reviewed With: patient                   No acute distress noted this shift, CPAP worn most of this shift, safety maintained, continue plan of care

## 2023-12-15 NOTE — PROGRESS NOTES
LOS: 4 days     Chief Complaint: Right hip decubitus ulcer    Interval History: Patient remains afebrile with stable WBC of 16.  Tolerating antibiotics.    Vital Signs  Temp:  [97.7 °F (36.5 °C)-98.2 °F (36.8 °C)] 98.2 °F (36.8 °C)  Heart Rate:  [67-80] 70  Resp:  [20] 20  BP: ()/(58-82) 122/59    Physical Exam:  General: In no acute distress  HEENT: Oropharynx clear, moist mucous membranes  Respiratory: Normal work of breathing  Skin: Right hip decubitus wound packed with surgical dressing  Access: Peripheral IV    Antibiotics:  Anti-Infectives (From admission, onward)      Ordered     Dose/Rate Route Frequency Start Stop    12/13/23 1214  vancomycin 750 mg in sodium chloride 0.9 % 250 mL IVPB-VTB        Ordering Provider: Mohan Escamilla MD    750 mg  333.3 mL/hr over 45 Minutes Intravenous Every 12 Hours 12/14/23 0630 12/17/23 1829    12/12/23 0959  cefepime 2000 mg IVPB in 100 ml NS (VTB)        Ordering Provider: Mohan Escamilla MD    2,000 mg  over 4 Hours Intravenous Every 8 Hours 12/12/23 1845 12/19/23 1844    12/12/23 0959  cefepime 2000 mg IVPB in 100 ml NS (VTB)        Ordering Provider: Angel Fernando DO    2,000 mg  over 30 Minutes Intravenous Once 12/12/23 1045 12/12/23 1354    12/11/23 2237  piperacillin-tazobactam (ZOSYN) 4.5 g in iso-osmotic dextrose 100 mL IVPB (premix)        Ordering Provider: Rosa Alvarado MD    4.5 g  over 30 Minutes Intravenous Once 12/11/23 2330 12/11/23 2337    12/11/23 2237  Pharmacy to dose vancomycin        Ordering Provider: Angel Fernando DO     Does not apply Continuous PRN 12/11/23 2237 12/18/23 2236    12/11/23 1847  vancomycin 2250 mg/500 mL 0.9% NS IVPB ([facility])        Ordering Provider: Khang Humphrey MD    20 mg/kg × 113 kg  over 135 Minutes Intravenous Once 12/11/23 1903 12/11/23 212             Results Review:     I reviewed the patient's new clinical results.    Lab Results   Component Value Date    WBC  16.68 (H) 12/15/2023    HGB 8.1 (L) 12/15/2023    HCT 25.9 (L) 12/15/2023    MCV 83.8 12/15/2023     (H) 12/15/2023     Lab Results   Component Value Date    GLUCOSE 104 (H) 12/15/2023    BUN 9 12/15/2023    CREATININE 0.84 12/15/2023    BCR 10.7 12/15/2023    CO2 21.8 (L) 12/15/2023    CALCIUM 8.3 (L) 12/15/2023    ALBUMIN 2.2 (L) 12/11/2023    AST 37 (H) 12/11/2023    ALT 9 12/11/2023       Microbiology:  12/11 blood cultures no growth to date  12/11 right hip wound culture MRSA  12/13 right hip OR cultures MRSA    Assessment    #Probable right hip osteomyelitis with abscess  #Suspected right hip septic arthritis  #Chronic right hip decubitus ulcer  #Immobility syndrome  #Diabetes complicating the above  #Obesity, BMI 41    Both cultures growing MRSA at this time and will plan to de-escalate to monotherapy with vancomycin goal -600.  Stop cefepime today.  Leukocytosis remained stable and unfortunately I think this reflects a lack of source control due to her deeper tracking of the infection.  Unfortunately I think antibiotics alone are going to be insufficient and she will require further debridement.  I again discussed transfer with her today and she would be willing to consider it if no further options here.    ID will follow.

## 2023-12-15 NOTE — PROGRESS NOTES
"[facility] Clinical Pharmacy Services: Vancomycin Monitoring Note    Vanessa Root is a 53 y.o. female who is on day 4/7 of pharmacy to dose vancomycin for probable R hip osteomyelitis w/ abscess.    Previous Vancomycin Dose: 750 mg IV every 12 hours    Updated Cultures and Sensitivities:   12/11: BCx-NGTD  12/11: R Hip WCx-MRSA (2+)  12/13: Surgical site R thigh Cx-MRSA (rare)    Results from last 7 days   Lab Units 12/15/23  0601 12/13/23  1002   VANCOMYCIN TR mcg/mL 19.40 20.80*     Vitals/Labs  Ht: 165.1 cm (65\"); Wt: 115 kg (254 lb 3.1 oz)   Temp Readings from Last 1 Encounters:   12/15/23 100.4 °F (38 °C) (Oral)     Estimated Creatinine Clearance: 100.3 mL/min (by C-G formula based on SCr of 0.84 mg/dL).     Results from last 7 days   Lab Units 12/15/23  0601 12/14/23  0400 12/13/23  0348   CREATININE mg/dL 0.84 1.06* 1.06*   WBC 10*3/mm3 16.68* 16.47* 18.54*     Assessment/Plan    Level came back within goal range at 19.4 mcg.mL, with a corresponding AUC of 486 mg/L.hr. Will continue current dosing.   Next Level Date and Time: Vanc Trough is scheduled for Sunday afternoon 12/17 at 1200.  We will continue to monitor patient changes and renal function. SCr is at baseline (0.84). Will order BMP for the days that the patient is on vancomycin.     Thank you for involving pharmacy in this patient's care. Please contact pharmacy with any questions or concerns.       Leydi Montez, Pharm.D., [facility]   Clinical Pharmacist  Phone Extension #[phone]  "

## 2023-12-15 NOTE — PROGRESS NOTES
"[facility] Clinical Pharmacy Services: Vancomycin Monitoring Note    Vanessa Root is a 53 y.o. female who is on day 4/5 of pharmacy to dose vancomycin for Bone and/or Joint Infection and Skin and Soft Tissue.    Vancomycin Dose: 750 mg IV every 12 hours  Updated Cultures and Sensitivities:   12/11 WCx (prelim): GPB and GPC  Results from last 7 days   Lab Units 12/15/23  0601 12/13/23  1002   VANCOMYCIN TR mcg/mL 19.40 20.80*     Vitals/Labs  Ht: 165.1 cm (65\"); Wt: 115 kg (254 lb 3.1 oz)   Temp Readings from Last 1 Encounters:   12/15/23 98.2 °F (36.8 °C) (Oral)     Estimated Creatinine Clearance: 100.3 mL/min (by C-G formula based on SCr of 0.84 mg/dL).   Results from last 7 days   Lab Units 12/15/23  0601 12/14/23  0400 12/13/23  0348   CREATININE mg/dL 0.84 1.06* 1.06*   WBC 10*3/mm3 16.68* 16.47* 18.54*     Assessment/Plan       Vancomycin Dose: 750 mg IV every 12 hours; provides a predicted  mg/L.hr ;     Next Level Date and Time: to be determined  We will continue to monitor patient changes and renal function     Thank you for involving pharmacy in this patient's care. Please contact pharmacy with any questions or concerns.       Nayeli Jay, Pharm.D., [facility]  Clinical Pharmacist  12/15/2023  09:37 EST          "

## 2023-12-15 NOTE — CASE MANAGEMENT/SOCIAL WORK
Continued Stay Note  [facility]     Patient Name: Vanessa Root  MRN: [id number]  Today's Date: 12/15/2023    Admit Date: 12/11/2023    Plan: Home with home health, will need ambulance/stretcher transport   Discharge Plan       Row Name 12/15/23 1400       Plan    Plan Home with home health, will need ambulance/stretcher transport    Plan Comments Met briefly with pt at bedside. Confirmed with pt that she has no discharge needs should she be discharged home over the weekend. Pt will need ambulance/stretcher transport.                   Discharge Codes    No documentation.                 Expected Discharge Date and Time       Expected Discharge Date Expected Discharge Time    Dec 18, 2023               Amanda Randle RN

## 2023-12-15 NOTE — PROGRESS NOTES
"Nutrition Services    Patient Name:  Vanessa Root  YOB: 1970  MRN: [id number]  Admit Date:  2023    Assessment Date:  12/15/23    Summary: Nutrition Follow-up     At attempted visit today patient sleeping on CPAP machine. Did not attempt to wake. Patient assessed at last visit and met criteria for severe malnutrition, though pt declines ONS or Logan for wound healing. PO intakes are good at % of meals. Will continue to monitor.     REC:  Monitor wt and PO intake    RD to follow per protocol    CLINICAL NUTRITION ASSESSMENT      Reason for Assessment MST score 2+, Pressure Injury and/or Non-Healing Wound     Diagnosis/Problem   Osteomyelitis, open wound of right hip, T2DM.    Medical/Surgical History Past Medical History:   Diagnosis Date    Arthritis     Bipolar 1 disorder     Cancer     uterine    COVID-19     Depression     Diabetes mellitus     Frequent UTI     GERD (gastroesophageal reflux disease)     Hyperlipidemia     Migraine     Murmur, heart     Ovarian cyst     PONV (postoperative nausea and vomiting)     Stroke        Past Surgical History:   Procedure Laterality Date     SECTION  2003    HYSTERECTOMY      INCISION AND DRAINAGE LEG Right 2023    Procedure: Debridement of right thigh wound;  Surgeon: Mohan Escamilla MD;  Location: [facility]  Service: General;  Laterality: Right;    INCISION AND DRAINAGE LEG Right 2023    Procedure: Debridement of right lateral thigh wound;  Surgeon: Mohan Escamilla MD;  Location: [facility]  Service: General;  Laterality: Right;    TONSILLECTOMY      WOUND DEBRIDEMENT N/A 2023    Procedure: Debridement necrotizing tissue  right buttock wound;  Surgeon: Elizabeth Adame MD;  Location: [facility]  Service: General;  Laterality: N/A;        Anthropometrics        Current Height  Current Weight  BMI kg/m2 Height: 165.1 cm (65\")  Weight: 115 kg (254 lb 3.1 oz) (12/15/23 " 0658)  Body mass index is 42.3 kg/m².   Adjusted BMI (if applicable)    BMI Category Obese, Class III (40 or higher)   Ideal Body Weight (IBW) 125#   Usual Body Weight (UBW) 274#- - 284#   Weight Trend Loss PTA  Wt stable since last review    Weight History Wt Readings from Last 30 Encounters:   12/15/23 0658 115 kg (254 lb 3.1 oz)   12/14/23 0509 117 kg (257 lb 0.9 oz)   12/13/23 0550 118 kg (259 lb 8 oz)   12/12/23 0338 113 kg (249 lb 12.5 oz)   12/11/23 1555 113 kg (250 lb)   12/13/23 2118 117 kg (259 lb)   11/08/23 1750 113 kg (250 lb)   08/26/23 0505 113 kg (248 lb 3.8 oz)   08/25/23 1935 113 kg (250 lb 3.6 oz)   08/25/23 1031 118 kg (260 lb)   06/06/23 0439 129 kg (284 lb 2.8 oz)   06/05/23 0500 129 kg (284 lb 9.6 oz)   06/04/23 0607 125 kg (274 lb 11.2 oz)   06/03/23 0617 126 kg (278 lb 3.2 oz)   06/02/23 0516 132 kg (291 lb 14.4 oz)   06/01/23 0112 127 kg (279 lb 3.2 oz)   05/31/23 0529 127 kg (280 lb 3.3 oz)   05/30/23 0500 128 kg (281 lb 9.6 oz)   05/29/23 0510 (!) 139 kg (306 lb 6.4 oz)   05/28/23 2144 (!) 138 kg (305 lb)   05/28/23 0542 (!) 139 kg (305 lb 12.5 oz)   05/27/23 0530 132 kg (291 lb 0.1 oz)   05/25/23 0338 133 kg (292 lb 15.9 oz)   11/29/21 1405 113 kg (249 lb)      --  Labs       Pertinent Labs    Results from last 7 days   Lab Units 12/15/23  0601 12/14/23  0400 12/13/23  0348 12/12/23  0353 12/11/23  1552   SODIUM mmol/L 138 137 138   < > 134*   POTASSIUM mmol/L 4.0 4.1 3.7   < > 3.7   CHLORIDE mmol/L 108* 107 106   < > 99   CO2 mmol/L 21.8* 21.0* 22.4   < > 24.0   BUN mg/dL 9 11 11   < > 11   CREATININE mg/dL 0.84 1.06* 1.06*   < > 0.77   CALCIUM mg/dL 8.3* 7.8* 7.8*   < > 8.5*   BILIRUBIN mg/dL  --   --   --   --  0.4   ALK PHOS U/L  --   --   --   --  272*   ALT (SGPT) U/L  --   --   --   --  9   AST (SGOT) U/L  --   --   --   --  37*   GLUCOSE mg/dL 104* 241* 165*   < > 181*    < > = values in this interval not displayed.     Results from last 7 days   Lab Units 12/15/23  0601  "12/12/23  0353 12/11/23  1552   HEMOGLOBIN g/dL 8.1*   < > 7.9*   HEMATOCRIT % 25.9*   < > 25.7*   WBC 10*3/mm3 16.68*   < > 18.25*   ALBUMIN g/dL  --   --  2.2*    < > = values in this interval not displayed.     Results from last 7 days   Lab Units 12/15/23  0601 12/14/23  0400 12/13/23  0348 12/12/23  0353 12/11/23  1552   PLATELETS 10*3/mm3 749* 633* 639* 635* 548*     No results found for: \"COVID19\"  Lab Results   Component Value Date    HGBA1C 8.90 (H) 11/09/2023          Medications           Scheduled Medications acetaminophen, 650 mg, Oral, TID  cefepime, 2,000 mg, Intravenous, Q8H  cetirizine, 10 mg, Oral, Nightly  enoxaparin, 40 mg, Subcutaneous, Q12H  gabapentin, 300 mg, Oral, Q12H  HYDROcodone-acetaminophen, 1 tablet, Oral, Daily  insulin glargine, 10 Units, Subcutaneous, Q12H  insulin lispro, 2-7 Units, Subcutaneous, 4x Daily AC & at Bedtime  insulin lispro, 8 Units, Subcutaneous, TID With Meals  levothyroxine, 200 mcg, Oral, Daily  midodrine, 5 mg, Oral, TID AC  multivitamin with minerals, 1 tablet, Oral, Daily  senna-docusate sodium, 2 tablet, Oral, BID  tiZANidine, 2 mg, Oral, Q12H  vancomycin, 750 mg, Intravenous, Q12H       Infusions lactated ringers, 9 mL/hr, Last Rate: Stopped (12/13/23 0904)  Pharmacy to dose vancomycin,   sodium chloride 0.9 % with KCl 20 mEq, 100 mL/hr, Last Rate: 100 mL/hr (12/14/23 1825)       PRN Medications   acetaminophen    senna-docusate sodium **AND** polyethylene glycol **AND** bisacodyl **AND** bisacodyl    cyclobenzaprine    dextrose    dextrose    glucagon (human recombinant)    hydrOXYzine    influenza vaccine    melatonin    ondansetron **OR** ondansetron    Pharmacy to dose vancomycin    [COMPLETED] Insert Peripheral IV **AND** sodium chloride    sodium chloride     Physical Findings          General Findings alert, obese, oriented   Oral/Mouth Cavity WDL, tooth or teeth missing   Edema  no edema   Gastrointestinal nausea, non-distended , last bowel movement: " 12/11   Skin  pressure injury: 2 right posterior thigh, 3 right lateral hip soft tissues necrosis   Tubes/Drains/Lines none   NFPE See Malnutrition Severity Assessment from previous note 12/12/23     Malnutrition Severity Assessment      Patient meets criteria for : Severe Malnutrition           Current Nutrition Orders & Evaluation of Intake       Oral Nutrition     Food Allergies Other: green pepper - hives   Current PO Diet Diet: Regular/House Diet; Texture: Regular Texture (IDDSI 7); Fluid Consistency: Thin (IDDSI 0)   Supplement n/a   PO Evaluation     % PO Intake Mostly %     Factors Affecting Intake: No factors at this time   --  PES STATEMENT / NUTRITION DIAGNOSIS      Nutrition Dx Problem  Problem: Malnutrition (severe)  Etiology: Factors Affecting Nutrition - pain and meds (abx)    Signs/Symptoms: Report of Minimal PO Intake and Unintended Weight Change     NUTRITION INTERVENTION / PLAN OF CARE      Intervention Goal(s) Maintain nutrition status, Maintain intake, and Appropriate weight loss         RD Intervention/Action Supplement offered/declined, Encourage intake, Continue to monitor, and Care plan reviewed   --      Prescription/Orders:       PO Diet Regular, thin      Supplements Pt declined      Enteral Nutrition N/a      Parenteral Nutrition N/a   New Prescription Ordered? No changes at this time   --      Monitor/Evaluation Per protocol, PO intake, Pertinent labs, Weight, Skin status, Symptoms   Discharge Plan/Needs No discharge needs identified at this time, Pending clinical course   --    RD to follow per protocol.      Electronically signed by:  Loreta Gordon RD  12/15/23 09:45 EST

## 2023-12-15 NOTE — PROGRESS NOTES
[facility] Medicine Services  PROGRESS NOTE    Patient Name: Vanessa Root  : 1970  MRN: [id number]    Date of Admission: 2023  Primary Care Physician: Grace Baumann APRN    Subjective   Subjective     CC:  Follow-up hip infection    Subjective:   Patient still having some shoulder tenderness and requesting an x-ray.  She tells me this morning at this point she [facility] not [facility] and wants nons[facility] treatment.  I encouraged her to continue to think about these options.    Review of Systems  No current headache or lightheadedness  No current nausea, vomiting, or diarrhea  No current chest pain or palpitations      Objective   Objective     Vital Signs:   Temp:  [98.1 °F (36.7 °C)-100.4 °F (38 °C)] 100.4 °F (38 °C)  Heart Rate:  [67-79] 69  Resp:  [18-20] 18  BP: ()/(59-82) 126/71        Physical Exam:  Constitutional:Awake, alert chronically ill-appearing but nontoxic  HENT: NCAT, mucous membranes moist, neck supple  Respiratory: No cough or wheezing, decreased inspiration, nonlabored breathing  Cardiovascular: Pulse rate is normal, normal radial pulses  Gastrointestinal: Obese, soft, nontender, nondistended  Musculoskeletal: Profound chronic lower extremity weakness with substantial debility, substantial bilateral lower extremity weakness, diffuse muscle wasting noted, left hip wound currently with dressing, some spasming muscle on the left shoulder in the trapezius area, BMI is 42  Psychiatric: Anxious affect, cooperative, conversational  Neurologic: Oriented, no slurred speech or facial droop, follows commands  Skin: Wound as per above, no rashes or jaundice, warm      Results Reviewed:  Results from last 7 days   Lab Units 12/15/23  0601 23  0400 23  0348   WBC 10*3/mm3 16.68* 16.47* 18.54*   HEMOGLOBIN g/dL 8.1* 7.2* 7.8*   HEMATOCRIT % 25.9* 23.9* 25.3*   PLATELETS 10*3/mm3 749* 633* 639*     Results from last 7 days   Lab Units 12/15/23  0601  12/14/23  0400 12/13/23  0348 12/12/23  0353 12/11/23  1552   SODIUM mmol/L 138 137 138   < > 134*   POTASSIUM mmol/L 4.0 4.1 3.7   < > 3.7   CHLORIDE mmol/L 108* 107 106   < > 99   CO2 mmol/L 21.8* 21.0* 22.4   < > 24.0   BUN mg/dL 9 11 11   < > 11   CREATININE mg/dL 0.84 1.06* 1.06*   < > 0.77   GLUCOSE mg/dL 104* 241* 165*   < > 181*   CALCIUM mg/dL 8.3* 7.8* 7.8*   < > 8.5*   ALK PHOS U/L  --   --   --   --  272*   ALT (SGPT) U/L  --   --   --   --  9   AST (SGOT) U/L  --   --   --   --  37*    < > = values in this interval not displayed.     Estimated Creatinine Clearance: 100.3 mL/min (by C-G formula based on SCr of 0.84 mg/dL).    Microbiology Results Abnormal       Procedure Component Value - Date/Time    Blood Culture - Blood, Arm, Right [[id number]]  (Normal) Collected: 12/11/23 1644    Lab Status: Preliminary result Specimen: Blood from Arm, Right Updated: 12/14/23 1715     Blood Culture No growth at 3 days    Narrative:      Less than seven (7) mL's of blood was collected.  Insufficient quantity may yield false negative results.    Blood Culture - Blood, Hand, Left [[id number]]  (Normal) Collected: 12/11/23 1644    Lab Status: Preliminary result Specimen: Blood from Hand, Left Updated: 12/14/23 1715     Blood Culture No growth at 3 days            Imaging Results (Last 24 Hours)       Procedure Component Value Units Date/Time    XR Shoulder 2+ View Left [[id number]] Collected: 12/15/23 1201     Updated: 12/15/23 1204    Narrative:      XR SHOULDER 2+ VW LEFT-     Clinical: Left shoulder pain, fell     FINDINGS: There is glenohumeral joint narrowing. The acromioclavicular  joint is satisfactory in appearance. Satisfactory internal and external  rotation, no fracture or dislocation seen. Adjacent ribs and overlying  soft tissues have a satisfactory appearance. The remainder is  unremarkable.     This report was finalized on 12/15/2023 12:01 PM by Dr. Rom Reyes M.D on Workstation: [employer]                    I have reviewed the medications:  Scheduled Meds:acetaminophen, 650 mg, Oral, TID  cetirizine, 10 mg, Oral, Nightly  enoxaparin, 40 mg, Subcutaneous, Q12H  gabapentin, 300 mg, Oral, Q12H  HYDROcodone-acetaminophen, 1 tablet, Oral, Daily  insulin glargine, 10 Units, Subcutaneous, Q12H  insulin lispro, 2-7 Units, Subcutaneous, 4x Daily AC & at Bedtime  insulin lispro, 8 Units, Subcutaneous, TID With Meals  levothyroxine, 200 mcg, Oral, Daily  midodrine, 5 mg, Oral, TID AC  multivitamin with minerals, 1 tablet, Oral, Daily  senna-docusate sodium, 2 tablet, Oral, BID  tiZANidine, 2 mg, Oral, Q12H  [START ON 12/16/2023] vancomycin, 750 mg, Intravenous, Q12H      Continuous Infusions:lactated ringers, 9 mL/hr, Last Rate: Stopped (12/13/23 0904)  Pharmacy to dose vancomycin,       PRN Meds:.  acetaminophen    senna-docusate sodium **AND** polyethylene glycol **AND** bisacodyl **AND** bisacodyl    cyclobenzaprine    dextrose    dextrose    glucagon (human recombinant)    hydrOXYzine    influenza vaccine    melatonin    ondansetron **OR** ondansetron    Pharmacy to dose vancomycin    [COMPLETED] Insert Peripheral IV **AND** sodium chloride    sodium chloride    Assessment & Plan   Assessment & Plan     Active Hospital Problems    Diagnosis  POA    **Necrotizing soft tissue infection [M79.89]  Yes    MRSA (methicillin resistant Staphylococcus aureus) infection [A49.02]  Yes    Chronic anticoagulation for history of PE [Z79.01]  Not Applicable    Pyogenic arthritis of right hip [M00.9]  Yes    History of pulmonary embolism [Z86.711]  Yes    Anemia, chronic disease [D63.8]  Yes    Hypotension [I95.9]  Yes    Severe malnutrition [E43]  Yes    Infected wound [T14.8XXA, L08.9]  Yes    Obesity, Class III, BMI 40-49.9 (morbid obesity) [E66.01]  Yes    History of ischemic stroke [Z86.73]  Not Applicable    Pressure ulcer of other site, stage 4 [L89.894]  Yes    Spinal stenosis of lumbar region with neurogenic claudication  [M48.062]  Yes    Immobility syndrome [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        Brief Hospital Course to date:  Vanessa Root is a 53 y.o. female     Plan today:  Discussing case with infectious disease today.  This morning patient was still not willing to transfer to [facility] for surgical opinion and wants to be treated with antibiotics only.  I explained to her that refusing recommended treatment can lead to worsening morbidity or mortality.  We will continue this conversation daily and monitor how she is doing.    Midodrine was started for blood pressure support.  Her blood pressure is better now I will stop IV fluids today.  Continue midodrine with hold parameters.  Likely we can wean off this once blood pressure continues to improve further.    Continue postoperative wound care.  Continue supportive care and symptom treatment.    Glucose reviewed and insulin further adjusted today.  I have adjusted the correction scale  Monitor glucose and adjust insulin as needed    Continue home medications for mood as appropriate  Continue gabapentin for chronic pain and neuropathy  Continue thyroid medication.  Bowel regimen ordered.    Anticoagulation transition to prophylactic Lovenox dose tonight.  Hold Eliquis until further determination on surgical plans.  History of PE noted.  Plan to dose prophylactic Lovenox for morbid obesity q12h.      Encourage nutrition.  Patient feels very strong she would prefer a regular diet of her heart healthy diet    Scheduled Tylenol and low-dose muscle relaxant for trapezius muscle spasm.  Improved.   XR shoulder to evaluate further.  glenohumeral joint narrowing noted per radiology report.  Seems consistent with arthritis.    PT OT for debility.  Physically patient is chronically very low functioning.    Supportive care for chronic visual impairment    Anemia noted which is significant.  No active bleeding noted.  Previous anemia studies this year showed anemia of chronic disease.    Treatment plan discussed with the patient who is in agreement.  Very complex case.        DVT Prophylaxis: Prophylactic Lovenox      Disposition: Pending clinical course    CODE STATUS:   Code Status and Medical Interventions:   Ordered at: 12/11/23 0605     Code Status (Patient has no pulse and is not breathing):    CPR (Attempt to Resuscitate)     Medical Interventions (Patient has pulse or is breathing):    Full       Marv Saucedo MD  12/15/23

## 2023-12-16 LAB
ANION GAP SERPL CALCULATED.3IONS-SCNC: 11.4 MMOL/L (ref 5–15)
BACTERIA SPEC AEROBE CULT: ABNORMAL
BACTERIA SPEC AEROBE CULT: NORMAL
BACTERIA SPEC AEROBE CULT: NORMAL
BUN SERPL-MCNC: 10 MG/DL (ref 6–20)
BUN/CREAT SERPL: 11.6 (ref 7–25)
CALCIUM SPEC-SCNC: 8 MG/DL (ref 8.6–10.5)
CHLORIDE SERPL-SCNC: 107 MMOL/L (ref 98–107)
CO2 SERPL-SCNC: 19.6 MMOL/L (ref 22–29)
CREAT SERPL-MCNC: 0.86 MG/DL (ref 0.57–1)
EGFRCR SERPLBLD CKD-EPI 2021: 81.9 ML/MIN/1.73
GLUCOSE BLDC GLUCOMTR-MCNC: 103 MG/DL (ref 70–130)
GLUCOSE BLDC GLUCOMTR-MCNC: 137 MG/DL (ref 70–130)
GLUCOSE BLDC GLUCOMTR-MCNC: 147 MG/DL (ref 70–130)
GLUCOSE BLDC GLUCOMTR-MCNC: 197 MG/DL (ref 70–130)
GLUCOSE SERPL-MCNC: 81 MG/DL (ref 65–99)
GRAM STN SPEC: ABNORMAL
GRAM STN SPEC: ABNORMAL
POTASSIUM SERPL-SCNC: 3.9 MMOL/L (ref 3.5–5.2)
SODIUM SERPL-SCNC: 138 MMOL/L (ref 136–145)

## 2023-12-16 PROCEDURE — 25810000003 SODIUM CHLORIDE 0.9 % SOLUTION 250 ML FLEX CONT: Performed by: STUDENT IN AN ORGANIZED HEALTH CARE EDUCATION/TRAINING PROGRAM

## 2023-12-16 PROCEDURE — 82948 REAGENT STRIP/BLOOD GLUCOSE: CPT

## 2023-12-16 PROCEDURE — 63710000001 INSULIN GLARGINE PER 5 UNITS: Performed by: INTERNAL MEDICINE

## 2023-12-16 PROCEDURE — 63710000001 INSULIN LISPRO (HUMAN) PER 5 UNITS: Performed by: SURGERY

## 2023-12-16 PROCEDURE — 25010000002 ENOXAPARIN PER 10 MG: Performed by: INTERNAL MEDICINE

## 2023-12-16 PROCEDURE — 25010000002 VANCOMYCIN 750 MG RECONSTITUTED SOLUTION 1 EACH VIAL: Performed by: STUDENT IN AN ORGANIZED HEALTH CARE EDUCATION/TRAINING PROGRAM

## 2023-12-16 PROCEDURE — 80048 BASIC METABOLIC PNL TOTAL CA: CPT | Performed by: STUDENT IN AN ORGANIZED HEALTH CARE EDUCATION/TRAINING PROGRAM

## 2023-12-16 PROCEDURE — 25010000002 ONDANSETRON PER 1 MG: Performed by: SURGERY

## 2023-12-16 PROCEDURE — 99232 SBSQ HOSP IP/OBS MODERATE 35: CPT | Performed by: INTERNAL MEDICINE

## 2023-12-16 RX ORDER — LOPERAMIDE HYDROCHLORIDE 2 MG/1
2 CAPSULE ORAL 3 TIMES DAILY PRN
Status: DISCONTINUED | OUTPATIENT
Start: 2023-12-16 | End: 2023-12-21 | Stop reason: HOSPADM

## 2023-12-16 RX ORDER — FLUCONAZOLE 150 MG/1
150 TABLET ORAL ONCE
Status: COMPLETED | OUTPATIENT
Start: 2023-12-16 | End: 2023-12-16

## 2023-12-16 RX ADMIN — ENOXAPARIN SODIUM 40 MG: 100 INJECTION SUBCUTANEOUS at 20:41

## 2023-12-16 RX ADMIN — INSULIN LISPRO 8 UNITS: 100 INJECTION, SOLUTION INTRAVENOUS; SUBCUTANEOUS at 12:44

## 2023-12-16 RX ADMIN — LOPERAMIDE HYDROCHLORIDE 2 MG: 2 CAPSULE ORAL at 17:22

## 2023-12-16 RX ADMIN — LEVOTHYROXINE SODIUM 200 MCG: 100 TABLET ORAL at 10:20

## 2023-12-16 RX ADMIN — FLUCONAZOLE 150 MG: 150 TABLET ORAL at 12:44

## 2023-12-16 RX ADMIN — ONDANSETRON 4 MG: 2 INJECTION INTRAMUSCULAR; INTRAVENOUS at 05:33

## 2023-12-16 RX ADMIN — VANCOMYCIN HYDROCHLORIDE 750 MG: 750 INJECTION, POWDER, LYOPHILIZED, FOR SOLUTION INTRAVENOUS at 00:34

## 2023-12-16 RX ADMIN — MIDODRINE HYDROCHLORIDE 5 MG: 5 TABLET ORAL at 12:45

## 2023-12-16 RX ADMIN — INSULIN GLARGINE 10 UNITS: 100 INJECTION, SOLUTION SUBCUTANEOUS at 20:41

## 2023-12-16 RX ADMIN — HYDROCODONE BITARTRATE AND ACETAMINOPHEN 1 TABLET: 5; 325 TABLET ORAL at 10:21

## 2023-12-16 RX ADMIN — MIDODRINE HYDROCHLORIDE 5 MG: 5 TABLET ORAL at 10:21

## 2023-12-16 RX ADMIN — CYCLOBENZAPRINE 5 MG: 10 TABLET, FILM COATED ORAL at 19:31

## 2023-12-16 RX ADMIN — ACETAMINOPHEN 650 MG: 325 TABLET, FILM COATED ORAL at 10:21

## 2023-12-16 RX ADMIN — INSULIN LISPRO 8 UNITS: 100 INJECTION, SOLUTION INTRAVENOUS; SUBCUTANEOUS at 10:22

## 2023-12-16 RX ADMIN — ENOXAPARIN SODIUM 40 MG: 100 INJECTION SUBCUTANEOUS at 10:22

## 2023-12-16 RX ADMIN — INSULIN GLARGINE 10 UNITS: 100 INJECTION, SOLUTION SUBCUTANEOUS at 10:23

## 2023-12-16 RX ADMIN — LOPERAMIDE HYDROCHLORIDE 2 MG: 2 CAPSULE ORAL at 19:31

## 2023-12-16 RX ADMIN — ACETAMINOPHEN 650 MG: 325 TABLET, FILM COATED ORAL at 20:41

## 2023-12-16 RX ADMIN — MIDODRINE HYDROCHLORIDE 5 MG: 5 TABLET ORAL at 17:32

## 2023-12-16 RX ADMIN — CETIRIZINE HYDROCHLORIDE 10 MG: 10 TABLET ORAL at 20:41

## 2023-12-16 RX ADMIN — GABAPENTIN 300 MG: 300 CAPSULE ORAL at 10:21

## 2023-12-16 RX ADMIN — HYDROXYZINE HYDROCHLORIDE 50 MG: 25 TABLET ORAL at 20:41

## 2023-12-16 RX ADMIN — ACETAMINOPHEN 650 MG: 325 TABLET, FILM COATED ORAL at 17:31

## 2023-12-16 RX ADMIN — Medication 1 TABLET: at 10:23

## 2023-12-16 RX ADMIN — INSULIN LISPRO 8 UNITS: 100 INJECTION, SOLUTION INTRAVENOUS; SUBCUTANEOUS at 17:32

## 2023-12-16 RX ADMIN — GABAPENTIN 300 MG: 300 CAPSULE ORAL at 20:41

## 2023-12-16 RX ADMIN — VANCOMYCIN HYDROCHLORIDE 750 MG: 750 INJECTION, POWDER, LYOPHILIZED, FOR SOLUTION INTRAVENOUS at 12:46

## 2023-12-16 NOTE — PROGRESS NOTES
"[facility] Clinical Pharmacy Services: Vancomycin Monitoring Note    Vanessa Root is a 53 y.o. female who is on day 5/7 of pharmacy to dose vancomycin for probable R hip osteomyelitis w/ abscess.    Previous Vancomycin Dose: 750 mg IV every 12 hours    Updated Cultures and Sensitivities:    12/11 blood cultures no growth to date  12/11 right hip wound culture MRSA  12/13 right hip OR cultures MRSA       Results from last 7 days   Lab Units 12/15/23  0601 12/13/23  1002   VANCOMYCIN TR mcg/mL 19.40 20.80*     Vitals/Labs  Ht: 165.1 cm (65\"); Wt: 115 kg (253 lb 14.4 oz)   Temp Readings from Last 1 Encounters:   12/15/23 100.4 °F (38 °C) (Oral)     Estimated Creatinine Clearance: 100.3 mL/min (by C-G formula based on SCr of 0.84 mg/dL).     Results from last 7 days   Lab Units 12/16/23  0546 12/15/23  0601 12/14/23  0400 12/13/23  0348   CREATININE mg/dL 0.86 0.84 1.06* 1.06*   WBC 10*3/mm3  --  16.68* 16.47* 18.54*     Assessment/Plan     Will continue current dosing.     750 mg iv q12h  ( expected AUC = 502)  Next Level Date and Time: Vanc Trough is scheduled for Sunday afternoon 12/17 at 1230.  We will continue to monitor patient changes and renal function.    Thank you for involving pharmacy in this patient's care. Please contact pharmacy with any questions or concerns.        Armida Falcon, [facility]      "

## 2023-12-16 NOTE — PLAN OF CARE
Goal Outcome Evaluation:  Plan of Care Reviewed With: patient        Progress: improving  Outcome Evaluation: VSS, cont IV antibiotics, Right hip drsg changed with x3 packages 4x4's NS wet to dry with abd pads, large amt serous drng noted, c/o nauzea x1 IV Zofran given, bonnie po well, F/C BSD with cloudy sediment urine noted,  CPAP on while asleep, safety maintained

## 2023-12-16 NOTE — PLAN OF CARE
Goal Outcome Evaluation:  Plan of Care Reviewed With: patient  VSS  Pt has had a quiet day sleeping at intervals-with CPAP  Blood sugars as   Eating well  Wound care this pm per orders  Pt remains in contact isolation for MRSA-  Safety maintained this shift        Progress: improving

## 2023-12-16 NOTE — PROGRESS NOTES
LOS: 5 days     Chief Complaint: Right hip decubitus ulcer    Interval History:  tm 100.4 but currently af. R h8ip not too bothersome  Tolerating antibiotics.    Vital Signs  Temp:  [98.1 °F (36.7 °C)-100.4 °F (38 °C)] 99.7 °F (37.6 °C)  Heart Rate:  [66-79] 70  Resp:  [16-20] 16  BP: (106-126)/(58-71) 106/70    Physical Exam:  General: In no acute distress  HEENT: Oropharynx clear, moist mucous membranes  Respiratory: Normal work of breathing  Skin: Right hip decubitus wound packed with surgical dressing  Access: Peripheral IV       Results Review:    Cr 0.86  Glc   Plain films of the left shoulder show glenohumeral joint narrowing but no other remarkable findings    Microbiology:  12/11 blood cultures no growth to date  12/11 right hip wound culture MRSA  12/13 right hip OR cultures MRSA    Assessment    1.  Right hip wound infection secondary to MRSA  2.  Diabetes mellitus type 2 with hyperglycemia, continue glycemic control efforts to prevent/control infectious complications  3.  Immobility syndrome complicating above    Wound/tissue Cx with MRSA but bcx ngtd  Cont vanc for -600    ID will follow.

## 2023-12-16 NOTE — PROGRESS NOTES
[facility] Medicine Services  PROGRESS NOTE    Patient Name: Vanessa Root  : 1970  MRN: [id number]    Date of Admission: 2023  Primary Care Physician: Grace Baumann APRN    Subjective   Subjective     CC:  Follow-up hip infection    Subjective:   shoulder tenderness fair  Had not wanted to transfer to [facility]  On IV ABX  Getting wound care    Review of Systems        Objective   Objective     Vital Signs:   Temp:  [98.1 °F (36.7 °C)-100.4 °F (38 °C)] 99.7 °F (37.6 °C)  Heart Rate:  [66-79] 70  Resp:  [16-20] 16  BP: (106-126)/(58-71) 106/70        Physical Exam:  Constitutional:Awake, alert chronically ill-appearing but nontoxic  HENT: NCAT, mucous membranes moist, neck supple  Respiratory: No cough or wheezing, nonlabored breathing  Cardiovascular: Pulse rate is normal  Gastrointestinal: Obese, soft, nontender, nondistended  Musculoskeletal: chronic lower extremity weakness with substantial debility, substantial bilateral lower extremity weakness, diffuse muscle wasting noted, left hip wound currently with dressing,   Psychiatric: Anxious affect, cooperative, conversational  Neurologic: Oriented, no slurred speech or facial droop, follows commands  Obese  Pleasant, appropriate       Results Reviewed:  Results from last 7 days   Lab Units 12/15/23  0601 23  0400 23  0348   WBC 10*3/mm3 16.68* 16.47* 18.54*   HEMOGLOBIN g/dL 8.1* 7.2* 7.8*   HEMATOCRIT % 25.9* 23.9* 25.3*   PLATELETS 10*3/mm3 749* 633* 639*     Results from last 7 days   Lab Units 23  0546 12/15/23  0601 23  0400 23  0353 23  1552   SODIUM mmol/L 138 138 137   < > 134*   POTASSIUM mmol/L 3.9 4.0 4.1   < > 3.7   CHLORIDE mmol/L 107 108* 107   < > 99   CO2 mmol/L 19.6* 21.8* 21.0*   < > 24.0   BUN mg/dL 10 9 11   < > 11   CREATININE mg/dL 0.86 0.84 1.06*   < > 0.77   GLUCOSE mg/dL 81 104* 241*   < > 181*   CALCIUM mg/dL 8.0* 8.3* 7.8*   < > 8.5*   ALK PHOS U/L  --   --   --   --  272*    ALT (SGPT) U/L  --   --   --   --  9   AST (SGOT) U/L  --   --   --   --  37*    < > = values in this interval not displayed.     Estimated Creatinine Clearance: 98 mL/min (by C-G formula based on SCr of 0.86 mg/dL).    Microbiology Results Abnormal       Procedure Component Value - Date/Time    Anaerobic Culture - Surgical Site, Thigh, Right [[id number]]  (Normal) Collected: 12/13/23 0738    Lab Status: Preliminary result Specimen: Surgical Site from Thigh, Right Updated: 12/16/23 0816     Anaerobic Culture Screening for Anaerobes    Blood Culture - Blood, Hand, Left [[id number]]  (Normal) Collected: 12/11/23 1644    Lab Status: Preliminary result Specimen: Blood from Hand, Left Updated: 12/15/23 1715     Blood Culture No growth at 4 days    Blood Culture - Blood, Arm, Right [[id number]]  (Normal) Collected: 12/11/23 1644    Lab Status: Preliminary result Specimen: Blood from Arm, Right Updated: 12/15/23 1715     Blood Culture No growth at 4 days    Narrative:      Less than seven (7) mL's of blood was collected.  Insufficient quantity may yield false negative results.            Imaging Results (Last 24 Hours)       Procedure Component Value Units Date/Time    XR Shoulder 2+ View Left [[id number]] Collected: 12/15/23 1201     Updated: 12/15/23 1204    Narrative:      XR SHOULDER 2+ VW LEFT-     Clinical: Left shoulder pain, fell     FINDINGS: There is glenohumeral joint narrowing. The acromioclavicular  joint is satisfactory in appearance. Satisfactory internal and external  rotation, no fracture or dislocation seen. Adjacent ribs and overlying  soft tissues have a satisfactory appearance. The remainder is  unremarkable.     This report was finalized on 12/15/2023 12:01 PM by Dr. Rom Reyes M.D on Workstation: [id number]                   I have reviewed the medications:  Scheduled Meds:acetaminophen, 650 mg, Oral, TID  cetirizine, 10 mg, Oral, Nightly  enoxaparin, 40 mg, Subcutaneous, Q12H  gabapentin, 300 mg,  Oral, Q12H  HYDROcodone-acetaminophen, 1 tablet, Oral, Daily  insulin glargine, 10 Units, Subcutaneous, Q12H  insulin lispro, 2-9 Units, Subcutaneous, 4x Daily AC & at Bedtime  insulin lispro, 8 Units, Subcutaneous, TID With Meals  levothyroxine, 200 mcg, Oral, Daily  midodrine, 5 mg, Oral, TID AC  multivitamin with minerals, 1 tablet, Oral, Daily  senna-docusate sodium, 2 tablet, Oral, BID  vancomycin, 750 mg, Intravenous, Q12H      Continuous Infusions:lactated ringers, 9 mL/hr, Last Rate: Stopped (12/13/23 0904)  Pharmacy to dose vancomycin,       PRN Meds:.  acetaminophen    senna-docusate sodium **AND** polyethylene glycol **AND** bisacodyl **AND** bisacodyl    cyclobenzaprine    dextrose    dextrose    glucagon (human recombinant)    hydrOXYzine    influenza vaccine    melatonin    ondansetron **OR** ondansetron    Pharmacy to dose vancomycin    [COMPLETED] Insert Peripheral IV **AND** sodium chloride    sodium chloride    Assessment & Plan   Assessment & Plan     Active Hospital Problems    Diagnosis  POA    **Necrotizing soft tissue infection [M79.89]  Yes    MRSA (methicillin resistant Staphylococcus aureus) infection [A49.02]  Yes    Chronic anticoagulation for history of PE [Z79.01]  Not Applicable    Pyogenic arthritis of right hip [M00.9]  Yes    History of pulmonary embolism [Z86.711]  Yes    Anemia, chronic disease [D63.8]  Yes    Hypotension [I95.9]  Yes    Severe malnutrition [E43]  Yes    Infected wound [T14.8XXA, L08.9]  Yes    Obesity, Class III, BMI 40-49.9 (morbid obesity) [E66.01]  Yes    History of ischemic stroke [Z86.73]  Not Applicable    Pressure ulcer of other site, stage 4 [L89.894]  Yes    Spinal stenosis of lumbar region with neurogenic claudication [M48.062]  Yes    Immobility syndrome [M62.3]  Yes    Hyperlipidemia [E78.5]  Yes    Type 2 diabetes mellitus with diabetic polyneuropathy [E11.42]  Yes    GERD (gastroesophageal reflux disease) [K21.9]  Yes    Bipolar 1 disorder [F31.9]   Yes    Depression [F32.A]  Yes    Muscle weakness [M62.81]  Yes    Obstructive sleep apnea syndrome [G47.33]  Yes    Hypothyroidism [E03.9]  Yes      Resolved Hospital Problems   No resolved problems to display.        Brief Hospital Course to date:  Vanessa Root is a 53 y.o. female     Plan today:  Discussing case with infectious disease today Dr. Sheridan.  This patient had not wanted to transfer to [facility] for surgical opinion and wants to be treated with antibiotics only.      Fluconazole x 1 for vaginal yeast infection.     Midodrine was started for blood pressure support.  Likely we can wean off this once blood pressure continues to improve further.    Continue postoperative wound care.  Continue supportive care and symptom treatment. She notes buttock decubitus ulcer which is being treated as well     Glucose reviewed and continue current insulin     Continue home medications for mood as appropriate  Continue gabapentin for chronic pain and neuropathy  Continue thyroid medication.  Bowel regimen ordered.    Anticoagulation transition to prophylactic Lovenox dose tonight.  Hold Eliquis until further determination on surgical plans.  History of PE noted.  Plan to dose prophylactic Lovenox for morbid obesity q12h.      Encourage nutrition.      Scheduled Tylenol and low-dose muscle relaxant for trapezius muscle spasm.  Improved.  XR shoulder to evaluate further.  glenohumeral joint narrowing noted per radiology report.  Seems consistent with arthritis.    PT OT for debility.  Physically patient is chronically very low functioning.    Supportive care for chronic visual impairment    Anemia noted which is significant.  No active bleeding noted.  Previous anemia studies this year showed anemia of chronic disease.    Treatment plan discussed with the patient who is in agreement.  Very complex case.        DVT Prophylaxis: Prophylactic Lovenox      Disposition: Pending clinical course    ROS Whitaker  Klausing    CODE STATUS:   Code Status and Medical Interventions:   Ordered at: 12/11/23 1900     Code Status (Patient has no pulse and is not breathing):    CPR (Attempt to Resuscitate)     Medical Interventions (Patient has pulse or is breathing):    Full       Lorenzo Ross MD  12/16/23

## 2023-12-17 LAB
ANION GAP SERPL CALCULATED.3IONS-SCNC: 12.2 MMOL/L (ref 5–15)
BUN SERPL-MCNC: 13 MG/DL (ref 6–20)
BUN/CREAT SERPL: 12.7 (ref 7–25)
CALCIUM SPEC-SCNC: 8.4 MG/DL (ref 8.6–10.5)
CHLORIDE SERPL-SCNC: 107 MMOL/L (ref 98–107)
CO2 SERPL-SCNC: 19.8 MMOL/L (ref 22–29)
CREAT SERPL-MCNC: 1.02 MG/DL (ref 0.57–1)
DEPRECATED RDW RBC AUTO: 40.6 FL (ref 37–54)
EGFRCR SERPLBLD CKD-EPI 2021: 66.7 ML/MIN/1.73
ERYTHROCYTE [DISTWIDTH] IN BLOOD BY AUTOMATED COUNT: 13.2 % (ref 12.3–15.4)
GLUCOSE BLDC GLUCOMTR-MCNC: 120 MG/DL (ref 70–130)
GLUCOSE BLDC GLUCOMTR-MCNC: 138 MG/DL (ref 70–130)
GLUCOSE BLDC GLUCOMTR-MCNC: 141 MG/DL (ref 70–130)
GLUCOSE BLDC GLUCOMTR-MCNC: 97 MG/DL (ref 70–130)
GLUCOSE SERPL-MCNC: 79 MG/DL (ref 65–99)
HCT VFR BLD AUTO: 27 % (ref 34–46.6)
HGB BLD-MCNC: 8.2 G/DL (ref 12–15.9)
MCH RBC QN AUTO: 25.6 PG (ref 26.6–33)
MCHC RBC AUTO-ENTMCNC: 30.4 G/DL (ref 31.5–35.7)
MCV RBC AUTO: 84.4 FL (ref 79–97)
PLATELET # BLD AUTO: 811 10*3/MM3 (ref 140–450)
PMV BLD AUTO: 9.4 FL (ref 6–12)
POTASSIUM SERPL-SCNC: 3.8 MMOL/L (ref 3.5–5.2)
RBC # BLD AUTO: 3.2 10*6/MM3 (ref 3.77–5.28)
SODIUM SERPL-SCNC: 139 MMOL/L (ref 136–145)
VANCOMYCIN TROUGH SERPL-MCNC: 19.1 MCG/ML (ref 5–20)
WBC NRBC COR # BLD AUTO: 20.79 10*3/MM3 (ref 3.4–10.8)

## 2023-12-17 PROCEDURE — 63710000001 INSULIN GLARGINE PER 5 UNITS: Performed by: INTERNAL MEDICINE

## 2023-12-17 PROCEDURE — 25810000003 SODIUM CHLORIDE 0.9 % SOLUTION 250 ML FLEX CONT: Performed by: STUDENT IN AN ORGANIZED HEALTH CARE EDUCATION/TRAINING PROGRAM

## 2023-12-17 PROCEDURE — 85027 COMPLETE CBC AUTOMATED: CPT | Performed by: INTERNAL MEDICINE

## 2023-12-17 PROCEDURE — 25010000002 ENOXAPARIN PER 10 MG: Performed by: INTERNAL MEDICINE

## 2023-12-17 PROCEDURE — 82948 REAGENT STRIP/BLOOD GLUCOSE: CPT

## 2023-12-17 PROCEDURE — 63710000001 INSULIN LISPRO (HUMAN) PER 5 UNITS: Performed by: INTERNAL MEDICINE

## 2023-12-17 PROCEDURE — 25010000002 VANCOMYCIN 750 MG RECONSTITUTED SOLUTION 1 EACH VIAL: Performed by: STUDENT IN AN ORGANIZED HEALTH CARE EDUCATION/TRAINING PROGRAM

## 2023-12-17 PROCEDURE — 99232 SBSQ HOSP IP/OBS MODERATE 35: CPT | Performed by: INTERNAL MEDICINE

## 2023-12-17 PROCEDURE — 25810000003 SODIUM CHLORIDE 0.9 % SOLUTION: Performed by: INTERNAL MEDICINE

## 2023-12-17 PROCEDURE — 80048 BASIC METABOLIC PNL TOTAL CA: CPT | Performed by: INTERNAL MEDICINE

## 2023-12-17 PROCEDURE — 80202 ASSAY OF VANCOMYCIN: CPT | Performed by: STUDENT IN AN ORGANIZED HEALTH CARE EDUCATION/TRAINING PROGRAM

## 2023-12-17 PROCEDURE — 63710000001 INSULIN LISPRO (HUMAN) PER 5 UNITS: Performed by: SURGERY

## 2023-12-17 RX ORDER — SODIUM CHLORIDE 9 MG/ML
100 INJECTION, SOLUTION INTRAVENOUS CONTINUOUS
Status: DISCONTINUED | OUTPATIENT
Start: 2023-12-17 | End: 2023-12-20

## 2023-12-17 RX ORDER — ENOXAPARIN SODIUM 150 MG/ML
1 INJECTION SUBCUTANEOUS EVERY 12 HOURS
Status: DISCONTINUED | OUTPATIENT
Start: 2023-12-17 | End: 2023-12-21 | Stop reason: HOSPADM

## 2023-12-17 RX ORDER — MIDODRINE HYDROCHLORIDE 5 MG/1
5 TABLET ORAL ONCE
Qty: 1 TABLET | Refills: 0 | Status: COMPLETED | OUTPATIENT
Start: 2023-12-17 | End: 2023-12-17

## 2023-12-17 RX ADMIN — INSULIN LISPRO 8 UNITS: 100 INJECTION, SOLUTION INTRAVENOUS; SUBCUTANEOUS at 12:31

## 2023-12-17 RX ADMIN — VANCOMYCIN HYDROCHLORIDE 750 MG: 750 INJECTION, POWDER, LYOPHILIZED, FOR SOLUTION INTRAVENOUS at 13:18

## 2023-12-17 RX ADMIN — SODIUM CHLORIDE 100 ML/HR: 9 INJECTION, SOLUTION INTRAVENOUS at 22:10

## 2023-12-17 RX ADMIN — MIDODRINE HYDROCHLORIDE 5 MG: 5 TABLET ORAL at 09:15

## 2023-12-17 RX ADMIN — INSULIN GLARGINE 10 UNITS: 100 INJECTION, SOLUTION SUBCUTANEOUS at 09:12

## 2023-12-17 RX ADMIN — Medication 1 TABLET: at 09:16

## 2023-12-17 RX ADMIN — INSULIN LISPRO 2 UNITS: 100 INJECTION, SOLUTION INTRAVENOUS; SUBCUTANEOUS at 00:24

## 2023-12-17 RX ADMIN — MIDODRINE HYDROCHLORIDE 5 MG: 5 TABLET ORAL at 14:13

## 2023-12-17 RX ADMIN — ENOXAPARIN SODIUM 40 MG: 100 INJECTION SUBCUTANEOUS at 09:14

## 2023-12-17 RX ADMIN — HYDROCODONE BITARTRATE AND ACETAMINOPHEN 1 TABLET: 5; 325 TABLET ORAL at 09:16

## 2023-12-17 RX ADMIN — ENOXAPARIN SODIUM 105 MG: 150 INJECTION SUBCUTANEOUS at 22:08

## 2023-12-17 RX ADMIN — LEVOTHYROXINE SODIUM 200 MCG: 100 TABLET ORAL at 09:16

## 2023-12-17 RX ADMIN — MIDODRINE HYDROCHLORIDE 7.5 MG: 5 TABLET ORAL at 17:42

## 2023-12-17 RX ADMIN — LOPERAMIDE HYDROCHLORIDE 2 MG: 2 CAPSULE ORAL at 12:32

## 2023-12-17 RX ADMIN — ACETAMINOPHEN 650 MG: 325 TABLET, FILM COATED ORAL at 09:14

## 2023-12-17 RX ADMIN — MIDODRINE HYDROCHLORIDE 5 MG: 5 TABLET ORAL at 12:32

## 2023-12-17 RX ADMIN — INSULIN LISPRO 8 UNITS: 100 INJECTION, SOLUTION INTRAVENOUS; SUBCUTANEOUS at 09:12

## 2023-12-17 RX ADMIN — GABAPENTIN 300 MG: 300 CAPSULE ORAL at 22:08

## 2023-12-17 RX ADMIN — ACETAMINOPHEN 650 MG: 325 TABLET, FILM COATED ORAL at 22:08

## 2023-12-17 RX ADMIN — CETIRIZINE HYDROCHLORIDE 10 MG: 10 TABLET ORAL at 22:08

## 2023-12-17 RX ADMIN — LOPERAMIDE HYDROCHLORIDE 2 MG: 2 CAPSULE ORAL at 18:35

## 2023-12-17 RX ADMIN — INSULIN GLARGINE 10 UNITS: 100 INJECTION, SOLUTION SUBCUTANEOUS at 22:09

## 2023-12-17 RX ADMIN — SODIUM CHLORIDE 500 ML: 9 INJECTION, SOLUTION INTRAVENOUS at 14:08

## 2023-12-17 RX ADMIN — INSULIN LISPRO 8 UNITS: 100 INJECTION, SOLUTION INTRAVENOUS; SUBCUTANEOUS at 17:42

## 2023-12-17 RX ADMIN — VANCOMYCIN HYDROCHLORIDE 750 MG: 750 INJECTION, POWDER, LYOPHILIZED, FOR SOLUTION INTRAVENOUS at 00:24

## 2023-12-17 RX ADMIN — GABAPENTIN 300 MG: 300 CAPSULE ORAL at 09:14

## 2023-12-17 NOTE — PROGRESS NOTES
[facility] Medicine Services  PROGRESS NOTE    Patient Name: Vanessa Root  : 1970  MRN: [id number]    Date of Admission: 2023  Primary Care Physician: Grace Baumann APRN    Subjective   Subjective     CC:  Follow-up hip infection    Subjective:   tenderness fair  Had not wanted to transfer to [facility]  On IV ABX  Getting wound care  Some diarrhea at times    Review of Systems        Objective   Objective     Vital Signs:   Temp:  [97.9 °F (36.6 °C)-99 °F (37.2 °C)] 97.9 °F (36.6 °C)  Heart Rate:  [77-84] 78  Resp:  [18-20] 18  BP: ()/(50-70) 72/50        Physical Exam:  Constitutional:Awake, alert chronically ill-appearing but nontoxic  HENT: NCAT, mucous membranes moist, neck supple  Respiratory: No cough or wheezing, nonlabored breathing  Gastrointestinal: Obese, soft, nontender, nondistended  Musculoskeletal: chronic lower extremity weakness with substantial debility,  diffuse muscle wasting noted, left hip wound currently with dressing,   Psychiatric: Anxious affect, cooperative, conversational  Neurologic: Oriented, no slurred speech or facial droop, follows commands  Obese- wt 247 lbs  Pleasant, appropriate       Results Reviewed:  Results from last 7 days   Lab Units 23  0352 12/15/23  0601 23  0400   WBC 10*3/mm3 20.79* 16.68* 16.47*   HEMOGLOBIN g/dL 8.2* 8.1* 7.2*   HEMATOCRIT % 27.0* 25.9* 23.9*   PLATELETS 10*3/mm3 811* 749* 633*     Results from last 7 days   Lab Units 23  0352 23  0546 12/15/23  0601 23  0353 23  1552   SODIUM mmol/L 139 138 138   < > 134*   POTASSIUM mmol/L 3.8 3.9 4.0   < > 3.7   CHLORIDE mmol/L 107 107 108*   < > 99   CO2 mmol/L 19.8* 19.6* 21.8*   < > 24.0   BUN mg/dL 13 10 9   < > 11   CREATININE mg/dL 1.02* 0.86 0.84   < > 0.77   GLUCOSE mg/dL 79 81 104*   < > 181*   CALCIUM mg/dL 8.4* 8.0* 8.3*   < > 8.5*   ALK PHOS U/L  --   --   --   --  272*   ALT (SGPT) U/L  --   --   --   --  9   AST (SGOT) U/L  --   --    --   --  37*    < > = values in this interval not displayed.     Estimated Creatinine Clearance: 81.4 mL/min (A) (by C-G formula based on SCr of 1.02 mg/dL (H)).    Microbiology Results Abnormal       Procedure Component Value - Date/Time    Blood Culture - Blood, Hand, Left [[id number]]  (Normal) Collected: 12/11/23 1644    Lab Status: Final result Specimen: Blood from Hand, Left Updated: 12/16/23 1716     Blood Culture No growth at 5 days    Blood Culture - Blood, Arm, Right [[id number]]  (Normal) Collected: 12/11/23 1644    Lab Status: Final result Specimen: Blood from Arm, Right Updated: 12/16/23 1716     Blood Culture No growth at 5 days    Narrative:      Less than seven (7) mL's of blood was collected.  Insufficient quantity may yield false negative results.    Anaerobic Culture - Surgical Site, Thigh, Right [[id number]]  (Normal) Collected: 12/13/23 0738    Lab Status: Preliminary result Specimen: Surgical Site from Thigh, Right Updated: 12/16/23 0816     Anaerobic Culture Screening for Anaerobes            Imaging Results (Last 24 Hours)       ** No results found for the last 24 hours. **                I have reviewed the medications:  Scheduled Meds:acetaminophen, 650 mg, Oral, TID  cetirizine, 10 mg, Oral, Nightly  enoxaparin, 40 mg, Subcutaneous, Q12H  gabapentin, 300 mg, Oral, Q12H  HYDROcodone-acetaminophen, 1 tablet, Oral, Daily  insulin glargine, 10 Units, Subcutaneous, Q12H  insulin lispro, 2-9 Units, Subcutaneous, 4x Daily AC & at Bedtime  insulin lispro, 8 Units, Subcutaneous, TID With Meals  levothyroxine, 200 mcg, Oral, Daily  midodrine, 5 mg, Oral, TID AC  multivitamin with minerals, 1 tablet, Oral, Daily  vancomycin, 750 mg, Intravenous, Q12H      Continuous Infusions:lactated ringers, 9 mL/hr, Last Rate: Stopped (12/13/23 0904)  Pharmacy to dose vancomycin,       PRN Meds:.  acetaminophen    [DISCONTINUED] senna-docusate sodium **AND** polyethylene glycol **AND** bisacodyl **AND** bisacodyl     cyclobenzaprine    dextrose    dextrose    glucagon (human recombinant)    hydrOXYzine    influenza vaccine    loperamide    melatonin    ondansetron **OR** ondansetron    Pharmacy to dose vancomycin    [COMPLETED] Insert Peripheral IV **AND** sodium chloride    sodium chloride    Assessment & Plan   Assessment & Plan     Active Hospital Problems    Diagnosis  POA    **Necrotizing soft tissue infection [M79.89]  Yes    MRSA (methicillin resistant Staphylococcus aureus) infection [A49.02]  Yes    Chronic anticoagulation for history of PE [Z79.01]  Not Applicable    Pyogenic arthritis of right hip [M00.9]  Yes    History of pulmonary embolism [Z86.711]  Yes    Anemia, chronic disease [D63.8]  Yes    Hypotension [I95.9]  Yes    Severe malnutrition [E43]  Yes    Infected wound [T14.8XXA, L08.9]  Yes    Obesity, Class III, BMI 40-49.9 (morbid obesity) [E66.01]  Yes    History of ischemic stroke [Z86.73]  Not Applicable    Pressure ulcer of other site, stage 4 [L89.894]  Yes    Spinal stenosis of lumbar region with neurogenic claudication [M48.062]  Yes    Immobility syndrome [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        Brief Hospital Course to date:  Vanessa Root is a 53 y.o. female     Plan today:  This patient had not wanted to transfer to [facility] for surgical opinion and wants to be treated with antibiotics only.  Also the fact that the MRI did not show osteo or septic arthritis does give some hope that this can be treated with ABX and not need disarticulation (which was Orthopedic worry before the MRI). Still even if can be treated  with local wound care and IV ABX she should follow with Orthopedics at [facility] as outpatient.     S/p Fluconazole x 1 for vaginal yeast infection.     Monitor diarrhea     Midodrine was started for blood pressure support.  Likely we can wean off this once blood pressure continues to improve further but can continue for now. Will give additional IVFs 12/17 as well     Continue postoperative wound care.  Continue supportive care and symptom treatment. She notes buttock decubitus ulcer which is being treated as well     Glucose reviewed and continue current insulin     Continue home medications for mood as appropriate  Continue gabapentin for chronic pain and neuropathy  Continue thyroid medication.      Anticoagulation transition to prophylactic Lovenox dose tonight.  Hold Eliquis until further determination on surgical plans.  History of PE noted.  Plan to dose prophylactic Lovenox for morbid obesity q12h.      Encourage nutrition.      Scheduled Tylenol and low-dose muscle relaxant for trapezius muscle spasm.  Improved.  XR shoulder to evaluate further.  glenohumeral joint narrowing noted per radiology report.  Seems consistent with arthritis.    PT OT for debility.  Physically patient is chronically very low functioning.    Supportive care for chronic visual impairment    Anemia noted which is significant.  No active bleeding noted.  Previous anemia studies this year showed anemia of chronic disease.    Treatment plan discussed with the patient who is in agreement.  Very complex case.        DVT Prophylaxis: Prophylactic Lovenox      Disposition: Pending clinical course. Probably to home with  12/19 or so.     DW staff    CODE STATUS:   Code Status and Medical Interventions:   Ordered at: 12/11/23 8440     Code Status (Patient has no pulse and is not breathing):    CPR (Attempt to Resuscitate)     Medical Interventions (Patient has pulse or is breathing):    Full       Lorenzo Ross MD  12/17/23

## 2023-12-17 NOTE — PLAN OF CARE
"Goal Outcome Evaluation:   Plan of care reviewed with patient.  Afebrile this shift.  BP on low side, fluid bolus given followed by continuous IVFs at 100/hr; midodrine dose also increased, all per MD order.  BP improved this evening.  Skin care done per orders, dsg changed to right hip wound this evening per order.  Med with immodium x 2 this shift for liquid BM x 2.  Indwelling stokes catheter remains, catheter care given with each BM as well as with daily care/bath.  Remains in contact isolation for MRSA, precautions maintained.  Denies c/o pain this shift.  Note on summary page to MD for miconazole powder per patient request for skin folds, cristina area \"itching\".  Antifungal powder used this shift.  Waffle cushion in use in bed beneath buttock/hips, client shifts own weight and can turn self pretty well.  Safety maintained.  Continue to monitor.                     "

## 2023-12-17 NOTE — PLAN OF CARE
Goal Outcome Evaluation:  Plan of Care Reviewed With: patient  VSS  Pt has had many stools today-each time 2 staff members turned and clean cristina area and changed diarrhea diaper  Imodium given x 1  Pt turn frequently from right to left side-pillows, wedges and waffle cushion to bottom  Creme applied with each BM  Rest of assessment per flow sheet  Safety maintained this shift        Progress: no change

## 2023-12-17 NOTE — PLAN OF CARE
Goal Outcome Evaluation:  Plan of Care Reviewed With: patient        Progress: improving  Outcome Evaluation: temp max 99.0, drsg change done ro right hip with x4 packs of 4x4's wet to dry NS with abd pads, large amt serous drng with some purulent drng on drsg, bonnie po well, CPAP on while asleep, Immodium x1 for c/o diarrhea x1, no further BM's this shift, coccyx area very red and excoriated, F/C with large amts of sediment, F/C bag changed and draining much better, safety maintained

## 2023-12-17 NOTE — PROGRESS NOTES
LOS: 6 days     Chief Complaint: Right hip decubitus ulcer    Interval History:   af. R h8ip not too bothersome  Tolerating antibiotics. 2-3 loose bm per day    Vital Signs  Temp:  [97.9 °F (36.6 °C)-99 °F (37.2 °C)] 97.9 °F (36.6 °C)  Heart Rate:  [77-84] 78  Resp:  [18-20] 18  BP: ()/(50-70) 72/50    Physical Exam:  General: In no acute distress  HEENT: Oropharynx clear, moist mucous membranes  Respiratory: Normal work of breathing  Skin: Right hip decubitus wound packed with surgical dressing  Access: Peripheral IV       Results Review:    Cr 1  Glc   Wbc 20  Vanc 19    Microbiology:  12/11 blood cultures no growth to date  12/11 right hip wound culture MRSA  12/13 right hip OR cultures MRSA    Assessment    1.  Right hip wound infection secondary to MRSA  2.  Diabetes mellitus type 2 with hyperglycemia, continue glycemic control efforts to prevent/control infectious complications  3.  Immobility syndrome complicating above    Wound/tissue Cx with MRSA but bcx ngtd  Cont vanc for -600  Wbc higher maybe due to ongoing infection. Only 2-3 bm per day so doubt c diff. Check cbc in am  Open to transfer if needed  ID will follow.

## 2023-12-17 NOTE — PROGRESS NOTES
"[facility] Clinical Pharmacy Services: Vancomycin Monitoring Note    Vanessa Root is a 53 y.o. female who is on day 6/7 of pharmacy to dose vancomycin for R hip decubitus ulcer/wound infection.      Previous Vancomycin Dose: 750 mg IV every 12 hours    Updated Cultures and Sensitivities:   12/11 blood cultures no growth to date  12/11 right hip wound culture MRSA  12/13 right hip OR cultures: MRSA       Results from last 7 days   Lab Units 12/17/23  1225 12/15/23  0601 12/13/23  1002   VANCOMYCIN TR mcg/mL 19.10 19.40 20.80*     Vitals/Labs  Ht: 165.1 cm (65\"); Wt: 112 kg (247 lb 6.4 oz)   Temp Readings from Last 1 Encounters:   12/17/23 97.9 °F (36.6 °C) (Oral)     Estimated Creatinine Clearance: 81.4 mL/min (A) (by C-G formula based on SCr of 1.02 mg/dL (H)).     Results from last 7 days   Lab Units 12/17/23  0352 12/16/23  0546 12/15/23  0601 12/14/23  0400   CREATININE mg/dL 1.02* 0.86 0.84 1.06*   WBC 10*3/mm3 20.79*  --  16.68* 16.47*     Assessment/Plan    Based on today's trough level, vancomycin 750mg IV q12h providing  mg/L*hr with 19% risk of nephrotoxicity.  Adjust vancomycin to 1250mg IV q24h, which is predicted to provide  mg/L*hr.  Could consider vancomycin 1000mg IV q24h (predicted  mg/L*hr) however with confirmed MRSA and deep-seated infection, favor vancomycin 1250mg IV q24h as best balance of risk/benefit.  Recheck vancomycin level on 12/19 or 12/20 if duration extended.  None currently ordered; duration plan is unclear.  Pharmacy consult for vancomycin scheduled to end 12/18 pm.  Follow-up duration plans.  We will continue to monitor patient changes and renal function.    Thank you for involving pharmacy in this patient's care. Please contact pharmacy with any questions or concerns.       Stefani Jacobsen, PharmD, MPH, BCPS      "

## 2023-12-18 ENCOUNTER — TELEPHONE (OUTPATIENT)
Dept: SURGERY | Facility: CLINIC | Age: 53
End: 2023-12-18
Payer: MEDICARE

## 2023-12-18 LAB
ANION GAP SERPL CALCULATED.3IONS-SCNC: 9 MMOL/L (ref 5–15)
BACTERIA SPEC ANAEROBE CULT: NORMAL
BUN SERPL-MCNC: 13 MG/DL (ref 6–20)
BUN/CREAT SERPL: 16.5 (ref 7–25)
CALCIUM SPEC-SCNC: 8.2 MG/DL (ref 8.6–10.5)
CHLORIDE SERPL-SCNC: 110 MMOL/L (ref 98–107)
CO2 SERPL-SCNC: 21 MMOL/L (ref 22–29)
CREAT SERPL-MCNC: 0.79 MG/DL (ref 0.57–1)
DEPRECATED RDW RBC AUTO: 40.9 FL (ref 37–54)
EGFRCR SERPLBLD CKD-EPI 2021: 90.7 ML/MIN/1.73
ERYTHROCYTE [DISTWIDTH] IN BLOOD BY AUTOMATED COUNT: 13.5 % (ref 12.3–15.4)
GLUCOSE BLDC GLUCOMTR-MCNC: 103 MG/DL (ref 70–130)
GLUCOSE BLDC GLUCOMTR-MCNC: 116 MG/DL (ref 70–130)
GLUCOSE BLDC GLUCOMTR-MCNC: 169 MG/DL (ref 70–130)
GLUCOSE BLDC GLUCOMTR-MCNC: 174 MG/DL (ref 70–130)
GLUCOSE SERPL-MCNC: 90 MG/DL (ref 65–99)
HCT VFR BLD AUTO: 24.9 % (ref 34–46.6)
HGB BLD-MCNC: 7.8 G/DL (ref 12–15.9)
MCH RBC QN AUTO: 26.2 PG (ref 26.6–33)
MCHC RBC AUTO-ENTMCNC: 31.3 G/DL (ref 31.5–35.7)
MCV RBC AUTO: 83.6 FL (ref 79–97)
PLATELET # BLD AUTO: 746 10*3/MM3 (ref 140–450)
PMV BLD AUTO: 9.1 FL (ref 6–12)
POTASSIUM SERPL-SCNC: 3.7 MMOL/L (ref 3.5–5.2)
RBC # BLD AUTO: 2.98 10*6/MM3 (ref 3.77–5.28)
SODIUM SERPL-SCNC: 140 MMOL/L (ref 136–145)
WBC NRBC COR # BLD AUTO: 19.6 10*3/MM3 (ref 3.4–10.8)

## 2023-12-18 PROCEDURE — 25810000003 SODIUM CHLORIDE 0.9 % SOLUTION: Performed by: INTERNAL MEDICINE

## 2023-12-18 PROCEDURE — 63710000001 INSULIN GLARGINE PER 5 UNITS: Performed by: INTERNAL MEDICINE

## 2023-12-18 PROCEDURE — 25010000002 ENOXAPARIN PER 10 MG: Performed by: INTERNAL MEDICINE

## 2023-12-18 PROCEDURE — 99232 SBSQ HOSP IP/OBS MODERATE 35: CPT | Performed by: STUDENT IN AN ORGANIZED HEALTH CARE EDUCATION/TRAINING PROGRAM

## 2023-12-18 PROCEDURE — 82948 REAGENT STRIP/BLOOD GLUCOSE: CPT

## 2023-12-18 PROCEDURE — 63710000001 INSULIN LISPRO (HUMAN) PER 5 UNITS: Performed by: INTERNAL MEDICINE

## 2023-12-18 PROCEDURE — 25010000002 VANCOMYCIN 10 G RECONSTITUTED SOLUTION: Performed by: INTERNAL MEDICINE

## 2023-12-18 PROCEDURE — 63710000001 INSULIN LISPRO (HUMAN) PER 5 UNITS: Performed by: SURGERY

## 2023-12-18 PROCEDURE — 80048 BASIC METABOLIC PNL TOTAL CA: CPT | Performed by: INTERNAL MEDICINE

## 2023-12-18 PROCEDURE — 25010000002 ONDANSETRON PER 1 MG: Performed by: SURGERY

## 2023-12-18 PROCEDURE — 85027 COMPLETE CBC AUTOMATED: CPT | Performed by: INTERNAL MEDICINE

## 2023-12-18 RX ORDER — VANCOMYCIN/0.9 % SOD CHLORIDE 1.5G/250ML
1500 PLASTIC BAG, INJECTION (ML) INTRAVENOUS EVERY 24 HOURS
Status: COMPLETED | OUTPATIENT
Start: 2023-12-19 | End: 2023-12-19

## 2023-12-18 RX ADMIN — VANCOMYCIN HYDROCHLORIDE 1250 MG: 10 INJECTION, POWDER, LYOPHILIZED, FOR SOLUTION INTRAVENOUS at 06:26

## 2023-12-18 RX ADMIN — MICONAZOLE NITRATE 200 MG: 200 SUPPOSITORY VAGINAL at 22:01

## 2023-12-18 RX ADMIN — INSULIN LISPRO 8 UNITS: 100 INJECTION, SOLUTION INTRAVENOUS; SUBCUTANEOUS at 17:51

## 2023-12-18 RX ADMIN — CETIRIZINE HYDROCHLORIDE 10 MG: 10 TABLET ORAL at 22:00

## 2023-12-18 RX ADMIN — Medication 1 TABLET: at 09:45

## 2023-12-18 RX ADMIN — ACETAMINOPHEN 650 MG: 325 TABLET, FILM COATED ORAL at 15:35

## 2023-12-18 RX ADMIN — ONDANSETRON 4 MG: 2 INJECTION INTRAMUSCULAR; INTRAVENOUS at 23:33

## 2023-12-18 RX ADMIN — INSULIN GLARGINE 10 UNITS: 100 INJECTION, SOLUTION SUBCUTANEOUS at 09:46

## 2023-12-18 RX ADMIN — MIDODRINE HYDROCHLORIDE 7.5 MG: 5 TABLET ORAL at 12:47

## 2023-12-18 RX ADMIN — INSULIN LISPRO 8 UNITS: 100 INJECTION, SOLUTION INTRAVENOUS; SUBCUTANEOUS at 12:47

## 2023-12-18 RX ADMIN — ENOXAPARIN SODIUM 105 MG: 150 INJECTION SUBCUTANEOUS at 09:43

## 2023-12-18 RX ADMIN — GABAPENTIN 300 MG: 300 CAPSULE ORAL at 09:45

## 2023-12-18 RX ADMIN — GABAPENTIN 300 MG: 300 CAPSULE ORAL at 22:00

## 2023-12-18 RX ADMIN — SODIUM CHLORIDE 100 ML/HR: 9 INJECTION, SOLUTION INTRAVENOUS at 17:52

## 2023-12-18 RX ADMIN — MIDODRINE HYDROCHLORIDE 7.5 MG: 5 TABLET ORAL at 17:51

## 2023-12-18 RX ADMIN — HYDROCODONE BITARTRATE AND ACETAMINOPHEN 1 TABLET: 5; 325 TABLET ORAL at 09:45

## 2023-12-18 RX ADMIN — ENOXAPARIN SODIUM 105 MG: 150 INJECTION SUBCUTANEOUS at 21:59

## 2023-12-18 RX ADMIN — LOPERAMIDE HYDROCHLORIDE 2 MG: 2 CAPSULE ORAL at 22:00

## 2023-12-18 RX ADMIN — Medication 10 ML: at 09:44

## 2023-12-18 RX ADMIN — LEVOTHYROXINE SODIUM 200 MCG: 100 TABLET ORAL at 09:45

## 2023-12-18 RX ADMIN — ACETAMINOPHEN 650 MG: 325 TABLET, FILM COATED ORAL at 22:00

## 2023-12-18 RX ADMIN — INSULIN GLARGINE 10 UNITS: 100 INJECTION, SOLUTION SUBCUTANEOUS at 22:00

## 2023-12-18 RX ADMIN — LOPERAMIDE HYDROCHLORIDE 2 MG: 2 CAPSULE ORAL at 09:57

## 2023-12-18 RX ADMIN — INSULIN LISPRO 8 UNITS: 100 INJECTION, SOLUTION INTRAVENOUS; SUBCUTANEOUS at 09:47

## 2023-12-18 RX ADMIN — SODIUM CHLORIDE 100 ML/HR: 9 INJECTION, SOLUTION INTRAVENOUS at 08:25

## 2023-12-18 RX ADMIN — MIDODRINE HYDROCHLORIDE 7.5 MG: 5 TABLET ORAL at 09:44

## 2023-12-18 RX ADMIN — INSULIN LISPRO 2 UNITS: 100 INJECTION, SOLUTION INTRAVENOUS; SUBCUTANEOUS at 17:51

## 2023-12-18 NOTE — PLAN OF CARE
Goal Outcome Evaluation:  Plan of Care Reviewed With: patient        Progress: no change  Outcome Evaluation: Pt with bp in 90s and is asymptomatic. Pt had one large light brown/yellow loose bm. Pt's asst reported undigested food seen in the stool also. Pt tolerated dressing change this am. Pt c/o vaginal yeast infection. New orders noted. Antifungal powder applied in groin/labia folds and moisture barrier cream applied.

## 2023-12-18 NOTE — PROGRESS NOTES
"[facility] Clinical Pharmacy Services: Vancomycin Monitoring Note    Vanessa Root is a 53 y.o. female who is on day 7 of pharmacy to dose vancomycin for R hip decubitus ulcer/wound infection.      Previous Vancomycin Dose: 1250 mg IV every 12 hours    Updated Cultures and Sensitivities:   12/11 blood cultures no growth to date  12/11 right hip wound culture MRSA  12/13 right hip OR cultures: MRSA       Results from last 7 days   Lab Units 12/17/23  1225 12/15/23  0601 12/13/23  1002   VANCOMYCIN TR mcg/mL 19.10 19.40 20.80*     Vitals/Labs  Ht: 165.1 cm (65\"); Wt: 115 kg (252 lb 6.8 oz)   Temp Readings from Last 1 Encounters:   12/18/23 97.9 °F (36.6 °C) (Oral)     Estimated Creatinine Clearance: 106.7 mL/min (by C-G formula based on SCr of 0.79 mg/dL).     Results from last 7 days   Lab Units 12/18/23  0418 12/17/23  0352 12/16/23  0546 12/15/23  0601   CREATININE mg/dL 0.79 1.02* 0.86 0.84   WBC 10*3/mm3 19.60* 20.79*  --  16.68*     Assessment/Plan    Scr has decreased today, appears at baseline. Previous regimen of 1250mg q24h yields AUC of 406mg/L*hr. Given possible abscess and elevated WBC, will increase to Vancomycin 1500mg q24h with AUC of 480mg/L*hr. If patient is to remain on therapy for extended duration, q24h dosing will provide greatest convenience and reduce risk of accumulation.    We will continue to monitor patient changes and renal function. No levels currently pending.    Thank you for involving pharmacy in this patient's care. Please contact pharmacy with any questions or concerns.       Leobardo Bowser, PharmD      "

## 2023-12-18 NOTE — PROGRESS NOTES
Name: Vanessa Root ADMIT: 2023   : 1970  PCP: Grace Baumann APRN    MRN: [id number] LOS: 7 days   AGE/SEX: 53 y.o. female  ROOM: [facility]     Subjective   Subjective   She is complain of some vaginal itching today.  No other acute complaints.    Objective   Objective   Vital Signs  Temp:  [97.3 °F (36.3 °C)-97.9 °F (36.6 °C)] 97.9 °F (36.6 °C)  Heart Rate:  [66-81] 81  Resp:  [16-18] 18  BP: ()/(67-75) 91/67  SpO2:  [96 %-99 %] 98 %  on  Flow (L/min):  [2] 2;   Device (Oxygen Therapy): CPAP  Body mass index is 42.01 kg/m².  Physical Exam  Constitutional:       General: She is not in acute distress.     Appearance: Normal appearance. She is ill-appearing. She is not toxic-appearing.   Cardiovascular:      Rate and Rhythm: Normal rate and regular rhythm.   Pulmonary:      Effort: Pulmonary effort is normal.   Abdominal:      General: Abdomen is flat. Bowel sounds are normal.      Palpations: Abdomen is soft.   Musculoskeletal:      Comments: Right hip decubitus ulcer; dressing in place C/D/I   Skin:     General: Skin is warm.   Neurological:      General: No focal deficit present.      Mental Status: She is alert and oriented to person, place, and time.   Psychiatric:         Mood and Affect: Mood normal.         Behavior: Behavior normal.         Results Review     I reviewed the patient's new clinical results.  Results from last 7 days   Lab Units 23  0352 12/15/23  0601 23  0400   WBC 10*3/mm3 19.60* 20.79* 16.68* 16.47*   HEMOGLOBIN g/dL 7.8* 8.2* 8.1* 7.2*   PLATELETS 10*3/mm3 746* 811* 749* 633*     Results from last 7 days   Lab Units 23  0418 23  0352 23  0546 12/15/23  0601   SODIUM mmol/L 140 139 138 138   POTASSIUM mmol/L 3.7 3.8 3.9 4.0   CHLORIDE mmol/L 110* 107 107 108*   CO2 mmol/L 21.0* 19.8* 19.6* 21.8*   BUN mg/dL 13 13 10 9   CREATININE mg/dL 0.79 1.02* 0.86 0.84   GLUCOSE mg/dL 90 79 81 104*   EGFR mL/min/1.73 90.7 66.7  81.9 84.3       Results from last 7 days   Lab Units 12/18/23  0418 12/17/23  0352 12/16/23  0546 12/15/23  0601   CALCIUM mg/dL 8.2* 8.4* 8.0* 8.3*       Glucose   Date/Time Value Ref Range Status   12/18/2023 1157 116 70 - 130 mg/dL Final   12/17/2023 2114 120 70 - 130 mg/dL Final   12/17/2023 1633 141 (H) 70 - 130 mg/dL Final   12/17/2023 1155 138 (H) 70 - 130 mg/dL Final   12/17/2023 0727 97 70 - 130 mg/dL Final   12/16/2023 2025 197 (H) 70 - 130 mg/dL Final   12/16/2023 1557 147 (H) 70 - 130 mg/dL Final       No radiology results for the last day  Scheduled Medications  acetaminophen, 650 mg, Oral, TID  cetirizine, 10 mg, Oral, Nightly  enoxaparin, 1 mg/kg, Subcutaneous, Q12H  gabapentin, 300 mg, Oral, Q12H  HYDROcodone-acetaminophen, 1 tablet, Oral, Daily  insulin glargine, 10 Units, Subcutaneous, Q12H  insulin lispro, 2-9 Units, Subcutaneous, 4x Daily AC & at Bedtime  insulin lispro, 8 Units, Subcutaneous, TID With Meals  levothyroxine, 200 mcg, Oral, Daily  miconazole, 200 mg, Vaginal, Nightly  midodrine, 7.5 mg, Oral, TID AC  multivitamin with minerals, 1 tablet, Oral, Daily  [START ON 12/19/2023] vancomycin, 1,500 mg, Intravenous, Q24H    Infusions  lactated ringers, 9 mL/hr, Last Rate: Stopped (12/13/23 0904)  Pharmacy to dose vancomycin,   sodium chloride, 100 mL/hr, Last Rate: 100 mL/hr (12/18/23 0825)    Diet  Diet: Regular/House Diet; Texture: Regular Texture (IDDSI 7); Fluid Consistency: Thin (IDDSI 0)       Assessment/Plan     Active Hospital Problems    Diagnosis  POA    **Necrotizing soft tissue infection [M79.89]  Yes    MRSA (methicillin resistant Staphylococcus aureus) infection [A49.02]  Yes    Chronic anticoagulation for history of PE [Z79.01]  Not Applicable    Pyogenic arthritis of right hip [M00.9]  Yes    History of pulmonary embolism [Z86.711]  Yes    Anemia, chronic disease [D63.8]  Yes    Hypotension [I95.9]  Yes    Severe malnutrition [E43]  Yes    Infected wound [T14.8XXA, L08.9]   Yes    Obesity, Class III, BMI 40-49.9 (morbid obesity) [E66.01]  Yes    History of ischemic stroke [Z86.73]  Not Applicable    Pressure ulcer of other site, stage 4 [L89.894]  Yes    Spinal stenosis of lumbar region with neurogenic claudication [M48.062]  Yes    Immobility syndrome [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       53 y.o. female admitted with Necrotizing soft tissue infection.      12/18/23  Added vaginal suppository for itching.    Necrotizing right hip infection  Chronic right hip decubitus ulcer  -ID following  -Ortho evaluated-concerned that this may not heal with antibiotics alone.  If infectious source remains, orthopedic surgeries recommend [facility] for [facility]articulation of the hip.  Patient still is a little resistant to this idea and would like to trial antibiotics at this time.  -no evidence of OM or septic arthritis on MRI  -cont vancomycin    DM2 with neuropathy  -Glargine 10 units twice daily, lispro 8 units 3 times daily AC, SSI  -Continue gabapentin    Hypothyroid  Synthroid 200 mcg    Flow (L/min):  [2] 2    DVT prophylaxis: Lovenox  Discussed with patient and care team on multidisciplinary rounds.  Anticipated discharge TBD, TBSKIP Fontenot MD  [facility]  12/18/23  16:09 EST

## 2023-12-18 NOTE — PROGRESS NOTES
LOS: 7 days     Chief Complaint: Right hip decubitus ulcer    Interval History: Patient is sitting up in bed this morning and afebrile.  WBC stable at 19 today.    Vital Signs  Temp:  [97.3 °F (36.3 °C)-97.7 °F (36.5 °C)] 97.5 °F (36.4 °C)  Heart Rate:  [66-82] 66  Resp:  [16-18] 18  BP: ()/(71-75) 117/75    Physical Exam:  General: In no acute distress  HEENT: Oropharynx clear, moist mucous membranes  Respiratory: Normal work of breathing  Skin: Right hip decubitus wound with dressing in place  Access: Peripheral IV    Antibiotics:  Anti-Infectives (From admission, onward)      Ordered     Dose/Rate Route Frequency Start Stop    12/17/23 1348  vancomycin 1250 mg/250 mL 0.9% NS IVPB ([facility])        Ordering Provider: Cody Sheridan MD    1,250 mg  over 75 Minutes Intravenous Every 24 Hours 12/18/23 0600 12/20/23 0559    12/16/23 1026  fluconazole (DIFLUCAN) tablet 150 mg        Ordering Provider: Lorenzo Ross MD    150 mg Oral Once 12/16/23 1115 12/16/23 1244    12/12/23 0959  cefepime 2000 mg IVPB in 100 ml NS ([facility])        Ordering Provider: Angel Fernando DO    2,000 mg  over 30 Minutes Intravenous Once 12/12/23 1045 12/12/23 1354    12/11/23 2237  piperacillin-tazobactam (ZOSYN) 4.5 g in iso-osmotic dextrose 100 mL IVPB (premix)        Ordering Provider: Rosa Alvarado MD    4.5 g  over 30 Minutes Intravenous Once 12/11/23 2330 12/11/23 2337    12/11/23 2237  Pharmacy to dose vancomycin        Ordering Provider: Angel Fernando DO     Does not apply Continuous PRN 12/11/23 2237 12/18/23 2236    12/11/23 1847  vancomycin 2250 mg/500 mL 0.9% NS IVPB ([facility])        Ordering Provider: Khang Humphrey MD    20 mg/kg × 113 kg  over 135 Minutes Intravenous Once 12/11/23 1903 12/11/23 2124             Results Review:     I reviewed the patient's new clinical results.    Lab Results   Component Value Date    WBC 19.60 (H) 12/18/2023    HGB 7.8 (L) 12/18/2023    HCT 24.9  (L) 12/18/2023    MCV 83.6 12/18/2023     (H) 12/18/2023     Lab Results   Component Value Date    GLUCOSE 90 12/18/2023    BUN 13 12/18/2023    CREATININE 0.79 12/18/2023    BCR 16.5 12/18/2023    CO2 21.0 (L) 12/18/2023    CALCIUM 8.2 (L) 12/18/2023    ALBUMIN 2.2 (L) 12/11/2023    AST 37 (H) 12/11/2023    ALT 9 12/11/2023       Microbiology:  12/11 blood cultures no growth to date  12/11 right hip wound culture MRSA  12/13 right hip OR cultures MRSA    Assessment    #Probable right hip osteomyelitis with abscess  #Suspected right hip septic arthritis  #Chronic right hip decubitus ulcer  #Immobility syndrome  #Diabetes complicating the above  #Obesity, BMI 41    Continue vancomycin goal -600 for MRSA.  WBC continues to remain elevated and remain concern for uncontrolled source of infection at the right hip.  Patient reporting she is open to transfer to [facility] and I would recommend this for evaluation and further source control given her protracted response.    ID will follow.

## 2023-12-18 NOTE — TELEPHONE ENCOUNTER
Patient calling to inform you that she is currently admitted in the hospital and that she would like for you to come over to access her.

## 2023-12-18 NOTE — PLAN OF CARE
Problem: Adult Inpatient Plan of Care  Goal: Plan of Care Review  Outcome: Ongoing, Progressing  Flowsheets (Taken 12/18/2023 0645)  Plan of Care Reviewed With: patient  Goal: Patient-Specific Goal (Individualized)  Outcome: Ongoing, Progressing  Goal: Absence of Hospital-Acquired Illness or Injury  Outcome: Ongoing, Progressing  Intervention: Identify and Manage Fall Risk  Recent Flowsheet Documentation  Taken 12/18/2023 0600 by Itzel Madden RN  Safety Promotion/Fall Prevention: safety round/check completed  Taken 12/18/2023 0400 by Itzel Madden RN  Safety Promotion/Fall Prevention: safety round/check completed  Taken 12/18/2023 0200 by Itzel Madden RN  Safety Promotion/Fall Prevention: safety round/check completed  Taken 12/18/2023 0000 by Itzel Madden RN  Safety Promotion/Fall Prevention: safety round/check completed  Intervention: Prevent Skin Injury  Recent Flowsheet Documentation  Taken 12/18/2023 0600 by Itzel Madden RN  Body Position: left  Taken 12/18/2023 0400 by Itzel Madden RN  Body Position: position changed independently  Taken 12/18/2023 0200 by Itzel Madden RN  Body Position: position changed independently  Taken 12/18/2023 0000 by Itzel Madden RN  Body Position:   supine, legs elevated   tilted  Skin Protection: adhesive use limited  Intervention: Prevent and Manage VTE (Venous Thromboembolism) Risk  Recent Flowsheet Documentation  Taken 12/18/2023 0000 by Itzel Madden RN  Activity Management: bedrest  VTE Prevention/Management:   bilateral   sequential compression devices on  Range of Motion: active ROM (range of motion) encouraged  Intervention: Prevent Infection  Recent Flowsheet Documentation  Taken 12/18/2023 0600 by Itzel Madedn RN  Infection Prevention:   single patient room provided   personal protective equipment utilized  Taken 12/18/2023 0400 by Itzel Madden RN  Infection  Prevention:   rest/sleep promoted   hand hygiene promoted  Taken 12/18/2023 0200 by Itzel Madden RN  Infection Prevention:   hand hygiene promoted   rest/sleep promoted  Taken 12/18/2023 0000 by Itzel Madden RN  Infection Prevention:   hand hygiene promoted   rest/sleep promoted  Goal: Optimal Comfort and Wellbeing  Outcome: Ongoing, Progressing  Goal: Readiness for Transition of Care  Outcome: Ongoing, Progressing     Problem: Skin Injury Risk Increased  Goal: Skin Health and Integrity  Outcome: Ongoing, Progressing  Intervention: Optimize Skin Protection  Recent Flowsheet Documentation  Taken 12/18/2023 0600 by Itzel Madden RN  Head of Bed (HOB) Positioning: HOB lowered  Taken 12/18/2023 0400 by Itzel Madden RN  Head of Bed (HOB) Positioning: HOB elevated  Taken 12/18/2023 0200 by Itzel Madden RN  Head of Bed (HOB) Positioning: HOB elevated  Taken 12/18/2023 0000 by Itzel Madden RN  Pressure Reduction Techniques: frequent weight shift encouraged  Head of Bed (HOB) Positioning: HOB elevated  Pressure Reduction Devices: alternating pressure pump (ADD)  Skin Protection: adhesive use limited     Problem: Adjustment to Illness (Sepsis/Septic Shock)  Goal: Optimal Coping  Outcome: Ongoing, Progressing  Intervention: Optimize Psychosocial Adjustment to Illness  Recent Flowsheet Documentation  Taken 12/18/2023 0000 by Itzel Madden RN  Supportive Measures: active listening utilized     Problem: Bleeding (Sepsis/Septic Shock)  Goal: Absence of Bleeding  Outcome: Ongoing, Progressing     Problem: Glycemic Control Impaired (Sepsis/Septic Shock)  Goal: Blood Glucose Level Within Desired Range  Outcome: Ongoing, Progressing  Intervention: Optimize Glycemic Control  Recent Flowsheet Documentation  Taken 12/18/2023 0000 by Itzel Madden RN  Glycemic Management: blood glucose monitored     Problem: Infection Progression (Sepsis/Septic Shock)  Goal:  Absence of Infection Signs and Symptoms  Outcome: Ongoing, Progressing  Intervention: Initiate Sepsis Management  Recent Flowsheet Documentation  Taken 12/18/2023 0600 by Itzel Madden RN  Infection Prevention:   single patient room provided   personal protective equipment utilized  Isolation Precautions: precautions maintained  Taken 12/18/2023 0400 by Itzel Madden RN  Infection Prevention:   rest/sleep promoted   hand hygiene promoted  Isolation Precautions: precautions maintained  Taken 12/18/2023 0200 by Itzel Madden RN  Infection Prevention:   hand hygiene promoted   rest/sleep promoted  Isolation Precautions: precautions maintained  Taken 12/18/2023 0000 by Itzel Madden RN  Infection Prevention:   hand hygiene promoted   rest/sleep promoted  Isolation Precautions: precautions maintained  Intervention: Promote Recovery  Recent Flowsheet Documentation  Taken 12/18/2023 0000 by Itzel Madden RN  Activity Management: bedrest  Sleep/Rest Enhancement: awakenings minimized     Problem: Nutrition Impaired (Sepsis/Septic Shock)  Goal: Optimal Nutrition Intake  Outcome: Ongoing, Progressing     Problem: Diabetes Comorbidity  Goal: Blood Glucose Level Within Targeted Range  Outcome: Ongoing, Progressing  Intervention: Monitor and Manage Glycemia  Recent Flowsheet Documentation  Taken 12/18/2023 0000 by Itzel Madden RN  Glycemic Management: blood glucose monitored     Problem: Hypertension Comorbidity  Goal: Blood Pressure in Desired Range  Outcome: Ongoing, Progressing  Intervention: Maintain Blood Pressure Management  Recent Flowsheet Documentation  Taken 12/18/2023 0600 by Itzel Madden RN  Medication Review/Management: medications reviewed  Taken 12/18/2023 0400 by Itzel Madden RN  Medication Review/Management: medications reviewed  Taken 12/18/2023 0200 by Itzel Madden RN  Medication Review/Management: medications reviewed  Taken  12/18/2023 0000 by Itzel Madden RN  Medication Review/Management: medications reviewed     Problem: Obstructive Sleep Apnea Risk or Actual Comorbidity Management  Goal: Unobstructed Breathing During Sleep  Outcome: Ongoing, Progressing  Intervention: Monitor and Manage Obstructive Sleep Apnea  Recent Flowsheet Documentation  Taken 12/18/2023 0000 by Itzel Madden RN  NPPV/CPAP Maintenance: home PAP equipment/settings used     Problem: Osteoarthritis Comorbidity  Goal: Maintenance of Osteoarthritis Symptom Control  Outcome: Ongoing, Progressing  Intervention: Maintain Osteoarthritis Symptom Control  Recent Flowsheet Documentation  Taken 12/18/2023 0600 by Itzel Madden RN  Medication Review/Management: medications reviewed  Taken 12/18/2023 0400 by Itzel Madden RN  Medication Review/Management: medications reviewed  Taken 12/18/2023 0200 by Itzel Madden RN  Medication Review/Management: medications reviewed  Taken 12/18/2023 0000 by Itzel Madden RN  Activity Management: bedrest  Medication Review/Management: medications reviewed     Problem: Pain Acute  Goal: Acceptable Pain Control and Functional Ability  Outcome: Ongoing, Progressing  Intervention: Prevent or Manage Pain  Recent Flowsheet Documentation  Taken 12/18/2023 0600 by Itzel Madden RN  Medication Review/Management: medications reviewed  Taken 12/18/2023 0400 by Itzel Madden RN  Medication Review/Management: medications reviewed  Taken 12/18/2023 0200 by Itzel Madden RN  Medication Review/Management: medications reviewed  Taken 12/18/2023 0000 by Itzel Madden RN  Sleep/Rest Enhancement: awakenings minimized  Medication Review/Management: medications reviewed  Intervention: Optimize Psychosocial Wellbeing  Recent Flowsheet Documentation  Taken 12/18/2023 0000 by Itzel Madden RN  Supportive Measures: active listening utilized  Diversional Activities:  smartphone     Problem: Fall Injury Risk  Goal: Absence of Fall and Fall-Related Injury  Outcome: Ongoing, Progressing  Intervention: Identify and Manage Contributors  Recent Flowsheet Documentation  Taken 12/18/2023 0600 by Itzel Madden RN  Medication Review/Management: medications reviewed  Taken 12/18/2023 0400 by Itzel Madden RN  Medication Review/Management: medications reviewed  Taken 12/18/2023 0200 by Itzel Madden RN  Medication Review/Management: medications reviewed  Taken 12/18/2023 0000 by Itzel Madden RN  Medication Review/Management: medications reviewed  Intervention: Promote Injury-Free Environment  Recent Flowsheet Documentation  Taken 12/18/2023 0600 by Itzel Madden RN  Safety Promotion/Fall Prevention: safety round/check completed  Taken 12/18/2023 0400 by Itzel Madden RN  Safety Promotion/Fall Prevention: safety round/check completed  Taken 12/18/2023 0200 by Itzel Madden RN  Safety Promotion/Fall Prevention: safety round/check completed  Taken 12/18/2023 0000 by Itzel Madden RN  Safety Promotion/Fall Prevention: safety round/check completed   Goal Outcome Evaluation:  Plan of Care Reviewed With: patient

## 2023-12-19 LAB
ANION GAP SERPL CALCULATED.3IONS-SCNC: 9 MMOL/L (ref 5–15)
BUN SERPL-MCNC: 11 MG/DL (ref 6–20)
BUN/CREAT SERPL: 12.8 (ref 7–25)
CALCIUM SPEC-SCNC: 8.2 MG/DL (ref 8.6–10.5)
CHLORIDE SERPL-SCNC: 114 MMOL/L (ref 98–107)
CO2 SERPL-SCNC: 19 MMOL/L (ref 22–29)
CREAT SERPL-MCNC: 0.86 MG/DL (ref 0.57–1)
DEPRECATED RDW RBC AUTO: 43 FL (ref 37–54)
EGFRCR SERPLBLD CKD-EPI 2021: 81.9 ML/MIN/1.73
ERYTHROCYTE [DISTWIDTH] IN BLOOD BY AUTOMATED COUNT: 14.3 % (ref 12.3–15.4)
GLUCOSE BLDC GLUCOMTR-MCNC: 106 MG/DL (ref 70–130)
GLUCOSE BLDC GLUCOMTR-MCNC: 115 MG/DL (ref 70–130)
GLUCOSE BLDC GLUCOMTR-MCNC: 120 MG/DL (ref 70–130)
GLUCOSE BLDC GLUCOMTR-MCNC: 134 MG/DL (ref 70–130)
GLUCOSE SERPL-MCNC: 163 MG/DL (ref 65–99)
HCT VFR BLD AUTO: 25.7 % (ref 34–46.6)
HGB BLD-MCNC: 7.8 G/DL (ref 12–15.9)
MCH RBC QN AUTO: 26 PG (ref 26.6–33)
MCHC RBC AUTO-ENTMCNC: 30.4 G/DL (ref 31.5–35.7)
MCV RBC AUTO: 85.7 FL (ref 79–97)
PLATELET # BLD AUTO: 727 10*3/MM3 (ref 140–450)
PMV BLD AUTO: 9 FL (ref 6–12)
POTASSIUM SERPL-SCNC: 4 MMOL/L (ref 3.5–5.2)
RBC # BLD AUTO: 3 10*6/MM3 (ref 3.77–5.28)
SODIUM SERPL-SCNC: 142 MMOL/L (ref 136–145)
WBC NRBC COR # BLD AUTO: 18.56 10*3/MM3 (ref 3.4–10.8)

## 2023-12-19 PROCEDURE — 63710000001 INSULIN LISPRO (HUMAN) PER 5 UNITS: Performed by: SURGERY

## 2023-12-19 PROCEDURE — 82948 REAGENT STRIP/BLOOD GLUCOSE: CPT

## 2023-12-19 PROCEDURE — 25810000003 SODIUM CHLORIDE 0.9 % SOLUTION: Performed by: STUDENT IN AN ORGANIZED HEALTH CARE EDUCATION/TRAINING PROGRAM

## 2023-12-19 PROCEDURE — 85027 COMPLETE CBC AUTOMATED: CPT | Performed by: INTERNAL MEDICINE

## 2023-12-19 PROCEDURE — 25010000002 ENOXAPARIN PER 10 MG: Performed by: INTERNAL MEDICINE

## 2023-12-19 PROCEDURE — 99232 SBSQ HOSP IP/OBS MODERATE 35: CPT | Performed by: STUDENT IN AN ORGANIZED HEALTH CARE EDUCATION/TRAINING PROGRAM

## 2023-12-19 PROCEDURE — 63710000001 INSULIN GLARGINE PER 5 UNITS: Performed by: INTERNAL MEDICINE

## 2023-12-19 PROCEDURE — 25010000002 VANCOMYCIN 10 G RECONSTITUTED SOLUTION: Performed by: STUDENT IN AN ORGANIZED HEALTH CARE EDUCATION/TRAINING PROGRAM

## 2023-12-19 PROCEDURE — 25810000003 SODIUM CHLORIDE 0.9 % SOLUTION: Performed by: INTERNAL MEDICINE

## 2023-12-19 PROCEDURE — 80048 BASIC METABOLIC PNL TOTAL CA: CPT | Performed by: INTERNAL MEDICINE

## 2023-12-19 PROCEDURE — 99024 POSTOP FOLLOW-UP VISIT: CPT | Performed by: SURGERY

## 2023-12-19 RX ORDER — VANCOMYCIN/0.9 % SOD CHLORIDE 1.5G/250ML
1500 PLASTIC BAG, INJECTION (ML) INTRAVENOUS EVERY 24 HOURS
Status: COMPLETED | OUTPATIENT
Start: 2023-12-20 | End: 2023-12-20

## 2023-12-19 RX ORDER — SODIUM HYPOCHLORITE 1.25 MG/ML
SOLUTION TOPICAL EVERY 12 HOURS
Status: DISCONTINUED | OUTPATIENT
Start: 2023-12-19 | End: 2023-12-19 | Stop reason: SDUPTHER

## 2023-12-19 RX ORDER — CHOLESTYRAMINE 4 G/9G
1 POWDER, FOR SUSPENSION ORAL DAILY
Status: DISCONTINUED | OUTPATIENT
Start: 2023-12-19 | End: 2023-12-21 | Stop reason: HOSPADM

## 2023-12-19 RX ORDER — SODIUM HYPOCHLORITE 1.25 MG/ML
SOLUTION TOPICAL 2 TIMES DAILY
Status: DISCONTINUED | OUTPATIENT
Start: 2023-12-19 | End: 2023-12-21 | Stop reason: HOSPADM

## 2023-12-19 RX ORDER — LOPERAMIDE HYDROCHLORIDE 2 MG/1
4 CAPSULE ORAL ONCE
Status: COMPLETED | OUTPATIENT
Start: 2023-12-19 | End: 2023-12-19

## 2023-12-19 RX ADMIN — ENOXAPARIN SODIUM 105 MG: 150 INJECTION SUBCUTANEOUS at 23:24

## 2023-12-19 RX ADMIN — CETIRIZINE HYDROCHLORIDE 10 MG: 10 TABLET ORAL at 23:24

## 2023-12-19 RX ADMIN — MICONAZOLE NITRATE 200 MG: 200 SUPPOSITORY VAGINAL at 21:20

## 2023-12-19 RX ADMIN — INSULIN GLARGINE 10 UNITS: 100 INJECTION, SOLUTION SUBCUTANEOUS at 21:20

## 2023-12-19 RX ADMIN — ENOXAPARIN SODIUM 105 MG: 150 INJECTION SUBCUTANEOUS at 08:56

## 2023-12-19 RX ADMIN — MIDODRINE HYDROCHLORIDE 7.5 MG: 5 TABLET ORAL at 06:27

## 2023-12-19 RX ADMIN — Medication 1 TABLET: at 08:52

## 2023-12-19 RX ADMIN — MIDODRINE HYDROCHLORIDE 7.5 MG: 5 TABLET ORAL at 12:09

## 2023-12-19 RX ADMIN — VANCOMYCIN HYDROCHLORIDE 1500 MG: 10 INJECTION, POWDER, LYOPHILIZED, FOR SOLUTION INTRAVENOUS at 06:27

## 2023-12-19 RX ADMIN — MIDODRINE HYDROCHLORIDE 7.5 MG: 5 TABLET ORAL at 17:34

## 2023-12-19 RX ADMIN — GABAPENTIN 300 MG: 300 CAPSULE ORAL at 08:53

## 2023-12-19 RX ADMIN — DAKIN'S SOLUTION 0.125% (QUARTER STRENGTH): 0.12 SOLUTION at 21:20

## 2023-12-19 RX ADMIN — HYDROCODONE BITARTRATE AND ACETAMINOPHEN 1 TABLET: 5; 325 TABLET ORAL at 08:53

## 2023-12-19 RX ADMIN — INSULIN GLARGINE 10 UNITS: 100 INJECTION, SOLUTION SUBCUTANEOUS at 08:53

## 2023-12-19 RX ADMIN — INSULIN LISPRO 8 UNITS: 100 INJECTION, SOLUTION INTRAVENOUS; SUBCUTANEOUS at 17:03

## 2023-12-19 RX ADMIN — INSULIN LISPRO 8 UNITS: 100 INJECTION, SOLUTION INTRAVENOUS; SUBCUTANEOUS at 08:53

## 2023-12-19 RX ADMIN — ACETAMINOPHEN 650 MG: 325 TABLET, FILM COATED ORAL at 08:53

## 2023-12-19 RX ADMIN — ACETAMINOPHEN 650 MG: 325 TABLET, FILM COATED ORAL at 21:20

## 2023-12-19 RX ADMIN — LOPERAMIDE HYDROCHLORIDE 4 MG: 2 CAPSULE ORAL at 01:10

## 2023-12-19 RX ADMIN — LEVOTHYROXINE SODIUM 200 MCG: 100 TABLET ORAL at 08:52

## 2023-12-19 RX ADMIN — GABAPENTIN 300 MG: 300 CAPSULE ORAL at 21:19

## 2023-12-19 RX ADMIN — CHOLESTYRAMINE 1 PACKET: 4 POWDER, FOR SUSPENSION ORAL at 12:09

## 2023-12-19 RX ADMIN — INSULIN LISPRO 8 UNITS: 100 INJECTION, SOLUTION INTRAVENOUS; SUBCUTANEOUS at 12:09

## 2023-12-19 RX ADMIN — SODIUM CHLORIDE 100 ML/HR: 9 INJECTION, SOLUTION INTRAVENOUS at 05:00

## 2023-12-19 NOTE — PROGRESS NOTES
"General Surgery  Progress Note    CC: Follow-up necrotic right hip/buttock wound    POD#6 debridement right buttock wound    S: She is tolerating normal saline wet-to-dry dressing changes.  Her final tissue culture is growing MRSA and she is on IV vancomycin per infectious disease recommendations.    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  CHEST: clear to auscultation, no wheezes, rales or rhonchi, symmetric air entry  CARDIAC: regular rate and rhythm    ABDOMEN: Soft, nontender, nondistended, morbidly obese  EXTREMITIES: Open wound of right buttock/lateral hip shows the beginnings of granulation tissue and a small amount of fibrinous exudate medially, the wound tunnels beneath the skin and subcutaneous fat medially/anteriorly for a length of about 4 cm and was packed with saline moistened 4 x 4's, there is no surrounding cellulitis  SKIN: Warm and moist, no rashes    LABS  Results from last 7 days   Lab Units 12/19/23  0358 12/18/23 0418 12/17/23  0352   WBC 10*3/mm3 18.56* 19.60* 20.79*   HEMOGLOBIN g/dL 7.8* 7.8* 8.2*   HEMATOCRIT % 25.7* 24.9* 27.0*   PLATELETS 10*3/mm3 727* 746* 811*     Results from last 7 days   Lab Units 12/19/23  0358 12/18/23  0418 12/17/23  0352   SODIUM mmol/L 142 140 139   POTASSIUM mmol/L 4.0 3.7 3.8   CHLORIDE mmol/L 114* 110* 107   CO2 mmol/L 19.0* 21.0* 19.8*   BUN mg/dL 11 13 13   CREATININE mg/dL 0.86 0.79 1.02*   CALCIUM mg/dL 8.2* 8.2* 8.4*   GLUCOSE mg/dL 163* 90 79             A/P: 53 y.o. female POD#6 debridement right hip/buttock wound    Continue wet-to-dry dressing changes but switch to Dakin's moistened gauze instead of normal saline irrigation.  She is on the appropriate antibiotics for the MRSA infection.  I " reviewed her recent MRI which shows no evidence of osteomyelitis.  I am hopeful with more aggressive local wound care with the help of Dakin's solution and the appropriate antibiotics, this wound will have a chance of healing.  She inquired about replacing the wound VAC but I do not think this is appropriate as she is high risk for developing recurrent infection.  She can be discharged to home or subacute rehab at any time with home health to assist with local wound care.    Elizabeth Adame MD  General, Robotic, and Endoscopic Surgery  [facility]    [address], [address]  [place] [address]  P: [phone]  F: [phone]

## 2023-12-19 NOTE — CASE MANAGEMENT/SOCIAL WORK
Continued Stay Note  [facility]     Patient Name: Vanessa Root  MRN: [id number]  Today's Date: 12/19/2023    Admit Date: 12/11/2023    Plan: Home with [facility]   Discharge Plan       Row Name 12/19/23 1325       Plan    Plan Home with [facility] home [facility]    Plan Comments Spoke at [facility] with pt re discharge plans. Pt adamantly refusing to go to SNF for IVAB therapy. Pt stated that her daughter, Dulce, will hang antibiotics. Permission obtained to contact daughter to discuss IVAB regiment. CCP contacted daughter, Dulce, who stated that she would be willing and capable of hanging her mother's IVAB. Contacted Lois, liaison with [facility], and informed that pt will be dc home with IVAB. Also placed referral in to [facility]. Lois/Christiane stated that she will contact [facility] re IVAB regiment.                   Discharge Codes    No documentation.                 Expected Discharge Date and Time       Expected Discharge Date Expected Discharge Time    Dec 21, 2023               Amanda Randle RN

## 2023-12-19 NOTE — PROGRESS NOTES
Name: Vanessa Root ADMIT: 2023   : 1970  PCP: Grace Baumann APRN    MRN: [id number] LOS: 8 days   AGE/SEX: 53 y.o. female  ROOM: [facility]     Subjective   Subjective   She feels a little better today, says she has had less diarrhea.     Objective   Objective   Vital Signs  Temp:  [97.7 °F (36.5 °C)-98.1 °F (36.7 °C)] 98.1 °F (36.7 °C)  Heart Rate:  [77-83] 83  Resp:  [18] 18  BP: ()/(64-73) 103/69  SpO2:  [95 %-100 %] 99 %  on  Flow (L/min):  [2] 2;   Device (Oxygen Therapy): CPAP  Body mass index is 41.82 kg/m².  Physical Exam  Constitutional:       General: She is not in acute distress.     Appearance: Normal appearance. She is ill-appearing. She is not toxic-appearing.   Cardiovascular:      Rate and Rhythm: Normal rate and regular rhythm.   Pulmonary:      Effort: Pulmonary effort is normal.   Abdominal:      General: Abdomen is flat. Bowel sounds are normal.      Palpations: Abdomen is soft.   Musculoskeletal:      Comments: Right hip decubitus ulcer; dressing in place C/D/I   Skin:     General: Skin is warm.   Neurological:      General: No focal deficit present.      Mental Status: She is alert and oriented to person, place, and time.   Psychiatric:         Mood and Affect: Mood normal.         Behavior: Behavior normal.         Results Review     I reviewed the patient's new clinical results.  Results from last 7 days   Lab Units 23  0358 238 23  0352 12/15/23  0601   WBC 10*3/mm3 18.56* 19.60* 20.79* 16.68*   HEMOGLOBIN g/dL 7.8* 7.8* 8.2* 8.1*   PLATELETS 10*3/mm3 727* 746* 811* 749*     Results from last 7 days   Lab Units 23  0358 23  0418 23  0352 23  0546   SODIUM mmol/L 142 140 139 138   POTASSIUM mmol/L 4.0 3.7 3.8 3.9   CHLORIDE mmol/L 114* 110* 107 107   CO2 mmol/L 19.0* 21.0* 19.8* 19.6*   BUN mg/dL 11 13 13 10   CREATININE mg/dL 0.86 0.79 1.02* 0.86   GLUCOSE mg/dL 163* 90 79 81   EGFR mL/min/1.73 81.9 90.7 66.7 81.9        Results from last 7 days   Lab Units 12/19/23  0358 12/18/23  0418 12/17/23  0352 12/16/23  0546   CALCIUM mg/dL 8.2* 8.2* 8.4* 8.0*       Glucose   Date/Time Value Ref Range Status   12/18/2023 2340 174 (H) 70 - 130 mg/dL Final   12/18/2023 2131 103 70 - 130 mg/dL Final   12/18/2023 1746 169 (H) 70 - 130 mg/dL Final   12/18/2023 1157 116 70 - 130 mg/dL Final   12/17/2023 2114 120 70 - 130 mg/dL Final   12/17/2023 1633 141 (H) 70 - 130 mg/dL Final   12/17/2023 1155 138 (H) 70 - 130 mg/dL Final       No radiology results for the last day  Scheduled Medications  acetaminophen, 650 mg, Oral, TID  cetirizine, 10 mg, Oral, Nightly  enoxaparin, 1 mg/kg, Subcutaneous, Q12H  gabapentin, 300 mg, Oral, Q12H  HYDROcodone-acetaminophen, 1 tablet, Oral, Daily  insulin glargine, 10 Units, Subcutaneous, Q12H  insulin lispro, 2-9 Units, Subcutaneous, 4x Daily AC & at Bedtime  insulin lispro, 8 Units, Subcutaneous, TID With Meals  levothyroxine, 200 mcg, Oral, Daily  miconazole, 200 mg, Vaginal, Nightly  midodrine, 7.5 mg, Oral, TID AC  multivitamin with minerals, 1 tablet, Oral, Daily  vancomycin, 1,500 mg, Intravenous, Q24H    Infusions  lactated ringers, 9 mL/hr, Last Rate: Stopped (12/13/23 0904)  Pharmacy to dose vancomycin,   sodium chloride, 100 mL/hr, Last Rate: 100 mL/hr (12/19/23 0500)    Diet  Diet: Regular/House Diet; Texture: Regular Texture (IDDSI 7); Fluid Consistency: Thin (IDDSI 0)       Assessment/Plan     Active Hospital Problems    Diagnosis  POA    **Necrotizing soft tissue infection [M79.89]  Yes    MRSA (methicillin resistant Staphylococcus aureus) infection [A49.02]  Yes    Chronic anticoagulation for history of PE [Z79.01]  Not Applicable    Pyogenic arthritis of right hip [M00.9]  Yes    History of pulmonary embolism [Z86.711]  Yes    Anemia, chronic disease [D63.8]  Yes    Hypotension [I95.9]  Yes    Severe malnutrition [E43]  Yes    Infected wound [T14.8XXA, L08.9]  Yes    Obesity, Class III, BMI  40-49.9 (morbid obesity) [E66.01]  Yes    History of ischemic stroke [Z86.73]  Not Applicable    Pressure ulcer of other site, stage 4 [L89.894]  Yes    Spinal stenosis of lumbar region with neurogenic claudication [M48.062]  Yes    Immobility syndrome [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       53 y.o. female admitted with Necrotizing soft tissue infection.      12/19/23  D/w Dr Fernando. Concern is that without adequate source control the infection will worsen into osteomyelitis. This was discussed with patient. At [facility] D[facility]w Dr Fernandez and if worse infection develops, disarticulation of hip likely only option. Goal at this time remains to use IV antibiotics with aggressive local wound care and hope that this is sufficient to treat. We discussed SNF with patient, however she adamantly refused. Plan is for PICC placement, Home Health, daughter to assist, and IV abx. Will await final duration recs. Likely dc 12/20.    Necrotizing right hip infection  Chronic right hip decubitus ulcer  -ID following  -Ortho evaluated-concerned that this may not heal with antibiotics alone.  If infectious source remains, orthopedic surgeries recommend transfer to [facility] for disarticulation of the hip.  Patient still is a little resistant to this idea and would like to trial antibiotics at this time.  -no evidence of OM or septic arthritis on MRI  -cont vancomycin    DM2 with neuropathy  -Glargine 10 units twice daily, lispro 8 units 3 times daily AC, SSI  -Continue gabapentin    IBS w/ diarrhea  -given undigested food in stool,  will trial choleystramine to see if she gets any relief    Hypothyroid  Synthroid 200 mcg    Flow (L/min):  [2] 2    DVT prophylaxis: Lovenox  Discussed with patient and care team on multidisciplinary rounds.  Anticipated discharge home with HH, tomorrow            Gustavo Fontenot MD  [facility]  12/19/23  07:28 EST

## 2023-12-19 NOTE — PROGRESS NOTES
LOS: 8 days     Chief Complaint: Right hip decubitus ulcer    Interval History: Patient reports he is doing well this morning other than some diarrhea overnight.  Tolerating breakfast well this morning.  She remains afebrile with WBC down to 18.    Vital Signs  Temp:  [97.7 °F (36.5 °C)-98.4 °F (36.9 °C)] 98.4 °F (36.9 °C)  Heart Rate:  [75-83] 75  Resp:  [18] 18  BP: ()/(60-73) 83/60    Physical Exam:  General: In no acute distress  HEENT: Oropharynx clear, moist mucous membranes  Respiratory: Normal work of breathing  Skin: Right hip decubitus wound with dressing in place  Access: Peripheral IV    Antibiotics:  Anti-Infectives (From admission, onward)      Ordered     Dose/Rate Route Frequency Start Stop    12/19/23 0834  vancomycin IVPB 1500 mg in 0.9% NaCl (Premix) 500 mL        Ordering Provider: Gustavo Fontenot MD    1,500 mg  333.3 mL/hr over 90 Minutes Intravenous Every 24 Hours 12/20/23 0800 12/21/23 0759    12/18/23 1255  vancomycin IVPB 1500 mg in 0.9% NaCl (Premix) 500 mL        Ordering Provider: Angel Fernando DO    1,500 mg  333.3 mL/hr over 90 Minutes Intravenous Every 24 Hours 12/19/23 0600 12/19/23 0757    12/18/23 1249  Pharmacy to dose vancomycin        Ordering Provider: Angel Fernando DO     Does not apply Continuous PRN 12/18/23 1249 12/20/23 1248    12/16/23 1026  fluconazole (DIFLUCAN) tablet 150 mg        Ordering Provider: Lorenzo Ross MD    150 mg Oral Once 12/16/23 1115 12/16/23 1244    12/12/23 0959  cefepime 2000 mg IVPB in 100 ml NS (VTB)        Ordering Provider: Angel Fernando DO    2,000 mg  over 30 Minutes Intravenous Once 12/12/23 1045 12/12/23 1354    12/11/23 2237  piperacillin-tazobactam (ZOSYN) 4.5 g in iso-osmotic dextrose 100 mL IVPB (premix)        Ordering Provider: Rosa Alvarado MD    4.5 g  over 30 Minutes Intravenous Once 12/11/23 2330 12/11/23 2337    12/11/23 1847  vancomycin 2250 mg/500 mL 0.9% NS IVPB ([facility])         Ordering Provider: Khang Humphrey MD    20 mg/kg × 113 kg  over 135 Minutes Intravenous Once 12/11/23 1903 12/11/23 2124             Results Review:     I reviewed the patient's new clinical results.    Lab Results   Component Value Date    WBC 18.56 (H) 12/19/2023    HGB 7.8 (L) 12/19/2023    HCT 25.7 (L) 12/19/2023    MCV 85.7 12/19/2023     (H) 12/19/2023     Lab Results   Component Value Date    GLUCOSE 163 (H) 12/19/2023    BUN 11 12/19/2023    CREATININE 0.86 12/19/2023    BCR 12.8 12/19/2023    CO2 19.0 (L) 12/19/2023    CALCIUM 8.2 (L) 12/19/2023    ALBUMIN 2.2 (L) 12/11/2023    AST 37 (H) 12/11/2023    ALT 9 12/11/2023       Microbiology:  12/11 blood cultures no growth to date  12/11 right hip wound culture MRSA  12/13 right hip OR cultures MRSA    Assessment    #Probable right hip osteomyelitis with abscess  #Suspected right hip septic arthritis  #Chronic right hip decubitus ulcer  #Immobility syndrome  #Diabetes complicating the above  #Obesity, BMI 41    Had a lengthy discussion with the patient today in regards to source control of her current ongoing problem.  Without further source control I have low expectations and antibiotics alone will cure her infection.  We discussed transfer to [facility] which she is resistant to with no procedures are going to be performed.  At this point I think it is a matter of time before she develops osteomyelitis if more aggressive surgical measures were not undertaken.  For now, continue vancomycin goal -600 for MRSA.      ID will follow.

## 2023-12-19 NOTE — PLAN OF CARE
Goal Outcome Evaluation:  Plan of Care Reviewed With: patient           Outcome Evaluation: VSS, no c/o pain. Pt had 4 loose BMs overnight--extra dose of Imodium ordered per pt request (pt stated 4mg is home dose for IBS PRN)--no other BMs after. Dressing changed per orders. Pt is getting very excoriated from the loose stool--barrier cream generosly applied. IVF continued. Will CTM, safety maintained.

## 2023-12-19 NOTE — PROGRESS NOTES
"[facility] Clinical Pharmacy Services: Vancomycin Monitoring Note    Vanessa Root is a 53 y.o. female who is on day 8 of pharmacy to dose vancomycin for R hip decubitus ulcer/wound infection.      Previous Vancomycin Dose: 1500 mg IV every 12 hours    Updated Cultures and Sensitivities:   12/11 blood cultures no growth to date  12/11 right hip wound culture MRSA  12/13 right hip OR cultures: MRSA     Results from last 7 days   Lab Units 12/17/23  1225 12/15/23  0601 12/13/23  1002   VANCOMYCIN TR mcg/mL 19.10 19.40 20.80*     Vitals/Labs  Ht: 165.1 cm (65\"); Wt: 114 kg (251 lb 5.2 oz)   Temp Readings from Last 1 Encounters:   12/19/23 98.4 °F (36.9 °C) (Oral)     Estimated Creatinine Clearance: 97.5 mL/min (by C-G formula based on SCr of 0.86 mg/dL).     Results from last 7 days   Lab Units 12/19/23  0358 12/18/23  0418 12/17/23  0352   CREATININE mg/dL 0.86 0.79 1.02*   WBC 10*3/mm3 18.56* 19.60* 20.79*     Assessment/Plan    Scr at baseline. WBC remains elevated. Current regimen of 1500mg q24h yields AUC of 512mg/L*hr. If patient is to remain on therapy for extended duration, q24h dosing will provide greatest convenience and reduce risk of accumulation. No changes at this time    We will continue to monitor patient changes and renal function. No levels currently pending.    Thank you for involving pharmacy in this patient's care. Please contact pharmacy with any questions or concerns.       Leobardo Bowser, PharmD        "

## 2023-12-20 PROBLEM — I95.9 HYPOTENSION: Status: RESOLVED | Noted: 2023-12-12 | Resolved: 2023-12-20

## 2023-12-20 LAB
ANION GAP SERPL CALCULATED.3IONS-SCNC: 10.6 MMOL/L (ref 5–15)
BUN SERPL-MCNC: 9 MG/DL (ref 6–20)
BUN/CREAT SERPL: 12.7 (ref 7–25)
CALCIUM SPEC-SCNC: 8.1 MG/DL (ref 8.6–10.5)
CHLORIDE SERPL-SCNC: 115 MMOL/L (ref 98–107)
CO2 SERPL-SCNC: 16.4 MMOL/L (ref 22–29)
CREAT SERPL-MCNC: 0.71 MG/DL (ref 0.57–1)
DEPRECATED RDW RBC AUTO: 43.2 FL (ref 37–54)
EGFRCR SERPLBLD CKD-EPI 2021: 103.1 ML/MIN/1.73
ERYTHROCYTE [DISTWIDTH] IN BLOOD BY AUTOMATED COUNT: 14.3 % (ref 12.3–15.4)
GLUCOSE BLDC GLUCOMTR-MCNC: 121 MG/DL (ref 70–130)
GLUCOSE BLDC GLUCOMTR-MCNC: 214 MG/DL (ref 70–130)
GLUCOSE BLDC GLUCOMTR-MCNC: 88 MG/DL (ref 70–130)
GLUCOSE BLDC GLUCOMTR-MCNC: 96 MG/DL (ref 70–130)
GLUCOSE SERPL-MCNC: 92 MG/DL (ref 65–99)
HCT VFR BLD AUTO: 25.6 % (ref 34–46.6)
HGB BLD-MCNC: 7.9 G/DL (ref 12–15.9)
MCH RBC QN AUTO: 26.4 PG (ref 26.6–33)
MCHC RBC AUTO-ENTMCNC: 30.9 G/DL (ref 31.5–35.7)
MCV RBC AUTO: 85.6 FL (ref 79–97)
PLATELET # BLD AUTO: 659 10*3/MM3 (ref 140–450)
PMV BLD AUTO: 9.2 FL (ref 6–12)
POTASSIUM SERPL-SCNC: 3.8 MMOL/L (ref 3.5–5.2)
RBC # BLD AUTO: 2.99 10*6/MM3 (ref 3.77–5.28)
SODIUM SERPL-SCNC: 142 MMOL/L (ref 136–145)
WBC NRBC COR # BLD AUTO: 15.61 10*3/MM3 (ref 3.4–10.8)

## 2023-12-20 PROCEDURE — 25810000003 SODIUM CHLORIDE 0.9 % SOLUTION: Performed by: STUDENT IN AN ORGANIZED HEALTH CARE EDUCATION/TRAINING PROGRAM

## 2023-12-20 PROCEDURE — 85027 COMPLETE CBC AUTOMATED: CPT | Performed by: STUDENT IN AN ORGANIZED HEALTH CARE EDUCATION/TRAINING PROGRAM

## 2023-12-20 PROCEDURE — 25010000002 VANCOMYCIN 10 G RECONSTITUTED SOLUTION: Performed by: STUDENT IN AN ORGANIZED HEALTH CARE EDUCATION/TRAINING PROGRAM

## 2023-12-20 PROCEDURE — 02HV33Z INSERTION OF INFUSION DEVICE INTO SUPERIOR VENA CAVA, PERCUTANEOUS APPROACH: ICD-10-PCS | Performed by: STUDENT IN AN ORGANIZED HEALTH CARE EDUCATION/TRAINING PROGRAM

## 2023-12-20 PROCEDURE — 63710000001 INSULIN GLARGINE PER 5 UNITS: Performed by: INTERNAL MEDICINE

## 2023-12-20 PROCEDURE — 63710000001 INSULIN LISPRO (HUMAN) PER 5 UNITS: Performed by: SURGERY

## 2023-12-20 PROCEDURE — 63710000001 INSULIN LISPRO (HUMAN) PER 5 UNITS: Performed by: INTERNAL MEDICINE

## 2023-12-20 PROCEDURE — 25010000002 ENOXAPARIN PER 10 MG: Performed by: INTERNAL MEDICINE

## 2023-12-20 PROCEDURE — 25810000003 SODIUM CHLORIDE 0.9 % SOLUTION: Performed by: INTERNAL MEDICINE

## 2023-12-20 PROCEDURE — 80048 BASIC METABOLIC PNL TOTAL CA: CPT | Performed by: STUDENT IN AN ORGANIZED HEALTH CARE EDUCATION/TRAINING PROGRAM

## 2023-12-20 PROCEDURE — 99232 SBSQ HOSP IP/OBS MODERATE 35: CPT | Performed by: STUDENT IN AN ORGANIZED HEALTH CARE EDUCATION/TRAINING PROGRAM

## 2023-12-20 PROCEDURE — C1751 CATH, INF, PER/CENT/MIDLINE: HCPCS

## 2023-12-20 PROCEDURE — 82948 REAGENT STRIP/BLOOD GLUCOSE: CPT

## 2023-12-20 RX ORDER — VANCOMYCIN/0.9 % SOD CHLORIDE 1.5G/250ML
1500 PLASTIC BAG, INJECTION (ML) INTRAVENOUS EVERY 24 HOURS
Qty: 7000 ML | Refills: 0 | Status: SHIPPED | OUTPATIENT
Start: 2023-12-20 | End: 2024-01-03

## 2023-12-20 RX ORDER — SODIUM CHLORIDE 0.9 % (FLUSH) 0.9 %
20 SYRINGE (ML) INJECTION AS NEEDED
Status: DISCONTINUED | OUTPATIENT
Start: 2023-12-20 | End: 2023-12-21 | Stop reason: HOSPADM

## 2023-12-20 RX ORDER — SODIUM CHLORIDE 0.9 % (FLUSH) 0.9 %
10 SYRINGE (ML) INJECTION AS NEEDED
Status: DISCONTINUED | OUTPATIENT
Start: 2023-12-20 | End: 2023-12-21 | Stop reason: HOSPADM

## 2023-12-20 RX ORDER — SODIUM CHLORIDE 0.9 % (FLUSH) 0.9 %
10 SYRINGE (ML) INJECTION EVERY 12 HOURS SCHEDULED
Status: DISCONTINUED | OUTPATIENT
Start: 2023-12-20 | End: 2023-12-21 | Stop reason: HOSPADM

## 2023-12-20 RX ORDER — BLOOD SUGAR DIAGNOSTIC
STRIP MISCELLANEOUS
Qty: 100 EACH | Refills: 12 | Status: SHIPPED | OUTPATIENT
Start: 2023-12-20

## 2023-12-20 RX ORDER — SODIUM HYPOCHLORITE 1.25 MG/ML
15 SOLUTION TOPICAL 2 TIMES DAILY
Qty: 473 ML | Refills: 1 | Status: SHIPPED | OUTPATIENT
Start: 2023-12-20

## 2023-12-20 RX ORDER — MULTIPLE VITAMINS W/ MINERALS TAB 9MG-400MCG
1 TAB ORAL DAILY
Qty: 30 EACH | Refills: 1 | Status: SHIPPED | OUTPATIENT
Start: 2023-12-20

## 2023-12-20 RX ORDER — CHOLESTYRAMINE 4 G/9G
1 POWDER, FOR SUSPENSION ORAL DAILY
Qty: 30 EACH | Refills: 0 | Status: SHIPPED | OUTPATIENT
Start: 2023-12-20

## 2023-12-20 RX ORDER — LANCETS 30 GAUGE
EACH MISCELLANEOUS
Qty: 100 EACH | Refills: 0 | Status: SHIPPED | OUTPATIENT
Start: 2023-12-20

## 2023-12-20 RX ORDER — MIDODRINE HYDROCHLORIDE 2.5 MG/1
7.5 TABLET ORAL
Qty: 270 TABLET | Refills: 0 | Status: SHIPPED | OUTPATIENT
Start: 2023-12-20

## 2023-12-20 RX ORDER — BLOOD-GLUCOSE METER
KIT MISCELLANEOUS
Qty: 1 EACH | Refills: 0 | Status: SHIPPED | OUTPATIENT
Start: 2023-12-20

## 2023-12-20 RX ADMIN — MIDODRINE HYDROCHLORIDE 7.5 MG: 5 TABLET ORAL at 17:35

## 2023-12-20 RX ADMIN — Medication 1 TABLET: at 09:28

## 2023-12-20 RX ADMIN — LOPERAMIDE HYDROCHLORIDE 2 MG: 2 CAPSULE ORAL at 10:44

## 2023-12-20 RX ADMIN — ACETAMINOPHEN 650 MG: 325 TABLET, FILM COATED ORAL at 17:35

## 2023-12-20 RX ADMIN — Medication 10 ML: at 21:25

## 2023-12-20 RX ADMIN — VANCOMYCIN HYDROCHLORIDE 1500 MG: 10 INJECTION, POWDER, LYOPHILIZED, FOR SOLUTION INTRAVENOUS at 09:02

## 2023-12-20 RX ADMIN — INSULIN LISPRO 8 UNITS: 100 INJECTION, SOLUTION INTRAVENOUS; SUBCUTANEOUS at 09:03

## 2023-12-20 RX ADMIN — INSULIN GLARGINE 10 UNITS: 100 INJECTION, SOLUTION SUBCUTANEOUS at 09:03

## 2023-12-20 RX ADMIN — GABAPENTIN 300 MG: 300 CAPSULE ORAL at 09:02

## 2023-12-20 RX ADMIN — INSULIN LISPRO 4 UNITS: 100 INJECTION, SOLUTION INTRAVENOUS; SUBCUTANEOUS at 17:36

## 2023-12-20 RX ADMIN — INSULIN LISPRO 8 UNITS: 100 INJECTION, SOLUTION INTRAVENOUS; SUBCUTANEOUS at 17:35

## 2023-12-20 RX ADMIN — MIDODRINE HYDROCHLORIDE 7.5 MG: 5 TABLET ORAL at 12:11

## 2023-12-20 RX ADMIN — CETIRIZINE HYDROCHLORIDE 10 MG: 10 TABLET ORAL at 21:23

## 2023-12-20 RX ADMIN — CYCLOBENZAPRINE 5 MG: 10 TABLET, FILM COATED ORAL at 21:23

## 2023-12-20 RX ADMIN — SODIUM CHLORIDE 100 ML/HR: 9 INJECTION, SOLUTION INTRAVENOUS at 02:34

## 2023-12-20 RX ADMIN — ACETAMINOPHEN 650 MG: 325 TABLET, FILM COATED ORAL at 21:23

## 2023-12-20 RX ADMIN — Medication 10 ML: at 12:14

## 2023-12-20 RX ADMIN — MIDODRINE HYDROCHLORIDE 7.5 MG: 5 TABLET ORAL at 09:04

## 2023-12-20 RX ADMIN — ACETAMINOPHEN 650 MG: 325 TABLET, FILM COATED ORAL at 09:02

## 2023-12-20 RX ADMIN — DAKIN'S SOLUTION 0.125% (QUARTER STRENGTH): 0.12 SOLUTION at 09:29

## 2023-12-20 RX ADMIN — CHOLESTYRAMINE 1 PACKET: 4 POWDER, FOR SUSPENSION ORAL at 09:02

## 2023-12-20 RX ADMIN — ENOXAPARIN SODIUM 105 MG: 150 INJECTION SUBCUTANEOUS at 21:24

## 2023-12-20 RX ADMIN — INSULIN LISPRO 8 UNITS: 100 INJECTION, SOLUTION INTRAVENOUS; SUBCUTANEOUS at 12:11

## 2023-12-20 RX ADMIN — DAKIN'S SOLUTION 0.125% (QUARTER STRENGTH): 0.12 SOLUTION at 21:25

## 2023-12-20 RX ADMIN — LEVOTHYROXINE SODIUM 200 MCG: 100 TABLET ORAL at 09:02

## 2023-12-20 RX ADMIN — ENOXAPARIN SODIUM 105 MG: 150 INJECTION SUBCUTANEOUS at 09:03

## 2023-12-20 RX ADMIN — GABAPENTIN 300 MG: 300 CAPSULE ORAL at 21:23

## 2023-12-20 RX ADMIN — HYDROCODONE BITARTRATE AND ACETAMINOPHEN 1 TABLET: 5; 325 TABLET ORAL at 09:02

## 2023-12-20 NOTE — CASE MANAGEMENT/SOCIAL WORK
Continued Stay Note  [facility]     Patient Name: Vanessa Root  MRN: [id number]  Today's Date: 12/20/2023    Admit Date: 12/11/2023    Plan: SNF vs home with home health   Discharge Plan       Row Name 12/20/23 0902       Plan    Plan SNF vs home with home health    Plan Comments Long discussion with pt, as she has now decided that she would like to go to SNF. Referral placed to facility per pt request. Contacted [facility]/Signature of referral.                   Discharge Codes    No documentation.                 Expected Discharge Date and Time       Expected Discharge Date Expected Discharge Time    Dec 20, 2023               Amanda Randle RN

## 2023-12-20 NOTE — CASE MANAGEMENT/SOCIAL WORK
"Physicians Statement of Medical Necessity for  Ambulance Transportation    GENERAL INFORMATION     Name: Vanessa Root  YOB: 1970  Medicare #: [id number]  Transport Date: 2023 (Valid for round trips this date, or for scheduled repetitive trips for 60 days from the date signed below.)  Origin: [facility]  Destination: [facility] [place]  Is the Patient's stay covered under Medicare Part A (PPS/DRG?)Yes  Closest appropriate facility? Yes  If this a hosp-hosp transfer? No  Is this a hospice patient? No    MEDICAL NECESSITY QUESTIONAIRE    Ambulance Transportation is medically necessary only if other means of transportation are contraindicated or would be potentially harmful to the patient.  To meet this requirement, the patient must be either \"bed confined\" or suffer from a condition such that transport by means other than an ambulance is contraindicated by the patient's condition.  The following questions must be answered by the healthcare professional signing below for this form to be valid:     1) Describe the MEDICAL CONDITION (physical and/or mental) of this patient AT THE TIME OF AMBULANCE TRANSPORT that requires the patient to be transported in an ambulance, and why transport by other means is contraindicated by the patient's condition:   Past Medical History:   Diagnosis Date    Arthritis     Bipolar 1 disorder     Cancer     uterine    COVID-19     Depression     Diabetes mellitus     Frequent UTI     GERD (gastroesophageal reflux disease)     Hyperlipidemia     Migraine     Murmur, heart     Ovarian cyst     PONV (postoperative nausea and vomiting)     Stroke       Past Surgical History:   Procedure Laterality Date     SECTION  2003    HYSTERECTOMY      INCISION AND DRAINAGE LEG Right 2023    Procedure: Debridement of right thigh wound;  Surgeon: Mohan Escamilla MD;  Location: [facility];  Service: General;  Laterality: Right;    INCISION " "AND DRAINAGE LEG Right 12/13/2023    Procedure: Debridement of right lateral thigh wound;  Surgeon: Mohan Escamilla MD;  Location: [facility] MAIN OR;  Service: General;  Laterality: Right;    TONSILLECTOMY      WOUND DEBRIDEMENT N/A 5/25/2023    Procedure: Debridement necrotizing tissue  right buttock wound;  Surgeon: Elizabeth Adame MD;  Location: [facility] MAIN OR;  Service: General;  Laterality: N/A;      2) Is this patient \"bed confined\" as defined below?No    To be \"bed confined\" the patient must satisfy all three of the following criteria:  (1) unable to get up from bed without assistance; AND (2) unable to ambulate;  AND (3) unable to sit in a chair or wheelchair.  3) Can this patient safely be transported by car or wheelchair van (I.e., may safely sit during transport, without an attendant or monitoring?)No   4. In addition to completing questions 1-3 above, please check any of the following conditions that apply:          *Note: supporting documentation for any boxes checked must be maintained in the patient's medical records Moderate/severe pain on movement, Unable to tolerate seated position for time needed to transport, and Other High falls risk, limited mobility, infected hip      SIGNATURE OF PHYSICIAN OR OTHER AUTHORIZED HEALTHCARE PROFESSIONAL    I certify that the above information is true and correct based on my evaluation of this patient, and represent that the patient requires transport by ambulance and that other forms of transport are contraindicated.  I understand that this information will be used by the Centers for Medicare and Medicaid Services (CMS) to support the determiniation of medical necessity for ambulance services, and I represent that I have personal knowledge of the patient's condition at the time of transport.       If this box is checked, I also certify that the patient is physically or mentally incapable of signing the ambulance service's claim form and that the " institution with which I am affiliated has furnished care, services or assistance to the patient.  My signature below is made on behalf of the patient pursuant to 42 .36(b)(4). In accordance with 42 .37, the specific reason(s) that the patient is physically or mentally incapable of signing the claim for is as follows:     Signature of Physician or Healthcare Professional  Date/Time:        (For Scheduled repetitive transport, this form is not valid for transports performed more than 60 days after this date).                                                                                                                                            --------------------------------------------------------------------------------------------  Printed Name and Credentials of Physician or Authorized Healthcare Professional     Form must be signed by patient's attending physician for scheduled, repetitive transports,.  For non-repetitive ambulance transports, if unable to obtain the signature of the attending physician, any of the following may sign (please select below):     Physician  Clinical Nurse Specialist  Registered Nurse     Physician Assistant  Discharge Planner  Licensed Practical Nurse     Nurse Practitioner

## 2023-12-20 NOTE — DISCHARGE PLACEMENT REQUEST
"Vanessa Villanueva (53 y.o. Female)       Date of Birth   1970    Social Security Number       Address   [address] [id number]    Home Phone   [phone]    MRN   [id number]       [facility]    Marital Status   Single                            Admission Date   12/11/23    Admission Type   Emergency    Admitting Provider   Rosa Alvarado MD    Attending Provider   Gustavo Fontenot MD    Department, Room/Bed   [address], [address]       Discharge Date       Discharge Disposition   Skilled Nursing Facility (DC - External)    Discharge Destination                                 Attending Provider: Gustavo Fontenot MD    Allergies: Clemastine Fumarate, Ziprasidone, Aripiprazole, Sulfa Antibiotics, Green Pepper, Latex, Lisinopril    Isolation: Contact   Infection: MRSA (12/14/23)   Code Status: CPR    Ht: 165.1 cm (65\")   Wt: 116 kg (256 lb 13.4 oz)    Admission Cmt: None   Principal Problem: Necrotizing soft tissue infection [M79.89]                   Active Insurance as of 12/11/2023       Primary Coverage       Payor Plan Insurance Group Employer/Plan Group    MEDICARE MEDICARE A & B        Payor Plan Address Payor Plan Phone Number Payor Plan Fax Number Effective Dates    [address] [phone]  4/1/2003 - None Entered    [facility] [id number]         Subscriber Name Subscriber Birth Date Member ID       VANESSA VILLANUEVA 1970 [id number]               Secondary Coverage       Payor Plan Insurance Group Employer/Plan Group    [place]        Payor Plan Address Payor Plan Phone Number Payor Plan Fax Number Effective Dates    [address] [phone]  11/8/2023 - None Entered    [place] [address]         Subscriber Name Subscriber Birth Date Member ID       VANESSA VILLANUEVA 1970 [id number]                     Emergency Contacts        (Rel.) Home Phone Work Phone Mobile Phone    " WHITNEY VILLANUEVA (Daughter) -- -- [phone]    Pauline Villanueva (Mother) -- -- [phone]

## 2023-12-20 NOTE — SIGNIFICANT NOTE
"   12/20/23 1148   PICC Single Lumen 12/20/23 Right Basilic   Placement date: If unknown, DO NOT use \"Add Comment\" note/Placement time: If unknown, DO NOT use \"Add Comment\" note: 12/20/23 1147   Hand Hygiene Completed: Yes  Size (Fr): 4  Length (cm): 39 cm  Orientation: Right  Location: Basilic  Site Prep: Chlor...   Site Assessment Clean;Dry;Intact   #1 Lumen Status Blood return noted;Capped;Flushed;Normal saline locked   Length ashly (cm) 39 cm   Extremity Circumference (cm) 36 cm   Dressing Type Border Dressing;Transparent;Securing device;Antimicrobial dressing/disc   Dressing Status Clean;Dry;Intact   Dressing Intervention New dressing   Dressing Change Due 12/27/23   Indication/Daily Review of Necessity long-term IV access >7 days     3 needles, 2 wires, and 1 scalpel accounted for and disposed of properly.    Lot #: [id number]  EXP DATE: 12/20/2023        "

## 2023-12-20 NOTE — PROGRESS NOTES
Name: Vanessa Root ADMIT: 2023   : 1970  PCP: Grace Baumann APRN    MRN: [id number] LOS: 9 days   AGE/SEX: 53 y.o. female  ROOM: [facility]     Subjective   Subjective   She is feeling better today.  After further discussions she has finally agreed to SNF.    Objective   Objective   Vital Signs  Temp:  [97.3 °F (36.3 °C)-97.9 °F (36.6 °C)] 97.9 °F (36.6 °C)  Heart Rate:  [70-72] 70  Resp:  [18] 18  BP: (101-116)/(65-78) 101/65  SpO2:  [95 %-99 %] 97 %  on  Flow (L/min):  [2] 2;   Device (Oxygen Therapy): CPAP  Body mass index is 42.74 kg/m².  Physical Exam  Constitutional:       General: She is not in acute distress.     Appearance: Normal appearance. She is ill-appearing. She is not toxic-appearing.   Cardiovascular:      Rate and Rhythm: Normal rate and regular rhythm.   Pulmonary:      Effort: Pulmonary effort is normal.   Abdominal:      General: Abdomen is flat. Bowel sounds are normal.      Palpations: Abdomen is soft.   Musculoskeletal:      Comments: Right hip decubitus ulcer; dressing in place C/D/I   Skin:     General: Skin is warm.   Neurological:      General: No focal deficit present.      Mental Status: She is alert and oriented to person, place, and time.   Psychiatric:         Mood and Affect: Mood normal.         Behavior: Behavior normal.         Results Review     I reviewed the patient's new clinical results.  Results from last 7 days   Lab Units 23  035   WBC 10*3/mm3 15.61* 18.56* 19.60* 20.79*   HEMOGLOBIN g/dL 7.9* 7.8* 7.8* 8.2*   PLATELETS 10*3/mm3 659* 727* 746* 811*     Results from last 7 days   Lab Units 238 23  035   SODIUM mmol/L 142 142 140 139   POTASSIUM mmol/L 3.8 4.0 3.7 3.8   CHLORIDE mmol/L 115* 114* 110* 107   CO2 mmol/L 16.4* 19.0* 21.0* 19.8*   BUN mg/dL 9 11 13 13   CREATININE mg/dL 0.71 0.86 0.79 1.02*   GLUCOSE mg/dL 92 163* 90 79   EGFR mL/min/1.73  103.1 81.9 90.7 66.7       Results from last 7 days   Lab Units 12/20/23  0424 12/19/23  0358 12/18/23  0418 12/17/23  0352   CALCIUM mg/dL 8.1* 8.2* 8.2* 8.4*       Glucose   Date/Time Value Ref Range Status   12/20/2023 1158 121 70 - 130 mg/dL Final   12/20/2023 0800 88 70 - 130 mg/dL Final   12/19/2023 2029 134 (H) 70 - 130 mg/dL Final   12/19/2023 1619 115 70 - 130 mg/dL Final   12/19/2023 1200 106 70 - 130 mg/dL Final   12/19/2023 0823 120 70 - 130 mg/dL Final   12/18/2023 2340 174 (H) 70 - 130 mg/dL Final       No radiology results for the last day  Scheduled Medications  acetaminophen, 650 mg, Oral, TID  cetirizine, 10 mg, Oral, Nightly  cholestyramine, 1 packet, Oral, Daily  enoxaparin, 1 mg/kg, Subcutaneous, Q12H  gabapentin, 300 mg, Oral, Q12H  HYDROcodone-acetaminophen, 1 tablet, Oral, Daily  insulin glargine, 10 Units, Subcutaneous, Q12H  insulin lispro, 2-9 Units, Subcutaneous, 4x Daily AC & at Bedtime  insulin lispro, 8 Units, Subcutaneous, TID With Meals  levothyroxine, 200 mcg, Oral, Daily  miconazole, 200 mg, Vaginal, Nightly  midodrine, 7.5 mg, Oral, TID AC  multivitamin with minerals, 1 tablet, Oral, Daily  sodium chloride, 10 mL, Intravenous, Q12H  sodium hypochlorite, , Topical, BID    Infusions  Pharmacy to dose vancomycin,     Diet  Diet: Regular/House Diet; Texture: Regular Texture (IDDSI 7); Fluid Consistency: Thin (IDDSI 0)       Assessment/Plan     Active Hospital Problems    Diagnosis  POA    **Necrotizing soft tissue infection [M79.89]  Yes    MRSA (methicillin resistant Staphylococcus aureus) infection [A49.02]  Yes    Chronic anticoagulation for history of PE [Z79.01]  Not Applicable    Pyogenic arthritis of right hip [M00.9]  Yes    History of pulmonary embolism [Z86.711]  Yes    Anemia, chronic disease [D63.8]  Yes    Severe malnutrition [E43]  Yes    Infected wound [T14.8XXA, L08.9]  Yes    Obesity, Class III, BMI 40-49.9 (morbid obesity) [E66.01]  Yes    History of ischemic stroke  [Z86.73]  Not Applicable    Pressure ulcer of other site, stage 4 [L89.894]  Yes    Spinal stenosis of lumbar region with neurogenic claudication [M48.062]  Yes    Immobility syndrome [M62.3]  Yes    Hyperlipidemia [E78.5]  Yes    Type 2 diabetes mellitus with diabetic polyneuropathy [E11.42]  Yes    GERD (gastroesophageal reflux disease) [K21.9]  Yes    Bipolar 1 disorder [F31.9]  Yes    Depression [F32.A]  Yes    Muscle weakness [M62.81]  Yes    Obstructive sleep apnea syndrome [G47.33]  Yes    Hypothyroidism [E03.9]  Yes      Resolved Hospital Problems    Diagnosis Date Resolved POA    Hypotension [I95.9] 12/20/2023 Yes       53 y.o. female admitted with Necrotizing soft tissue infection.      12/20/23  After initially refusing SNF, she has now agreed.  PICC line has been placed.  Plan to DC 12/21/2023.      D/w Dr Fernando 12/19. Concern is that without adequate source control the infection will worsen into osteomyelitis. This was discussed with patient. At this time no additional intervention likely at [facility]. D/w Dr Fernandez and if worse infection develops, disarticulation of hip likely only option. Goal at this time remains to use IV antibiotics with aggressive local wound care and hope that this is sufficient to treat.     Necrotizing right hip infection  Chronic right hip decubitus ulcer  -ID following  -Ortho evaluated-concerned that this may not heal with antibiotics alone.  If infectious source remains, orthopedic surgeries recommend transfer to [facility] for disarticulation of the hip.  Patient still is a little resistant to this idea and would like to trial antibiotics at this time.  -no evidence of OM or septic arthritis on MRI  -cont vancomycin x 14d    DM2 with neuropathy  -Glargine 10 units twice daily, lispro 8 units 3 times daily AC, SSI  -Continue gabapentin    IBS w/ diarrhea  -given undigested food in stool, will trial choleystramine to see if she gets any relief    Hypothyroid  Synthroid 200 mcg    Flow  (L/min):  [2] 2    DVT prophylaxis: Lovenox  Discussed with patient and care team on multidisciplinary rounds.  Anticipated discharge SNF, tomorrow            Gustavo Fontenot MD  [facility]  12/20/23  13:45 EST

## 2023-12-20 NOTE — PLAN OF CARE
Goal Outcome Evaluation:  Plan of Care Reviewed With: patient        Progress: improving  Outcome Evaluation: wound care completed. denies pain. VSS. cpap when sleeping. iv fluids running. q2h turns. possible discharge with home health today if can get a PICC line for abx. [facility] care completed.

## 2023-12-20 NOTE — PROGRESS NOTES
LOS: 9 days     Chief Complaint: Right hip decubitus ulcer    Interval History: Patient reports he is doing well this morning.  Tolerating antibiotics well.  Remains afebrile.  Leukocytosis down to 15.    Vital Signs  Temp:  [97.3 °F (36.3 °C)-98.4 °F (36.9 °C)] 97.9 °F (36.6 °C)  Heart Rate:  [70-75] 70  Resp:  [18] 18  BP: ()/(60-78) 101/65    Physical Exam:  General: In no acute distress  HEENT: Oropharynx clear, moist mucous membranes  Respiratory: Normal work of breathing  Skin: Right hip decubitus wound with dressing in place  Access: Peripheral IV    Antibiotics:  Anti-Infectives (From admission, onward)      Ordered     Dose/Rate Route Frequency Start Stop    12/19/23 0834  vancomycin IVPB 1500 mg in 0.9% NaCl (Premix) 500 mL        Ordering Provider: Gustavo Fontenot MD    1,500 mg  333.3 mL/hr over 90 Minutes Intravenous Every 24 Hours 12/20/23 0800 12/21/23 0759    12/20/23 0801  Vancomycin HCl in NaCl 1.5-0.9 GM/500ML-% solution IVPB        Ordering Provider: Gustavo Fontenot MD    1,500 mg Intravenous Every 24 Hours 12/20/23 0000 01/03/24 2359    12/18/23 1255  vancomycin IVPB 1500 mg in 0.9% NaCl (Premix) 500 mL        Ordering Provider: Angel Fernadno DO    1,500 mg  333.3 mL/hr over 90 Minutes Intravenous Every 24 Hours 12/19/23 0600 12/19/23 0757    12/18/23 1249  Pharmacy to dose vancomycin        Ordering Provider: Angel Fernando DO     Does not apply Continuous PRN 12/18/23 1249 12/20/23 1248    12/16/23 1026  fluconazole (DIFLUCAN) tablet 150 mg        Ordering Provider: Lorenzo Ross MD    150 mg Oral Once 12/16/23 1115 12/16/23 1244    12/12/23 0959  cefepime 2000 mg IVPB in 100 ml NS (VTB)        Ordering Provider: Angel Fernando DO    2,000 mg  over 30 Minutes Intravenous Once 12/12/23 1045 12/12/23 1354    12/11/23 4085  piperacillin-tazobactam (ZOSYN) 4.5 g in iso-osmotic dextrose 100 mL IVPB (premix)        Ordering Provider: Jenny  Rosa Willams MD    4.5 g  over 30 Minutes Intravenous Once 12/11/23 2330 12/11/23 2337    12/11/23 1847  vancomycin 2250 mg/500 mL 0.9% NS IVPB ([facility])        Ordering Provider: Khang Humphrey MD    20 mg/kg × 113 kg  over 135 Minutes Intravenous Once 12/11/23 1903 12/11/23 2124             Results Review:     I reviewed the patient's new clinical results.    Lab Results   Component Value Date    WBC 15.61 (H) 12/20/2023    HGB 7.9 (L) 12/20/2023    HCT 25.6 (L) 12/20/2023    MCV 85.6 12/20/2023     (H) 12/20/2023     Lab Results   Component Value Date    GLUCOSE 92 12/20/2023    BUN 9 12/20/2023    CREATININE 0.71 12/20/2023    BCR 12.7 12/20/2023    CO2 16.4 (L) 12/20/2023    CALCIUM 8.1 (L) 12/20/2023    ALBUMIN 2.2 (L) 12/11/2023    AST 37 (H) 12/11/2023    ALT 9 12/11/2023       Microbiology:  12/11 blood cultures no growth to date  12/11 right hip wound culture MRSA  12/13 right hip OR cultures MRSA    Assessment    #Probable right hip osteomyelitis with abscess  #Suspected right hip septic arthritis  #Chronic right hip decubitus ulcer  #Immobility syndrome  #Diabetes complicating the above  #Obesity, BMI 41    Patient desires to attempt antibiotic therapy alone though I have outlined the benefits and risks of this plan.  She wants to go to rehab and trial antibiotics for now so we will continue vancomycin goal -600 for MRSA for 2-week course.  End date will be 12/29.  Since she is going to a rehab then would recommend a weekly vancomycin level, creatinine and CBC while on therapy faxed to [phone].  PICC line has been ordered.    Thank you for allowing me to be involved in the care of this patient. Infectious diseases will sign off at this time with antibiotics plan in place, but please call me at [phone] if any further ID questions or new ID concerns.

## 2023-12-20 NOTE — DISCHARGE SUMMARY
Patient Name: Vanessa Root  : 1970  MRN: [id number]    Date of Admission: 2023  Date of Discharge:  2023  Primary Care Physician: Grace Baumann APRN      Chief Complaint:   Wound Check      Discharge Diagnoses     Active Hospital Problems    Diagnosis  POA   • **Necrotizing soft tissue infection [M79.89]  Yes   • MRSA (methicillin resistant Staphylococcus aureus) infection [A49.02]  Yes   • Chronic anticoagulation for history of PE [Z79.01]  Not Applicable   • Pyogenic arthritis of right hip [M00.9]  Yes   • History of pulmonary embolism [Z86.711]  Yes   • Anemia, chronic disease [D63.8]  Yes   • Severe malnutrition [E43]  Yes   • Infected wound [T14.8XXA, L08.9]  Yes   • Obesity, Class III, BMI 40-49.9 (morbid obesity) [E66.01]  Yes   • History of ischemic stroke [Z86.73]  Not Applicable   • Pressure ulcer of other site, stage 4 [L89.894]  Yes   • Spinal stenosis of lumbar region with neurogenic claudication [M48.062]  Yes   • Immobility syndrome [M62.3]  Yes   • Hyperlipidemia [E78.5]  Yes   • Type 2 diabetes mellitus with diabetic polyneuropathy [E11.42]  Yes   • GERD (gastroesophageal reflux disease) [K21.9]  Yes   • Bipolar 1 disorder [F31.9]  Yes   • Depression [F32.A]  Yes   • Muscle weakness [M62.81]  Yes   • Obstructive sleep apnea syndrome [G47.33]  Yes   • Hypothyroidism [E03.9]  Yes      Resolved Hospital Problems    Diagnosis Date Resolved POA   • Hypotension [I95.9] 2023 Yes        Admitting HPI     53 year old female who presented to the emergency room with pain of her right hip and knee; she is bedbound after a stroke and has a hip wound; she has had a prior debridement done by dr dash; she has had a temp of 99.6 and has had drainage from the wound; she has also been weaker than usual     Hospital Course     Pt admitted for chronic sacral ulcer.  She was seen in consultation with orthopedics, surgery and infectious disease.  She underwent debridement on  12/13/2023.  Imaging obtained did not show septic arthritis or osteomyelitis.  There remains concerns from infectious disease that this will not heal without additional intervention, however at the present time surgical services not recommending any acute intervention given lack of deeper infection.  Recommendations have been made for wet-to-dry dressing changes with Dakin's moistened gauze, as well as 2 weeks of IV vancomycin via PICC line.  Initially patient was resistant to SNF, however after further discussion she finally agreed to this in order to give her the best chance of healing.  Orthopedic surgery has noted that at this fails to treat the wound, disarticulation of the hip is likely the next step.  Case discussed with patient and she expresses agreement.  She is stable for discharge to SNF.    Discharge Plan     Necrotizing right hip infection, MRSA  Chronic right hip decubitus ulcer  -ID evaluated  -Ortho evaluated-concerned that this may not heal with antibiotics alone.  If infectious source remains, orthopedic surgeries recommend transfer to [facility] for disarticulation of the hip.  -no evidence of OM or septic arthritis on MRI  -cont vancomycin x 14d    -goal -600 (end date 12/29/23)    -weekly vanc, Crt, CBC faxed to [phone]     DM2 with neuropathy  -Glargine 10 units twice daily, lispro 8 units 3 times daily AC, SSI  -Continue gabapentin     IBS w/ diarrhea  -given undigested food in stool, will trial choleystramine to see if she gets any relief     Hypothyroid  Synthroid 200 mcg    Day of Discharge     Physical Exam:  Temp:  [97.3 °F (36.3 °C)-98.6 °F (37 °C)] 97.3 °F (36.3 °C)  Heart Rate:  [65-83] 65  Resp:  [18] 18  BP: (113-143)/(64-73) 113/64  Body mass index is 44.87 kg/m².  Physical Exam  Constitutional:       General: She is not in acute distress.     Appearance: Normal appearance. She is ill-appearing. She is not toxic-appearing.   Cardiovascular:      Rate and Rhythm: Normal rate  and regular rhythm.   Pulmonary:      Effort: Pulmonary effort is normal.   Abdominal:      General: Abdomen is flat. Bowel sounds are normal.      Palpations: Abdomen is soft.   Musculoskeletal:      Comments: Right hip decubitus ulcer; dressing in place C/D/I      RUE PICC line  Skin:     General: Skin is warm.   Neurological:      General: No focal deficit present.      Mental Status: She is alert and oriented to person, place, and time.   Psychiatric:         Mood and Affect: Mood normal.         Behavior: Behavior normal.     Consultants     Consult Orders (all) (From admission, onward)       Start     Ordered    12/19/23 1613  IV TEAM - Consult for PICC Line (IV Team to Determine Number of Lumens)  Once        Provider:  (Not yet assigned)    12/19/23 1612    12/12/23 0134  Inpatient Case Management  Consult  Once        Provider:  (Not yet assigned)    12/12/23 0133    12/12/23 0027  Inpatient Diabetes Educator Consult  Once,   Status:  Canceled        Provider:  (Not yet assigned)    12/12/23 0028    12/11/23 2238  Inpatient Infectious Diseases Consult  Once        Specialty:  Infectious Diseases  Provider:  Zeinab Hilton MD    12/11/23 2237 12/11/23 2238  Inpatient General Surgery Consult  Once        Specialty:  General Surgery  Provider:  Elizabeth Adame MD    12/11/23 2237 12/11/23 1831  [facility] (on-call MD unless specified) Details  Once        Specialty:  Hospitalist  Provider:  Rosa Alvarado MD    12/11/23 1830    12/11/23 1831  Ortho (on-call MD unless specified)  Once        Specialty:  Orthopedic Surgery  Provider:  Ryan Fernandez II, MD    12/11/23 1830                  Procedures     Debridement of right lateral thigh wound      Imaging Results (All)       Procedure Component Value Units Date/Time    XR Shoulder 2+ View Left [[id number]] Collected: 12/15/23 1201     Updated: 12/15/23 1204    Narrative:      XR SHOULDER 2+ VW LEFT-     Clinical: Left shoulder  pain, fell     FINDINGS: There is glenohumeral joint narrowing. The acromioclavicular  joint is satisfactory in appearance. Satisfactory internal and external  rotation, no fracture or dislocation seen. Adjacent ribs and overlying  soft tissues have a satisfactory appearance. The remainder is  unremarkable.     This report was finalized on 12/15/2023 12:01 PM by Dr. Rom Reyes M.D on Workstation: [id number]       MRI Hip Right With & Without Contrast [[id number]] Collected: 12/14/23 0711     Updated: 12/14/23 0740    Narrative:      MRI RIGHT HIP WITH AND WITHOUT CONTRAST     HISTORY: Chronic open wound; evaluate for abscess and osteomyelitis.  Pelvis CT performed 2 days ago showed an open wound and collection of  air and fluid contiguous with the wound superficial to the greater  trochanter with some gas bubbles extending into the gluteus yash  muscle but no loculated, undrained fluid collection.     TECHNIQUE: MRI of the right hip was performed before and after the  administration of 20 mL of MultiHance contrast and is correlated with  the CT from a few days ago.     FINDINGS: Marrow signal in the proximal femurs and throughout the pelvis  as well as in the visualized lower lumbar spine is normal with the  exception of degenerative endplate signal change around the L5-S1 disc.  There is no abnormal joint fluid at either hip. No abnormal synovial  enhancement. The SI joints and symphysis pubis are normal as well.     The poorly circumscribed collection of fluid and air bubbles superficial  to the greater trochanter which was seen to communicate with the lateral  right hip open soft tissue wound on CT a few days ago is again observed.  The cavity measures about 6 cm craniocaudad, 4 cm AP and 3 cm  transverse. The air bubble seen in the right gluteus yash muscle on  CT are present again although, as expected, significantly less  conspicuous than on the CT exam. The gluteus yash muscle is edematous  and  demonstrates some enhancement but there is no focal fluid  collection.     No intrapelvic lesion is present. There is diffuse third spacing.       Impression:      Open wound over the lateral right hip communicates with an  air and fluid-filled cavity in the soft tissue superficial to the  greater trochanter. Some air bubbles are again observed in the right  gluteus yash muscle posteriorly but are not associated with any focal  fluid collection. There is no evidence of osteomyelitis or septic  arthritis.     This report was finalized on 12/14/2023 7:37 AM by Dr. Crescencio Sepulveda M.D on Workstation: [id number]       CT Pelvis Without Contrast [[id number]] Collected: 12/11/23 1742     Updated: 12/11/23 1757    Narrative:      CT PELVIS WO CONTRAST-     Radiation dose reduction techniques were utilized, including automated  exposure control and exposure modulation based on body size.     Clinical: Chronic right hip soft tissue wound now with worsening right  hip pain     COMPARISON 11/8/2023     FINDINGS: The amount of subcutaneous fat edema and skin thickening along  the lateral aspect of the right hip and upper thigh has increased within  the interim. This extends at least to the distal thigh and upper  abdomen. Progressive cellulitis.     The open wound has extended deeper into the soft tissues, and there is a  collection of gas and fluid adjacent to the greater trochanter measuring  5.5 x 3 cm. The amount of gas is greater than fluid at this location.  This extends to the bone and there is now a possible small focus of   cortical breakthrough. Potential osteomyelitis. The collection is  contiguous with the right gluteus yash which is now thickened with  several gas bubbles noted within. This process does not extend cephalad  or inferiorly into the mid thigh. It is in very close proximity to the  lateral hip bursa. There is small hip effusion. Potential septic  arthropathy.     There is a Quiroz catheter within  the bladder lumen. No acute abnormality  within the pelvis seen. The remainder is unremarkable.              This report was finalized on 12/11/2023 5:54 PM by Dr. Rom Reyes M.D on Workstation: [id number]       XR Femur 2 View Right [[id number]] Collected: 12/11/23 1645     Updated: 12/11/23 1651    Narrative:      XR FEMUR 2 VW RIGHT-     Clinical: Right hip and knee pain, right hip wound, no trauma     FINDINGS: No femur fracture demonstrated. There is narrowing of the  medial and lateral compartments of the knee. Also degenerative change  about the patellofemoral articulation. Hip joint alignment is  satisfactory. There appears to be some narrowing and subchondral  sclerosis. There is clothing artifact superimposing the entire  field-of-view. No radiopaque foreign body seen. The remainder is  unremarkable.     This report was finalized on 12/11/2023 4:47 PM by Dr. Rom Reyes M.D on Workstation: [id number]             Results for orders placed during the hospital encounter of 08/25/23    Duplex Venous Lower Extremity - Bilateral CAR    Interpretation Summary  •  Acute right lower extremity superficial thrombophlebitis noted in the great saphenous (below knee).  •  Left popliteal fossa fluid collection.  •  All other veins appeared normal bilaterally.      Pertinent Labs     Results from last 7 days   Lab Units 12/21/23 0525 12/20/23 0424 12/19/23 0358 12/18/23 0418   WBC 10*3/mm3 13.41* 15.61* 18.56* 19.60*   HEMOGLOBIN g/dL 8.2* 7.9* 7.8* 7.8*   PLATELETS 10*3/mm3 646* 659* 727* 746*     Results from last 7 days   Lab Units 12/21/23  0525 12/20/23  0424 12/19/23 0358 12/18/23 0418   SODIUM mmol/L 142 142 142 140   POTASSIUM mmol/L 3.7 3.8 4.0 3.7   CHLORIDE mmol/L 114* 115* 114* 110*   CO2 mmol/L 19.1* 16.4* 19.0* 21.0*   BUN mg/dL 7 9 11 13   CREATININE mg/dL 0.78 0.71 0.86 0.79   GLUCOSE mg/dL 76 92 163* 90   EGFR mL/min/1.73 92.1 103.1 81.9 90.7       Results from last 7 days   Lab Units  "12/21/23  0525 12/20/23  0424 12/19/23  0358 12/18/23  0418   CALCIUM mg/dL 8.4* 8.1* 8.2* 8.2*               Invalid input(s): \"LDLCALC\"          Test Results Pending at Discharge       Discharge Details        Discharge Medications        New Medications        Instructions Start Date   Alcohol Pads 70 % pads   Apply one alcohol swab to injection site of skin immediately prior to insulin injection. Formulary Compliance Approval.      cholestyramine 4 g packet  Commonly known as: QUESTRAN   1 packet, Oral, Daily      Lancets misc   Use to test blood glucose up to four times daily as needed. Formulary Compliance Approval. Diagnosis: Type 2 Diabetes - Insulin Dependent      miconazole 200 MG vaginal suppository  Commonly known as: MICOTIN   200 mg, Vaginal, Nightly      midodrine 2.5 MG tablet  Commonly known as: PROAMATINE   7.5 mg, Oral, 3 Times Daily Before Meals      multivitamin with minerals tablet tablet   1 tablet, Oral, Daily      sodium hypochlorite 0.125 % solution topical solution 0.125%  Commonly known as: DAKIN'S 1/4 STRENGTH   15 mL, Topical, 2 Times Daily      Vancomycin HCl in NaCl 1.5-0.9 GM/500ML-% solution IVPB   1,500 mg, Intravenous, Every 24 Hours             Changes to Medications        Instructions Start Date   glucose blood test strip  What changed: Another medication with the same name was added. Make sure you understand how and when to take each.   1 each, Other, 3 Times Daily, Test blood sugar once daily before a meal and as needed.      glucose blood test strip  What changed: You were already taking a medication with the same name, and this prescription was added. Make sure you understand how and when to take each.   Use to test blood glucose up to four times daily as needed. Formulary Compliance Approval. Diagnosis: Type 2 Diabetes - Insulin Dependent      glucose monitor monitoring kit  What changed: Another medication with the same name was added. Make sure you understand how and " when to take each.   1 each, Does not apply, As Needed      glucose monitor monitoring kit  What changed: You were already taking a medication with the same name, and this prescription was added. Make sure you understand how and when to take each.   Use to test blood glucose up to four times daily as needed. Formulary Compliance Approval. Diagnosis: Type 2 Diabetes - Insulin Dependent      levothyroxine 200 MCG tablet  Commonly known as: SYNTHROID, LEVOTHROID  What changed: additional instructions   200 mcg, Oral, Daily      levothyroxine 75 MCG tablet  Commonly known as: SYNTHROID, LEVOTHROID  What changed:   how much to take  how to take this  when to take this  additional instructions   Take one a day.      NovoLOG FlexPen 100 UNIT/ML solution pen-injector sc pen  Generic drug: insulin aspart  What changed:   how much to take  how to take this  when to take this  additional instructions   Use as directed up to 16 U with each meal      Tresiba FlexTouch 200 UNIT/ML solution pen-injector pen injection  Generic drug: Insulin Degludec  What changed: See the new instructions.   ADMINISTER 60 UNITS UNDER THE SKIN DAILY AS DIRECTED             Continue These Medications        Instructions Start Date   alosetron 0.5 MG tablet  Commonly known as: LOTRONEX   0.5 mg, Oral, Daily      apixaban 5 MG tablet tablet  Commonly known as: ELIQUIS   5 mg, Oral, 2 Times Daily      cetirizine 10 MG tablet  Commonly known as: zyrTEC   10 mg, Oral, Nightly      cyclobenzaprine 5 MG tablet  Commonly known as: FLEXERIL   5 mg, Oral, 3 Times Daily PRN      gabapentin 300 MG capsule  Commonly known as: NEURONTIN   300 mg, Oral, Every 12 Hours Scheduled      HYDROcodone-acetaminophen 5-325 MG per tablet  Commonly known as: NORCO   1 tablet, Oral, Daily      hydrOXYzine 50 MG tablet  Commonly known as: ATARAX   50 mg, Oral, 3 Times Daily PRN      Insulin Pen Needle 32G X 4 MM misc   Use as directed with insulin with meals and at night     "  Insulin Syringe 31G X 5/16\" 1 ML misc   No dose, route, or frequency recorded.      ondansetron ODT 4 MG disintegrating tablet  Commonly known as: ZOFRAN-ODT   4 mg, Translingual, Every 8 Hours PRN             Stop These Medications      levoFLOXacin 750 MG tablet  Commonly known as: Levaquin              Allergies   Allergen Reactions   • Clemastine Fumarate Other (See Comments)     SEVERE ORBITAL SWELLING   • Ziprasidone Other (See Comments)     EXTREME SLEEPINESS  EXTREME SLEEPINESS     • Aripiprazole Other (See Comments)     MIGRAINES  MIGRAINES     • Sulfa Antibiotics Nausea Only   • Green Pepper Hives     unknown   • Latex Rash   • Lisinopril Cough       Discharge Disposition:  Skilled Nursing Facility (DC - External)      Discharge Diet:  Diet Order   Procedures   • Diet: Regular/House Diet; Texture: Regular Texture (IDDSI 7); Fluid Consistency: Thin (IDDSI 0)       Discharge Activity:   Activity Instructions       Activity as Tolerated              CODE STATUS:    Code Status and Medical Interventions:   Ordered at: 12/11/23 7724     Code Status (Patient has no pulse and is not breathing):    CPR (Attempt to Resuscitate)     Medical Interventions (Patient has pulse or is breathing):    Full       No future appointments.   Contact information for follow-up providers       Grace Baumann APRN Follow up in 1 week(s).    Specialty: Family Medicine  Contact information:  [address]  [address]  [place] [address]  [phone]                       Contact information for after-discharge care       Destination       [facility] .    Service: Skilled Nursing  Contact information:  [address] [facility] [address] [phone]                     Home Medical Care       [facility] .    Services: Home Health Services, Home Nursing  Contact information:  [address]  [facility] " [address]  [phone]                                   Time Spent on Discharge:  Greater than 30 minutes spent on discharge management including final examination, discussion of hospital stay and patient education, preparation of records, medication reconciliation, follow up planning      Gustavo Fontenot MD  [facility]  12/21/23  08:01 EST

## 2023-12-21 ENCOUNTER — TELEPHONE (OUTPATIENT)
Dept: SURGERY | Facility: CLINIC | Age: 53
End: 2023-12-21
Payer: MEDICARE

## 2023-12-21 VITALS
HEIGHT: 65 IN | TEMPERATURE: 97.3 F | RESPIRATION RATE: 18 BRPM | SYSTOLIC BLOOD PRESSURE: 112 MMHG | OXYGEN SATURATION: 100 % | DIASTOLIC BLOOD PRESSURE: 64 MMHG | HEART RATE: 71 BPM | WEIGHT: 269.62 LBS | BODY MASS INDEX: 44.92 KG/M2

## 2023-12-21 LAB
ANION GAP SERPL CALCULATED.3IONS-SCNC: 8.9 MMOL/L (ref 5–15)
BUN SERPL-MCNC: 7 MG/DL (ref 6–20)
BUN/CREAT SERPL: 9 (ref 7–25)
CALCIUM SPEC-SCNC: 8.4 MG/DL (ref 8.6–10.5)
CHLORIDE SERPL-SCNC: 114 MMOL/L (ref 98–107)
CO2 SERPL-SCNC: 19.1 MMOL/L (ref 22–29)
CREAT SERPL-MCNC: 0.78 MG/DL (ref 0.57–1)
DEPRECATED RDW RBC AUTO: 44.9 FL (ref 37–54)
EGFRCR SERPLBLD CKD-EPI 2021: 92.1 ML/MIN/1.73
ERYTHROCYTE [DISTWIDTH] IN BLOOD BY AUTOMATED COUNT: 14.6 % (ref 12.3–15.4)
GLUCOSE BLDC GLUCOMTR-MCNC: 105 MG/DL (ref 70–130)
GLUCOSE SERPL-MCNC: 76 MG/DL (ref 65–99)
HCT VFR BLD AUTO: 26.8 % (ref 34–46.6)
HGB BLD-MCNC: 8.2 G/DL (ref 12–15.9)
MCH RBC QN AUTO: 26.5 PG (ref 26.6–33)
MCHC RBC AUTO-ENTMCNC: 30.6 G/DL (ref 31.5–35.7)
MCV RBC AUTO: 86.5 FL (ref 79–97)
PLATELET # BLD AUTO: 646 10*3/MM3 (ref 140–450)
PMV BLD AUTO: 9.4 FL (ref 6–12)
POTASSIUM SERPL-SCNC: 3.7 MMOL/L (ref 3.5–5.2)
RBC # BLD AUTO: 3.1 10*6/MM3 (ref 3.77–5.28)
SODIUM SERPL-SCNC: 142 MMOL/L (ref 136–145)
WBC NRBC COR # BLD AUTO: 13.41 10*3/MM3 (ref 3.4–10.8)

## 2023-12-21 PROCEDURE — 25010000002 ENOXAPARIN PER 10 MG: Performed by: INTERNAL MEDICINE

## 2023-12-21 PROCEDURE — 80048 BASIC METABOLIC PNL TOTAL CA: CPT | Performed by: STUDENT IN AN ORGANIZED HEALTH CARE EDUCATION/TRAINING PROGRAM

## 2023-12-21 PROCEDURE — 63710000001 INSULIN LISPRO (HUMAN) PER 5 UNITS: Performed by: SURGERY

## 2023-12-21 PROCEDURE — 82948 REAGENT STRIP/BLOOD GLUCOSE: CPT

## 2023-12-21 PROCEDURE — 63710000001 INSULIN GLARGINE PER 5 UNITS: Performed by: INTERNAL MEDICINE

## 2023-12-21 PROCEDURE — 85027 COMPLETE CBC AUTOMATED: CPT | Performed by: STUDENT IN AN ORGANIZED HEALTH CARE EDUCATION/TRAINING PROGRAM

## 2023-12-21 RX ORDER — VANCOMYCIN/0.9 % SOD CHLORIDE 1.5G/250ML
1500 PLASTIC BAG, INJECTION (ML) INTRAVENOUS EVERY 24 HOURS
Status: DISCONTINUED | OUTPATIENT
Start: 2023-12-21 | End: 2023-12-21 | Stop reason: HOSPADM

## 2023-12-21 RX ADMIN — CHOLESTYRAMINE 1 PACKET: 4 POWDER, FOR SUSPENSION ORAL at 09:31

## 2023-12-21 RX ADMIN — Medication 10 ML: at 05:39

## 2023-12-21 RX ADMIN — Medication 10 ML: at 05:38

## 2023-12-21 RX ADMIN — INSULIN GLARGINE 10 UNITS: 100 INJECTION, SOLUTION SUBCUTANEOUS at 09:36

## 2023-12-21 RX ADMIN — ENOXAPARIN SODIUM 105 MG: 150 INJECTION SUBCUTANEOUS at 09:32

## 2023-12-21 RX ADMIN — Medication 1 TABLET: at 09:31

## 2023-12-21 RX ADMIN — MIDODRINE HYDROCHLORIDE 7.5 MG: 5 TABLET ORAL at 09:31

## 2023-12-21 RX ADMIN — LEVOTHYROXINE SODIUM 200 MCG: 100 TABLET ORAL at 09:31

## 2023-12-21 RX ADMIN — HYDROCODONE BITARTRATE AND ACETAMINOPHEN 1 TABLET: 5; 325 TABLET ORAL at 09:34

## 2023-12-21 RX ADMIN — GABAPENTIN 300 MG: 300 CAPSULE ORAL at 09:36

## 2023-12-21 RX ADMIN — Medication 10 ML: at 09:46

## 2023-12-21 RX ADMIN — ACETAMINOPHEN 650 MG: 325 TABLET, FILM COATED ORAL at 09:33

## 2023-12-21 RX ADMIN — INSULIN LISPRO 8 UNITS: 100 INJECTION, SOLUTION INTRAVENOUS; SUBCUTANEOUS at 09:34

## 2023-12-21 NOTE — PLAN OF CARE
Goal Outcome Evaluation:  Plan of Care Reviewed With: patient        Progress: improving   Patient has been alert and oriented. No complaints of pain. Dressing changed to right hip prior to discharge. All belongings gathered. Quiroz cath emptied prior to discharge. Vital signs stable. Heart monitor removed. EMS here and transported out. Attempt to call [facility] will continue to call.

## 2023-12-21 NOTE — CASE MANAGEMENT/SOCIAL WORK
Continued Stay Note  Psychiatric     Patient Name: Vanessa Root  MRN: [id number]  Today's Date: 12/21/2023    Admit Date: 12/11/2023    Plan: DC to [facility], [facility] to transport at 1100   Discharge Plan       Row Name 12/21/23 0923       Plan    Plan DC to [facility], [facility] to transport at 1100    Plan Comments Pt to be dc to [facility]. [facility] to transport at 1100. Informed pt and daughterDulce of disposition plans. Contacted [facility] and informed of  time. Packet to nurse.                   Discharge Codes    No documentation.                 Expected Discharge Date and Time       Expected Discharge Date Expected Discharge Time    Dec 21, 2023               Amanda Randle RN

## 2023-12-21 NOTE — DISCHARGE PLACEMENT REQUEST
"Vanessa Villanueva (53 y.o. Female)       Date of Birth   1970    Social Security Number       Address   [address] [id number]    Home Phone   [phone]    MRN   [id number]       [facility]    Marital Status   Single                            Admission Date   12/11/23    Admission Type   Emergency    Admitting Provider   Rosa Alvarado MD    Attending Provider   Gustavo Fontenot MD    Department, Room/Bed   [address], [address]1       Discharge Date       Discharge Disposition   Skilled Nursing Facility (DC - External)    Discharge Destination                                 Attending Provider: Gustavo Fontenot MD    Allergies: Clemastine Fumarate, Ziprasidone, Aripiprazole, Sulfa Antibiotics, Green Pepper, Latex, Lisinopril    Isolation: Contact   Infection: MRSA (12/14/23)   Code Status: CPR    Ht: 165.1 cm (65\")   Wt: 122 kg (269 lb 10 oz)    Admission Cmt: None   Principal Problem: Necrotizing soft tissue infection [M79.89]                   Active Insurance as of 12/11/2023       Primary Coverage       Payor Plan Insurance Group Employer/Plan Group    MEDICARE MEDICARE A & B        Payor Plan Address Payor Plan Phone Number Payor Plan Fax Number Effective Dates    [address] [phone]  4/1/2003 - None Entered    [facility] [id number]         Subscriber Name Subscriber Birth Date Member ID       VANESSA VILLANUEVA 1970 [id number]               Secondary Coverage       Payor Plan Insurance Group Employer/Plan Group    [place]        Payor Plan Address Payor Plan Phone Number Payor Plan Fax Number Effective Dates    [address] [phone]  11/8/2023 - None Entered    [place] [address]         Subscriber Name Subscriber Birth Date Member ID       VANESSA VILLANUEVA 1970 [id number]                     Emergency Contacts        (Rel.) Home Phone Work Phone Mobile Phone    " WHITNEY VILLANUEVA (Daughter) -- -- [phone]    Pauline Villanueva (Mother) -- -- [phone]                   Discharge Summary        Gustavo Fontenot MD at 23              Patient Name: Vanessa Villanueva  : 1970  MRN: [id number]    Date of Admission: 2023  Date of Discharge:  2023  Primary Care Physician: Grace Baumann APRN      Chief Complaint:   Wound Check      Discharge Diagnoses     Active Hospital Problems    Diagnosis  POA    **Necrotizing soft tissue infection [M79.89]  Yes    MRSA (methicillin resistant Staphylococcus aureus) infection [A49.02]  Yes    Chronic anticoagulation for history of PE [Z79.01]  Not Applicable    Pyogenic arthritis of right hip [M00.9]  Yes    History of pulmonary embolism [Z86.711]  Yes    Anemia, chronic disease [D63.8]  Yes    Severe malnutrition [E43]  Yes    Infected wound [T14.8XXA, L08.9]  Yes    Obesity, Class III, BMI 40-49.9 (morbid obesity) [E66.01]  Yes    History of ischemic stroke [Z86.73]  Not Applicable    Pressure ulcer of other site, stage 4 [L89.894]  Yes    Spinal stenosis of lumbar region with neurogenic claudication [M48.062]  Yes    Immobility syndrome [M62.3]  Yes    Hyperlipidemia [E78.5]  Yes    Type 2 diabetes mellitus with diabetic polyneuropathy [E11.42]  Yes    GERD (gastroesophageal reflux disease) [K21.9]  Yes    Bipolar 1 disorder [F31.9]  Yes    Depression [F32.A]  Yes    Muscle weakness [M62.81]  Yes    Obstructive sleep apnea syndrome [G47.33]  Yes    Hypothyroidism [E03.9]  Yes      Resolved Hospital Problems    Diagnosis Date Resolved POA    Hypotension [I95.9] 2023 Yes        Admitting HPI     53 year old female who presented to the emergency room with pain of her right hip and knee; she is bedbound after a stroke and has a hip wound; she has had a prior debridement done by dr dash; she has had a temp of 99.6 and has had drainage from the wound; she has also been weaker than usual      Hospital Course     Pt admitted for chronic sacral ulcer.  She was seen in consultation with orthopedics, surgery and infectious disease.  She underwent debridement on 12/13/2023.  Imaging obtained did not show septic arthritis or osteomyelitis.  There remains concerns from infectious disease that this will not heal without additional intervention, however at the present time surgical services not recommending any acute intervention given lack of deeper infection.  Recommendations have been made for wet-to-dry dressing changes with Dakin's moistened gauze, as well as 2 weeks of IV vancomycin via PICC line.  Initially patient was resistant to SNF, however after further discussion she finally agreed to this in order to give her the best chance of healing.  Orthopedic surgery has noted that at this fails to treat the wound, disarticulation of the hip is likely the next step.  Case discussed with patient and she expresses agreement.  She is stable for discharge to SNF.    Discharge Plan     Necrotizing right hip infection, MRSA  Chronic right hip decubitus ulcer  -ID evaluated  -Ortho evaluated-concerned that this may not heal with antibiotics alone.  If infectious source remains, orthopedic surgeries recommend transfer to [facility] for disarticulation of the hip.  -no evidence of OM or septic arthritis on MRI  -cont vancomycin x 14d    -goal -600 (end date 12/29/23)    -weekly vanc, Crt, CBC faxed to [phone]     DM2 with neuropathy  -Glargine 10 units twice daily, lispro 8 units 3 times daily AC, SSI  -Continue gabapentin     IBS w/ diarrhea  -given undigested food in stool, will trial choleystramine to see if she gets any relief     Hypothyroid  Synthroid 200 mcg    Day of Discharge     Physical Exam:  Temp:  [97.3 °F (36.3 °C)-98.6 °F (37 °C)] 97.3 °F (36.3 °C)  Heart Rate:  [65-83] 65  Resp:  [18] 18  BP: (113-143)/(64-73) 113/64  Body mass index is 44.87 kg/m².  Physical Exam  Constitutional:        General: She is not in acute distress.     Appearance: Normal appearance. She is ill-appearing. She is not toxic-appearing.   Cardiovascular:      Rate and Rhythm: Normal rate and regular rhythm.   Pulmonary:      Effort: Pulmonary effort is normal.   Abdominal:      General: Abdomen is flat. Bowel sounds are normal.      Palpations: Abdomen is soft.   Musculoskeletal:      Comments: Right hip decubitus ulcer; dressing in place C/D/I      RUE PICC line  Skin:     General: Skin is warm.   Neurological:      General: No focal deficit present.      Mental Status: She is alert and oriented to person, place, and time.   Psychiatric:         Mood and Affect: Mood normal.         Behavior: Behavior normal.     Consultants     Consult Orders (all) (From admission, onward)       Start     Ordered    12/19/23 1613  IV TEAM - Consult for PICC Line (IV Team to Determine Number of Lumens)  Once        Provider:  (Not yet assigned)    12/19/23 1612    12/12/23 0134  Inpatient Case Management  Consult  Once        Provider:  (Not yet assigned)    12/12/23 0133    12/12/23 0027  Inpatient Diabetes Educator Consult  Once,   Status:  Canceled        Provider:  (Not yet assigned)    12/12/23 0028    12/11/23 2238  Inpatient Infectious Diseases Consult  Once        Specialty:  Infectious Diseases  Provider:  Zeinab Hilton MD    12/11/23 2237 12/11/23 2238  Inpatient General Surgery Consult  Once        Specialty:  General Surgery  Provider:  Elizabeth Adame MD    12/11/23 2237 12/11/23 1831  [facility] (on-call MD unless specified) Details  Once        Specialty:  Hospitalist  Provider:  Rosa Alvarado MD    12/11/23 1830 12/11/23 1831  Ortho (on-call MD unless specified)  Once        Specialty:  Orthopedic Surgery  Provider:  Rayn Fernandez II, MD    12/11/23 1830                  Procedures     Debridement of right lateral thigh wound      Imaging Results (All)       Procedure Component Value  Units Date/Time    XR Shoulder 2+ View Left [[id number]] Collected: 12/15/23 1201     Updated: 12/15/23 1204    Narrative:      XR SHOULDER 2+ VW LEFT-     Clinical: Left shoulder pain, fell     FINDINGS: There is glenohumeral joint narrowing. The acromioclavicular  joint is satisfactory in appearance. Satisfactory internal and external  rotation, no fracture or dislocation seen. Adjacent ribs and overlying  soft tissues have a satisfactory appearance. The remainder is  unremarkable.     This report was finalized on 12/15/2023 12:01 PM by Dr. Rom Reyes M.D on Workstation: [id number]       MRI Hip Right With & Without Contrast [[id number]] Collected: 12/14/23 0711     Updated: 12/14/23 0740    Narrative:      MRI RIGHT HIP WITH AND WITHOUT CONTRAST     HISTORY: Chronic open wound; evaluate for abscess and osteomyelitis.  Pelvis CT performed 2 days ago showed an open wound and collection of  air and fluid contiguous with the wound superficial to the greater  trochanter with some gas bubbles extending into the gluteus yash  muscle but no loculated, undrained fluid collection.     TECHNIQUE: MRI of the right hip was performed before and after the  administration of 20 mL of MultiHance contrast and is correlated with  the CT from a few days ago.     FINDINGS: Marrow signal in the proximal femurs and throughout the pelvis  as well as in the visualized lower lumbar spine is normal with the  exception of degenerative endplate signal change around the L5-S1 disc.  There is no abnormal joint fluid at either hip. No abnormal synovial  enhancement. The SI joints and symphysis pubis are normal as well.     The poorly circumscribed collection of fluid and air bubbles superficial  to the greater trochanter which was seen to communicate with the lateral  right hip open soft tissue wound on CT a few days ago is again observed.  The cavity measures about 6 cm craniocaudad, 4 cm AP and 3 cm  transverse. The air bubble seen in  the right gluteus yash muscle on  CT are present again although, as expected, significantly less  conspicuous than on the CT exam. The gluteus yash muscle is edematous  and demonstrates some enhancement but there is no focal fluid  collection.     No intrapelvic lesion is present. There is diffuse third spacing.       Impression:      Open wound over the lateral right hip communicates with an  air and fluid-filled cavity in the soft tissue superficial to the  greater trochanter. Some air bubbles are again observed in the right  gluteus yash muscle posteriorly but are not associated with any focal  fluid collection. There is no evidence of osteomyelitis or septic  arthritis.     This report was finalized on 12/14/2023 7:37 AM by Dr. Crescencio Sepulveda M.D on Workstation: [id number]       CT Pelvis Without Contrast [[id number]] Collected: 12/11/23 1742     Updated: 12/11/23 1757    Narrative:      CT PELVIS WO CONTRAST-     Radiation dose reduction techniques were utilized, including automated  exposure control and exposure modulation based on body size.     Clinical: Chronic right hip soft tissue wound now with worsening right  hip pain     COMPARISON 11/8/2023     FINDINGS: The amount of subcutaneous fat edema and skin thickening along  the lateral aspect of the right hip and upper thigh has increased within  the interim. This extends at least to the distal thigh and upper  abdomen. Progressive cellulitis.     The open wound has extended deeper into the soft tissues, and there is a  collection of gas and fluid adjacent to the greater trochanter measuring  5.5 x 3 cm. The amount of gas is greater than fluid at this location.  This extends to the bone and there is now a possible small focus of   cortical breakthrough. Potential osteomyelitis. The collection is  contiguous with the right gluteus yash which is now thickened with  several gas bubbles noted within. This process does not extend cephalad  or  inferiorly into the mid thigh. It is in very close proximity to the  lateral hip bursa. There is small hip effusion. Potential septic  arthropathy.     There is a Quiroz catheter within the bladder lumen. No acute abnormality  within the pelvis seen. The remainder is unremarkable.              This report was finalized on 12/11/2023 5:54 PM by Dr. Rom Reyes M.D on Workstation: [id number]       XR Femur 2 View Right [[id number]] Collected: 12/11/23 1645     Updated: 12/11/23 1651    Narrative:      XR FEMUR 2 VW RIGHT-     Clinical: Right hip and knee pain, right hip wound, no trauma     FINDINGS: No femur fracture demonstrated. There is narrowing of the  medial and lateral compartments of the knee. Also degenerative change  about the patellofemoral articulation. Hip joint alignment is  satisfactory. There appears to be some narrowing and subchondral  sclerosis. There is clothing artifact superimposing the entire  field-of-view. No radiopaque foreign body seen. The remainder is  unremarkable.     This report was finalized on 12/11/2023 4:47 PM by Dr. Rom Reyes M.D on Workstation: [id number]             Results for orders placed during the hospital encounter of 08/25/23    Duplex Venous Lower Extremity - Bilateral CAR    Interpretation Summary    Acute right lower extremity superficial thrombophlebitis noted in the great saphenous (below knee).    Left popliteal fossa fluid collection.    All other veins appeared normal bilaterally.      Pertinent Labs     Results from last 7 days   Lab Units 12/21/23  0525 12/20/23  0424 12/19/23 0358 12/18/23 0418   WBC 10*3/mm3 13.41* 15.61* 18.56* 19.60*   HEMOGLOBIN g/dL 8.2* 7.9* 7.8* 7.8*   PLATELETS 10*3/mm3 646* 659* 727* 746*     Results from last 7 days   Lab Units 12/21/23  0525 12/20/23  0424 12/19/23  0358 12/18/23 0418   SODIUM mmol/L 142 142 142 140   POTASSIUM mmol/L 3.7 3.8 4.0 3.7   CHLORIDE mmol/L 114* 115* 114* 110*   CO2 mmol/L 19.1* 16.4* 19.0* 21.0*  "  BUN mg/dL 7 9 11 13   CREATININE mg/dL 0.78 0.71 0.86 0.79   GLUCOSE mg/dL 76 92 163* 90   EGFR mL/min/1.73 92.1 103.1 81.9 90.7       Results from last 7 days   Lab Units 12/21/23  0525 12/20/23  0424 12/19/23  0358 12/18/23  0418   CALCIUM mg/dL 8.4* 8.1* 8.2* 8.2*               Invalid input(s): \"LDLCALC\"          Test Results Pending at Discharge       Discharge Details        Discharge Medications        New Medications        Instructions Start Date   Alcohol Pads 70 % pads   Apply one alcohol swab to injection site of skin immediately prior to insulin injection. Formulary Compliance Approval.      cholestyramine 4 g packet  Commonly known as: QUESTRAN   1 packet, Oral, Daily      Lancets misc   Use to test blood glucose up to four times daily as needed. Formulary Compliance Approval. Diagnosis: Type 2 Diabetes - Insulin Dependent      miconazole 200 MG vaginal suppository  Commonly known as: MICOTIN   200 mg, Vaginal, Nightly      midodrine 2.5 MG tablet  Commonly known as: PROAMATINE   7.5 mg, Oral, 3 Times Daily Before Meals      multivitamin with minerals tablet tablet   1 tablet, Oral, Daily      sodium hypochlorite 0.125 % solution topical solution 0.125%  Commonly known as: DAKIN'S 1/4 STRENGTH   15 mL, Topical, 2 Times Daily      Vancomycin HCl in NaCl 1.5-0.9 GM/500ML-% solution IVPB   1,500 mg, Intravenous, Every 24 Hours             Changes to Medications        Instructions Start Date   glucose blood test strip  What changed: Another medication with the same name was added. Make sure you understand how and when to take each.   1 each, Other, 3 Times Daily, Test blood sugar once daily before a meal and as needed.      glucose blood test strip  What changed: You were already taking a medication with the same name, and this prescription was added. Make sure you understand how and when to take each.   Use to test blood glucose up to four times daily as needed. Formulary Compliance Approval. " Diagnosis: Type 2 Diabetes - Insulin Dependent      glucose monitor monitoring kit  What changed: Another medication with the same name was added. Make sure you understand how and when to take each.   1 each, Does not apply, As Needed      glucose monitor monitoring kit  What changed: You were already taking a medication with the same name, and this prescription was added. Make sure you understand how and when to take each.   Use to test blood glucose up to four times daily as needed. Formulary Compliance Approval. Diagnosis: Type 2 Diabetes - Insulin Dependent      levothyroxine 200 MCG tablet  Commonly known as: SYNTHROID, LEVOTHROID  What changed: additional instructions   200 mcg, Oral, Daily      levothyroxine 75 MCG tablet  Commonly known as: SYNTHROID, LEVOTHROID  What changed:   how much to take  how to take this  when to take this  additional instructions   Take one a day.      NovoLOG FlexPen 100 UNIT/ML solution pen-injector sc pen  Generic drug: insulin aspart  What changed:   how much to take  how to take this  when to take this  additional instructions   Use as directed up to 16 U with each meal      Tresiba FlexTouch 200 UNIT/ML solution pen-injector pen injection  Generic drug: Insulin Degludec  What changed: See the new instructions.   ADMINISTER 60 UNITS UNDER THE SKIN DAILY AS DIRECTED             Continue These Medications        Instructions Start Date   alosetron 0.5 MG tablet  Commonly known as: LOTRONEX   0.5 mg, Oral, Daily      apixaban 5 MG tablet tablet  Commonly known as: ELIQUIS   5 mg, Oral, 2 Times Daily      cetirizine 10 MG tablet  Commonly known as: zyrTEC   10 mg, Oral, Nightly      cyclobenzaprine 5 MG tablet  Commonly known as: FLEXERIL   5 mg, Oral, 3 Times Daily PRN      gabapentin 300 MG capsule  Commonly known as: NEURONTIN   300 mg, Oral, Every 12 Hours Scheduled      HYDROcodone-acetaminophen 5-325 MG per tablet  Commonly known as: NORCO   1 tablet, Oral, Daily     "  hydrOXYzine 50 MG tablet  Commonly known as: ATARAX   50 mg, Oral, 3 Times Daily PRN      Insulin Pen Needle 32G X 4 MM misc   Use as directed with insulin with meals and at night      Insulin Syringe 31G X 5/16\" 1 ML misc   No dose, route, or frequency recorded.      ondansetron ODT 4 MG disintegrating tablet  Commonly known as: ZOFRAN-ODT   4 mg, Translingual, Every 8 Hours PRN             Stop These Medications      levoFLOXacin 750 MG tablet  Commonly known as: Levaquin              Allergies   Allergen Reactions    Clemastine Fumarate Other (See Comments)     SEVERE ORBITAL SWELLING    Ziprasidone Other (See Comments)     EXTREME SLEEPINESS  EXTREME SLEEPINESS      Aripiprazole Other (See Comments)     MIGRAINES  MIGRAINES      Sulfa Antibiotics Nausea Only    Green Pepper Hives     unknown    Latex Rash    Lisinopril Cough       Discharge Disposition:  Skilled Nursing Facility (DC - External)      Discharge Diet:  Diet Order   Procedures    Diet: Regular/House Diet; Texture: Regular Texture (IDDSI 7); Fluid Consistency: Thin (IDDSI 0)       Discharge Activity:   Activity Instructions       Activity as Tolerated              CODE STATUS:    Code Status and Medical Interventions:   Ordered at: 12/11/23 8581     Code Status (Patient has no pulse and is not breathing):    CPR (Attempt to Resuscitate)     Medical Interventions (Patient has pulse or is breathing):    Full       No future appointments.   Contact information for follow-up providers       Grace Baumann APRN Follow up in 1 week(s).    Specialty: Family Medicine  Contact information:  [address] [facility]  [address] [place] [address] [phone]                       Contact information for after-discharge care       Destination       [facility] .    Service: Skilled Nursing  Contact information:  [facility] [address] [phone]                     Home Medical Care       " [facility] .    Services: Home Health Services, Home Nursing  Contact information:  [address]  [facility] [address] [phone]                                   Time Spent on Discharge:  Greater than 30 minutes spent on discharge management including final examination, discussion of hospital stay and patient education, preparation of records, medication reconciliation, follow up planning      Ara Fontenot MD  [facility]  12/21/23  08:01 EST                Electronically signed by Ara Fontenot MD at 12/21/23 0830       Discharge Order (From admission, onward)       Start     Ordered    12/20/23 0839  Discharge patient  Once        Expected Discharge Date: 12/20/23   Discharge Disposition: Skilled Nursing Facility (DC - External)   Physician of Record for Attribution - Please select from Treatment Team: ARA FONTENOT [[id number]]   Review needed by CMO to determine Physician of Record: No      Question Answer Comment   Physician of Record for Attribution - Please select from Treatment Team ARA FONTENOT    Review needed by CMO to determine Physician of Record No        12/20/23 7950

## 2023-12-21 NOTE — PLAN OF CARE
Goal Outcome Evaluation:  Plan of Care Reviewed With: patient        Progress: improving  Outcome Evaluation: pt to discharge today to SNF. vss. picc line care and stokes care complete. iv abx

## 2023-12-22 NOTE — CASE MANAGEMENT/SOCIAL WORK
Case Management Discharge Note      Final Note: DC to [facility], [facility] to transport    Provided Post Acute Provider List?: N/A  Provided Post Acute Provider Quality & Resource List?: N/A    Selected Continued Care - Discharged on 12/21/2023 Admission date: 12/11/2023 - Discharge disposition: Skilled Nursing Facility (DC - External)      Destination Coordination complete.      Service Provider Selected Services Address Phone Fax Patient Preferred    [facility] [address] [facility] [id number] [phone] --              Durable Medical Equipment    No services have been selected for the patient.                Dialysis/Infusion    No services have been selected for the patient.                Home Medical Care Coordination complete.      Service Provider Selected Services Address Phone Fax Patient Preferred    [facility] - [facility] ,  Home Nursing [address] [facility], [place] [id number] [phone] --       Internal Comment last updated by Yomaira Reyes CSW 12/12/2023 0834    Current with? If not, please decline                         Therapy    No services have been selected for the patient.                Community Resources    No services have been selected for the patient.                Community & DME    No services have been selected for the patient.                    Transportation Services  Ambulance: [facility]    Final Discharge Disposition Code: 03 - skilled nursing facility (SNF)

## 2023-12-26 ENCOUNTER — TELEPHONE (OUTPATIENT)
Dept: SURGERY | Facility: CLINIC | Age: 53
End: 2023-12-26
Payer: MEDICARE

## 2023-12-26 NOTE — TELEPHONE ENCOUNTER
Patient left a voicemail on the clinical line stating she is currently at [facility] for abx therapy and that they are trying to remove her stokes catheter. Pt states the stokes catheter is supposed to stay in until her wound is healed. Returned pt's call-left message requesting call back.

## 2023-12-27 NOTE — TELEPHONE ENCOUNTER
"Sabrina with [facility] called needing a diagnosis for patient's Foly Catheter. I provided Sabrina with the following,              \" Chronic Urinary Retention related to Obstructive, Infectious/Inflammatory, Neurologic or Traumatic Causes \"  "

## 2024-01-04 ENCOUNTER — TELEPHONE (OUTPATIENT)
Dept: INFECTIOUS DISEASES | Facility: CLINIC | Age: 54
End: 2024-01-04
Payer: MEDICARE

## 2024-01-04 NOTE — TELEPHONE ENCOUNTER
Received call from nurse Bennett at [facility] stating that they have the 2 week end date for the IV Abx vancomycin as today and want to verify if it is ok to remove IV PICC line. She gave me verbal results of the serum Creatinine & CBC w/ diff that had been obtained 1/2/24 and stated she would fax the results. I spoke w/ Dr. Fernando and showed him the results of the recent labs and he stated that IV Picc line can be removed. I returned to phone and notified nurse Bennett that the IV Picc can be removed and the IV Abx are completed. She stated that they would go ahead and remove PICC.

## 2024-08-27 DIAGNOSIS — Z79.4 TYPE 2 DIABETES MELLITUS WITH DIABETIC POLYNEUROPATHY, WITH LONG-TERM CURRENT USE OF INSULIN: ICD-10-CM

## 2024-08-27 DIAGNOSIS — E11.42 TYPE 2 DIABETES MELLITUS WITH DIABETIC POLYNEUROPATHY, WITH LONG-TERM CURRENT USE OF INSULIN: ICD-10-CM

## 2024-08-30 NOTE — TELEPHONE ENCOUNTER
LOV 6/14/23  NOV None  LF 11/20/23    Appears pt may be in nursing home.  Please advise    [facility]

## 2024-09-03 RX ORDER — INSULIN DEGLUDEC 200 U/ML
INJECTION, SOLUTION SUBCUTANEOUS
Qty: 9 ML | Refills: 3 | OUTPATIENT
Start: 2024-09-03

## 2025-04-09 NOTE — DISCHARGE SUMMARY
Patient Name: Vanessa Root  : 1970  MRN: [id number]    Date of Admission: 2023  Date of Discharge:  2023  Primary Care Physician: No primary care provider on file.      Chief Complaint:   Wound Check      Discharge Diagnoses     Active Hospital Problems    Diagnosis  POA    **Infected wound [T14.8XXA, L08.9]  Yes    History of ischemic stroke [Z86.73]  Not Applicable    Immobility syndrome (paraplegic) [M62.3]  Yes    Bipolar 1 disorder [F31.9]  Yes    GERD (gastroesophageal reflux disease) [K21.9]  Yes    Hyperlipidemia [E78.5]  Yes    Type 2 diabetes mellitus with diabetic polyneuropathy [E11.42]  Yes    Obstructive sleep apnea syndrome [G47.33]  Yes    Hypothyroidism [E03.9]  Yes      Resolved Hospital Problems   No resolved problems to display.        Hospital Course     Ms. Root is a 53 y.o. female with a history of hypothyroidism, obesity, obstructive sleep apnea, type 2 diabetes, hyperlipidemia, bipolar disorder, immobility syndrome, prior stroke and prior necrotizing fasciitis who presented to [facility] initially complaining of wound issues.  Please see the admitting history and physical for further details.  She was found to have wound infection and was admitted to the hospital for further evaluation and treatment.  In her area of previous necrotizing fasciitis she had increased pain and had some changes in the wound over the last few weeks.  White blood cell count was becoming elevated so she was admitted to the hospital for further evaluation.  She was taken to the operating room for debridement and tolerated that procedure well.  Intraoperative cultures grew ESBL Proteus and recommendations are for continuation of Levaquin in the outpatient setting.  Her pain is controlled with oral pain medication.  She has been out of oral pain medication and was provided prescription for a few days to get her through to her to her appointment.  She will need to continue  twice daily dressing changes wet-to-dry at home and follow-up with general surgery as directed.    Day of Discharge     Subjective:  She is doing well today.  She is having a lot of pain in the wound but following the dressing change she had improved.  She has transportation arranged home at 730 tonight.    Physical Exam:  Temp:  [97.7 °F (36.5 °C)-98.4 °F (36.9 °C)] 97.7 °F (36.5 °C)  Heart Rate:  [76-89] 89  Resp:  [16-18] 16  BP: ()/(57-87) 93/57  Body mass index is 41.6 kg/m².  Physical Exam  Vitals and nursing note reviewed.   Constitutional:       General: She is not in acute distress.     Appearance: She is ill-appearing.   Cardiovascular:      Rate and Rhythm: Normal rate and regular rhythm.   Pulmonary:      Breath sounds: Normal breath sounds.   Abdominal:      General: Bowel sounds are normal.      Palpations: Abdomen is soft.   Neurological:      General: No focal deficit present.      Mental Status: She is alert. Mental status is at baseline.     I did miss the dressing change today but discussed with the nurse.  She is good pink granulation tissue and some sloughing at the edges but overall the wound looks well.    Consultants     Consult Orders (all) (From admission, onward)       Start     Ordered    11/09/23 1428  Inpatient Infectious Diseases Consult  Once        Specialty:  Infectious Diseases  Provider:  Cody Sheridan MD    11/09/23 1428    11/08/23 2115  Inpatient General Surgery Consult  Once        Specialty:  General Surgery  Provider:  Elizabeth Adame MD    11/08/23 2117 11/08/23 2010  [facility] (on-call MD unless specified) Details  Once        Specialty:  Hospitalist  Provider:  (Not yet assigned)    11/08/23 2009                  Procedures     Debridement of right thigh wound    Imaging Results (All)       Procedure Component Value Units Date/Time    CT Pelvis Without Contrast [[id number]] Collected: 11/08/23 1956     Updated: 11/08/23 2003    Narrative:      CT PELVIS  WITHOUT IV CONTRAST     INDICATION: Worsening right posterior lateral hip wound     TECHNIQUE: CT scan of the pelvis was performed without the  administration of IV contrast. Coronal and sagittal reformatted images  were obtained. Radiation dose reduction techniques were utilized,  including automated exposure control and exposure modulation based on  body size.     COMPARISON: 8/25/2023     FINDINGS:  There is a wound in the soft tissues overlying the lateral right hip.  There is some skin thickening and some subcutaneous inflammation  involving the wound. There is no evidence of any underlying abscess.  There is no evidence of fracture or acute bony abnormality. Degenerative  changes are seen of the lower lumbar spine and of the SI joints. There  is a Quiroz catheter in the bladder. The visualized intrapelvic  structures are otherwise unremarkable.       Impression:      There is a wound in the soft tissues overlying the lateral right hip  without evidence of underlying abscess or any extension to the bone.        Radiation dose reduction techniques were utilized, including automated  exposure control and exposure modulation based on body size.        This report was finalized on 11/8/2023 8:00 PM by Dr. Abhijit Rosado M.D on Workstation: [id number]             Results for orders placed during the hospital encounter of 08/25/23    Duplex Venous Lower Extremity - Bilateral CAR    Interpretation Summary    Acute right lower extremity superficial thrombophlebitis noted in the great saphenous (below knee).    Left popliteal fossa fluid collection.    All other veins appeared normal bilaterally.      Pertinent Labs     Results from last 7 days   Lab Units 11/11/23  0739 11/09/23  0205 11/08/23  1901   WBC 10*3/mm3 12.67* 12.89* 13.45*   HEMOGLOBIN g/dL 9.6* 9.6* 11.2*   PLATELETS 10*3/mm3 401 403 477*     Results from last 7 days   Lab Units 11/11/23  0739 11/09/23  0205 11/08/23  1901   SODIUM mmol/L 139 139 139  "  POTASSIUM mmol/L 3.8 3.5 3.8   CHLORIDE mmol/L 103 106 104   CO2 mmol/L 28.0 25.0 23.9   BUN mg/dL 9 8 8   CREATININE mg/dL 0.92 0.81 0.78   GLUCOSE mg/dL 209* 215* 228*   EGFR mL/min/1.73 75.5 88.0 92.1     Results from last 7 days   Lab Units 11/11/23  0739 11/08/23  1901   ALBUMIN g/dL 3.0* 3.3*   BILIRUBIN mg/dL  --  0.3   ALK PHOS U/L  --  221*   AST (SGOT) U/L  --  24   ALT (SGPT) U/L  --  11     Results from last 7 days   Lab Units 11/11/23  0739 11/09/23  0205 11/08/23  1901   CALCIUM mg/dL 8.9 8.2* 8.7   ALBUMIN g/dL 3.0*  --  3.3*   MAGNESIUM mg/dL 1.7  --   --    PHOSPHORUS mg/dL 3.4  --   --          Results from last 7 days   Lab Units 11/11/23  0739   URIC ACID mg/dL 4.2         Invalid input(s): \"LDLCALC\"  Results from last 7 days   Lab Units 11/09/23  0616 11/08/23  1903 11/08/23  1837   BLOODCX   --  Staphylococcus, coagulase negative* No growth at 3 days   WOUNDCX  Scant growth (1+) Proteus mirabilis*  Rare Escherichia coli*  Rare Klebsiella pneumoniae ssp pneumoniae*  Rare Normal Skin Carmen  --   --    BCIDPCR   --  Staph spp, not aureus or lugdunensis. Identification by BCID2 PCR.*  --          Test Results Pending at Discharge     Pending Labs       Order Current Status    Blood Culture - Blood, Arm, Left Preliminary result    Wound Culture - Wound, Hip, Right Preliminary result            Discharge Details        Discharge Medications        New Medications        Instructions Start Date   levoFLOXacin 750 MG tablet  Commonly known as: LEVAQUIN   750 mg, Oral, Every 24 Hours      SITagliptin 100 MG tablet  Commonly known as: Januvia   100 mg, Oral, Daily             Changes to Medications        Instructions Start Date   HYDROcodone-acetaminophen 7.5-325 MG per tablet  Commonly known as: NORCO  What changed:   when to take this  reasons to take this   1 tablet, Oral, Every 8 Hours PRN      NovoLOG FlexPen 100 UNIT/ML solution pen-injector sc pen  Generic drug: insulin aspart  What changed: " "additional instructions   Use as directed up to 16 U with each meal             Continue These Medications        Instructions Start Date   alosetron 0.5 MG tablet  Commonly known as: LOTRONEX   0.5 mg, Oral, Nightly      apixaban 5 MG tablet tablet  Commonly known as: ELIQUIS   5 mg, Oral, 2 Times Daily      cetirizine 10 MG tablet  Commonly known as: zyrTEC   10 mg, Oral, Nightly      gabapentin 300 MG capsule  Commonly known as: NEURONTIN   300 mg, Oral, Every 12 Hours Scheduled      glucose blood test strip   1 each, Other, 3 Times Daily, Test blood sugar once daily before a meal and as needed.      glucose monitor monitoring kit   1 each, Does not apply, As Needed      hydrOXYzine 50 MG tablet  Commonly known as: ATARAX   50 mg, Oral, 3 Times Daily PRN      Insulin Pen Needle 32G X 4 MM misc   Use as directed with insulin with meals and at night      Insulin Syringe 31G X 5/16\" 1 ML misc   No dose, route, or frequency recorded.      levothyroxine 200 MCG tablet  Commonly known as: SYNTHROID, LEVOTHROID   200 mcg, Oral, Daily      levothyroxine 75 MCG tablet  Commonly known as: SYNTHROID, LEVOTHROID   Take one a day.      Tresiba FlexTouch 200 UNIT/ML solution pen-injector pen injection  Generic drug: Insulin Degludec   60 Units, Subcutaneous, Daily             Stop These Medications      fluticasone 50 MCG/ACT nasal spray  Commonly known as: Flonase     hydrocortisone-liver oil-zinc oxide in bacitracin-nystatin     omeprazole 40 MG capsule  Commonly known as: priLOSEC     ondansetron 4 MG tablet  Commonly known as: Zofran     terbinafine 250 MG tablet  Commonly known as: LamISIL              Allergies   Allergen Reactions    Clemastine Fumarate Other (See Comments)     SEVERE ORBITAL SWELLING    Ziprasidone Other (See Comments)     EXTREME SLEEPINESS  EXTREME SLEEPINESS      Aripiprazole Other (See Comments)     MIGRAINES  MIGRAINES      Sulfa Antibiotics Nausea Only    Green Pepper Hives     unknown    Latex " Rash    Lisinopril Cough       Discharge Disposition:  Home or Self Care      Discharge Diet:  Diet Order   Procedures    Diet: Diabetic Diets; Consistent Carbohydrate; Texture: Regular Texture (IDDSI 7); Fluid Consistency: Thin (IDDSI 0)       Discharge Activity:   Activity Instructions       Activity as Tolerated              CODE STATUS:    Code Status and Medical Interventions:   Ordered at: 11/08/23 2117     Code Status (Patient has no pulse and is not breathing):    CPR (Attempt to Resuscitate)     Medical Interventions (Patient has pulse or is breathing):    Full Support       No future appointments.  Additional Instructions for the Follow-ups that You Need to Schedule       Discharge Follow-up with PCP   As directed       Currently Documented PCP:    No primary care provider on file.    PCP Phone Number:    None     Follow Up Details: 2-3 weeks        Discharge Follow-up with Specified Provider: Dr Escamilla as directed   As directed      To: Dr Escamilla as directed                Additional Instructions for the Follow-ups that You Need to Schedule       Discharge Follow-up with PCP   As directed       Currently Documented PCP:    No primary care provider on file.    PCP Phone Number:    None     Follow Up Details: 2-3 weeks        Discharge Follow-up with Specified Provider: Dr Escamilla as directed   As directed      To: Dr Escamilla as directed            Time Spent on Discharge:  Greater than 30 minutes      Marv Aguiar MD  [facility]  11/12/23  13:15 EST               Unknown

## 2025-07-25 ENCOUNTER — APPOINTMENT (OUTPATIENT)
Dept: CT IMAGING | Facility: HOSPITAL | Age: 55
DRG: 393 | End: 2025-07-25
Payer: MEDICARE

## 2025-07-25 ENCOUNTER — APPOINTMENT (OUTPATIENT)
Dept: GENERAL RADIOLOGY | Facility: HOSPITAL | Age: 55
DRG: 393 | End: 2025-07-25
Payer: MEDICARE

## 2025-07-25 ENCOUNTER — HOSPITAL ENCOUNTER (INPATIENT)
Facility: HOSPITAL | Age: 55
LOS: 9 days | Discharge: HOME HEALTH | DRG: 393 | End: 2025-08-03
Attending: EMERGENCY MEDICINE | Admitting: INTERNAL MEDICINE
Payer: MEDICARE

## 2025-07-25 DIAGNOSIS — D63.8 ANEMIA, CHRONIC DISEASE: ICD-10-CM

## 2025-07-25 DIAGNOSIS — D62 ABLA (ACUTE BLOOD LOSS ANEMIA): Primary | ICD-10-CM

## 2025-07-25 DIAGNOSIS — E11.42 TYPE 2 DIABETES MELLITUS WITH DIABETIC POLYNEUROPATHY, WITH LONG-TERM CURRENT USE OF INSULIN: ICD-10-CM

## 2025-07-25 DIAGNOSIS — N17.9 AKI (ACUTE KIDNEY INJURY): ICD-10-CM

## 2025-07-25 DIAGNOSIS — J90 BILATERAL PLEURAL EFFUSION: ICD-10-CM

## 2025-07-25 DIAGNOSIS — K92.2 GASTROINTESTINAL HEMORRHAGE, UNSPECIFIED GASTROINTESTINAL HEMORRHAGE TYPE: ICD-10-CM

## 2025-07-25 DIAGNOSIS — R05.1 ACUTE COUGH: ICD-10-CM

## 2025-07-25 DIAGNOSIS — Z79.4 TYPE 2 DIABETES MELLITUS WITH DIABETIC POLYNEUROPATHY, WITH LONG-TERM CURRENT USE OF INSULIN: ICD-10-CM

## 2025-07-25 PROBLEM — R05.9 COUGH: Status: ACTIVE | Noted: 2025-07-25

## 2025-07-25 LAB
ABO GROUP BLD: NORMAL
ANION GAP SERPL CALCULATED.3IONS-SCNC: 10 MMOL/L (ref 5–15)
B PARAPERT DNA SPEC QL NAA+PROBE: NOT DETECTED
B PERT DNA SPEC QL NAA+PROBE: NOT DETECTED
BASOPHILS # BLD AUTO: 0.09 10*3/MM3 (ref 0–0.2)
BASOPHILS NFR BLD AUTO: 0.8 % (ref 0–1.5)
BLD GP AB SCN SERPL QL: NEGATIVE
BUN SERPL-MCNC: 20 MG/DL (ref 6–20)
BUN/CREAT SERPL: 10.6 (ref 7–25)
C PNEUM DNA NPH QL NAA+NON-PROBE: NOT DETECTED
CALCIUM SPEC-SCNC: 8.7 MG/DL (ref 8.6–10.5)
CHLORIDE SERPL-SCNC: 111 MMOL/L (ref 98–107)
CK SERPL-CCNC: 89 U/L (ref 20–180)
CO2 SERPL-SCNC: 20 MMOL/L (ref 22–29)
CREAT SERPL-MCNC: 1.89 MG/DL (ref 0.57–1)
DEPRECATED RDW RBC AUTO: 50.1 FL (ref 37–54)
EGFRCR SERPLBLD CKD-EPI 2021: 31.4 ML/MIN/1.73
EOSINOPHIL # BLD AUTO: 0.54 10*3/MM3 (ref 0–0.4)
EOSINOPHIL NFR BLD AUTO: 4.7 % (ref 0.3–6.2)
ERYTHROCYTE [DISTWIDTH] IN BLOOD BY AUTOMATED COUNT: 16.3 % (ref 12.3–15.4)
FLUAV SUBTYP SPEC NAA+PROBE: NOT DETECTED
FLUBV RNA NPH QL NAA+NON-PROBE: NOT DETECTED
GLUCOSE BLDC GLUCOMTR-MCNC: 92 MG/DL (ref 70–130)
GLUCOSE SERPL-MCNC: 107 MG/DL (ref 65–99)
HADV DNA SPEC NAA+PROBE: NOT DETECTED
HCOV 229E RNA SPEC QL NAA+PROBE: NOT DETECTED
HCOV HKU1 RNA SPEC QL NAA+PROBE: NOT DETECTED
HCOV NL63 RNA SPEC QL NAA+PROBE: NOT DETECTED
HCOV OC43 RNA SPEC QL NAA+PROBE: NOT DETECTED
HCT VFR BLD AUTO: 23.3 % (ref 34–46.6)
HGB BLD-MCNC: 6.6 G/DL (ref 12–15.9)
HMPV RNA NPH QL NAA+NON-PROBE: NOT DETECTED
HPIV1 RNA ISLT QL NAA+PROBE: NOT DETECTED
HPIV2 RNA SPEC QL NAA+PROBE: NOT DETECTED
HPIV3 RNA NPH QL NAA+PROBE: NOT DETECTED
HPIV4 P GENE NPH QL NAA+PROBE: NOT DETECTED
IMM GRANULOCYTES # BLD AUTO: 0.05 10*3/MM3 (ref 0–0.05)
IMM GRANULOCYTES NFR BLD AUTO: 0.4 % (ref 0–0.5)
LYMPHOCYTES # BLD AUTO: 2.24 10*3/MM3 (ref 0.7–3.1)
LYMPHOCYTES NFR BLD AUTO: 19.7 % (ref 19.6–45.3)
M PNEUMO IGG SER IA-ACNC: NOT DETECTED
MCH RBC QN AUTO: 24.1 PG (ref 26.6–33)
MCHC RBC AUTO-ENTMCNC: 28.3 G/DL (ref 31.5–35.7)
MCV RBC AUTO: 85 FL (ref 79–97)
MONOCYTES # BLD AUTO: 0.54 10*3/MM3 (ref 0.1–0.9)
MONOCYTES NFR BLD AUTO: 4.7 % (ref 5–12)
NEUTROPHILS NFR BLD AUTO: 69.7 % (ref 42.7–76)
NEUTROPHILS NFR BLD AUTO: 7.91 10*3/MM3 (ref 1.7–7)
NRBC BLD AUTO-RTO: 0 /100 WBC (ref 0–0.2)
PLATELET # BLD AUTO: 400 10*3/MM3 (ref 140–450)
PMV BLD AUTO: 9.2 FL (ref 6–12)
POTASSIUM SERPL-SCNC: 4.7 MMOL/L (ref 3.5–5.2)
PROCALCITONIN SERPL-MCNC: 0.18 NG/ML (ref 0–0.25)
RBC # BLD AUTO: 2.74 10*6/MM3 (ref 3.77–5.28)
RH BLD: POSITIVE
RHINOVIRUS RNA SPEC NAA+PROBE: NOT DETECTED
RSV RNA NPH QL NAA+NON-PROBE: NOT DETECTED
SARS-COV-2 RNA NPH QL NAA+NON-PROBE: NOT DETECTED
SODIUM SERPL-SCNC: 141 MMOL/L (ref 136–145)
T&S EXPIRATION DATE: NORMAL
WBC NRBC COR # BLD AUTO: 11.37 10*3/MM3 (ref 3.4–10.8)

## 2025-07-25 PROCEDURE — 86900 BLOOD TYPING SEROLOGIC ABO: CPT

## 2025-07-25 PROCEDURE — 85014 HEMATOCRIT: CPT | Performed by: INTERNAL MEDICINE

## 2025-07-25 PROCEDURE — 93005 ELECTROCARDIOGRAM TRACING: CPT | Performed by: INTERNAL MEDICINE

## 2025-07-25 PROCEDURE — 36430 TRANSFUSION BLD/BLD COMPNT: CPT

## 2025-07-25 PROCEDURE — P9016 RBC LEUKOCYTES REDUCED: HCPCS

## 2025-07-25 PROCEDURE — 82550 ASSAY OF CK (CPK): CPT | Performed by: EMERGENCY MEDICINE

## 2025-07-25 PROCEDURE — 86901 BLOOD TYPING SEROLOGIC RH(D): CPT | Performed by: EMERGENCY MEDICINE

## 2025-07-25 PROCEDURE — 86923 COMPATIBILITY TEST ELECTRIC: CPT

## 2025-07-25 PROCEDURE — 85018 HEMOGLOBIN: CPT | Performed by: INTERNAL MEDICINE

## 2025-07-25 PROCEDURE — 99285 EMERGENCY DEPT VISIT HI MDM: CPT

## 2025-07-25 PROCEDURE — 85025 COMPLETE CBC W/AUTO DIFF WBC: CPT | Performed by: EMERGENCY MEDICINE

## 2025-07-25 PROCEDURE — 93010 ELECTROCARDIOGRAM REPORT: CPT | Performed by: INTERNAL MEDICINE

## 2025-07-25 PROCEDURE — 71250 CT THORAX DX C-: CPT

## 2025-07-25 PROCEDURE — 84145 PROCALCITONIN (PCT): CPT | Performed by: EMERGENCY MEDICINE

## 2025-07-25 PROCEDURE — 36415 COLL VENOUS BLD VENIPUNCTURE: CPT

## 2025-07-25 PROCEDURE — 80048 BASIC METABOLIC PNL TOTAL CA: CPT | Performed by: EMERGENCY MEDICINE

## 2025-07-25 PROCEDURE — 25010000002 ONDANSETRON PER 1 MG: Performed by: INTERNAL MEDICINE

## 2025-07-25 PROCEDURE — 25810000003 LACTATED RINGERS SOLUTION: Performed by: EMERGENCY MEDICINE

## 2025-07-25 PROCEDURE — 71045 X-RAY EXAM CHEST 1 VIEW: CPT

## 2025-07-25 PROCEDURE — 82948 REAGENT STRIP/BLOOD GLUCOSE: CPT

## 2025-07-25 PROCEDURE — 25010000002 BUMETANIDE PER 0.5 MG: Performed by: INTERNAL MEDICINE

## 2025-07-25 PROCEDURE — 74018 RADEX ABDOMEN 1 VIEW: CPT

## 2025-07-25 PROCEDURE — 63710000001 INSULIN GLARGINE PER 5 UNITS: Performed by: INTERNAL MEDICINE

## 2025-07-25 PROCEDURE — 86900 BLOOD TYPING SEROLOGIC ABO: CPT | Performed by: EMERGENCY MEDICINE

## 2025-07-25 PROCEDURE — 0202U NFCT DS 22 TRGT SARS-COV-2: CPT | Performed by: EMERGENCY MEDICINE

## 2025-07-25 PROCEDURE — 86850 RBC ANTIBODY SCREEN: CPT | Performed by: EMERGENCY MEDICINE

## 2025-07-25 RX ORDER — ACETAMINOPHEN 160 MG/5ML
650 SOLUTION ORAL EVERY 4 HOURS PRN
Status: DISCONTINUED | OUTPATIENT
Start: 2025-07-25 | End: 2025-08-03 | Stop reason: HOSPADM

## 2025-07-25 RX ORDER — SODIUM CHLORIDE 9 MG/ML
40 INJECTION, SOLUTION INTRAVENOUS AS NEEDED
Status: DISCONTINUED | OUTPATIENT
Start: 2025-07-25 | End: 2025-07-25

## 2025-07-25 RX ORDER — DEXTROSE MONOHYDRATE 25 G/50ML
25 INJECTION, SOLUTION INTRAVENOUS
Status: DISCONTINUED | OUTPATIENT
Start: 2025-07-25 | End: 2025-07-28 | Stop reason: SDUPTHER

## 2025-07-25 RX ORDER — MULTIPLE VITAMINS W/ MINERALS TAB 9MG-400MCG
1 TAB ORAL DAILY
Status: DISCONTINUED | OUTPATIENT
Start: 2025-07-25 | End: 2025-08-03 | Stop reason: HOSPADM

## 2025-07-25 RX ORDER — MORPHINE SULFATE 2 MG/ML
2 INJECTION, SOLUTION INTRAMUSCULAR; INTRAVENOUS ONCE
Status: DISCONTINUED | OUTPATIENT
Start: 2025-07-25 | End: 2025-08-03 | Stop reason: HOSPADM

## 2025-07-25 RX ORDER — LEVOTHYROXINE SODIUM 100 UG/1
200 TABLET ORAL DAILY
Status: DISCONTINUED | OUTPATIENT
Start: 2025-07-25 | End: 2025-08-03 | Stop reason: HOSPADM

## 2025-07-25 RX ORDER — NITROGLYCERIN 0.4 MG/1
0.4 TABLET SUBLINGUAL
Status: DISCONTINUED | OUTPATIENT
Start: 2025-07-25 | End: 2025-08-03 | Stop reason: HOSPADM

## 2025-07-25 RX ORDER — ACETAMINOPHEN 325 MG/1
650 TABLET ORAL EVERY 4 HOURS PRN
Status: DISCONTINUED | OUTPATIENT
Start: 2025-07-25 | End: 2025-08-03 | Stop reason: HOSPADM

## 2025-07-25 RX ORDER — PANTOPRAZOLE SODIUM 40 MG/10ML
80 INJECTION, POWDER, LYOPHILIZED, FOR SOLUTION INTRAVENOUS ONCE
Status: COMPLETED | OUTPATIENT
Start: 2025-07-25 | End: 2025-07-25

## 2025-07-25 RX ORDER — ONDANSETRON 2 MG/ML
4 INJECTION INTRAMUSCULAR; INTRAVENOUS EVERY 6 HOURS PRN
Status: DISCONTINUED | OUTPATIENT
Start: 2025-07-25 | End: 2025-08-03 | Stop reason: HOSPADM

## 2025-07-25 RX ORDER — SODIUM CHLORIDE 9 MG/ML
100 INJECTION, SOLUTION INTRAVENOUS CONTINUOUS
Status: DISCONTINUED | OUTPATIENT
Start: 2025-07-25 | End: 2025-07-25

## 2025-07-25 RX ORDER — DICYCLOMINE HCL 20 MG
20 TABLET ORAL 4 TIMES DAILY
COMMUNITY

## 2025-07-25 RX ORDER — SODIUM CHLORIDE 0.9 % (FLUSH) 0.9 %
10 SYRINGE (ML) INJECTION AS NEEDED
Status: DISCONTINUED | OUTPATIENT
Start: 2025-07-25 | End: 2025-08-03 | Stop reason: HOSPADM

## 2025-07-25 RX ORDER — LEVOTHYROXINE SODIUM 75 UG/1
75 TABLET ORAL DAILY
Status: DISCONTINUED | OUTPATIENT
Start: 2025-07-25 | End: 2025-08-03 | Stop reason: HOSPADM

## 2025-07-25 RX ORDER — BUMETANIDE 0.25 MG/ML
2 INJECTION, SOLUTION INTRAMUSCULAR; INTRAVENOUS ONCE
Status: COMPLETED | OUTPATIENT
Start: 2025-07-25 | End: 2025-07-25

## 2025-07-25 RX ORDER — CYCLOBENZAPRINE HCL 10 MG
5 TABLET ORAL 3 TIMES DAILY PRN
Status: DISCONTINUED | OUTPATIENT
Start: 2025-07-25 | End: 2025-08-03 | Stop reason: HOSPADM

## 2025-07-25 RX ORDER — IBUPROFEN 600 MG/1
1 TABLET ORAL
Status: DISCONTINUED | OUTPATIENT
Start: 2025-07-25 | End: 2025-07-28 | Stop reason: SDUPTHER

## 2025-07-25 RX ORDER — ONDANSETRON 4 MG/1
4 TABLET, ORALLY DISINTEGRATING ORAL EVERY 6 HOURS PRN
Status: DISCONTINUED | OUTPATIENT
Start: 2025-07-25 | End: 2025-08-03 | Stop reason: HOSPADM

## 2025-07-25 RX ORDER — GABAPENTIN 300 MG/1
300 CAPSULE ORAL EVERY 12 HOURS SCHEDULED
Status: DISCONTINUED | OUTPATIENT
Start: 2025-07-25 | End: 2025-08-03 | Stop reason: HOSPADM

## 2025-07-25 RX ORDER — HYDROCODONE BITARTRATE AND ACETAMINOPHEN 5; 325 MG/1; MG/1
1 TABLET ORAL DAILY
Status: DISCONTINUED | OUTPATIENT
Start: 2025-07-25 | End: 2025-08-03 | Stop reason: HOSPADM

## 2025-07-25 RX ORDER — SODIUM CHLORIDE 0.9 % (FLUSH) 0.9 %
10 SYRINGE (ML) INJECTION EVERY 12 HOURS SCHEDULED
Status: DISCONTINUED | OUTPATIENT
Start: 2025-07-25 | End: 2025-08-03 | Stop reason: HOSPADM

## 2025-07-25 RX ORDER — NICOTINE POLACRILEX 4 MG
15 LOZENGE BUCCAL
Status: DISCONTINUED | OUTPATIENT
Start: 2025-07-25 | End: 2025-07-28 | Stop reason: SDUPTHER

## 2025-07-25 RX ORDER — ACETAMINOPHEN 650 MG/1
650 SUPPOSITORY RECTAL EVERY 4 HOURS PRN
Status: DISCONTINUED | OUTPATIENT
Start: 2025-07-25 | End: 2025-08-03 | Stop reason: HOSPADM

## 2025-07-25 RX ORDER — PANTOPRAZOLE SODIUM 40 MG/1
40 TABLET, DELAYED RELEASE ORAL DAILY
COMMUNITY

## 2025-07-25 RX ADMIN — MIDODRINE HYDROCHLORIDE 7.5 MG: 2.5 TABLET ORAL at 21:55

## 2025-07-25 RX ADMIN — PANTOPRAZOLE SODIUM 8 MG/HR: 40 INJECTION, POWDER, FOR SOLUTION INTRAVENOUS at 21:36

## 2025-07-25 RX ADMIN — PANTOPRAZOLE SODIUM 80 MG: 40 INJECTION, POWDER, FOR SOLUTION INTRAVENOUS at 14:06

## 2025-07-25 RX ADMIN — INSULIN GLARGINE 30 UNITS: 100 INJECTION, SOLUTION SUBCUTANEOUS at 21:56

## 2025-07-25 RX ADMIN — Medication 10 ML: at 21:54

## 2025-07-25 RX ADMIN — GABAPENTIN 300 MG: 300 CAPSULE ORAL at 21:56

## 2025-07-25 RX ADMIN — BUMETANIDE 2 MG: 0.25 INJECTION INTRAMUSCULAR; INTRAVENOUS at 21:53

## 2025-07-25 RX ADMIN — CYCLOBENZAPRINE HYDROCHLORIDE 5 MG: 10 TABLET, FILM COATED ORAL at 18:46

## 2025-07-25 RX ADMIN — ONDANSETRON 4 MG: 2 INJECTION INTRAMUSCULAR; INTRAVENOUS at 18:46

## 2025-07-25 RX ADMIN — SODIUM CHLORIDE, POTASSIUM CHLORIDE, SODIUM LACTATE AND CALCIUM CHLORIDE 1000 ML: 600; 310; 30; 20 INJECTION, SOLUTION INTRAVENOUS at 12:26

## 2025-07-25 RX ADMIN — HYDROCODONE BITARTRATE AND ACETAMINOPHEN 1 TABLET: 5; 325 TABLET ORAL at 18:46

## 2025-07-25 RX ADMIN — PANTOPRAZOLE SODIUM 8 MG/HR: 40 INJECTION, POWDER, FOR SOLUTION INTRAVENOUS at 21:53

## 2025-07-26 LAB
ALBUMIN SERPL-MCNC: 2.7 G/DL (ref 3.5–5.2)
ALBUMIN/GLOB SERPL: 0.7 G/DL
ALP SERPL-CCNC: 135 U/L (ref 39–117)
ALT SERPL W P-5'-P-CCNC: 8 U/L (ref 1–33)
ANION GAP SERPL CALCULATED.3IONS-SCNC: 8.5 MMOL/L (ref 5–15)
AST SERPL-CCNC: 13 U/L (ref 1–32)
BASOPHILS # BLD AUTO: 0.12 10*3/MM3 (ref 0–0.2)
BASOPHILS NFR BLD AUTO: 1.1 % (ref 0–1.5)
BH BB BLOOD EXPIRATION DATE: NORMAL
BH BB BLOOD EXPIRATION DATE: NORMAL
BH BB BLOOD TYPE BARCODE: 7300
BH BB BLOOD TYPE BARCODE: 7300
BH BB DISPENSE STATUS: NORMAL
BH BB DISPENSE STATUS: NORMAL
BH BB PRODUCT CODE: NORMAL
BH BB PRODUCT CODE: NORMAL
BH BB UNIT NUMBER: NORMAL
BH BB UNIT NUMBER: NORMAL
BILIRUB SERPL-MCNC: <0.2 MG/DL (ref 0–1.2)
BUN SERPL-MCNC: 21 MG/DL (ref 6–20)
BUN/CREAT SERPL: 11.6 (ref 7–25)
CALCIUM SPEC-SCNC: 8.6 MG/DL (ref 8.6–10.5)
CHLORIDE SERPL-SCNC: 113 MMOL/L (ref 98–107)
CO2 SERPL-SCNC: 19.5 MMOL/L (ref 22–29)
CREAT SERPL-MCNC: 1.81 MG/DL (ref 0.57–1)
CROSSMATCH INTERPRETATION: NORMAL
CROSSMATCH INTERPRETATION: NORMAL
DEPRECATED RDW RBC AUTO: 48.6 FL (ref 37–54)
EGFRCR SERPLBLD CKD-EPI 2021: 33.1 ML/MIN/1.73
EOSINOPHIL # BLD AUTO: 0.41 10*3/MM3 (ref 0–0.4)
EOSINOPHIL NFR BLD AUTO: 3.7 % (ref 0.3–6.2)
ERYTHROCYTE [DISTWIDTH] IN BLOOD BY AUTOMATED COUNT: 15.7 % (ref 12.3–15.4)
GLOBULIN UR ELPH-MCNC: 3.7 GM/DL
GLUCOSE BLDC GLUCOMTR-MCNC: 70 MG/DL (ref 70–130)
GLUCOSE BLDC GLUCOMTR-MCNC: 85 MG/DL (ref 70–130)
GLUCOSE BLDC GLUCOMTR-MCNC: 86 MG/DL (ref 70–130)
GLUCOSE BLDC GLUCOMTR-MCNC: 96 MG/DL (ref 70–130)
GLUCOSE BLDC GLUCOMTR-MCNC: 97 MG/DL (ref 70–130)
GLUCOSE SERPL-MCNC: 77 MG/DL (ref 65–99)
HBA1C MFR BLD: 6.5 % (ref 4.8–5.6)
HCT VFR BLD AUTO: 30 % (ref 34–46.6)
HCT VFR BLD AUTO: 30 % (ref 34–46.6)
HCT VFR BLD AUTO: 33.3 % (ref 34–46.6)
HGB BLD-MCNC: 10 G/DL (ref 12–15.9)
HGB BLD-MCNC: 8.9 G/DL (ref 12–15.9)
HGB BLD-MCNC: 9.3 G/DL (ref 12–15.9)
IMM GRANULOCYTES # BLD AUTO: 0.14 10*3/MM3 (ref 0–0.05)
IMM GRANULOCYTES NFR BLD AUTO: 1.3 % (ref 0–0.5)
LYMPHOCYTES # BLD AUTO: 2.44 10*3/MM3 (ref 0.7–3.1)
LYMPHOCYTES NFR BLD AUTO: 22.1 % (ref 19.6–45.3)
MCH RBC QN AUTO: 26.2 PG (ref 26.6–33)
MCHC RBC AUTO-ENTMCNC: 31 G/DL (ref 31.5–35.7)
MCV RBC AUTO: 84.5 FL (ref 79–97)
MONOCYTES # BLD AUTO: 0.73 10*3/MM3 (ref 0.1–0.9)
MONOCYTES NFR BLD AUTO: 6.6 % (ref 5–12)
NEUTROPHILS NFR BLD AUTO: 65.2 % (ref 42.7–76)
NEUTROPHILS NFR BLD AUTO: 7.19 10*3/MM3 (ref 1.7–7)
NRBC BLD AUTO-RTO: 0 /100 WBC (ref 0–0.2)
PLATELET # BLD AUTO: 337 10*3/MM3 (ref 140–450)
PMV BLD AUTO: 9 FL (ref 6–12)
POTASSIUM SERPL-SCNC: 4.7 MMOL/L (ref 3.5–5.2)
PROT SERPL-MCNC: 6.4 G/DL (ref 6–8.5)
QT INTERVAL: 374 MS
QTC INTERVAL: 483 MS
RBC # BLD AUTO: 3.55 10*6/MM3 (ref 3.77–5.28)
SODIUM SERPL-SCNC: 141 MMOL/L (ref 136–145)
UNIT  ABO: NORMAL
UNIT  ABO: NORMAL
UNIT  RH: NORMAL
UNIT  RH: NORMAL
URATE SERPL-MCNC: 8.4 MG/DL (ref 2.4–5.7)
WBC NRBC COR # BLD AUTO: 11.03 10*3/MM3 (ref 3.4–10.8)

## 2025-07-26 PROCEDURE — 80053 COMPREHEN METABOLIC PANEL: CPT | Performed by: INTERNAL MEDICINE

## 2025-07-26 PROCEDURE — 99222 1ST HOSP IP/OBS MODERATE 55: CPT | Performed by: INTERNAL MEDICINE

## 2025-07-26 PROCEDURE — 85014 HEMATOCRIT: CPT | Performed by: INTERNAL MEDICINE

## 2025-07-26 PROCEDURE — 25010000002 ONDANSETRON PER 1 MG: Performed by: INTERNAL MEDICINE

## 2025-07-26 PROCEDURE — 82948 REAGENT STRIP/BLOOD GLUCOSE: CPT

## 2025-07-26 PROCEDURE — 84550 ASSAY OF BLOOD/URIC ACID: CPT | Performed by: INTERNAL MEDICINE

## 2025-07-26 PROCEDURE — 85025 COMPLETE CBC W/AUTO DIFF WBC: CPT | Performed by: INTERNAL MEDICINE

## 2025-07-26 PROCEDURE — 85018 HEMOGLOBIN: CPT | Performed by: INTERNAL MEDICINE

## 2025-07-26 PROCEDURE — 83036 HEMOGLOBIN GLYCOSYLATED A1C: CPT | Performed by: INTERNAL MEDICINE

## 2025-07-26 RX ORDER — HYDROXYZINE HYDROCHLORIDE 25 MG/1
25 TABLET, FILM COATED ORAL ONCE
Status: COMPLETED | OUTPATIENT
Start: 2025-07-26 | End: 2025-07-26

## 2025-07-26 RX ORDER — HYDROXYZINE HYDROCHLORIDE 25 MG/1
50 TABLET, FILM COATED ORAL 3 TIMES DAILY PRN
Status: DISCONTINUED | OUTPATIENT
Start: 2025-07-26 | End: 2025-08-03 | Stop reason: HOSPADM

## 2025-07-26 RX ADMIN — Medication 10 ML: at 09:11

## 2025-07-26 RX ADMIN — PANTOPRAZOLE SODIUM 8 MG/HR: 40 INJECTION, POWDER, FOR SOLUTION INTRAVENOUS at 14:21

## 2025-07-26 RX ADMIN — Medication 10 ML: at 20:47

## 2025-07-26 RX ADMIN — HYDROCODONE BITARTRATE AND ACETAMINOPHEN 1 TABLET: 5; 325 TABLET ORAL at 09:09

## 2025-07-26 RX ADMIN — HYDROXYZINE HYDROCHLORIDE 25 MG: 25 TABLET, FILM COATED ORAL at 05:44

## 2025-07-26 RX ADMIN — Medication 1 TABLET: at 09:09

## 2025-07-26 RX ADMIN — HYDROXYZINE HYDROCHLORIDE 50 MG: 25 TABLET, FILM COATED ORAL at 14:21

## 2025-07-26 RX ADMIN — LEVOTHYROXINE SODIUM 200 MCG: 0.1 TABLET ORAL at 09:09

## 2025-07-26 RX ADMIN — LEVOTHYROXINE SODIUM 75 MCG: 0.07 TABLET ORAL at 09:09

## 2025-07-26 RX ADMIN — GABAPENTIN 300 MG: 300 CAPSULE ORAL at 20:17

## 2025-07-26 RX ADMIN — ONDANSETRON 4 MG: 2 INJECTION INTRAMUSCULAR; INTRAVENOUS at 02:37

## 2025-07-26 RX ADMIN — ONDANSETRON 4 MG: 2 INJECTION INTRAMUSCULAR; INTRAVENOUS at 20:37

## 2025-07-26 RX ADMIN — GABAPENTIN 300 MG: 300 CAPSULE ORAL at 09:09

## 2025-07-27 ENCOUNTER — APPOINTMENT (OUTPATIENT)
Dept: CARDIOLOGY | Facility: HOSPITAL | Age: 55
DRG: 393 | End: 2025-07-27
Payer: MEDICARE

## 2025-07-27 ENCOUNTER — APPOINTMENT (OUTPATIENT)
Dept: CT IMAGING | Facility: HOSPITAL | Age: 55
DRG: 393 | End: 2025-07-27
Payer: MEDICARE

## 2025-07-27 ENCOUNTER — APPOINTMENT (OUTPATIENT)
Dept: GENERAL RADIOLOGY | Facility: HOSPITAL | Age: 55
DRG: 393 | End: 2025-07-27
Payer: MEDICARE

## 2025-07-27 LAB
ALBUMIN SERPL-MCNC: 2.5 G/DL (ref 3.5–5.2)
ANION GAP SERPL CALCULATED.3IONS-SCNC: 8.9 MMOL/L (ref 5–15)
AORTIC DIMENSIONLESS INDEX: 0.69 (DI)
ASCENDING AORTA: 2.8 CM
AV MEAN PRESS GRAD SYS DOP V1V2: 5.4 MMHG
AV VMAX SYS DOP: 159.1 CM/SEC
BH CV ECHO LEFT VENTRICLE GLOBAL LONGITUDINAL STRAIN: -13.3 %
BH CV ECHO MEAS - ACS: 1.85 CM
BH CV ECHO MEAS - AO MAX PG: 10.1 MMHG
BH CV ECHO MEAS - AO V2 VTI: 32.9 CM
BH CV ECHO MEAS - AVA(I,D): 2.4 CM2
BH CV ECHO MEAS - EDV(CUBED): 91.6 ML
BH CV ECHO MEAS - EDV(MOD-SP2): 129 ML
BH CV ECHO MEAS - EDV(MOD-SP4): 155 ML
BH CV ECHO MEAS - EF(MOD-SP2): 46.5 %
BH CV ECHO MEAS - EF(MOD-SP4): 47.7 %
BH CV ECHO MEAS - ESV(CUBED): 53.6 ML
BH CV ECHO MEAS - ESV(MOD-SP2): 69 ML
BH CV ECHO MEAS - ESV(MOD-SP4): 81 ML
BH CV ECHO MEAS - FS: 16.4 %
BH CV ECHO MEAS - IVS/LVPW: 1.19 CM
BH CV ECHO MEAS - IVSD: 1.8 CM
BH CV ECHO MEAS - LAT PEAK E' VEL: 3.7 CM/SEC
BH CV ECHO MEAS - LV DIASTOLIC VOL/BSA (35-75): 69 CM2
BH CV ECHO MEAS - LV MASS(C)D: 322.2 GRAMS
BH CV ECHO MEAS - LV MAX PG: 3.6 MMHG
BH CV ECHO MEAS - LV MEAN PG: 1.93 MMHG
BH CV ECHO MEAS - LV SYSTOLIC VOL/BSA (12-30): 36 CM2
BH CV ECHO MEAS - LV V1 MAX: 94.7 CM/SEC
BH CV ECHO MEAS - LV V1 VTI: 22.7 CM
BH CV ECHO MEAS - LVIDD: 4.5 CM
BH CV ECHO MEAS - LVIDS: 3.8 CM
BH CV ECHO MEAS - LVOT AREA: 3.5 CM2
BH CV ECHO MEAS - LVOT DIAM: 2.1 CM
BH CV ECHO MEAS - LVPWD: 1.51 CM
BH CV ECHO MEAS - MED PEAK E' VEL: 5.3 CM/SEC
BH CV ECHO MEAS - MV A DUR: 0.14 SEC
BH CV ECHO MEAS - MV A MAX VEL: 106.4 CM/SEC
BH CV ECHO MEAS - MV DEC SLOPE: 580.3 CM/SEC2
BH CV ECHO MEAS - MV DEC TIME: 0.16 SEC
BH CV ECHO MEAS - MV E MAX VEL: 74.9 CM/SEC
BH CV ECHO MEAS - MV E/A: 0.7
BH CV ECHO MEAS - MV MAX PG: 7.9 MMHG
BH CV ECHO MEAS - MV MEAN PG: 3.4 MMHG
BH CV ECHO MEAS - MV P1/2T: 52.3 MSEC
BH CV ECHO MEAS - MV V2 VTI: 32.4 CM
BH CV ECHO MEAS - MVA(P1/2T): 4.2 CM2
BH CV ECHO MEAS - MVA(VTI): 2.44 CM2
BH CV ECHO MEAS - PA ACC TIME: 0.17 SEC
BH CV ECHO MEAS - PA V2 MAX: 84.3 CM/SEC
BH CV ECHO MEAS - PULM A REVS DUR: 0.1 SEC
BH CV ECHO MEAS - PULM A REVS VEL: 21.5 CM/SEC
BH CV ECHO MEAS - PULM DIAS VEL: 38.7 CM/SEC
BH CV ECHO MEAS - PULM S/D: 1.64
BH CV ECHO MEAS - PULM SYS VEL: 63.6 CM/SEC
BH CV ECHO MEAS - QP/QS: 0.61
BH CV ECHO MEAS - RV MAX PG: 1.8 MMHG
BH CV ECHO MEAS - RV V1 MAX: 67.1 CM/SEC
BH CV ECHO MEAS - RV V1 VTI: 12.8 CM
BH CV ECHO MEAS - RVOT DIAM: 2.19 CM
BH CV ECHO MEAS - SV(LVOT): 79.1 ML
BH CV ECHO MEAS - SV(MOD-SP2): 60 ML
BH CV ECHO MEAS - SV(MOD-SP4): 74 ML
BH CV ECHO MEAS - SV(RVOT): 48.4 ML
BH CV ECHO MEAS - SVI(LVOT): 35.2 ML/M2
BH CV ECHO MEAS - SVI(MOD-SP2): 26.7 ML/M2
BH CV ECHO MEAS - SVI(MOD-SP4): 32.9 ML/M2
BH CV ECHO MEAS - TAPSE (>1.6): 1.78 CM
BH CV ECHO MEASUREMENTS AVERAGE E/E' RATIO: 16.64
BH CV XLRA - RV BASE: 2.3 CM
BH CV XLRA - RV LENGTH: 8.2 CM
BH CV XLRA - RV MID: 2.32 CM
BH CV XLRA - TDI S': 12.3 CM/SEC
BUN SERPL-MCNC: 20 MG/DL (ref 6–20)
BUN/CREAT SERPL: 10.6 (ref 7–25)
CALCIUM SPEC-SCNC: 8.6 MG/DL (ref 8.6–10.5)
CHLORIDE SERPL-SCNC: 114 MMOL/L (ref 98–107)
CO2 SERPL-SCNC: 21.1 MMOL/L (ref 22–29)
CREAT SERPL-MCNC: 1.88 MG/DL (ref 0.57–1)
DEPRECATED RDW RBC AUTO: 46.6 FL (ref 37–54)
EGFRCR SERPLBLD CKD-EPI 2021: 31.6 ML/MIN/1.73
ERYTHROCYTE [DISTWIDTH] IN BLOOD BY AUTOMATED COUNT: 15.5 % (ref 12.3–15.4)
GLUCOSE BLDC GLUCOMTR-MCNC: 69 MG/DL (ref 70–130)
GLUCOSE BLDC GLUCOMTR-MCNC: 70 MG/DL (ref 70–130)
GLUCOSE BLDC GLUCOMTR-MCNC: 78 MG/DL (ref 70–130)
GLUCOSE BLDC GLUCOMTR-MCNC: 86 MG/DL (ref 70–130)
GLUCOSE SERPL-MCNC: 62 MG/DL (ref 65–99)
HCT VFR BLD AUTO: 29 % (ref 34–46.6)
HCT VFR BLD AUTO: 29 % (ref 34–46.6)
HCT VFR BLD AUTO: 30.9 % (ref 34–46.6)
HCT VFR BLD AUTO: 33.5 % (ref 34–46.6)
HGB BLD-MCNC: 10.1 G/DL (ref 12–15.9)
HGB BLD-MCNC: 8.8 G/DL (ref 12–15.9)
HGB BLD-MCNC: 8.8 G/DL (ref 12–15.9)
HGB BLD-MCNC: 9.4 G/DL (ref 12–15.9)
LEFT ATRIUM VOLUME INDEX: 19.6 ML/M2
LV EF BIPLANE MOD: 48.3 %
MCH RBC QN AUTO: 25.6 PG (ref 26.6–33)
MCHC RBC AUTO-ENTMCNC: 30.3 G/DL (ref 31.5–35.7)
MCV RBC AUTO: 84.3 FL (ref 79–97)
NT-PROBNP SERPL-MCNC: ABNORMAL PG/ML (ref 0–900)
PHOSPHATE SERPL-MCNC: 4.7 MG/DL (ref 2.5–4.5)
PLATELET # BLD AUTO: 358 10*3/MM3 (ref 140–450)
PMV BLD AUTO: 8.8 FL (ref 6–12)
POTASSIUM SERPL-SCNC: 4.5 MMOL/L (ref 3.5–5.2)
RBC # BLD AUTO: 3.44 10*6/MM3 (ref 3.77–5.28)
SINUS: 3.3 CM
SODIUM SERPL-SCNC: 144 MMOL/L (ref 136–145)
STJ: 2.2 CM
TROPONIN T SERPL HS-MCNC: 70 NG/L
TROPONIN T SERPL HS-MCNC: 72 NG/L
WBC NRBC COR # BLD AUTO: 9.01 10*3/MM3 (ref 3.4–10.8)

## 2025-07-27 PROCEDURE — 85018 HEMOGLOBIN: CPT | Performed by: INTERNAL MEDICINE

## 2025-07-27 PROCEDURE — 85014 HEMATOCRIT: CPT | Performed by: INTERNAL MEDICINE

## 2025-07-27 PROCEDURE — 74176 CT ABD & PELVIS W/O CONTRAST: CPT

## 2025-07-27 PROCEDURE — 93306 TTE W/DOPPLER COMPLETE: CPT | Performed by: INTERNAL MEDICINE

## 2025-07-27 PROCEDURE — 71045 X-RAY EXAM CHEST 1 VIEW: CPT

## 2025-07-27 PROCEDURE — 93356 MYOCRD STRAIN IMG SPCKL TRCK: CPT | Performed by: INTERNAL MEDICINE

## 2025-07-27 PROCEDURE — 80069 RENAL FUNCTION PANEL: CPT | Performed by: INTERNAL MEDICINE

## 2025-07-27 PROCEDURE — 99222 1ST HOSP IP/OBS MODERATE 55: CPT | Performed by: INTERNAL MEDICINE

## 2025-07-27 PROCEDURE — 99232 SBSQ HOSP IP/OBS MODERATE 35: CPT | Performed by: PHYSICIAN ASSISTANT

## 2025-07-27 PROCEDURE — 93306 TTE W/DOPPLER COMPLETE: CPT

## 2025-07-27 PROCEDURE — 83880 ASSAY OF NATRIURETIC PEPTIDE: CPT | Performed by: STUDENT IN AN ORGANIZED HEALTH CARE EDUCATION/TRAINING PROGRAM

## 2025-07-27 PROCEDURE — 82948 REAGENT STRIP/BLOOD GLUCOSE: CPT

## 2025-07-27 PROCEDURE — 25510000001 PERFLUTREN 6.52 MG/ML SUSPENSION 2 ML VIAL: Performed by: INTERNAL MEDICINE

## 2025-07-27 PROCEDURE — 85027 COMPLETE CBC AUTOMATED: CPT | Performed by: INTERNAL MEDICINE

## 2025-07-27 PROCEDURE — 84484 ASSAY OF TROPONIN QUANT: CPT | Performed by: STUDENT IN AN ORGANIZED HEALTH CARE EDUCATION/TRAINING PROGRAM

## 2025-07-27 PROCEDURE — 93356 MYOCRD STRAIN IMG SPCKL TRCK: CPT

## 2025-07-27 RX ORDER — HYDRALAZINE HYDROCHLORIDE 20 MG/ML
10 INJECTION INTRAMUSCULAR; INTRAVENOUS EVERY 6 HOURS PRN
Status: DISCONTINUED | OUTPATIENT
Start: 2025-07-27 | End: 2025-08-03 | Stop reason: HOSPADM

## 2025-07-27 RX ADMIN — Medication 10 ML: at 08:30

## 2025-07-27 RX ADMIN — PERFLUTREN 2 ML: 6.52 INJECTION, SUSPENSION INTRAVENOUS at 13:48

## 2025-07-27 RX ADMIN — GABAPENTIN 300 MG: 300 CAPSULE ORAL at 21:57

## 2025-07-27 RX ADMIN — LEVOTHYROXINE SODIUM 200 MCG: 0.1 TABLET ORAL at 08:37

## 2025-07-27 RX ADMIN — LEVOTHYROXINE SODIUM 75 MCG: 0.07 TABLET ORAL at 08:38

## 2025-07-27 RX ADMIN — DEXTROSE MONOHYDRATE 25 G: 25 INJECTION, SOLUTION INTRAVENOUS at 02:57

## 2025-07-27 RX ADMIN — Medication 10 ML: at 21:58

## 2025-07-27 RX ADMIN — HYDROCODONE BITARTRATE AND ACETAMINOPHEN 1 TABLET: 5; 325 TABLET ORAL at 08:38

## 2025-07-27 RX ADMIN — GABAPENTIN 300 MG: 300 CAPSULE ORAL at 08:38

## 2025-07-27 RX ADMIN — Medication 1 TABLET: at 08:38

## 2025-07-28 ENCOUNTER — APPOINTMENT (OUTPATIENT)
Dept: ULTRASOUND IMAGING | Facility: HOSPITAL | Age: 55
DRG: 393 | End: 2025-07-28
Payer: MEDICARE

## 2025-07-28 LAB
ANION GAP SERPL CALCULATED.3IONS-SCNC: 9.2 MMOL/L (ref 5–15)
APPEARANCE FLD: CLEAR
BUN SERPL-MCNC: 20 MG/DL (ref 6–20)
BUN/CREAT SERPL: 11.3 (ref 7–25)
CALCIUM SPEC-SCNC: 8.5 MG/DL (ref 8.6–10.5)
CHLORIDE SERPL-SCNC: 114 MMOL/L (ref 98–107)
CO2 SERPL-SCNC: 20.8 MMOL/L (ref 22–29)
COLOR FLD: NORMAL
CREAT SERPL-MCNC: 1.77 MG/DL (ref 0.57–1)
CREAT UR-MCNC: 73.3 MG/DL
CREAT UR-MCNC: 75.4 MG/DL
DEPRECATED RDW RBC AUTO: 47.5 FL (ref 37–54)
EGFRCR SERPLBLD CKD-EPI 2021: 34 ML/MIN/1.73
ERYTHROCYTE [DISTWIDTH] IN BLOOD BY AUTOMATED COUNT: 15.8 % (ref 12.3–15.4)
GIE STN SPEC: NORMAL
GLUCOSE BLDC GLUCOMTR-MCNC: 104 MG/DL (ref 70–130)
GLUCOSE BLDC GLUCOMTR-MCNC: 115 MG/DL (ref 70–130)
GLUCOSE BLDC GLUCOMTR-MCNC: 123 MG/DL (ref 70–130)
GLUCOSE BLDC GLUCOMTR-MCNC: 98 MG/DL (ref 70–130)
GLUCOSE FLD-MCNC: 118 MG/DL
GLUCOSE SERPL-MCNC: 63 MG/DL (ref 65–99)
HCT VFR BLD AUTO: 28.2 % (ref 34–46.6)
HCT VFR BLD AUTO: 32.2 % (ref 34–46.6)
HCT VFR BLD AUTO: 33.2 % (ref 34–46.6)
HGB BLD-MCNC: 8.6 G/DL (ref 12–15.9)
HGB BLD-MCNC: 9.5 G/DL (ref 12–15.9)
HGB BLD-MCNC: 9.9 G/DL (ref 12–15.9)
LDH FLD-CCNC: 103 U/L
LYMPHOCYTES NFR FLD MANUAL: 68 %
MCH RBC QN AUTO: 25.5 PG (ref 26.6–33)
MCHC RBC AUTO-ENTMCNC: 30.5 G/DL (ref 31.5–35.7)
MCV RBC AUTO: 83.7 FL (ref 79–97)
METHOD: NORMAL
MONOS+MACROS NFR FLD: 12 %
NEUTROPHILS NFR FLD MANUAL: 20 %
NUC CELL # FLD: 523 /MM3
PH FLD: 8.06 [PH]
PLATELET # BLD AUTO: 348 10*3/MM3 (ref 140–450)
PMV BLD AUTO: 9 FL (ref 6–12)
POTASSIUM SERPL-SCNC: 4.1 MMOL/L (ref 3.5–5.2)
PROT ?TM UR-MCNC: 1212 MG/DL
PROT ?TM UR-MCNC: 629 MG/DL
PROT FLD-MCNC: 1.9 G/DL
PROT/CREAT UR: ABNORMAL MG/G CREA (ref 0–200)
RBC # BLD AUTO: 3.37 10*6/MM3 (ref 3.77–5.28)
RBC # FLD AUTO: 118 /MM3
SODIUM SERPL-SCNC: 144 MMOL/L (ref 136–145)
SODIUM UR-SCNC: 115 MMOL/L
WBC NRBC COR # BLD AUTO: 8.2 10*3/MM3 (ref 3.4–10.8)

## 2025-07-28 PROCEDURE — 89051 BODY FLUID CELL COUNT: CPT | Performed by: INTERNAL MEDICINE

## 2025-07-28 PROCEDURE — 87075 CULTR BACTERIA EXCEPT BLOOD: CPT | Performed by: INTERNAL MEDICINE

## 2025-07-28 PROCEDURE — 80048 BASIC METABOLIC PNL TOTAL CA: CPT | Performed by: STUDENT IN AN ORGANIZED HEALTH CARE EDUCATION/TRAINING PROGRAM

## 2025-07-28 PROCEDURE — 82945 GLUCOSE OTHER FLUID: CPT | Performed by: INTERNAL MEDICINE

## 2025-07-28 PROCEDURE — 84157 ASSAY OF PROTEIN OTHER: CPT | Performed by: INTERNAL MEDICINE

## 2025-07-28 PROCEDURE — 83986 ASSAY PH BODY FLUID NOS: CPT | Performed by: INTERNAL MEDICINE

## 2025-07-28 PROCEDURE — 99232 SBSQ HOSP IP/OBS MODERATE 35: CPT | Performed by: STUDENT IN AN ORGANIZED HEALTH CARE EDUCATION/TRAINING PROGRAM

## 2025-07-28 PROCEDURE — 85027 COMPLETE CBC AUTOMATED: CPT | Performed by: STUDENT IN AN ORGANIZED HEALTH CARE EDUCATION/TRAINING PROGRAM

## 2025-07-28 PROCEDURE — 87205 SMEAR GRAM STAIN: CPT | Performed by: INTERNAL MEDICINE

## 2025-07-28 PROCEDURE — 99232 SBSQ HOSP IP/OBS MODERATE 35: CPT | Performed by: INTERNAL MEDICINE

## 2025-07-28 PROCEDURE — 92610 EVALUATE SWALLOWING FUNCTION: CPT

## 2025-07-28 PROCEDURE — 85014 HEMATOCRIT: CPT | Performed by: INTERNAL MEDICINE

## 2025-07-28 PROCEDURE — 84300 ASSAY OF URINE SODIUM: CPT | Performed by: INTERNAL MEDICINE

## 2025-07-28 PROCEDURE — 85018 HEMOGLOBIN: CPT | Performed by: INTERNAL MEDICINE

## 2025-07-28 PROCEDURE — 82948 REAGENT STRIP/BLOOD GLUCOSE: CPT

## 2025-07-28 PROCEDURE — 83615 LACTATE (LD) (LDH) ENZYME: CPT | Performed by: INTERNAL MEDICINE

## 2025-07-28 PROCEDURE — 87206 SMEAR FLUORESCENT/ACID STAI: CPT | Performed by: INTERNAL MEDICINE

## 2025-07-28 PROCEDURE — 25010000002 ONDANSETRON PER 1 MG: Performed by: INTERNAL MEDICINE

## 2025-07-28 PROCEDURE — 82570 ASSAY OF URINE CREATININE: CPT | Performed by: INTERNAL MEDICINE

## 2025-07-28 PROCEDURE — 87102 FUNGUS ISOLATION CULTURE: CPT | Performed by: INTERNAL MEDICINE

## 2025-07-28 PROCEDURE — 87070 CULTURE OTHR SPECIMN AEROBIC: CPT | Performed by: INTERNAL MEDICINE

## 2025-07-28 PROCEDURE — 0W993ZZ DRAINAGE OF RIGHT PLEURAL CAVITY, PERCUTANEOUS APPROACH: ICD-10-PCS | Performed by: RADIOLOGY

## 2025-07-28 PROCEDURE — 25010000002 LIDOCAINE 1 % SOLUTION: Performed by: NURSE PRACTITIONER

## 2025-07-28 PROCEDURE — 76942 ECHO GUIDE FOR BIOPSY: CPT

## 2025-07-28 PROCEDURE — 25010000002 BUMETANIDE PER 0.5 MG: Performed by: INTERNAL MEDICINE

## 2025-07-28 PROCEDURE — 84156 ASSAY OF PROTEIN URINE: CPT | Performed by: INTERNAL MEDICINE

## 2025-07-28 RX ORDER — LIDOCAINE HYDROCHLORIDE 10 MG/ML
10 INJECTION, SOLUTION INFILTRATION; PERINEURAL ONCE
Status: COMPLETED | OUTPATIENT
Start: 2025-07-28 | End: 2025-07-28

## 2025-07-28 RX ORDER — BUMETANIDE 0.25 MG/ML
2 INJECTION, SOLUTION INTRAMUSCULAR; INTRAVENOUS ONCE
Status: COMPLETED | OUTPATIENT
Start: 2025-07-28 | End: 2025-07-28

## 2025-07-28 RX ORDER — BUMETANIDE 0.25 MG/ML
3 INJECTION, SOLUTION INTRAMUSCULAR; INTRAVENOUS EVERY 8 HOURS
Status: COMPLETED | OUTPATIENT
Start: 2025-07-28 | End: 2025-07-30

## 2025-07-28 RX ORDER — NICOTINE POLACRILEX 4 MG
15 LOZENGE BUCCAL
Status: DISCONTINUED | OUTPATIENT
Start: 2025-07-28 | End: 2025-08-03 | Stop reason: HOSPADM

## 2025-07-28 RX ORDER — AMLODIPINE BESYLATE 5 MG/1
5 TABLET ORAL
Status: DISCONTINUED | OUTPATIENT
Start: 2025-07-28 | End: 2025-08-02

## 2025-07-28 RX ORDER — IBUPROFEN 600 MG/1
1 TABLET ORAL
Status: DISCONTINUED | OUTPATIENT
Start: 2025-07-28 | End: 2025-08-03 | Stop reason: HOSPADM

## 2025-07-28 RX ORDER — DEXTROSE MONOHYDRATE 25 G/50ML
25 INJECTION, SOLUTION INTRAVENOUS
Status: DISCONTINUED | OUTPATIENT
Start: 2025-07-28 | End: 2025-08-03 | Stop reason: HOSPADM

## 2025-07-28 RX ADMIN — Medication 1 TABLET: at 08:26

## 2025-07-28 RX ADMIN — HYDROCODONE BITARTRATE AND ACETAMINOPHEN 1 TABLET: 5; 325 TABLET ORAL at 08:26

## 2025-07-28 RX ADMIN — BUMETANIDE 2 MG: 0.25 INJECTION INTRAMUSCULAR; INTRAVENOUS at 07:34

## 2025-07-28 RX ADMIN — PANTOPRAZOLE SODIUM 8 MG/HR: 40 INJECTION, POWDER, FOR SOLUTION INTRAVENOUS at 03:14

## 2025-07-28 RX ADMIN — LEVOTHYROXINE SODIUM 200 MCG: 0.1 TABLET ORAL at 08:26

## 2025-07-28 RX ADMIN — GABAPENTIN 300 MG: 300 CAPSULE ORAL at 08:26

## 2025-07-28 RX ADMIN — BUMETANIDE 3 MG: 0.25 INJECTION INTRAMUSCULAR; INTRAVENOUS at 17:53

## 2025-07-28 RX ADMIN — AMLODIPINE BESYLATE 5 MG: 5 TABLET ORAL at 16:38

## 2025-07-28 RX ADMIN — Medication 10 ML: at 08:28

## 2025-07-28 RX ADMIN — GABAPENTIN 300 MG: 300 CAPSULE ORAL at 21:55

## 2025-07-28 RX ADMIN — ONDANSETRON 4 MG: 2 INJECTION INTRAMUSCULAR; INTRAVENOUS at 16:38

## 2025-07-28 RX ADMIN — LEVOTHYROXINE SODIUM 75 MCG: 0.07 TABLET ORAL at 08:26

## 2025-07-28 RX ADMIN — Medication 10 ML: at 21:56

## 2025-07-28 RX ADMIN — PANTOPRAZOLE SODIUM 8 MG/HR: 40 INJECTION, POWDER, FOR SOLUTION INTRAVENOUS at 07:34

## 2025-07-28 RX ADMIN — LIDOCAINE HYDROCHLORIDE 7 ML: 10 INJECTION, SOLUTION INFILTRATION; PERINEURAL at 15:23

## 2025-07-29 ENCOUNTER — APPOINTMENT (OUTPATIENT)
Dept: GENERAL RADIOLOGY | Facility: HOSPITAL | Age: 55
DRG: 393 | End: 2025-07-29
Payer: MEDICARE

## 2025-07-29 LAB
ANION GAP SERPL CALCULATED.3IONS-SCNC: 10.1 MMOL/L (ref 5–15)
BUN SERPL-MCNC: 23 MG/DL (ref 6–20)
BUN/CREAT SERPL: 11.8 (ref 7–25)
CALCIUM SPEC-SCNC: 8.4 MG/DL (ref 8.6–10.5)
CHLORIDE SERPL-SCNC: 110 MMOL/L (ref 98–107)
CO2 SERPL-SCNC: 22.9 MMOL/L (ref 22–29)
CREAT SERPL-MCNC: 1.95 MG/DL (ref 0.57–1)
DEPRECATED RDW RBC AUTO: 46.4 FL (ref 37–54)
EGFRCR SERPLBLD CKD-EPI 2021: 30.3 ML/MIN/1.73
ERYTHROCYTE [DISTWIDTH] IN BLOOD BY AUTOMATED COUNT: 15.5 % (ref 12.3–15.4)
GLUCOSE BLDC GLUCOMTR-MCNC: 105 MG/DL (ref 65–99)
GLUCOSE BLDC GLUCOMTR-MCNC: 119 MG/DL (ref 65–99)
GLUCOSE BLDC GLUCOMTR-MCNC: 123 MG/DL (ref 70–130)
GLUCOSE BLDC GLUCOMTR-MCNC: 124 MG/DL (ref 70–130)
GLUCOSE SERPL-MCNC: 91 MG/DL (ref 65–99)
HCT VFR BLD AUTO: 29.1 % (ref 34–46.6)
HGB BLD-MCNC: 9.1 G/DL (ref 12–15.9)
MAGNESIUM SERPL-MCNC: 1.8 MG/DL (ref 1.6–2.6)
MCH RBC QN AUTO: 25.9 PG (ref 26.6–33)
MCHC RBC AUTO-ENTMCNC: 31.3 G/DL (ref 31.5–35.7)
MCV RBC AUTO: 82.7 FL (ref 79–97)
PHOSPHATE SERPL-MCNC: 4.7 MG/DL (ref 2.5–4.5)
PLATELET # BLD AUTO: 322 10*3/MM3 (ref 140–450)
PMV BLD AUTO: 8.9 FL (ref 6–12)
POTASSIUM SERPL-SCNC: 4 MMOL/L (ref 3.5–5.2)
RBC # BLD AUTO: 3.52 10*6/MM3 (ref 3.77–5.28)
SODIUM SERPL-SCNC: 143 MMOL/L (ref 136–145)
WBC NRBC COR # BLD AUTO: 7.9 10*3/MM3 (ref 3.4–10.8)

## 2025-07-29 PROCEDURE — 85027 COMPLETE CBC AUTOMATED: CPT | Performed by: STUDENT IN AN ORGANIZED HEALTH CARE EDUCATION/TRAINING PROGRAM

## 2025-07-29 PROCEDURE — 92611 MOTION FLUOROSCOPY/SWALLOW: CPT

## 2025-07-29 PROCEDURE — 83735 ASSAY OF MAGNESIUM: CPT

## 2025-07-29 PROCEDURE — 99232 SBSQ HOSP IP/OBS MODERATE 35: CPT | Performed by: INTERNAL MEDICINE

## 2025-07-29 PROCEDURE — 25010000002 ONDANSETRON PER 1 MG: Performed by: INTERNAL MEDICINE

## 2025-07-29 PROCEDURE — 80048 BASIC METABOLIC PNL TOTAL CA: CPT | Performed by: STUDENT IN AN ORGANIZED HEALTH CARE EDUCATION/TRAINING PROGRAM

## 2025-07-29 PROCEDURE — 99232 SBSQ HOSP IP/OBS MODERATE 35: CPT | Performed by: NURSE PRACTITIONER

## 2025-07-29 PROCEDURE — 74230 X-RAY XM SWLNG FUNCJ C+: CPT

## 2025-07-29 PROCEDURE — 84100 ASSAY OF PHOSPHORUS: CPT

## 2025-07-29 PROCEDURE — 74018 RADEX ABDOMEN 1 VIEW: CPT

## 2025-07-29 PROCEDURE — 82948 REAGENT STRIP/BLOOD GLUCOSE: CPT

## 2025-07-29 PROCEDURE — 25010000002 BUMETANIDE PER 0.5 MG: Performed by: INTERNAL MEDICINE

## 2025-07-29 RX ORDER — POLYETHYLENE GLYCOL 3350 17 G/17G
0.5 POWDER, FOR SOLUTION ORAL EVERY 12 HOURS
Status: COMPLETED | OUTPATIENT
Start: 2025-07-29 | End: 2025-07-30

## 2025-07-29 RX ADMIN — GABAPENTIN 300 MG: 300 CAPSULE ORAL at 21:13

## 2025-07-29 RX ADMIN — HYDROCODONE BITARTRATE AND ACETAMINOPHEN 1 TABLET: 5; 325 TABLET ORAL at 09:14

## 2025-07-29 RX ADMIN — ACETAMINOPHEN 325MG 650 MG: 325 TABLET ORAL at 14:14

## 2025-07-29 RX ADMIN — Medication 1 TABLET: at 09:14

## 2025-07-29 RX ADMIN — BUMETANIDE 3 MG: 0.25 INJECTION INTRAMUSCULAR; INTRAVENOUS at 09:18

## 2025-07-29 RX ADMIN — PANTOPRAZOLE SODIUM 8 MG/HR: 40 INJECTION, POWDER, FOR SOLUTION INTRAVENOUS at 00:08

## 2025-07-29 RX ADMIN — LEVOTHYROXINE SODIUM 75 MCG: 0.07 TABLET ORAL at 09:14

## 2025-07-29 RX ADMIN — POLYETHYLENE GLYCOL 3350 0.5 BOTTLE: 17 POWDER, FOR SOLUTION ORAL at 17:58

## 2025-07-29 RX ADMIN — PANTOPRAZOLE SODIUM 8 MG/HR: 40 INJECTION, POWDER, FOR SOLUTION INTRAVENOUS at 11:01

## 2025-07-29 RX ADMIN — BARIUM SULFATE 4 ML: 980 POWDER, FOR SUSPENSION ORAL at 08:37

## 2025-07-29 RX ADMIN — BUMETANIDE 3 MG: 0.25 INJECTION INTRAMUSCULAR; INTRAVENOUS at 02:22

## 2025-07-29 RX ADMIN — ONDANSETRON 4 MG: 2 INJECTION INTRAMUSCULAR; INTRAVENOUS at 06:41

## 2025-07-29 RX ADMIN — PANTOPRAZOLE SODIUM 8 MG/HR: 40 INJECTION, POWDER, FOR SOLUTION INTRAVENOUS at 17:57

## 2025-07-29 RX ADMIN — GABAPENTIN 300 MG: 300 CAPSULE ORAL at 09:14

## 2025-07-29 RX ADMIN — AMLODIPINE BESYLATE 5 MG: 5 TABLET ORAL at 09:14

## 2025-07-29 RX ADMIN — Medication 10 ML: at 21:13

## 2025-07-29 RX ADMIN — Medication 10 ML: at 09:18

## 2025-07-29 RX ADMIN — BARIUM SULFATE 50 ML: 400 SUSPENSION ORAL at 08:37

## 2025-07-29 RX ADMIN — PANTOPRAZOLE SODIUM 8 MG/HR: 40 INJECTION, POWDER, FOR SOLUTION INTRAVENOUS at 05:37

## 2025-07-29 RX ADMIN — BUMETANIDE 3 MG: 0.25 INJECTION INTRAMUSCULAR; INTRAVENOUS at 17:58

## 2025-07-29 RX ADMIN — BARIUM SULFATE 55 ML: 0.81 POWDER, FOR SUSPENSION ORAL at 08:37

## 2025-07-29 RX ADMIN — BARIUM SULFATE 5 ML: 400 PASTE ORAL at 08:37

## 2025-07-29 RX ADMIN — LEVOTHYROXINE SODIUM 200 MCG: 0.1 TABLET ORAL at 09:14

## 2025-07-30 ENCOUNTER — ANESTHESIA (OUTPATIENT)
Dept: GASTROENTEROLOGY | Facility: HOSPITAL | Age: 55
End: 2025-07-30
Payer: MEDICARE

## 2025-07-30 ENCOUNTER — ANESTHESIA EVENT (OUTPATIENT)
Dept: GASTROENTEROLOGY | Facility: HOSPITAL | Age: 55
End: 2025-07-30
Payer: MEDICARE

## 2025-07-30 LAB
ALBUMIN SERPL-MCNC: 2.2 G/DL (ref 3.5–5.2)
ALBUMIN/GLOB SERPL: 0.7 G/DL
ALP SERPL-CCNC: 128 U/L (ref 39–117)
ALT SERPL W P-5'-P-CCNC: 10 U/L (ref 1–33)
ANION GAP SERPL CALCULATED.3IONS-SCNC: 9 MMOL/L (ref 5–15)
AST SERPL-CCNC: 17 U/L (ref 1–32)
BASOPHILS # BLD AUTO: 0.06 10*3/MM3 (ref 0–0.2)
BASOPHILS NFR BLD AUTO: 0.7 % (ref 0–1.5)
BILIRUB SERPL-MCNC: <0.2 MG/DL (ref 0–1.2)
BUN SERPL-MCNC: 22 MG/DL (ref 6–20)
BUN/CREAT SERPL: 11.5 (ref 7–25)
CALCIUM SPEC-SCNC: 8.1 MG/DL (ref 8.6–10.5)
CHLORIDE SERPL-SCNC: 109 MMOL/L (ref 98–107)
CO2 SERPL-SCNC: 22 MMOL/L (ref 22–29)
CREAT SERPL-MCNC: 1.92 MG/DL (ref 0.57–1)
DEPRECATED RDW RBC AUTO: 47.3 FL (ref 37–54)
EGFRCR SERPLBLD CKD-EPI 2021: 30.9 ML/MIN/1.73
EOSINOPHIL # BLD AUTO: 0.62 10*3/MM3 (ref 0–0.4)
EOSINOPHIL NFR BLD AUTO: 6.9 % (ref 0.3–6.2)
ERYTHROCYTE [DISTWIDTH] IN BLOOD BY AUTOMATED COUNT: 15.7 % (ref 12.3–15.4)
GLOBULIN UR ELPH-MCNC: 3.3 GM/DL
GLUCOSE BLDC GLUCOMTR-MCNC: 114 MG/DL (ref 65–99)
GLUCOSE BLDC GLUCOMTR-MCNC: 79 MG/DL (ref 65–99)
GLUCOSE BLDC GLUCOMTR-MCNC: 84 MG/DL (ref 65–99)
GLUCOSE SERPL-MCNC: 83 MG/DL (ref 65–99)
HCT VFR BLD AUTO: 30.3 % (ref 34–46.6)
HGB BLD-MCNC: 9.2 G/DL (ref 12–15.9)
IMM GRANULOCYTES # BLD AUTO: 0.04 10*3/MM3 (ref 0–0.05)
IMM GRANULOCYTES NFR BLD AUTO: 0.4 % (ref 0–0.5)
INR PPP: 1.13 (ref 0.9–1.1)
LYMPHOCYTES # BLD AUTO: 2.96 10*3/MM3 (ref 0.7–3.1)
LYMPHOCYTES NFR BLD AUTO: 32.7 % (ref 19.6–45.3)
MCH RBC QN AUTO: 25.6 PG (ref 26.6–33)
MCHC RBC AUTO-ENTMCNC: 30.4 G/DL (ref 31.5–35.7)
MCV RBC AUTO: 84.2 FL (ref 79–97)
MONOCYTES # BLD AUTO: 0.53 10*3/MM3 (ref 0.1–0.9)
MONOCYTES NFR BLD AUTO: 5.9 % (ref 5–12)
NEUTROPHILS NFR BLD AUTO: 4.84 10*3/MM3 (ref 1.7–7)
NEUTROPHILS NFR BLD AUTO: 53.4 % (ref 42.7–76)
NRBC BLD AUTO-RTO: 0 /100 WBC (ref 0–0.2)
PLATELET # BLD AUTO: 313 10*3/MM3 (ref 140–450)
PMV BLD AUTO: 8.9 FL (ref 6–12)
POTASSIUM SERPL-SCNC: 3.8 MMOL/L (ref 3.5–5.2)
PROT SERPL-MCNC: 5.5 G/DL (ref 6–8.5)
PROTHROMBIN TIME: 14.5 SECONDS (ref 11.7–14.2)
RBC # BLD AUTO: 3.6 10*6/MM3 (ref 3.77–5.28)
SODIUM SERPL-SCNC: 140 MMOL/L (ref 136–145)
WBC NRBC COR # BLD AUTO: 9.05 10*3/MM3 (ref 3.4–10.8)

## 2025-07-30 PROCEDURE — 25010000002 PROPOFOL 200 MG/20ML EMULSION: Performed by: NURSE ANESTHETIST, CERTIFIED REGISTERED

## 2025-07-30 PROCEDURE — 43239 EGD BIOPSY SINGLE/MULTIPLE: CPT | Performed by: INTERNAL MEDICINE

## 2025-07-30 PROCEDURE — 25010000002 BUMETANIDE PER 0.5 MG: Performed by: INTERNAL MEDICINE

## 2025-07-30 PROCEDURE — 99232 SBSQ HOSP IP/OBS MODERATE 35: CPT | Performed by: NURSE PRACTITIONER

## 2025-07-30 PROCEDURE — 45385 COLONOSCOPY W/LESION REMOVAL: CPT | Performed by: INTERNAL MEDICINE

## 2025-07-30 PROCEDURE — 25810000003 LACTATED RINGERS PER 1000 ML: Performed by: NURSE ANESTHETIST, CERTIFIED REGISTERED

## 2025-07-30 PROCEDURE — 0DB98ZX EXCISION OF DUODENUM, VIA NATURAL OR ARTIFICIAL OPENING ENDOSCOPIC, DIAGNOSTIC: ICD-10-PCS | Performed by: INTERNAL MEDICINE

## 2025-07-30 PROCEDURE — 25810000003 SODIUM CHLORIDE 0.9 % SOLUTION: Performed by: INTERNAL MEDICINE

## 2025-07-30 PROCEDURE — 25010000002 PHENYLEPHRINE 10 MG/ML SOLUTION: Performed by: NURSE ANESTHETIST, CERTIFIED REGISTERED

## 2025-07-30 PROCEDURE — 63710000001 INSULIN GLARGINE PER 5 UNITS: Performed by: INTERNAL MEDICINE

## 2025-07-30 PROCEDURE — 88342 IMHCHEM/IMCYTCHM 1ST ANTB: CPT | Performed by: INTERNAL MEDICINE

## 2025-07-30 PROCEDURE — 0D758ZZ DILATION OF ESOPHAGUS, VIA NATURAL OR ARTIFICIAL OPENING ENDOSCOPIC: ICD-10-PCS | Performed by: INTERNAL MEDICINE

## 2025-07-30 PROCEDURE — 45381 COLONOSCOPY SUBMUCOUS NJX: CPT | Performed by: INTERNAL MEDICINE

## 2025-07-30 PROCEDURE — 82948 REAGENT STRIP/BLOOD GLUCOSE: CPT | Performed by: STUDENT IN AN ORGANIZED HEALTH CARE EDUCATION/TRAINING PROGRAM

## 2025-07-30 PROCEDURE — 85025 COMPLETE CBC W/AUTO DIFF WBC: CPT | Performed by: INTERNAL MEDICINE

## 2025-07-30 PROCEDURE — 0DBK8ZZ EXCISION OF ASCENDING COLON, VIA NATURAL OR ARTIFICIAL OPENING ENDOSCOPIC: ICD-10-PCS | Performed by: INTERNAL MEDICINE

## 2025-07-30 PROCEDURE — 25010000002 PROPOFOL 1000 MG/100ML EMULSION: Performed by: NURSE ANESTHETIST, CERTIFIED REGISTERED

## 2025-07-30 PROCEDURE — 82948 REAGENT STRIP/BLOOD GLUCOSE: CPT

## 2025-07-30 PROCEDURE — 0DB78ZX EXCISION OF STOMACH, PYLORUS, VIA NATURAL OR ARTIFICIAL OPENING ENDOSCOPIC, DIAGNOSTIC: ICD-10-PCS | Performed by: INTERNAL MEDICINE

## 2025-07-30 PROCEDURE — 80053 COMPREHEN METABOLIC PANEL: CPT | Performed by: INTERNAL MEDICINE

## 2025-07-30 PROCEDURE — 43450 DILATE ESOPHAGUS 1/MULT PASS: CPT | Performed by: INTERNAL MEDICINE

## 2025-07-30 PROCEDURE — 88305 TISSUE EXAM BY PATHOLOGIST: CPT | Performed by: INTERNAL MEDICINE

## 2025-07-30 PROCEDURE — 85610 PROTHROMBIN TIME: CPT | Performed by: INTERNAL MEDICINE

## 2025-07-30 PROCEDURE — 25010000002 GLYCOPYRROLATE 0.2 MG/ML SOLUTION: Performed by: NURSE ANESTHETIST, CERTIFIED REGISTERED

## 2025-07-30 PROCEDURE — 25010000002 LIDOCAINE 2% SOLUTION: Performed by: NURSE ANESTHETIST, CERTIFIED REGISTERED

## 2025-07-30 PROCEDURE — 0DB58ZX EXCISION OF ESOPHAGUS, VIA NATURAL OR ARTIFICIAL OPENING ENDOSCOPIC, DIAGNOSTIC: ICD-10-PCS | Performed by: INTERNAL MEDICINE

## 2025-07-30 DEVICE — WORKING LENGTH 235CM, WORKING CHANNEL 2.8MM
Type: IMPLANTABLE DEVICE | Site: ASCENDING COLON | Status: FUNCTIONAL
Brand: RESOLUTION 360 CLIP

## 2025-07-30 DEVICE — CLIP
Type: IMPLANTABLE DEVICE | Site: ASCENDING COLON | Status: FUNCTIONAL
Brand: RESOLUTION 360™ ULTRA CLIP

## 2025-07-30 RX ORDER — PROPOFOL 10 MG/ML
INJECTION, EMULSION INTRAVENOUS CONTINUOUS PRN
Status: DISCONTINUED | OUTPATIENT
Start: 2025-07-30 | End: 2025-07-30 | Stop reason: SURG

## 2025-07-30 RX ORDER — PROPOFOL 10 MG/ML
INJECTION, EMULSION INTRAVENOUS AS NEEDED
Status: DISCONTINUED | OUTPATIENT
Start: 2025-07-30 | End: 2025-07-30 | Stop reason: SURG

## 2025-07-30 RX ORDER — LIDOCAINE HYDROCHLORIDE 20 MG/ML
INJECTION, SOLUTION INFILTRATION; PERINEURAL AS NEEDED
Status: DISCONTINUED | OUTPATIENT
Start: 2025-07-30 | End: 2025-07-30 | Stop reason: SURG

## 2025-07-30 RX ORDER — GLYCOPYRROLATE 0.2 MG/ML
INJECTION INTRAMUSCULAR; INTRAVENOUS AS NEEDED
Status: DISCONTINUED | OUTPATIENT
Start: 2025-07-30 | End: 2025-07-30 | Stop reason: SURG

## 2025-07-30 RX ORDER — SODIUM CHLORIDE 9 MG/ML
30 INJECTION, SOLUTION INTRAVENOUS CONTINUOUS PRN
Status: ACTIVE | OUTPATIENT
Start: 2025-07-30 | End: 2025-07-30

## 2025-07-30 RX ORDER — BUMETANIDE 0.25 MG/ML
3 INJECTION, SOLUTION INTRAMUSCULAR; INTRAVENOUS 3 TIMES DAILY
Status: COMPLETED | OUTPATIENT
Start: 2025-07-31 | End: 2025-07-31

## 2025-07-30 RX ORDER — PHENYLEPHRINE HYDROCHLORIDE 10 MG/ML
INJECTION INTRAVENOUS AS NEEDED
Status: DISCONTINUED | OUTPATIENT
Start: 2025-07-30 | End: 2025-07-30 | Stop reason: SURG

## 2025-07-30 RX ORDER — SODIUM CHLORIDE, SODIUM LACTATE, POTASSIUM CHLORIDE, CALCIUM CHLORIDE 600; 310; 30; 20 MG/100ML; MG/100ML; MG/100ML; MG/100ML
INJECTION, SOLUTION INTRAVENOUS CONTINUOUS PRN
Status: DISCONTINUED | OUTPATIENT
Start: 2025-07-30 | End: 2025-07-30 | Stop reason: SURG

## 2025-07-30 RX ADMIN — SODIUM CHLORIDE 30 ML/HR: 9 INJECTION, SOLUTION INTRAVENOUS at 13:50

## 2025-07-30 RX ADMIN — HYDROCODONE BITARTRATE AND ACETAMINOPHEN 1 TABLET: 5; 325 TABLET ORAL at 08:49

## 2025-07-30 RX ADMIN — PHENYLEPHRINE HYDROCHLORIDE 200 MCG: 10 INJECTION INTRAVENOUS at 14:52

## 2025-07-30 RX ADMIN — LIDOCAINE HYDROCHLORIDE 60 MG: 20 INJECTION, SOLUTION INFILTRATION; PERINEURAL at 14:15

## 2025-07-30 RX ADMIN — PANTOPRAZOLE SODIUM 8 MG/HR: 40 INJECTION, POWDER, FOR SOLUTION INTRAVENOUS at 10:00

## 2025-07-30 RX ADMIN — GABAPENTIN 300 MG: 300 CAPSULE ORAL at 08:50

## 2025-07-30 RX ADMIN — AMLODIPINE BESYLATE 5 MG: 5 TABLET ORAL at 08:49

## 2025-07-30 RX ADMIN — GLYCOPYRROLATE 0.2 MG: 0.2 INJECTION INTRAMUSCULAR; INTRAVENOUS at 14:15

## 2025-07-30 RX ADMIN — PROPOFOL 180 MCG/KG/MIN: 10 INJECTION, EMULSION INTRAVENOUS at 14:16

## 2025-07-30 RX ADMIN — POLYETHYLENE GLYCOL 3350 0.5 BOTTLE: 17 POWDER, FOR SOLUTION ORAL at 03:21

## 2025-07-30 RX ADMIN — PANTOPRAZOLE SODIUM 8 MG/HR: 40 INJECTION, POWDER, FOR SOLUTION INTRAVENOUS at 00:05

## 2025-07-30 RX ADMIN — BUMETANIDE 3 MG: 0.25 INJECTION INTRAMUSCULAR; INTRAVENOUS at 03:04

## 2025-07-30 RX ADMIN — GABAPENTIN 300 MG: 300 CAPSULE ORAL at 22:10

## 2025-07-30 RX ADMIN — LEVOTHYROXINE SODIUM 200 MCG: 0.1 TABLET ORAL at 08:50

## 2025-07-30 RX ADMIN — PROPOFOL INJECTABLE EMULSION 100 MG: 10 INJECTION, EMULSION INTRAVENOUS at 14:16

## 2025-07-30 RX ADMIN — PANTOPRAZOLE SODIUM 8 MG/HR: 40 INJECTION, POWDER, FOR SOLUTION INTRAVENOUS at 04:10

## 2025-07-30 RX ADMIN — Medication 10 ML: at 10:00

## 2025-07-30 RX ADMIN — INSULIN GLARGINE 15 UNITS: 100 INJECTION, SOLUTION SUBCUTANEOUS at 22:11

## 2025-07-30 RX ADMIN — LEVOTHYROXINE SODIUM 75 MCG: 0.07 TABLET ORAL at 08:50

## 2025-07-30 RX ADMIN — PHENYLEPHRINE HYDROCHLORIDE 200 MCG: 10 INJECTION INTRAVENOUS at 14:39

## 2025-07-30 RX ADMIN — SODIUM CHLORIDE, POTASSIUM CHLORIDE, SODIUM LACTATE AND CALCIUM CHLORIDE: 600; 310; 30; 20 INJECTION, SOLUTION INTRAVENOUS at 13:20

## 2025-07-30 RX ADMIN — Medication 10 ML: at 22:11

## 2025-07-30 NOTE — ANESTHESIA POSTPROCEDURE EVALUATION
Patient: Vanessa Root    Procedure Summary       Date: 07/30/25 Room / Location:  [facility]    Anesthesia Start: 1412 Anesthesia Stop: 1459    Procedures:       ESOPHAGOGASTRODUODENOSCOPY with biopsies with 56Fr ceballos (Esophagus)      COLONOSCOPY to cecum and into ti with 5cc ascendo lift, 5cc tattoo ink, hot snare polypectomy, clip placement x3 Diagnosis:       Anemia, chronic disease      (Anemia, chronic disease [D63.8])    Surgeons: Jacques Washington MD Provider: Franky Clemons MD    Anesthesia Type: MAC ASA Status: 3            Anesthesia Type: MAC    Vitals  No vitals data found for the desired time range.          Post Anesthesia Care and Evaluation    Patient location during evaluation: PHASE II  Patient participation: complete - patient participated  Level of consciousness: awake and alert  Pain management: adequate    Airway patency: patent  Anesthetic complications: No anesthetic complications  PONV Status: none  Cardiovascular status: acceptable and hemodynamically stable  Respiratory status: acceptable, nonlabored ventilation and spontaneous ventilation  Hydration status: acceptable

## 2025-07-30 NOTE — ANESTHESIA PREPROCEDURE EVALUATION
Anesthesia Evaluation     Patient summary reviewed and Nursing notes reviewed   history of anesthetic complications:  PONV  NPO Solid Status: > 8 hours  NPO Liquid Status: > 2 hours           Airway   Mallampati: II  TM distance: >3 FB  Neck ROM: full  No difficulty expected  Comment: Grade IIa view with MAC 3/grade I view with Renteria 2/feeding tube left nares  Dental    (+) poor dentition    Comment: Most teeth missing, some broken, no loose teeth per pt.    Pulmonary    (+) pleural effusion, pulmonary embolism, a smoker Former,sleep apnea  Cardiovascular - normal exam    ECG reviewed  Rhythm: regular  Rate: normal    (+) valvular problems/murmurs murmur, hyperlipidemia    ROS comment: EF 48% by ECHO 7/25    Neuro/Psych  (+) CVA residual symptoms, headaches, numbness, psychiatric history Bipolar and Depression    ROS Comment: Hx CVA with paraplegia/immobility syndrome/RLS/diabetic peripheral neuropathy/migraines/legally blind  GI/Hepatic/Renal/Endo    (+) obesity, morbid obesity, GERD, renal disease-, diabetes mellitus type 2, thyroid problem hypothyroidism    Musculoskeletal         ROS comment: Lumbar spinal stenosis/immobility syndrome  Abdominal   (+) obese   Substance History      OB/GYN          Other   arthritis, blood dyscrasia anemia,   history of cancer      Other Comment: Hx uterine CA/Hgb 9.2                Anesthesia Plan    ASA 3     MAC     intravenous induction     Anesthetic plan, risks, benefits, and alternatives have been provided, discussed and informed consent has been obtained with: patient.      CODE STATUS:    Code Status (Patient has no pulse and is not breathing): CPR (Attempt to Resuscitate)  Medical Interventions (Patient has pulse or is breathing): Full Support

## 2025-07-31 ENCOUNTER — APPOINTMENT (OUTPATIENT)
Dept: CARDIOLOGY | Facility: HOSPITAL | Age: 55
DRG: 393 | End: 2025-07-31
Payer: MEDICARE

## 2025-07-31 LAB
ANION GAP SERPL CALCULATED.3IONS-SCNC: 7.8 MMOL/L (ref 5–15)
BH CV UPPER VENOUS LEFT INTERNAL JUGULAR AUGMENT: NORMAL
BH CV UPPER VENOUS LEFT INTERNAL JUGULAR COMPRESS: NORMAL
BH CV UPPER VENOUS LEFT INTERNAL JUGULAR PHASIC: NORMAL
BH CV UPPER VENOUS LEFT INTERNAL JUGULAR SPONT: NORMAL
BH CV UPPER VENOUS LEFT SUBCLAVIAN AUGMENT: NORMAL
BH CV UPPER VENOUS LEFT SUBCLAVIAN COMPRESS: NORMAL
BH CV UPPER VENOUS LEFT SUBCLAVIAN PHASIC: NORMAL
BH CV UPPER VENOUS LEFT SUBCLAVIAN SPONT: NORMAL
BH CV UPPER VENOUS RIGHT AXILLARY AUGMENT: NORMAL
BH CV UPPER VENOUS RIGHT AXILLARY COMPRESS: NORMAL
BH CV UPPER VENOUS RIGHT AXILLARY PHASIC: NORMAL
BH CV UPPER VENOUS RIGHT AXILLARY SPONT: NORMAL
BH CV UPPER VENOUS RIGHT BASILIC FOREARM COLOR: 1
BH CV UPPER VENOUS RIGHT BASILIC FOREARM COMPRESS: NORMAL
BH CV UPPER VENOUS RIGHT BASILIC FOREARM THROMBUS: NORMAL
BH CV UPPER VENOUS RIGHT BASILIC UPPER COMPRESS: NORMAL
BH CV UPPER VENOUS RIGHT BRACHIAL COMPRESS: NORMAL
BH CV UPPER VENOUS RIGHT CEPHALIC FOREARM COMPRESS: NORMAL
BH CV UPPER VENOUS RIGHT CEPHALIC UPPER COMPRESS: NORMAL
BH CV UPPER VENOUS RIGHT INTERNAL JUGULAR AUGMENT: NORMAL
BH CV UPPER VENOUS RIGHT INTERNAL JUGULAR COMPRESS: NORMAL
BH CV UPPER VENOUS RIGHT INTERNAL JUGULAR PHASIC: NORMAL
BH CV UPPER VENOUS RIGHT INTERNAL JUGULAR SPONT: NORMAL
BH CV UPPER VENOUS RIGHT RADIAL COMPRESS: NORMAL
BH CV UPPER VENOUS RIGHT SUBCLAVIAN AUGMENT: NORMAL
BH CV UPPER VENOUS RIGHT SUBCLAVIAN COMPRESS: NORMAL
BH CV UPPER VENOUS RIGHT SUBCLAVIAN PHASIC: NORMAL
BH CV UPPER VENOUS RIGHT SUBCLAVIAN SPONT: NORMAL
BH CV UPPER VENOUS RIGHT ULNAR COMPRESS: NORMAL
BH CV VAS PRELIMINARY FINDINGS SCRIPTING: 1
BUN SERPL-MCNC: 21 MG/DL (ref 6–20)
BUN/CREAT SERPL: 11 (ref 7–25)
CALCIUM SPEC-SCNC: 8.7 MG/DL (ref 8.6–10.5)
CHLORIDE SERPL-SCNC: 111 MMOL/L (ref 98–107)
CO2 SERPL-SCNC: 24.2 MMOL/L (ref 22–29)
CREAT SERPL-MCNC: 1.91 MG/DL (ref 0.57–1)
DEPRECATED RDW RBC AUTO: 49.2 FL (ref 37–54)
EGFRCR SERPLBLD CKD-EPI 2021: 31.1 ML/MIN/1.73
ERYTHROCYTE [DISTWIDTH] IN BLOOD BY AUTOMATED COUNT: 16.1 % (ref 12.3–15.4)
GLUCOSE BLDC GLUCOMTR-MCNC: 124 MG/DL (ref 65–99)
GLUCOSE BLDC GLUCOMTR-MCNC: 152 MG/DL (ref 65–99)
GLUCOSE BLDC GLUCOMTR-MCNC: 86 MG/DL (ref 65–99)
GLUCOSE BLDC GLUCOMTR-MCNC: 94 MG/DL (ref 65–99)
GLUCOSE SERPL-MCNC: 85 MG/DL (ref 65–99)
HCT VFR BLD AUTO: 31.5 % (ref 34–46.6)
HGB BLD-MCNC: 9.6 G/DL (ref 12–15.9)
MCH RBC QN AUTO: 25.5 PG (ref 26.6–33)
MCHC RBC AUTO-ENTMCNC: 30.5 G/DL (ref 31.5–35.7)
MCV RBC AUTO: 83.6 FL (ref 79–97)
PLATELET # BLD AUTO: 298 10*3/MM3 (ref 140–450)
PMV BLD AUTO: 9.2 FL (ref 6–12)
POTASSIUM SERPL-SCNC: 3.8 MMOL/L (ref 3.5–5.2)
RBC # BLD AUTO: 3.77 10*6/MM3 (ref 3.77–5.28)
SODIUM SERPL-SCNC: 143 MMOL/L (ref 136–145)
WBC NRBC COR # BLD AUTO: 14.62 10*3/MM3 (ref 3.4–10.8)

## 2025-07-31 PROCEDURE — 93971 EXTREMITY STUDY: CPT

## 2025-07-31 PROCEDURE — 63710000001 INSULIN GLARGINE PER 5 UNITS: Performed by: INTERNAL MEDICINE

## 2025-07-31 PROCEDURE — 63710000001 INSULIN REGULAR HUMAN PER 5 UNITS: Performed by: INTERNAL MEDICINE

## 2025-07-31 PROCEDURE — 82948 REAGENT STRIP/BLOOD GLUCOSE: CPT

## 2025-07-31 PROCEDURE — 25010000002 BUMETANIDE PER 0.5 MG: Performed by: INTERNAL MEDICINE

## 2025-07-31 PROCEDURE — 85027 COMPLETE CBC AUTOMATED: CPT | Performed by: INTERNAL MEDICINE

## 2025-07-31 PROCEDURE — 80048 BASIC METABOLIC PNL TOTAL CA: CPT | Performed by: INTERNAL MEDICINE

## 2025-07-31 PROCEDURE — 99232 SBSQ HOSP IP/OBS MODERATE 35: CPT | Performed by: STUDENT IN AN ORGANIZED HEALTH CARE EDUCATION/TRAINING PROGRAM

## 2025-07-31 PROCEDURE — 93971 EXTREMITY STUDY: CPT | Performed by: SURGERY

## 2025-07-31 PROCEDURE — 82948 REAGENT STRIP/BLOOD GLUCOSE: CPT | Performed by: INTERNAL MEDICINE

## 2025-07-31 PROCEDURE — 99232 SBSQ HOSP IP/OBS MODERATE 35: CPT | Performed by: INTERNAL MEDICINE

## 2025-07-31 RX ORDER — POTASSIUM CHLORIDE 1500 MG/1
40 TABLET, EXTENDED RELEASE ORAL ONCE
Status: COMPLETED | OUTPATIENT
Start: 2025-07-31 | End: 2025-07-31

## 2025-07-31 RX ORDER — PANTOPRAZOLE SODIUM 40 MG/1
40 TABLET, DELAYED RELEASE ORAL
Status: DISCONTINUED | OUTPATIENT
Start: 2025-07-31 | End: 2025-08-03 | Stop reason: HOSPADM

## 2025-07-31 RX ADMIN — Medication 1 TABLET: at 08:04

## 2025-07-31 RX ADMIN — LEVOTHYROXINE SODIUM 200 MCG: 0.1 TABLET ORAL at 08:04

## 2025-07-31 RX ADMIN — HYDROCODONE BITARTRATE AND ACETAMINOPHEN 1 TABLET: 5; 325 TABLET ORAL at 08:04

## 2025-07-31 RX ADMIN — PANTOPRAZOLE SODIUM 40 MG: 40 TABLET, DELAYED RELEASE ORAL at 16:50

## 2025-07-31 RX ADMIN — AMLODIPINE BESYLATE 5 MG: 5 TABLET ORAL at 08:05

## 2025-07-31 RX ADMIN — BUMETANIDE 3 MG: 0.25 INJECTION INTRAMUSCULAR; INTRAVENOUS at 21:42

## 2025-07-31 RX ADMIN — LEVOTHYROXINE SODIUM 75 MCG: 0.07 TABLET ORAL at 08:04

## 2025-07-31 RX ADMIN — POTASSIUM CHLORIDE 40 MEQ: 1500 TABLET, EXTENDED RELEASE ORAL at 08:04

## 2025-07-31 RX ADMIN — INSULIN GLARGINE 15 UNITS: 100 INJECTION, SOLUTION SUBCUTANEOUS at 21:43

## 2025-07-31 RX ADMIN — BUMETANIDE 3 MG: 0.25 INJECTION INTRAMUSCULAR; INTRAVENOUS at 14:51

## 2025-07-31 RX ADMIN — INSULIN HUMAN 2 UNITS: 100 INJECTION, SOLUTION PARENTERAL at 21:43

## 2025-07-31 RX ADMIN — Medication 10 ML: at 21:44

## 2025-07-31 RX ADMIN — Medication 10 ML: at 08:05

## 2025-07-31 RX ADMIN — PANTOPRAZOLE SODIUM 8 MG/HR: 40 INJECTION, POWDER, FOR SOLUTION INTRAVENOUS at 10:30

## 2025-07-31 RX ADMIN — GABAPENTIN 300 MG: 300 CAPSULE ORAL at 21:43

## 2025-07-31 RX ADMIN — BUMETANIDE 3 MG: 0.25 INJECTION INTRAMUSCULAR; INTRAVENOUS at 08:04

## 2025-07-31 RX ADMIN — PANTOPRAZOLE SODIUM 8 MG/HR: 40 INJECTION, POWDER, FOR SOLUTION INTRAVENOUS at 05:23

## 2025-07-31 RX ADMIN — GABAPENTIN 300 MG: 300 CAPSULE ORAL at 08:04

## 2025-07-31 RX ADMIN — PANTOPRAZOLE SODIUM 8 MG/HR: 40 INJECTION, POWDER, FOR SOLUTION INTRAVENOUS at 00:43

## 2025-08-01 LAB
ANION GAP SERPL CALCULATED.3IONS-SCNC: 10.6 MMOL/L (ref 5–15)
BUN SERPL-MCNC: 20 MG/DL (ref 6–20)
BUN/CREAT SERPL: 9.7 (ref 7–25)
CALCIUM SPEC-SCNC: 8.4 MG/DL (ref 8.6–10.5)
CHLORIDE SERPL-SCNC: 109 MMOL/L (ref 98–107)
CO2 SERPL-SCNC: 24.4 MMOL/L (ref 22–29)
CREAT SERPL-MCNC: 2.06 MG/DL (ref 0.57–1)
DEPRECATED RDW RBC AUTO: 48.7 FL (ref 37–54)
EGFRCR SERPLBLD CKD-EPI 2021: 28.4 ML/MIN/1.73
ERYTHROCYTE [DISTWIDTH] IN BLOOD BY AUTOMATED COUNT: 15.9 % (ref 12.3–15.4)
GLUCOSE BLDC GLUCOMTR-MCNC: 107 MG/DL (ref 65–99)
GLUCOSE BLDC GLUCOMTR-MCNC: 136 MG/DL (ref 65–99)
GLUCOSE BLDC GLUCOMTR-MCNC: 143 MG/DL (ref 65–99)
GLUCOSE BLDC GLUCOMTR-MCNC: 97 MG/DL (ref 65–99)
GLUCOSE SERPL-MCNC: 103 MG/DL (ref 65–99)
HCT VFR BLD AUTO: 29.2 % (ref 34–46.6)
HCT VFR BLD AUTO: 32.2 % (ref 34–46.6)
HGB BLD-MCNC: 10 G/DL (ref 12–15.9)
HGB BLD-MCNC: 8.8 G/DL (ref 12–15.9)
MCH RBC QN AUTO: 25.4 PG (ref 26.6–33)
MCHC RBC AUTO-ENTMCNC: 30.1 G/DL (ref 31.5–35.7)
MCV RBC AUTO: 84.4 FL (ref 79–97)
PLATELET # BLD AUTO: 289 10*3/MM3 (ref 140–450)
PMV BLD AUTO: 9.3 FL (ref 6–12)
POTASSIUM SERPL-SCNC: 4.1 MMOL/L (ref 3.5–5.2)
RBC # BLD AUTO: 3.46 10*6/MM3 (ref 3.77–5.28)
SODIUM SERPL-SCNC: 144 MMOL/L (ref 136–145)
WBC NRBC COR # BLD AUTO: 9.3 10*3/MM3 (ref 3.4–10.8)

## 2025-08-01 PROCEDURE — 99232 SBSQ HOSP IP/OBS MODERATE 35: CPT | Performed by: NURSE PRACTITIONER

## 2025-08-01 PROCEDURE — 92526 ORAL FUNCTION THERAPY: CPT

## 2025-08-01 PROCEDURE — 80048 BASIC METABOLIC PNL TOTAL CA: CPT | Performed by: INTERNAL MEDICINE

## 2025-08-01 PROCEDURE — 82948 REAGENT STRIP/BLOOD GLUCOSE: CPT

## 2025-08-01 PROCEDURE — 85027 COMPLETE CBC AUTOMATED: CPT | Performed by: INTERNAL MEDICINE

## 2025-08-01 PROCEDURE — 85014 HEMATOCRIT: CPT | Performed by: HOSPITALIST

## 2025-08-01 PROCEDURE — 82948 REAGENT STRIP/BLOOD GLUCOSE: CPT | Performed by: INTERNAL MEDICINE

## 2025-08-01 PROCEDURE — 85018 HEMOGLOBIN: CPT | Performed by: HOSPITALIST

## 2025-08-01 RX ORDER — TORSEMIDE 20 MG/1
40 TABLET ORAL DAILY
Status: DISCONTINUED | OUTPATIENT
Start: 2025-08-01 | End: 2025-08-02

## 2025-08-01 RX ADMIN — AMLODIPINE BESYLATE 5 MG: 5 TABLET ORAL at 09:52

## 2025-08-01 RX ADMIN — GABAPENTIN 300 MG: 300 CAPSULE ORAL at 21:29

## 2025-08-01 RX ADMIN — TORSEMIDE 40 MG: 20 TABLET ORAL at 09:53

## 2025-08-01 RX ADMIN — LEVOTHYROXINE SODIUM 200 MCG: 0.1 TABLET ORAL at 09:56

## 2025-08-01 RX ADMIN — PANTOPRAZOLE SODIUM 40 MG: 40 TABLET, DELAYED RELEASE ORAL at 09:52

## 2025-08-01 RX ADMIN — LEVOTHYROXINE SODIUM 75 MCG: 0.07 TABLET ORAL at 09:55

## 2025-08-01 RX ADMIN — Medication 1 TABLET: at 09:54

## 2025-08-01 RX ADMIN — Medication 10 ML: at 09:50

## 2025-08-01 RX ADMIN — GABAPENTIN 300 MG: 300 CAPSULE ORAL at 09:52

## 2025-08-01 RX ADMIN — Medication 10 ML: at 21:29

## 2025-08-01 RX ADMIN — PANTOPRAZOLE SODIUM 40 MG: 40 TABLET, DELAYED RELEASE ORAL at 18:29

## 2025-08-02 LAB
ANION GAP SERPL CALCULATED.3IONS-SCNC: 9.6 MMOL/L (ref 5–15)
BACTERIA FLD CULT: NORMAL
BACTERIA SPEC ANAEROBE CULT: NORMAL
BUN SERPL-MCNC: 25 MG/DL (ref 6–20)
BUN/CREAT SERPL: 10.3 (ref 7–25)
CALCIUM SPEC-SCNC: 8.1 MG/DL (ref 8.6–10.5)
CHLORIDE SERPL-SCNC: 109 MMOL/L (ref 98–107)
CO2 SERPL-SCNC: 25.4 MMOL/L (ref 22–29)
CREAT SERPL-MCNC: 2.43 MG/DL (ref 0.57–1)
EGFRCR SERPLBLD CKD-EPI 2021: 23.3 ML/MIN/1.73
GLUCOSE BLDC GLUCOMTR-MCNC: 123 MG/DL (ref 65–99)
GLUCOSE BLDC GLUCOMTR-MCNC: 157 MG/DL (ref 65–99)
GLUCOSE BLDC GLUCOMTR-MCNC: 172 MG/DL (ref 65–99)
GLUCOSE BLDC GLUCOMTR-MCNC: 178 MG/DL (ref 65–99)
GLUCOSE SERPL-MCNC: 122 MG/DL (ref 65–99)
GRAM STN SPEC: NORMAL
HCT VFR BLD AUTO: 29.2 % (ref 34–46.6)
HGB BLD-MCNC: 8.8 G/DL (ref 12–15.9)
MAGNESIUM SERPL-MCNC: 1.8 MG/DL (ref 1.6–2.6)
PHOSPHATE SERPL-MCNC: 4.1 MG/DL (ref 2.5–4.5)
POTASSIUM SERPL-SCNC: 4 MMOL/L (ref 3.5–5.2)
SODIUM SERPL-SCNC: 144 MMOL/L (ref 136–145)

## 2025-08-02 PROCEDURE — 84100 ASSAY OF PHOSPHORUS: CPT

## 2025-08-02 PROCEDURE — 99232 SBSQ HOSP IP/OBS MODERATE 35: CPT | Performed by: NURSE PRACTITIONER

## 2025-08-02 PROCEDURE — 63710000001 INSULIN REGULAR HUMAN PER 5 UNITS: Performed by: INTERNAL MEDICINE

## 2025-08-02 PROCEDURE — 85014 HEMATOCRIT: CPT | Performed by: HOSPITALIST

## 2025-08-02 PROCEDURE — 80048 BASIC METABOLIC PNL TOTAL CA: CPT | Performed by: INTERNAL MEDICINE

## 2025-08-02 PROCEDURE — 82948 REAGENT STRIP/BLOOD GLUCOSE: CPT | Performed by: INTERNAL MEDICINE

## 2025-08-02 PROCEDURE — 83735 ASSAY OF MAGNESIUM: CPT

## 2025-08-02 PROCEDURE — 63710000001 INSULIN GLARGINE PER 5 UNITS: Performed by: INTERNAL MEDICINE

## 2025-08-02 PROCEDURE — 85018 HEMOGLOBIN: CPT | Performed by: HOSPITALIST

## 2025-08-02 PROCEDURE — 82948 REAGENT STRIP/BLOOD GLUCOSE: CPT

## 2025-08-02 RX ORDER — TORSEMIDE 20 MG/1
20 TABLET ORAL DAILY
Status: DISCONTINUED | OUTPATIENT
Start: 2025-08-02 | End: 2025-08-03 | Stop reason: HOSPADM

## 2025-08-02 RX ORDER — AMLODIPINE BESYLATE 5 MG/1
5 TABLET ORAL ONCE
Status: COMPLETED | OUTPATIENT
Start: 2025-08-02 | End: 2025-08-02

## 2025-08-02 RX ORDER — AMLODIPINE BESYLATE 10 MG/1
10 TABLET ORAL
Status: DISCONTINUED | OUTPATIENT
Start: 2025-08-03 | End: 2025-08-03 | Stop reason: HOSPADM

## 2025-08-02 RX ADMIN — HYDROCODONE BITARTRATE AND ACETAMINOPHEN 1 TABLET: 5; 325 TABLET ORAL at 10:09

## 2025-08-02 RX ADMIN — APIXABAN 5 MG: 5 TABLET, FILM COATED ORAL at 21:01

## 2025-08-02 RX ADMIN — PANTOPRAZOLE SODIUM 40 MG: 40 TABLET, DELAYED RELEASE ORAL at 10:09

## 2025-08-02 RX ADMIN — HYDROXYZINE HYDROCHLORIDE 50 MG: 25 TABLET, FILM COATED ORAL at 18:11

## 2025-08-02 RX ADMIN — TORSEMIDE 20 MG: 20 TABLET ORAL at 10:10

## 2025-08-02 RX ADMIN — LEVOTHYROXINE SODIUM 200 MCG: 0.1 TABLET ORAL at 10:08

## 2025-08-02 RX ADMIN — ACETAMINOPHEN 325MG 650 MG: 325 TABLET ORAL at 21:01

## 2025-08-02 RX ADMIN — GABAPENTIN 300 MG: 300 CAPSULE ORAL at 10:09

## 2025-08-02 RX ADMIN — Medication 10 ML: at 10:10

## 2025-08-02 RX ADMIN — GABAPENTIN 300 MG: 300 CAPSULE ORAL at 21:01

## 2025-08-02 RX ADMIN — LEVOTHYROXINE SODIUM 75 MCG: 0.07 TABLET ORAL at 10:08

## 2025-08-02 RX ADMIN — INSULIN GLARGINE 15 UNITS: 100 INJECTION, SOLUTION SUBCUTANEOUS at 21:00

## 2025-08-02 RX ADMIN — INSULIN HUMAN 2 UNITS: 100 INJECTION, SOLUTION PARENTERAL at 17:51

## 2025-08-02 RX ADMIN — PANTOPRAZOLE SODIUM 40 MG: 40 TABLET, DELAYED RELEASE ORAL at 17:51

## 2025-08-02 RX ADMIN — Medication 1 TABLET: at 10:09

## 2025-08-02 RX ADMIN — AMLODIPINE BESYLATE 5 MG: 5 TABLET ORAL at 10:08

## 2025-08-02 RX ADMIN — AMLODIPINE BESYLATE 5 MG: 5 TABLET ORAL at 14:05

## 2025-08-02 RX ADMIN — INSULIN HUMAN 2 UNITS: 100 INJECTION, SOLUTION PARENTERAL at 14:05

## 2025-08-02 RX ADMIN — INSULIN HUMAN 2 UNITS: 100 INJECTION, SOLUTION PARENTERAL at 21:00

## 2025-08-03 VITALS
WEIGHT: 268.96 LBS | TEMPERATURE: 97.5 F | OXYGEN SATURATION: 100 % | SYSTOLIC BLOOD PRESSURE: 144 MMHG | BODY MASS INDEX: 44.81 KG/M2 | RESPIRATION RATE: 18 BRPM | DIASTOLIC BLOOD PRESSURE: 84 MMHG | HEART RATE: 83 BPM | HEIGHT: 65 IN

## 2025-08-03 LAB
ALBUMIN SERPL-MCNC: 2.4 G/DL (ref 3.5–5.2)
ANION GAP SERPL CALCULATED.3IONS-SCNC: 11 MMOL/L (ref 5–15)
BUN SERPL-MCNC: 30 MG/DL (ref 6–20)
BUN/CREAT SERPL: 12.4 (ref 7–25)
CALCIUM SPEC-SCNC: 8.2 MG/DL (ref 8.6–10.5)
CHLORIDE SERPL-SCNC: 105 MMOL/L (ref 98–107)
CO2 SERPL-SCNC: 25 MMOL/L (ref 22–29)
CREAT SERPL-MCNC: 2.42 MG/DL (ref 0.57–1)
EGFRCR SERPLBLD CKD-EPI 2021: 23.4 ML/MIN/1.73
GLUCOSE BLDC GLUCOMTR-MCNC: 118 MG/DL (ref 65–99)
GLUCOSE BLDC GLUCOMTR-MCNC: 144 MG/DL (ref 65–99)
GLUCOSE SERPL-MCNC: 131 MG/DL (ref 65–99)
HCT VFR BLD AUTO: 28.1 % (ref 34–46.6)
HGB BLD-MCNC: 8.8 G/DL (ref 12–15.9)
MAGNESIUM SERPL-MCNC: 1.9 MG/DL (ref 1.6–2.6)
PHOSPHATE SERPL-MCNC: 4.4 MG/DL (ref 2.5–4.5)
POTASSIUM SERPL-SCNC: 3.9 MMOL/L (ref 3.5–5.2)
SODIUM SERPL-SCNC: 141 MMOL/L (ref 136–145)

## 2025-08-03 PROCEDURE — 99232 SBSQ HOSP IP/OBS MODERATE 35: CPT | Performed by: NURSE PRACTITIONER

## 2025-08-03 PROCEDURE — 80069 RENAL FUNCTION PANEL: CPT | Performed by: INTERNAL MEDICINE

## 2025-08-03 PROCEDURE — 82948 REAGENT STRIP/BLOOD GLUCOSE: CPT | Performed by: INTERNAL MEDICINE

## 2025-08-03 PROCEDURE — 85018 HEMOGLOBIN: CPT | Performed by: HOSPITALIST

## 2025-08-03 PROCEDURE — 83735 ASSAY OF MAGNESIUM: CPT | Performed by: INTERNAL MEDICINE

## 2025-08-03 PROCEDURE — 85014 HEMATOCRIT: CPT | Performed by: HOSPITALIST

## 2025-08-03 RX ORDER — AMLODIPINE BESYLATE 10 MG/1
10 TABLET ORAL
Qty: 30 TABLET | Refills: 0 | Status: SHIPPED | OUTPATIENT
Start: 2025-08-03 | End: 2025-08-03

## 2025-08-03 RX ORDER — TORSEMIDE 20 MG/1
20 TABLET ORAL DAILY
Qty: 30 TABLET | Refills: 0 | Status: SHIPPED | OUTPATIENT
Start: 2025-08-03

## 2025-08-03 RX ORDER — INSULIN DEGLUDEC 200 U/ML
30 INJECTION, SOLUTION SUBCUTANEOUS DAILY
Start: 2025-08-03

## 2025-08-03 RX ORDER — AMLODIPINE BESYLATE 10 MG/1
10 TABLET ORAL
Qty: 30 TABLET | Refills: 0 | Status: SHIPPED | OUTPATIENT
Start: 2025-08-03

## 2025-08-03 RX ORDER — TORSEMIDE 20 MG/1
20 TABLET ORAL DAILY
Qty: 30 TABLET | Refills: 0 | Status: SHIPPED | OUTPATIENT
Start: 2025-08-03 | End: 2025-08-03

## 2025-08-03 RX ORDER — LOPERAMIDE HYDROCHLORIDE 2 MG/1
2 CAPSULE ORAL ONCE
Status: COMPLETED | OUTPATIENT
Start: 2025-08-03 | End: 2025-08-03

## 2025-08-03 RX ORDER — INSULIN ASPART 100 [IU]/ML
8-10 INJECTION, SOLUTION INTRAVENOUS; SUBCUTANEOUS
Start: 2025-08-03

## 2025-08-03 RX ADMIN — LEVOTHYROXINE SODIUM 75 MCG: 0.07 TABLET ORAL at 10:07

## 2025-08-03 RX ADMIN — APIXABAN 5 MG: 5 TABLET, FILM COATED ORAL at 10:08

## 2025-08-03 RX ADMIN — GABAPENTIN 300 MG: 300 CAPSULE ORAL at 10:07

## 2025-08-03 RX ADMIN — TORSEMIDE 20 MG: 20 TABLET ORAL at 10:08

## 2025-08-03 RX ADMIN — LOPERAMIDE HYDROCHLORIDE 2 MG: 2 CAPSULE ORAL at 13:18

## 2025-08-03 RX ADMIN — HYDROCODONE BITARTRATE AND ACETAMINOPHEN 1 TABLET: 5; 325 TABLET ORAL at 10:07

## 2025-08-03 RX ADMIN — Medication 10 ML: at 00:24

## 2025-08-03 RX ADMIN — PANTOPRAZOLE SODIUM 40 MG: 40 TABLET, DELAYED RELEASE ORAL at 10:08

## 2025-08-03 RX ADMIN — Medication 1 TABLET: at 10:09

## 2025-08-03 RX ADMIN — AMLODIPINE BESYLATE 10 MG: 10 TABLET ORAL at 10:07

## 2025-08-03 RX ADMIN — LEVOTHYROXINE SODIUM 200 MCG: 0.1 TABLET ORAL at 10:07

## 2025-08-04 ENCOUNTER — READMISSION MANAGEMENT (OUTPATIENT)
Dept: CALL CENTER | Facility: HOSPITAL | Age: 55
End: 2025-08-04
Payer: MEDICARE

## 2025-08-04 ENCOUNTER — TELEPHONE (OUTPATIENT)
Age: 55
End: 2025-08-04
Payer: MEDICARE

## 2025-08-05 LAB
CYTO UR: NORMAL
LAB AP CASE REPORT: NORMAL
LAB AP DIAGNOSIS COMMENT: NORMAL
PATH REPORT.ADDENDUM SPEC: NORMAL
PATH REPORT.FINAL DX SPEC: NORMAL
PATH REPORT.GROSS SPEC: NORMAL

## 2025-08-06 ENCOUNTER — READMISSION MANAGEMENT (OUTPATIENT)
Dept: CALL CENTER | Facility: HOSPITAL | Age: 55
End: 2025-08-06
Payer: MEDICARE

## 2025-08-07 ENCOUNTER — TELEPHONE (OUTPATIENT)
Dept: CARDIOLOGY | Age: 55
End: 2025-08-07
Payer: MEDICARE

## 2025-08-23 LAB — FUNGUS WND CULT: NORMAL

## (undated) DEVICE — TRAP FLD MINIVAC MEGADYNE 100ML

## (undated) DEVICE — CULT AER/ANAEROB FASTIDIOUS BACT

## (undated) DEVICE — PATIENT RETURN ELECTRODE, SINGLE-USE, CONTACT QUALITY MONITORING, ADULT, WITH 9FT CORD, FOR PATIENTS WEIGING OVER 33LBS. (15KG): Brand: MEGADYNE

## (undated) DEVICE — GLV SURG SENSICARE POLYISPRN W/ALOE PF LF 6.5 GRN STRL

## (undated) DEVICE — LOU MINOR PROCEDURE: Brand: MEDLINE INDUSTRIES, INC.

## (undated) DEVICE — ADAPT CLN BIOGUARD AIR/H2O DISP

## (undated) DEVICE — BLCK/BITE BLOX W/DENTL/RIM W/STRAP 54F

## (undated) DEVICE — BANDAGE,GAUZE,BULKEE II,4.5"X4.1YD,STRL: Brand: MEDLINE

## (undated) DEVICE — GLV SURG PREMIERPRO ORTHO LTX PF SZ7 BRN

## (undated) DEVICE — GOWN,SIRUS,NON REINFRCD,LARGE,SET IN SL: Brand: MEDLINE

## (undated) DEVICE — SPNG LAP 18X18IN LF STRL PK/5

## (undated) DEVICE — APPL CHLORAPREP HI/LITE 26ML ORNG

## (undated) DEVICE — PAD,ABDOMINAL,8"X10",ST,LF: Brand: MEDLINE

## (undated) DEVICE — DRSNG PAD ABD 8X10IN STRL

## (undated) DEVICE — THE SINGLE USE ETRAP – POLYP TRAP IS USED FOR SUCTION RETRIEVAL OF ENDOSCOPICALLY REMOVED POLYPS.: Brand: ETRAP

## (undated) DEVICE — SNAR POLYP CAPTIVATOR RND STFF 2.4 240CM 10MM 1P/U

## (undated) DEVICE — TOWEL,OR,DSP,ST,BLUE,STD,4/PK,20PK/CS: Brand: MEDLINE

## (undated) DEVICE — LN SMPL CO2 SHTRM SD STREAM W/M LUER

## (undated) DEVICE — AGNT LIFT ENDO ASCENDO SUBMUCOSAL PREFIL/SYR 5ML 1P/U STRL

## (undated) DEVICE — 3M™ IOBAN™ 2 ANTIMICROBIAL INCISE DRAPE 6650EZ: Brand: IOBAN™ 2

## (undated) DEVICE — Device: Brand: BLACK EYE

## (undated) DEVICE — SUT MNCRYL 3/0 PS2 18IN MCP497G

## (undated) DEVICE — PREMIUM WET SKIN PREP TRAY: Brand: MEDLINE INDUSTRIES, INC.

## (undated) DEVICE — TBG PENCL TELESCP MEGADYNE SMOKE EVAC 10FT

## (undated) DEVICE — SINGLE-USE BIOPSY FORCEPS: Brand: RADIAL JAW 4

## (undated) DEVICE — TUBING, SUCTION, 1/4" X 10', STRAIGHT: Brand: MEDLINE

## (undated) DEVICE — SENSR O2 OXIMAX FNGR A/ 18IN NONSTR

## (undated) DEVICE — GLV SURG BIOGEL LTX PF 6

## (undated) DEVICE — VIOLET BRAIDED (POLYGLACTIN 910), SYNTHETIC ABSORBABLE SUTURE: Brand: COATED VICRYL

## (undated) DEVICE — CANN O2 ETCO2 FITS ALL CONN CO2 SMPL A/ 7IN DISP LF

## (undated) DEVICE — SNAR POLYP CAPTIVATOR HEX 27MM 240CM

## (undated) DEVICE — THE CARR-LOCKE INJECTION NEEDLE IS A SINGLE USE, DISPOSABLE, FLEXIBLE SHEATH INJECTION NEEDLE USED FOR THE INJECTION OF VARIOUS TYPES OF MEDIA THROUGH FLEXIBLE ENDOSCOPES.

## (undated) DEVICE — ERBE NESSY®PLATE 170 SPLIT; 168CM²; CABLE 3M: Brand: ERBE

## (undated) DEVICE — SNAR POLYP SENSATION STDOVL 27 240 BX40

## (undated) DEVICE — KT ORCA ORCAPOD DISP STRL